# Patient Record
Sex: FEMALE | Race: WHITE
[De-identification: names, ages, dates, MRNs, and addresses within clinical notes are randomized per-mention and may not be internally consistent; named-entity substitution may affect disease eponyms.]

---

## 2021-03-23 ENCOUNTER — HOSPITAL ENCOUNTER (OUTPATIENT)
Dept: HOSPITAL 47 - CATHCVL | Age: 66
End: 2021-03-23
Attending: INTERNAL MEDICINE
Payer: MEDICARE

## 2021-03-23 VITALS — TEMPERATURE: 98 F

## 2021-03-23 VITALS — RESPIRATION RATE: 16 BRPM

## 2021-03-23 VITALS — HEART RATE: 80 BPM | DIASTOLIC BLOOD PRESSURE: 74 MMHG | SYSTOLIC BLOOD PRESSURE: 168 MMHG

## 2021-03-23 DIAGNOSIS — E78.5: ICD-10-CM

## 2021-03-23 DIAGNOSIS — Z95.828: ICD-10-CM

## 2021-03-23 DIAGNOSIS — Z88.8: ICD-10-CM

## 2021-03-23 DIAGNOSIS — Z88.5: ICD-10-CM

## 2021-03-23 DIAGNOSIS — Z95.5: ICD-10-CM

## 2021-03-23 DIAGNOSIS — I34.0: ICD-10-CM

## 2021-03-23 DIAGNOSIS — Z88.1: ICD-10-CM

## 2021-03-23 DIAGNOSIS — Z79.899: ICD-10-CM

## 2021-03-23 DIAGNOSIS — Z88.4: ICD-10-CM

## 2021-03-23 DIAGNOSIS — I10: ICD-10-CM

## 2021-03-23 DIAGNOSIS — I27.20: ICD-10-CM

## 2021-03-23 DIAGNOSIS — Z91.041: ICD-10-CM

## 2021-03-23 DIAGNOSIS — Z72.0: ICD-10-CM

## 2021-03-23 DIAGNOSIS — I73.9: Primary | ICD-10-CM

## 2021-03-23 DIAGNOSIS — R06.02: ICD-10-CM

## 2021-03-23 DIAGNOSIS — Z91.048: ICD-10-CM

## 2021-03-23 DIAGNOSIS — E78.00: ICD-10-CM

## 2021-03-23 DIAGNOSIS — Z79.02: ICD-10-CM

## 2021-03-23 DIAGNOSIS — Z88.6: ICD-10-CM

## 2021-03-23 DIAGNOSIS — I77.89: ICD-10-CM

## 2021-03-23 DIAGNOSIS — I25.10: ICD-10-CM

## 2021-03-23 LAB
ANION GAP SERPL CALC-SCNC: 12 MMOL/L
BASOPHILS # BLD AUTO: 0.1 K/UL (ref 0–0.2)
BASOPHILS NFR BLD AUTO: 1 %
BUN SERPL-SCNC: 21 MG/DL (ref 7–17)
CALCIUM SPEC-MCNC: 9.7 MG/DL (ref 8.4–10.2)
CHLORIDE SERPL-SCNC: 108 MMOL/L (ref 98–107)
CO2 SERPL-SCNC: 21 MMOL/L (ref 22–30)
EOSINOPHIL # BLD AUTO: 0.3 K/UL (ref 0–0.7)
EOSINOPHIL NFR BLD AUTO: 4 %
ERYTHROCYTE [DISTWIDTH] IN BLOOD BY AUTOMATED COUNT: 4.99 M/UL (ref 3.8–5.4)
ERYTHROCYTE [DISTWIDTH] IN BLOOD: 15.9 % (ref 11.5–15.5)
GLUCOSE SERPL-MCNC: 106 MG/DL (ref 74–99)
HCT VFR BLD AUTO: 39.8 % (ref 34–46)
HGB BLD-MCNC: 13.8 GM/DL (ref 11.4–16)
LYMPHOCYTES # SPEC AUTO: 1.7 K/UL (ref 1–4.8)
LYMPHOCYTES NFR SPEC AUTO: 26 %
MCH RBC QN AUTO: 27.7 PG (ref 25–35)
MCHC RBC AUTO-ENTMCNC: 34.8 G/DL (ref 31–37)
MCV RBC AUTO: 79.7 FL (ref 80–100)
MONOCYTES # BLD AUTO: 0.4 K/UL (ref 0–1)
MONOCYTES NFR BLD AUTO: 6 %
NEUTROPHILS # BLD AUTO: 4.1 K/UL (ref 1.3–7.7)
NEUTROPHILS NFR BLD AUTO: 62 %
PLATELET # BLD AUTO: 170 K/UL (ref 150–450)
POTASSIUM SERPL-SCNC: 4 MMOL/L (ref 3.5–5.1)
SODIUM SERPL-SCNC: 141 MMOL/L (ref 137–145)
WBC # BLD AUTO: 6.6 K/UL (ref 3.8–10.6)

## 2021-03-23 PROCEDURE — 75625 CONTRAST EXAM ABDOMINL AORTA: CPT

## 2021-03-23 PROCEDURE — 85025 COMPLETE CBC W/AUTO DIFF WBC: CPT

## 2021-03-23 PROCEDURE — 36200 PLACE CATHETER IN AORTA: CPT

## 2021-03-23 PROCEDURE — 80048 BASIC METABOLIC PNL TOTAL CA: CPT

## 2021-03-23 PROCEDURE — 75716 ARTERY X-RAYS ARMS/LEGS: CPT

## 2021-03-23 NOTE — AN
ANGIOGRAPHY REPORT



DATE OF SERVICE:

March 23, 2021



PERFORMING PHYSICIAN:

Chris Palacios MD



PROCEDURE PERFORMED:

1. An abdominal aortogram.

2. Bilateral lower extremities runoff.



INDICATION:

Left leg intermittent claudication with evidence of abnormal arterial duplex study in

this 65-year-old female patient who is known to have peripheral arterial disease and

prior aortobifemoral bypass.



APPROACH:

Right brachial artery.



COMPLICATION:

None.



LEVEL OF SEDATION:

Moderate with sedation length of 30 minutes.



PROCEDURE DESCRIPTION:

After obtaining an informed consent, the patient was brought to the cardiac cath lab.

Initially, we attempted to access the right common femoral artery, but that was

unsuccessful and because of that I accessed the right brachial artery.  I placed a 5-

Vietnamese sheath at the right brachial artery.



After that I did an abdominal aortogram and bilateral lower extremities runoff using 5-

Vietnamese pigtail catheter which was initially placed at the level of the renal arteries

and it was advanced down in the aortobifemoral bypass.



The procedure was completed without any complication.



SELECTIVE PERIPHERAL ANGIOGRAM:

1. The aortobifemoral bypass appeared to be patent.

2. Common Femoral Arteries: The right common femoral artery has mild disease only and

    the left common femoral artery appeared to have mild disease only as well.

3. Profunda: Both profunda are patent.

4. SFA: The right SFA appeared to be angiographically normal and the ostial of the

    left SFA has a lesion appeared to be in the range of 99.9%.

5. Popliteal: Both popliteals appeared to be patent.

6. Below The Knee: The arteries below the knee were not well visualized because of the

    patient motion.



CONCLUSION:

1. Patent aortobifemoral bypass.

2. Critical disease involving the ostial left SFA.



POSTPROCEDURE MANAGEMENT:

PTA of the left SFA to be performed either from an arm approach or from a foot

approach.





MMODL / IJN: [id number] / Job#: [id number]

## 2021-04-14 ENCOUNTER — HOSPITAL ENCOUNTER (OUTPATIENT)
Dept: HOSPITAL 47 - CATHCVL | Age: 66
Discharge: HOME | End: 2021-04-14
Attending: INTERNAL MEDICINE
Payer: OTHER GOVERNMENT

## 2021-04-14 VITALS — BODY MASS INDEX: 39 KG/M2

## 2021-04-14 VITALS — TEMPERATURE: 97 F | RESPIRATION RATE: 16 BRPM

## 2021-04-14 VITALS — HEART RATE: 67 BPM | DIASTOLIC BLOOD PRESSURE: 74 MMHG | SYSTOLIC BLOOD PRESSURE: 119 MMHG

## 2021-04-14 DIAGNOSIS — Z88.8: ICD-10-CM

## 2021-04-14 DIAGNOSIS — I70.212: Primary | ICD-10-CM

## 2021-04-14 DIAGNOSIS — I25.10: ICD-10-CM

## 2021-04-14 DIAGNOSIS — Z88.6: ICD-10-CM

## 2021-04-14 DIAGNOSIS — Z88.1: ICD-10-CM

## 2021-04-14 DIAGNOSIS — Z79.899: ICD-10-CM

## 2021-04-14 DIAGNOSIS — Z88.4: ICD-10-CM

## 2021-04-14 DIAGNOSIS — I10: ICD-10-CM

## 2021-04-14 DIAGNOSIS — E78.5: ICD-10-CM

## 2021-04-14 DIAGNOSIS — Z95.5: ICD-10-CM

## 2021-04-14 DIAGNOSIS — Z79.02: ICD-10-CM

## 2021-04-14 DIAGNOSIS — Z72.0: ICD-10-CM

## 2021-04-14 DIAGNOSIS — Z88.5: ICD-10-CM

## 2021-04-14 DIAGNOSIS — Z91.048: ICD-10-CM

## 2021-04-14 PROCEDURE — 37225: CPT

## 2021-04-14 PROCEDURE — 37252 INTRVASC US NONCORONARY 1ST: CPT

## 2021-04-14 PROCEDURE — 87635 SARS-COV-2 COVID-19 AMP PRB: CPT

## 2021-04-14 NOTE — PCN
PROCEDURE NOTE



DATE OF SERVICE:

04/14/2021



PERFORMING PHYSICIAN:

Chris Palacios M.D.



PROCEDURES PERFORMED:

1. Atherectomy of the left SFA using the TurboHawk device.

2. Intravascular ultrasound (IVUS) of the left SFA.

3. Successful balloon angioplasty of the left SFA using a 5 x 40 mm Inpact drug-coated

    balloon with an excellent angiographic result and reduction of stenosis from 99% to

    0%.

4. Selective angiogram of the left SFA and left posterior tibial artery.

5. Ultrasound-guided access of the left posterior tibial artery.



INDICATION:

This is a very pleasant 65-year-old female patient with peripheral arterial disease and

prior aortobifemoral bypass who was experiencing left leg intermittent claudication and

underwent an angiogram that revealed critical disease involving the left SFA.



She was brought today for intervention.



APPROACH:

Left posterior tibial artery.



COMPLICATIONS:

None.



LEVEL OF SEDATION:

Moderate, with sedation length of 42 minutes.



PROCEDURE DESCRIPTION:

After obtaining informed consent, the patient was brought to the cardiac cath lab.  The

left posterior tibial artery was accessed using micropuncture technique under

ultrasound guidance. Then I placed a slender 11 cm with a 5 mm outer diameter and 6 mm

inner diameter over a 0.018 wire in the left posterior tibial artery.



At that point, a cocktail drip of heparin as well as nitroglycerin as well as verapamil

was initiated.



I did selective left posterior tibial artery angiogram to prove that I was in the true

lumen.



Subsequently I did cross the lesion in the left SFA using an 0.014 wire.  I did

intravascular ultrasound which revealed a diameter of 5 mm.  Atherectomy was performed

using the TurboHawk device before balloon angioplasty initially was performed using a 4

mm balloon and subsequently a 5 mm balloon.  The following angiogram showed an

excellent angiographic result and the procedure was completed without any complication.



POST-PROCEDURE MANAGEMENT:

1. Dual anti-platelet therapy.

2. Risk factor modifications.

3. Follow up with the patient.





MMODL / IJN: [id number] / Job#: [id number]

## 2021-04-14 NOTE — IR
Fluoroscopy

 

HISTORY: Pain in leg, peripheral vascular occlusive disease

 

6.6 minutes fluoroscopy time supplied to the referring clinician.  177 intraoperative C-arm images do
cument the procedure. See dictated report from cardiology.

## 2021-04-16 ENCOUNTER — HOSPITAL ENCOUNTER (EMERGENCY)
Dept: HOSPITAL 47 - EC | Age: 66
Discharge: HOME | End: 2021-04-16
Payer: OTHER GOVERNMENT

## 2021-04-16 VITALS — RESPIRATION RATE: 16 BRPM | SYSTOLIC BLOOD PRESSURE: 152 MMHG | DIASTOLIC BLOOD PRESSURE: 100 MMHG | HEART RATE: 60 BPM

## 2021-04-16 VITALS — TEMPERATURE: 97.7 F

## 2021-04-16 DIAGNOSIS — Z88.6: ICD-10-CM

## 2021-04-16 DIAGNOSIS — Z88.4: ICD-10-CM

## 2021-04-16 DIAGNOSIS — I25.10: ICD-10-CM

## 2021-04-16 DIAGNOSIS — M79.605: Primary | ICD-10-CM

## 2021-04-16 DIAGNOSIS — G47.33: ICD-10-CM

## 2021-04-16 DIAGNOSIS — E78.5: ICD-10-CM

## 2021-04-16 DIAGNOSIS — Z99.89: ICD-10-CM

## 2021-04-16 DIAGNOSIS — F41.9: ICD-10-CM

## 2021-04-16 DIAGNOSIS — I10: ICD-10-CM

## 2021-04-16 DIAGNOSIS — F32.9: ICD-10-CM

## 2021-04-16 DIAGNOSIS — Z85.038: ICD-10-CM

## 2021-04-16 DIAGNOSIS — Z88.1: ICD-10-CM

## 2021-04-16 DIAGNOSIS — Z88.8: ICD-10-CM

## 2021-04-16 DIAGNOSIS — Z91.048: ICD-10-CM

## 2021-04-16 DIAGNOSIS — Z79.899: ICD-10-CM

## 2021-04-16 DIAGNOSIS — Z91.041: ICD-10-CM

## 2021-04-16 DIAGNOSIS — Z88.5: ICD-10-CM

## 2021-04-16 DIAGNOSIS — K21.9: ICD-10-CM

## 2021-04-16 DIAGNOSIS — Z87.891: ICD-10-CM

## 2021-04-16 PROCEDURE — 99283 EMERGENCY DEPT VISIT LOW MDM: CPT

## 2021-04-16 NOTE — US
EXAMINATION TYPE: US venous doppler duplex LE LT

 

DATE OF EXAM: 4/16/2021 7:48 PM

 

COMPARISON: NONE

 

CLINICAL HISTORY: recent procedure, pain in calf. Recent procedure 4/14/2021, pain in calf. Limited h
istory from patient. No hx of DVT. Patient on blood thinners.

 

SIDE PERFORMED: Left  

 

TECHNIQUE:  The lower extremity deep venous system is examined utilizing real time linear array sonog
radha with graded compression, doppler sonography and color-flow sonography.

 

VESSELS IMAGED:

Common Femoral Vein

Deep Femoral Vein

Greater Saphenous Vein *

Femoral Vein

Popliteal Vein

Small Saphenous Vein *

Proximal Calf Veins

(* superficial vessels)

 

Limited due to patient body habitus and swelling.

 

Left Leg:  There appears to be a color defect in the mid and distal femoral vein.  All veins imaged a
ppear to compress and to show color flow.  Possible chronic thrombus within the mid and distal femora
l vein?

 

At the patient's area of pain within the calf, there appears to be a heterogeneous, hypoechoic area n
ear calf vessels.  This area measures: 1.6 x 1.0 x 0.7 cm.

 

 

IMPRESSION:  Irregular wall of the femoral vein suggestive of chronic deep vein thrombosis. No eviden
ce of acute deep vein thrombosis.

 

Small hypoechoic complex area in the calf could be a small hematoma.

## 2021-04-16 NOTE — CDI
Outpatient Documentation Clarification From



Date:  4/16/21



CDS/ Name:  Diane Dee

Phone:  If any question, call Marla Elliott  at [phone]



Patient Name:  Paty Rubin

Account Number:  [id number]

Admit Date:  4/14/21

Discharged Date:  4/14/21



ATTENTION:  The [facility] coding Staff appreciate your assistance in clarifying 
documentation.  Please respond to

the clarification below the line at the bottom and electronically sign.  The [facility]
coding staff will review the response

and follow-up if needed.  Please Note:  Queries are made part of the Legal 
Health Record.  If you have any

question, please contact the .



Dear Dr. Palacios.



Please provide clarification as to the cause of the occlusive PAD.  PAD?PVD is 
considered unspecified.

Greatest specificity is required for medical necessity support. 



Is underlying cause of the Occlusive PAD one of the following?



Arteriosclerotic disease of the arteries

Arteritis

Necrotic

Due to embolism/thrombosis

Other - please specify below



Thank you for your kind consideration,

 
________________________________________________________________________________

_______________________________

MTDD

## 2021-04-16 NOTE — ED
Lower Extremity Injury HPI





- General


Chief Complaint: Extremity Injury, Lower


Stated Complaint: post op issue


Time Seen by Provider: 21 18:20


Source: patient, RN notes reviewed


Mode of arrival: ambulatory


Limitations: no limitations





- History of Present Illness


Initial Comments: 





This is a 65-year-old female history of multiple medical issues who had a left 

femoral vessel procedure done 2 days ago and complained of the onset of pain to 

the localized site as well as some into her medial left calf.  No fevers chills 

nausea vomiting sweats or other symptoms no bleeding.  No bruising reported.  No

loss of function to her lower extremities.





- Related Data


                                Home Medications











 Medication  Instructions  Recorded  Confirmed


 


Cetirizine HCl [Zyrtec] 10 mg PO DAILY 19


 


Pantoprazole Sodium [Protonix] 40 mg PO DAILY 19


 


Sertraline [Zoloft] 100 mg PO DAILY 19


 


Valsartan [Diovan] 160 mg PO BID 20


 


Metoprolol Succinate [Toprol XL] 25 mg PO DAILY 21


 


Rosuvastatin Calcium [Crestor] 40 mg PO DAILY 21


 


buPROPion HCL [Wellbutrin SR] 100 mg PO DAILY 21








                                  Previous Rx's











 Medication  Instructions  Recorded


 


Clopidogrel [Plavix] 75 mg PO DAILY #30 tab 19


 


Nitroglycerin Sl Tabs [Nitrostat] 0.4 mg SUBLINGUAL Q5M PRN #25 tab 19











                                    Allergies











Allergy/AdvReac Type Severity Reaction Status Date / Time


 


albuterol Allergy  Rash/Hives Verified 21 17:01


 


cortisone Allergy  Swelling/Pain Verified 21 17:01





   at inj site  


 


erythromycin base Allergy  Rash/Hives Verified 21 17:01


 


hydralazine Allergy  Rash/Hives Verified 21 17:01


 


Iodinated Contrast Media Allergy  Anaphylaxis Verified 21 17:01


 


iodine Allergy  Anaphylaxis Verified 21 17:01


 


lisinopril Allergy  Swelling Verified 21 17:01


 


modafinil Allergy  Unknown Verified 21 17:01


 


nystatin Allergy  Rash/Hives Verified 21 17:01


 


procaine [From Novocain] Allergy  Swelling Verified 21 17:01


 


vancomycin Allergy  Rash/Hives Verified 21 17:01


 


amlodipine AdvReac  Nausea & Verified 21 17:01





   Vomiting &  





   Diarrhea  


 


aspirin AdvReac  stomach Verified 21 17:01





   and  





   esophageal  





   ulcers  


 


atorvastatin [From Lipitor] AdvReac  MUSCLE PAIN Verified 21 17:01


 


cimetidine [From Tagamet] AdvReac  dizziness Verified 21 17:01


 


isosorbide AdvReac  Nausea & Verified 21 17:01





   Vomiting &  





   Diarrhea  


 


labetalol AdvReac  Nausea & Verified 21 17:01





   Vomiting &  





   Diarrhea  


 


metoprolol AdvReac  Diarrhea Verified 21 17:01


 


morphine AdvReac  "generic Verified 21 17:01





   morphine"  


 


oxcarbazepine AdvReac  hair loss Verified 21 17:01





[From Trileptal]     


 


paroxetine [From Paxil] AdvReac  tremors Verified 21 17:01


 


simvastatin [From Zocor] AdvReac  MUSCLE PAIN Verified 21 17:01














Review of Systems


ROS Statement: 


Those systems with pertinent positive or pertinent negative responses have been 

documented in the HPI.





ROS Other: All systems not noted in ROS Statement are negative.





Past Medical History


Past Medical History: Coronary Artery Disease (CAD), Cancer, GERD/Reflux, 

Hyperlipidemia, Hypertension, Liver Disease, Osteoarthritis (OA), Sleep 

Apnea/CPAP/BIPAP, Vascular Disorder


Additional Past Medical History / Comment(s): 2007 Aortic 

thrombectomy/aortofemoral bypass/pt in coma/trach x 6 weeks, R hip to R toes 

nerve damage after 6 weeks comatose, past chronic pain to R hip/leg but has pain

stimulator which has greatly helped, hx gastric ulcers, Nielsen's esophagus, 

hemorrhoids, 5 cancerous colon polyps removed, MIRELA with Cpap/O2 use, arthritis 

in feet.


History of Any Multi-Drug Resistant Organisms: None Reported


Past Surgical History: Appendectomy, Cholecystectomy, Heart Catheterization, 

Heart Catheterization With Stent, Hysterectomy, Orthopedic Surgery, 

Tonsillectomy


Additional Past Surgical History / Comment(s): abdominal aortogram 21, 

Bilateral heel bone spurs/bilateral feet fusions to lower arches, bone spurs 

removed from inside bilateral feet,  stent in R leg with bypass to left 

leg,(now disconnected)  aortic thrombectomy/aortofemoral bypass, R knee 

arthroscopic surgery,  neurostimulator implant,  neruostimulator paddle 

moved, 2019 stimulator replaced and relocation stimulator battery, 2017 L common

femoral endartectomy, EGDs, colonoscopies/cancerous polypectomies., left femoral

surgery,


Past Anesthesia/Blood Transfusion Reactions: No Reported Reaction


Date of Last Stent Placement:: 2019


Past Psychological History: Anxiety, Depression


Smoking Status: Former smoker


Past Alcohol Use History: None Reported


Past Drug Use History: None Reported





- Past Family History


  ** Mother


Family Medical History: No Reported History


Additional Family Medical History / Comment(s): Mother was an alcoholic and 

at the age of 36 yrs.





  ** Father


Family Medical History: Cancer


Additional Family Medical History / Comment(s): Father had jaw/throat cancer.  

He was a smoker.





General Exam





- General Exam Comments


Initial Comments: 





This is a well-developed well-nourished awake alert oriented 3 female


Limitations: no limitations


General appearance: alert, in no apparent distress


Head exam: Present: atraumatic, normocephalic, normal inspection


Eye exam: Present: normal appearance, PERRL, EOMI.  Absent: scleral icterus, 

conjunctival injection, periorbital swelling


Neck exam: Present: normal inspection


Respiratory exam: Present: normal lung sounds bilaterally.  Absent: respiratory 

distress, wheezes, rales, rhonchi, stridor


Cardiovascular Exam: Present: regular rate, normal rhythm, normal heart sounds. 

Absent: systolic murmur, diastolic murmur, rubs, gallop, clicks


Extremities exam: Present: full ROM, tenderness, normal capillary refill, other 

( alert 77 straight a healing catheterization site over the distal medial left

leg no evidence of any bleeding ecchymosis erythema there is some tenderness 

palpation over the medial inferior aspect of the left calf no palpable cords 

proximal is no tenderness.).  Absent: pedal edema, joint swelling


Back exam: Present: full ROM


Neurological exam: Present: alert, oriented X3, CN II-XII intact


Psychiatric exam: Present: normal affect, normal mood


Skin exam: Present: warm, dry, intact, normal color.  Absent: rash





Course


                                   Vital Signs











  21





  16:56


 


Temperature 97.7 F


 


Pulse Rate 85


 


Respiratory 18





Rate 


 


Blood Pressure 155/79


 


O2 Sat by Pulse 96





Oximetry 














Medical Decision Making





- Medical Decision Making





I did review the imaging and the report I did discuss findings with patient 

she'll be discharged home with follow-up with Dr. Aranda as directed.  Return 

parameters discussed.  No evidence of acute DVT no evidence of acute vascular 

occlusions





- Radiology Data


Radiology results: report reviewed (Imaging and report reviewed question 

possible old clot no acute findings seen at this time.), image reviewed





Disposition


Clinical Impression: 


 Left leg pain





Disposition: HOME SELF-CARE


Condition: Good


Instructions (If sedation given, give patient instructions):  Leg Pain (ED)


Additional Instructions: 


Keep follow-ups with Dr. Palacios as directed


Is patient prescribed a controlled substance at d/c from ED?: No


Referrals: 


[facility] [Primary Care Provider] - 1-2 days

## 2021-05-14 ENCOUNTER — HOSPITAL ENCOUNTER (INPATIENT)
Dept: HOSPITAL 47 - EC | Age: 66
LOS: 7 days | Discharge: HOME | DRG: 196 | End: 2021-05-21
Attending: HOSPITALIST | Admitting: HOSPITALIST
Payer: OTHER GOVERNMENT

## 2021-05-14 VITALS — BODY MASS INDEX: 39 KG/M2

## 2021-05-14 DIAGNOSIS — I10: ICD-10-CM

## 2021-05-14 DIAGNOSIS — Z88.1: ICD-10-CM

## 2021-05-14 DIAGNOSIS — Z91.041: ICD-10-CM

## 2021-05-14 DIAGNOSIS — Z90.49: ICD-10-CM

## 2021-05-14 DIAGNOSIS — Z88.6: ICD-10-CM

## 2021-05-14 DIAGNOSIS — Z79.02: ICD-10-CM

## 2021-05-14 DIAGNOSIS — Z87.891: ICD-10-CM

## 2021-05-14 DIAGNOSIS — K76.0: ICD-10-CM

## 2021-05-14 DIAGNOSIS — Z95.5: ICD-10-CM

## 2021-05-14 DIAGNOSIS — Z88.5: ICD-10-CM

## 2021-05-14 DIAGNOSIS — Z90.710: ICD-10-CM

## 2021-05-14 DIAGNOSIS — G47.33: ICD-10-CM

## 2021-05-14 DIAGNOSIS — Z81.1: ICD-10-CM

## 2021-05-14 DIAGNOSIS — J84.9: Primary | ICD-10-CM

## 2021-05-14 DIAGNOSIS — I25.110: ICD-10-CM

## 2021-05-14 DIAGNOSIS — N39.0: ICD-10-CM

## 2021-05-14 DIAGNOSIS — E66.01: ICD-10-CM

## 2021-05-14 DIAGNOSIS — F41.9: ICD-10-CM

## 2021-05-14 DIAGNOSIS — F32.9: ICD-10-CM

## 2021-05-14 DIAGNOSIS — Z88.8: ICD-10-CM

## 2021-05-14 DIAGNOSIS — J43.9: ICD-10-CM

## 2021-05-14 DIAGNOSIS — J96.21: ICD-10-CM

## 2021-05-14 DIAGNOSIS — K21.9: ICD-10-CM

## 2021-05-14 DIAGNOSIS — E78.5: ICD-10-CM

## 2021-05-14 DIAGNOSIS — I48.91: ICD-10-CM

## 2021-05-14 DIAGNOSIS — I49.1: ICD-10-CM

## 2021-05-14 DIAGNOSIS — Z85.038: ICD-10-CM

## 2021-05-14 DIAGNOSIS — Z79.899: ICD-10-CM

## 2021-05-14 DIAGNOSIS — I73.9: ICD-10-CM

## 2021-05-14 DIAGNOSIS — Z20.822: ICD-10-CM

## 2021-05-14 DIAGNOSIS — Z79.52: ICD-10-CM

## 2021-05-14 DIAGNOSIS — Z91.19: ICD-10-CM

## 2021-05-14 LAB
ALBUMIN SERPL-MCNC: 4.4 G/DL (ref 3.5–5)
ALP SERPL-CCNC: 114 U/L (ref 38–126)
ALT SERPL-CCNC: 21 U/L (ref 4–34)
ANION GAP SERPL CALC-SCNC: 9 MMOL/L
APTT BLD: 23.8 SEC (ref 22–30)
AST SERPL-CCNC: 28 U/L (ref 14–36)
BASOPHILS # BLD AUTO: 0.1 K/UL (ref 0–0.2)
BASOPHILS NFR BLD AUTO: 1 %
BUN SERPL-SCNC: 15 MG/DL (ref 7–17)
CALCIUM SPEC-MCNC: 9.9 MG/DL (ref 8.4–10.2)
CHLORIDE SERPL-SCNC: 108 MMOL/L (ref 98–107)
CK SERPL-CCNC: 69 U/L (ref 30–135)
CO2 SERPL-SCNC: 22 MMOL/L (ref 22–30)
EOSINOPHIL # BLD AUTO: 0.3 K/UL (ref 0–0.7)
EOSINOPHIL NFR BLD AUTO: 5 %
ERYTHROCYTE [DISTWIDTH] IN BLOOD BY AUTOMATED COUNT: 5.3 M/UL (ref 3.8–5.4)
ERYTHROCYTE [DISTWIDTH] IN BLOOD: 15.7 % (ref 11.5–15.5)
GLUCOSE SERPL-MCNC: 90 MG/DL (ref 74–99)
HCT VFR BLD AUTO: 42.9 % (ref 34–46)
HGB BLD-MCNC: 14.7 GM/DL (ref 11.4–16)
INR PPP: 1 (ref ?–1.2)
LYMPHOCYTES # SPEC AUTO: 1.5 K/UL (ref 1–4.8)
LYMPHOCYTES NFR SPEC AUTO: 25 %
MAGNESIUM SPEC-SCNC: 1.6 MG/DL (ref 1.6–2.3)
MCH RBC QN AUTO: 27.6 PG (ref 25–35)
MCHC RBC AUTO-ENTMCNC: 34.2 G/DL (ref 31–37)
MCV RBC AUTO: 80.8 FL (ref 80–100)
MONOCYTES # BLD AUTO: 0.3 K/UL (ref 0–1)
MONOCYTES NFR BLD AUTO: 6 %
NEUTROPHILS # BLD AUTO: 3.7 K/UL (ref 1.3–7.7)
NEUTROPHILS NFR BLD AUTO: 62 %
PLATELET # BLD AUTO: 160 K/UL (ref 150–450)
POTASSIUM SERPL-SCNC: 4.6 MMOL/L (ref 3.5–5.1)
PROT SERPL-MCNC: 6.8 G/DL (ref 6.3–8.2)
PT BLD: 11 SEC (ref 9–12)
SODIUM SERPL-SCNC: 139 MMOL/L (ref 137–145)
WBC # BLD AUTO: 6 K/UL (ref 3.8–10.6)

## 2021-05-14 PROCEDURE — 85610 PROTHROMBIN TIME: CPT

## 2021-05-14 PROCEDURE — 82550 ASSAY OF CK (CPK): CPT

## 2021-05-14 PROCEDURE — 71046 X-RAY EXAM CHEST 2 VIEWS: CPT

## 2021-05-14 PROCEDURE — 85730 THROMBOPLASTIN TIME PARTIAL: CPT

## 2021-05-14 PROCEDURE — 99285 EMERGENCY DEPT VISIT HI MDM: CPT

## 2021-05-14 PROCEDURE — 85025 COMPLETE CBC W/AUTO DIFF WBC: CPT

## 2021-05-14 PROCEDURE — 93306 TTE W/DOPPLER COMPLETE: CPT

## 2021-05-14 PROCEDURE — 83880 ASSAY OF NATRIURETIC PEPTIDE: CPT

## 2021-05-14 PROCEDURE — 87635 SARS-COV-2 COVID-19 AMP PRB: CPT

## 2021-05-14 PROCEDURE — 94760 N-INVAS EAR/PLS OXIMETRY 1: CPT

## 2021-05-14 PROCEDURE — 80053 COMPREHEN METABOLIC PANEL: CPT

## 2021-05-14 PROCEDURE — 93005 ELECTROCARDIOGRAM TRACING: CPT

## 2021-05-14 PROCEDURE — 81001 URINALYSIS AUTO W/SCOPE: CPT

## 2021-05-14 PROCEDURE — 36415 COLL VENOUS BLD VENIPUNCTURE: CPT

## 2021-05-14 PROCEDURE — 71045 X-RAY EXAM CHEST 1 VIEW: CPT

## 2021-05-14 PROCEDURE — 83735 ASSAY OF MAGNESIUM: CPT

## 2021-05-14 PROCEDURE — 84484 ASSAY OF TROPONIN QUANT: CPT

## 2021-05-14 PROCEDURE — 87086 URINE CULTURE/COLONY COUNT: CPT

## 2021-05-14 PROCEDURE — 80048 BASIC METABOLIC PNL TOTAL CA: CPT

## 2021-05-14 PROCEDURE — 71275 CT ANGIOGRAPHY CHEST: CPT

## 2021-05-14 PROCEDURE — 83605 ASSAY OF LACTIC ACID: CPT

## 2021-05-14 PROCEDURE — 80061 LIPID PANEL: CPT

## 2021-05-14 PROCEDURE — 85379 FIBRIN DEGRADATION QUANT: CPT

## 2021-05-14 RX ADMIN — VALSARTAN SCH MG: 160 TABLET, FILM COATED ORAL at 21:05

## 2021-05-14 NOTE — XR
EXAMINATION TYPE: XR chest 2V

 

DATE OF EXAM: 5/14/2021

 

COMPARISON: 1/22/2020

 

HISTORY: Shortness of breath

 

TECHNIQUE:  Frontal and lateral views of the chest are obtained.

 

FINDINGS:

 

Scattered senescent parenchymal changes noted. Hyperinflation compatible with COPD. 

 

No evidence for infiltrate. No evidence for atelectasis. Nonspecific prominence of the pulmonary inte
rstitium. Overall the appearance is unchanged.

 

Heart size is stable.

 

Mediastinal structures are stable and grossly unremarkable.

 

No evidence for hilar prominence.

 

Degenerative changes dorsal spine. 

 

IMPRESSION:

1. No evidence for acute pulmonary disease.

## 2021-05-14 NOTE — ED
SOB HPI





- General


Chief Complaint: Shortness of Breath


Stated Complaint: sent from [facility] for hypoxia


Time Seen by Provider: 21 14:07


Source: patient, RN notes reviewed


Mode of arrival: ambulatory


Limitations: no limitations





- History of Present Illness


Initial Comments: 





This is a 65-year-old female presents complaints of going breathing exertional 

dyspnea and some chest tightness when she tries to breathe.  She denies any 

overt fevers chills nausea vomiting sweats she was seen at [facility] and sent 

here for further evaluation.  She did have a Covid test there that was negative.

 She apparently was found have a low pulse oximetry upon arrival here was 94%.  

Patient has had shortness of breath and cough for 3 days also chest discomfort 

with breathing.


MD Complaint: shortness of breath





- Related Data


                                Home Medications











 Medication  Instructions  Recorded  Confirmed


 


Cetirizine HCl [Zyrtec] 10 mg PO DAILY 19


 


Pantoprazole Sodium [Protonix] 40 mg PO DAILY 19


 


Sertraline [Zoloft] 100 mg PO DAILY 19


 


Metoprolol Succinate [Toprol XL] 25 mg PO DAILY 21


 


Rosuvastatin Calcium [Crestor] 40 mg PO DAILY 21


 


Turmeric Root Extract [Turmeric] 500 mg PO DAILY 21


 


Valsartan 160 mg PO BID 21


 


buPROPion [Wellbutrin] 100 mg PO DAILY 21








                                  Previous Rx's











 Medication  Instructions  Recorded


 


Clopidogrel [Plavix] 75 mg PO DAILY #30 tab 19


 


Nitroglycerin Sl Tabs [Nitrostat] 0.4 mg SUBLINGUAL Q5M PRN #25 tab 19











                                    Allergies











Allergy/AdvReac Type Severity Reaction Status Date / Time


 


albuterol Allergy  Rash/Hives Verified 21 15:49


 


cortisone Allergy  Swelling/Pain Verified 21 15:49





   at inj site  


 


erythromycin base Allergy  Rash/Hives Verified 21 15:49


 


hydralazine Allergy  Rash/Hives Verified 21 15:49


 


Iodinated Contrast Media Allergy  Anaphylaxis Verified 21 15:49


 


iodine Allergy  Anaphylaxis Verified 21 15:49


 


lisinopril Allergy  Swelling Verified 21 15:49


 


modafinil Allergy  Unknown Verified 21 15:49


 


nystatin Allergy  Rash/Hives Verified 21 15:49


 


procaine [From Novocain] Allergy  Swelling Verified 21 15:49


 


vancomycin Allergy  Rash/Hives Verified 21 15:49


 


amlodipine AdvReac  Nausea & Verified 21 15:49





   Vomiting &  





   Diarrhea  


 


aspirin AdvReac  stomach Verified 21 15:49





   and  





   esophageal  





   ulcers  


 


atorvastatin [From Lipitor] AdvReac  MUSCLE PAIN Verified 21 15:49


 


cimetidine [From Tagamet] AdvReac  dizziness Verified 21 15:49


 


isosorbide AdvReac  Nausea & Verified 21 15:49





   Vomiting &  





   Diarrhea  


 


labetalol AdvReac  Nausea & Verified 21 15:49





   Vomiting &  





   Diarrhea  


 


metoprolol AdvReac  Diarrhea Verified 21 15:49


 


morphine AdvReac  "generic Verified 21 15:49





   morphine"  


 


oxcarbazepine AdvReac  hair loss Verified 21 15:49





[From Trileptal]     


 


paroxetine [From Paxil] AdvReac  tremors Verified 21 15:49


 


simvastatin [From Zocor] AdvReac  MUSCLE PAIN Verified 21 15:49














Review of Systems


ROS Statement: 


Those systems with pertinent positive or pertinent negative responses have been 

documented in the HPI.





ROS Other: All systems not noted in ROS Statement are negative.





Past Medical History


Past Medical History: Coronary Artery Disease (CAD), Cancer, GERD/Reflux, 

Hyperlipidemia, Hypertension, Liver Disease, Osteoarthritis (OA), Sleep 

Apnea/CPAP/BIPAP, Vascular Disorder


Additional Past Medical History / Comment(s): 2007 Aortic thr

ombectomy/aortofemoral bypass/pt in coma/trach x 6 weeks, R hip to R toes nerve 

damage after 6 weeks comatose, past chronic pain to R hip/leg but has pain 

stimulator which has greatly helped, hx gastric ulcers, Nielsen's esophagus, 

hemorrhoids, 5 cancerous colon polyps removed, MIRELA with Cpap/O2 use, arthritis 

in feet.


History of Any Multi-Drug Resistant Organisms: None Reported


Past Surgical History: Appendectomy, Cholecystectomy, Heart Catheterization, 

Heart Catheterization With Stent, Hysterectomy, Orthopedic Surgery, 

Tonsillectomy


Additional Past Surgical History / Comment(s): abdominal aortogram 21, 

Bilateral heel bone spurs/bilateral feet fusions to lower arches, bone spurs 

removed from inside bilateral feet,  stent in R leg with bypass to left 

leg,(now disconnected)  aortic thrombectomy/aortofemoral bypass, R knee 

arthroscopic surgery,  neurostimulator implant,  neruostimulator paddle 

moved,  stimulator replaced and relocation stimulator battery, 2017 L common

femoral endartectomy, EGDs, colonoscopies/cancerous polypectomies., left femoral

surgery,


Past Anesthesia/Blood Transfusion Reactions: No Reported Reaction


Date of Last Stent Placement:: 2019


Past Psychological History: Anxiety, Depression


Smoking Status: Former smoker


Past Alcohol Use History: None Reported


Past Drug Use History: None Reported





- Past Family History


  ** Mother


Family Medical History: No Reported History


Additional Family Medical History / Comment(s): Mother was an alcoholic and 

at the age of 36 yrs.





  ** Father


Family Medical History: Cancer


Additional Family Medical History / Comment(s): Father had jaw/throat cancer.  

He was a smoker.





General Exam





- General Exam Comments


Initial Comments: 





Is a well developed well-nourished awake alert oriented 3 female


Limitations: no limitations


General appearance: alert, in no apparent distress


Head exam: Present: atraumatic, normocephalic, normal inspection


Eye exam: Present: normal appearance, PERRL, EOMI.  Absent: scleral icterus, 

conjunctival injection, periorbital swelling


ENT exam: Present: normal exam, mucous membranes moist


Neck exam: Present: normal inspection.  Absent: tenderness, meningismus, 

lymphadenopathy


Respiratory exam: Present: normal lung sounds bilaterally.  Absent: respiratory 

distress, wheezes, rales, rhonchi, stridor


Cardiovascular Exam: Present: regular rate, normal rhythm, normal heart sounds. 

Absent: systolic murmur, diastolic murmur, rubs, gallop, clicks


GI/Abdominal exam: Present: soft, normal bowel sounds.  Absent: distended, t

enderness, guarding, rebound, rigid


Extremities exam: Present: normal inspection, full ROM, normal capillary refill.

 Absent: tenderness, pedal edema, joint swelling, calf tenderness


Back exam: Present: normal inspection


Neurological exam: Present: alert, oriented X3, CN II-XII intact


Psychiatric exam: Present: normal affect, normal mood


Skin exam: Present: warm, dry, intact, normal color.  Absent: rash





Course


                                   Vital Signs











  21





  14:02 14:19 14:40


 


Temperature 98.1 F  


 


Pulse Rate 58 L  74


 


Respiratory 20 18 18





Rate   


 


Blood Pressure 165/87  148/77


 


O2 Sat by Pulse 91 L  93 L





Oximetry   














Medical Decision Making





- Medical Decision Making





I did discuss [employer] the patient family as well as Dr. Dixon the patient be 

admitted with cardiology consultation concern for the exertional dyspnea 

secondary to anginal in:





- Lab Data


Result diagrams: 


                                 21 14:35





                                 21 14:35


                                   Lab Results











  21 Range/Units





  14:35 14:35 14:35 


 


WBC  6.0    (3.8-10.6)  k/uL


 


RBC  5.30    (3.80-5.40)  m/uL


 


Hgb  14.7    (11.4-16.0)  gm/dL


 


Hct  42.9    (34.0-46.0)  %


 


MCV  80.8    (80.0-100.0)  fL


 


MCH  27.6    (25.0-35.0)  pg


 


MCHC  34.2    (31.0-37.0)  g/dL


 


RDW  15.7 H    (11.5-15.5)  %


 


Plt Count  160    (150-450)  k/uL


 


MPV  7.8    


 


Neutrophils %  62    %


 


Lymphocytes %  25    %


 


Monocytes %  6    %


 


Eosinophils %  5    %


 


Basophils %  1    %


 


Neutrophils #  3.7    (1.3-7.7)  k/uL


 


Lymphocytes #  1.5    (1.0-4.8)  k/uL


 


Monocytes #  0.3    (0-1.0)  k/uL


 


Eosinophils #  0.3    (0-0.7)  k/uL


 


Basophils #  0.1    (0-0.2)  k/uL


 


PT   11.0   (9.0-12.0)  sec


 


INR   1.0   (<1.2)  


 


APTT   23.8   (22.0-30.0)  sec


 


D-Dimer   0.52   (<0.60)  mg/L FEU


 


Sodium    139  (137-145)  mmol/L


 


Potassium    4.6  (3.5-5.1)  mmol/L


 


Chloride    108 H  ()  mmol/L


 


Carbon Dioxide    22  (22-30)  mmol/L


 


Anion Gap    9  mmol/L


 


BUN    15  (7-17)  mg/dL


 


Creatinine    0.88  (0.52-1.04)  mg/dL


 


Est GFR (CKD-EPI)AfAm    80  (>60 ml/min/1.73 sqM)  


 


Est GFR (CKD-EPI)NonAf    69  (>60 ml/min/1.73 sqM)  


 


Glucose    90  (74-99)  mg/dL


 


Plasma Lactic Acid John     (0.7-2.0)  mmol/L


 


Calcium    9.9  (8.4-10.2)  mg/dL


 


Magnesium    1.6  (1.6-2.3)  mg/dL


 


Total Bilirubin    0.7  (0.2-1.3)  mg/dL


 


AST    28  (14-36)  U/L


 


ALT    21  (4-34)  U/L


 


Alkaline Phosphatase    114  ()  U/L


 


Creatine Kinase    69  ()  U/L


 


Troponin I     (0.000-0.034)  ng/mL


 


NT-Pro-B Natriuret Pep     pg/mL


 


Total Protein    6.8  (6.3-8.2)  g/dL


 


Albumin    4.4  (3.5-5.0)  g/dL














  21 Range/Units





  14:35 14:35 14:35 


 


WBC     (3.8-10.6)  k/uL


 


RBC     (3.80-5.40)  m/uL


 


Hgb     (11.4-16.0)  gm/dL


 


Hct     (34.0-46.0)  %


 


MCV     (80.0-100.0)  fL


 


MCH     (25.0-35.0)  pg


 


MCHC     (31.0-37.0)  g/dL


 


RDW     (11.5-15.5)  %


 


Plt Count     (150-450)  k/uL


 


MPV     


 


Neutrophils %     %


 


Lymphocytes %     %


 


Monocytes %     %


 


Eosinophils %     %


 


Basophils %     %


 


Neutrophils #     (1.3-7.7)  k/uL


 


Lymphocytes #     (1.0-4.8)  k/uL


 


Monocytes #     (0-1.0)  k/uL


 


Eosinophils #     (0-0.7)  k/uL


 


Basophils #     (0-0.2)  k/uL


 


PT     (9.0-12.0)  sec


 


INR     (<1.2)  


 


APTT     (22.0-30.0)  sec


 


D-Dimer     (<0.60)  mg/L FEU


 


Sodium     (137-145)  mmol/L


 


Potassium     (3.5-5.1)  mmol/L


 


Chloride     ()  mmol/L


 


Carbon Dioxide     (22-30)  mmol/L


 


Anion Gap     mmol/L


 


BUN     (7-17)  mg/dL


 


Creatinine     (0.52-1.04)  mg/dL


 


Est GFR (CKD-EPI)AfAm     (>60 ml/min/1.73 sqM)  


 


Est GFR (CKD-EPI)NonAf     (>60 ml/min/1.73 sqM)  


 


Glucose     (74-99)  mg/dL


 


Plasma Lactic Acid John  0.9    (0.7-2.0)  mmol/L


 


Calcium     (8.4-10.2)  mg/dL


 


Magnesium     (1.6-2.3)  mg/dL


 


Total Bilirubin     (0.2-1.3)  mg/dL


 


AST     (14-36)  U/L


 


ALT     (4-34)  U/L


 


Alkaline Phosphatase     ()  U/L


 


Creatine Kinase     ()  U/L


 


Troponin I   <0.012   (0.000-0.034)  ng/mL


 


NT-Pro-B Natriuret Pep    264  pg/mL


 


Total Protein     (6.3-8.2)  g/dL


 


Albumin     (3.5-5.0)  g/dL














- EKG Data


-: EKG Interpreted by Me


EKG Comments: 





Sinus rhythm with PACs rate 72.  Interval 150 to QRS 68 QT since /413 no 

acute ST-T wave changes





- Radiology Data


Radiology results: report reviewed (Imaging reviewed no acute findings.), image 

reviewed





Disposition


Clinical Impression: 


 Dyspnea, Unstable angina, PAC (premature atrial contraction)





Disposition: ADMITTED AS IP TO THIS HOSP


Condition: Fair


Referrals: 


[facility],Clinic [Primary Care Provider] - 1-2 days

## 2021-05-14 NOTE — HP
HISTORY AND PHYSICAL



DATE OF SERVICE:

05/14/2021.



CHIEF COMPLAINT:

Shortness of breath and as well as chest discomfort.



HISTORY OF PRESENT ILLNESS:

This 65-year-old woman with a past medical history of multiple medical problems

including history of CAD stent, history of GERD, hypertension, hyperlipidemia, 
history

of aortic thrombectomy, aortofemoral bypass and as well as stent, being followed
[facility] as well as Dr. Palacios in the outpatient setting, was 
admitted

with some shortness of breath and as well as chest discomfort.  The patient was 
having

progressive symptoms and the patient unable to breathe and [facility] and [facility] ox [facility] 94 percent.  Patient referred to [facility]
and

admitted to the hospital for further evaluation and treatment.  There is no 
history of

fever, rigors.  No history of headache, loss of consciousness, seizures at this 
time.

The current pulse ox is 96% on 2 L. Other labs are unremarkable.  The chest x-
ray which

was reviewed personally by me showed no acute changes.  EKG showed atrial 
fibrillation.

D-dimer is negative also.



PAST MEDICAL HISTORY:

CAD/stent, history of peripheral vascular disease, GERD, hypertension, 
hyperlipidemia,

liver disease, sleep apnea.



MEDICATIONS:

Home medications are Wellbutrin, valsartan, turmeric, Zoloft, Crestor, Protonix.

Nitrostat, Toprol-XL, Plavix, . Doses reviewed.



ALLERGIES:

MULTIPLE ALLERGIES INCLUDING ALBUTEROL, CORTISONE, ERYTHROMYCIN,HYDRALAZINE,  
IODINATED

CONTRAST DYE,  LISINOPRIL, MODAFINIL,  PROCAINE, VANCOMYCIN, ASPIRIN. LIPITOR,

ISOSORBIDE,  METOPROLOL AND MORPHINE.



FAMILY HISTORY:

History of alcohol in the family.



SOCIAL HISTORY:

Previous history of smoking.  No history of current smoking, alcohol intake.



REVIEW OF SYSTEMS:

ENT: No diminished vision. No diminished hearing.

CARDIOVASCULAR as mentioned earlier.

RESPIRATORY: As mentioned earlier.

GI: As mentioned earlier.  Patient has Nielsen's esophagus.

: No dysuria.

NERVOUS SYSTEM: No numbness, weakness.

ALLERGY/IMMUNOLOGY: No asthma or hayfever.

MUSCULOSKELETAL as mentioned earlier.

HEMATOLOGY/ONCOLOGY: No history of anemia.

ENDOCRINE: No history of diabetes or hypothyroidism.

CONSTITUTIONAL: As mentioned earlier.

RHEUMATOLOGY: Negative.

DERMATOLOGY: Negative.

PSYCHIATRIC: As mentioned earlier.



PHYSICAL EXAM:

Patient is alert, oriented x3.

Pulse 72, blood pressure 156/62, respiration 20, temperature 97.6, pulse ox 96% 
on 2 L.

HEENT: Conjunctivae normal.

NECK: No JVD.

CARDIOVASCULAR: S1, S2 muffled.

RESPIRATORY: Breath sounds diminished in the bases. Scattered rhonchi and 
crackles.

ABDOMEN:  Soft, obese, nontender.  No mass palpable.

LEGS: No edema. No swelling.

NERVOUS SYSTEM: Higher functions as mentioned. Moves all four limbs.

LYMPHATICS: No lymph nodes palpable in the neck, axillae or groin.

SKIN: No ulcer. No rash. No bleeding.

JOINTS: No active deforming arthropathy.



LABS:

CBC within normal limits.  Chloride is 108, Covid-19 is negative.



ASSESSMENT:

1. Chest discomfort, possible coronary disease.

2. Shortness of breath, rule out chronic obstructive pulmonary disease.

3. History of coronary artery disease/stent.

4. Gastroesophageal reflux disease.

5. Hypertension.

6. Hyperlipidemia.

7. History of liver disease.

8. History of degenerative joint disease.

9. Obstructive sleep apnea.

10.History of vascular disorder.

11.History of aortic thrombectomy and aortofemoral bypass.

12.History of degenerative joint disease.

13.History of gastric ulcers.

14.History of Nielsen's esophagus.

15.History of colonic polyps.

16.History of peripheral vascular disease.

17.Anxiety, depression.

18.History of nicotine dependence.

19.Obesity, body mass of 39.1.



RECOMMENDATIONS AND DISCUSSION:

This 65-year-old woman who presented with multiple complex medical issues, we 
will

monitor the patient closely, continue the current medications, management and

symptomatic treatment. Cardiology consultation.  Patient also has shortness of 
breath

and apparent hypoxia as well.  Also get a pulmonary consultation.  D-dimer is 
negative.

Resume the home medications.  Symptomatic treatment.  Prognosis guarded because 
of

multiple complex medical issues.  The patient might require a complete cardiac 
workup

also at this point.  Further recommendations to follow. A copy of this dictation
is

being forwarded to Dr. Lester who is the primary physician.





MMPRADEEPL / MICKEYN: [id number] / Job#: [id number]

MTDD

## 2021-05-15 LAB
ANION GAP SERPL CALC-SCNC: 9.1 MMOL/L (ref 4–12)
BASOPHILS # BLD AUTO: 0.05 X 10*3/UL (ref 0–0.1)
BASOPHILS NFR BLD AUTO: 1 %
BUN SERPL-SCNC: 18 MG/DL (ref 9–27)
BUN/CREAT SERPL: 16.36 RATIO (ref 12–20)
CALCIUM SPEC-MCNC: 9.3 MG/DL (ref 8.7–10.3)
CHLORIDE SERPL-SCNC: 105 MMOL/L (ref 96–109)
CHOLEST SERPL-MCNC: 125 MG/DL (ref 0–200)
CO2 SERPL-SCNC: 28.9 MMOL/L (ref 21.6–31.8)
EOSINOPHIL # BLD AUTO: 0.19 X 10*3/UL (ref 0.04–0.35)
EOSINOPHIL NFR BLD AUTO: 3.7 %
ERYTHROCYTE [DISTWIDTH] IN BLOOD BY AUTOMATED COUNT: 4.71 X 10*6/UL (ref 4.1–5.2)
ERYTHROCYTE [DISTWIDTH] IN BLOOD: 15.6 % (ref 11.5–14.5)
GLUCOSE SERPL-MCNC: 117 MG/DL (ref 70–110)
HCT VFR BLD AUTO: 40.5 % (ref 37.2–46.3)
HDLC SERPL-MCNC: 38 MG/DL (ref 40–60)
HGB BLD-MCNC: 12.7 G/DL (ref 12–15)
LDLC SERPL CALC-MCNC: 61.2 MG/DL (ref 0–131)
LYMPHOCYTES # SPEC AUTO: 1.38 X 10*3/UL (ref 0.9–5)
LYMPHOCYTES NFR SPEC AUTO: 26.7 %
MCH RBC QN AUTO: 27 PG (ref 27–32)
MCHC RBC AUTO-ENTMCNC: 31.4 G/DL (ref 32–37)
MCV RBC AUTO: 86 FL (ref 80–97)
MONOCYTES # BLD AUTO: 0.41 X 10*3/UL (ref 0.2–1)
MONOCYTES NFR BLD AUTO: 7.9 %
NEUTROPHILS # BLD AUTO: 3.11 X 10*3/UL (ref 1.8–7.7)
NEUTROPHILS NFR BLD AUTO: 60.1 %
PH UR: 5.5 [PH] (ref 5–8)
PLATELET # BLD AUTO: 151 X 10*3/UL (ref 140–440)
POTASSIUM SERPL-SCNC: 4.7 MMOL/L (ref 3.5–5.5)
RBC UR QL: 1 /HPF (ref 0–5)
SODIUM SERPL-SCNC: 143 MMOL/L (ref 135–145)
SP GR UR: 1.02 (ref 1–1.03)
SQUAMOUS UR QL AUTO: 1 /HPF (ref 0–4)
TRIGL SERPL-MCNC: 129 MG/DL (ref 0–149)
UROBILINOGEN UR QL STRIP: <2 MG/DL (ref ?–2)
VLDLC SERPL CALC-MCNC: 25.8 MG/DL (ref 5–40)
WBC # BLD AUTO: 5.17 X 10*3/UL (ref 4.5–10)
WBC #/AREA URNS HPF: 29 /HPF (ref 0–5)

## 2021-05-15 RX ADMIN — VALSARTAN SCH MG: 160 TABLET, FILM COATED ORAL at 19:59

## 2021-05-15 RX ADMIN — SERTRALINE HYDROCHLORIDE SCH MG: 100 TABLET ORAL at 09:26

## 2021-05-15 RX ADMIN — PANTOPRAZOLE SODIUM SCH MG: 40 TABLET, DELAYED RELEASE ORAL at 09:26

## 2021-05-15 RX ADMIN — VALSARTAN SCH MG: 160 TABLET, FILM COATED ORAL at 09:26

## 2021-05-15 RX ADMIN — METOPROLOL SUCCINATE SCH MG: 25 TABLET, EXTENDED RELEASE ORAL at 10:14

## 2021-05-15 RX ADMIN — BUDESONIDE SCH: 1 SUSPENSION RESPIRATORY (INHALATION) at 19:40

## 2021-05-15 RX ADMIN — CLOPIDOGREL BISULFATE SCH MG: 75 TABLET ORAL at 09:26

## 2021-05-15 RX ADMIN — LORATADINE SCH MG: 10 TABLET ORAL at 09:26

## 2021-05-15 RX ADMIN — ASPIRIN SCH: 325 TABLET ORAL at 09:28

## 2021-05-15 NOTE — P.CRDCN
History of Present Illness


History of present illness: 





HISTORY OF PRESENTING ILLNESS


This is a pleasant 65-year-old with past medical history significant for 

coronary artery disease status post stent to RCA, prior tobacco abuse, COPD, 

history of aortic repair, hypertension. She follows in the office with Dr. Palacios.

 Patient admits that since 2019 she has been having shortness breath.  She 

states normally the shortness breath is mainly with exertion.  She also states 

she wakes up feeling short of breath at times.  She denies any chest pain or 

pressure.  She does have occasional tightness.  She states she was previously 

worked up with pulmonary with prior diagnosis of COPD however this improved 

after her nerve stimulator and then she has been off of any inhalers.  She quit 

smoking a proximally 17 years ago.  She did have repeat heart catheterization 

early  which showed mild disease, patent stent.  Her troponins were normal 

3, BNP normal, d-dimer normal, chest x-ray no acute process.  She states this 

has been fairly constant over last year however has been somewhat worse over 

last 3 days as she has been having a mild cough and congestion.  She had recent 

echocardiogram performed approximately 1 month ago in the office per patient.





DIAGNOSTICS


EKG reveals sinus rhythm with PACs.


Chest xray no acute cardiopulmonary process








REVIEW OF SYSTEMS


At the time of my exam:


CONSTITUTIONAL: Denies fever or chills.


CARDIOVASCULAR: Denies chest pain, +shortness of breath, no orthopnea, PND or 

palpitations.


RESPIRATORY: Denies cough. 


GASTROINTESTINAL: Denies abdominal pain, diarrhea, constipation, nausea or 

vomiting.


MUSCULOSKELETAL: Denies myalgias.


NEUROLOGIC: Denies numbness, tingling or weakness.


ENDOCRINE: Denies fatigue, weight change,  polydipsia or polyurina.


GENITOURINARY: Denies burning, hematuria or urgency with micturation.


HEMATOLOGIC: Denies history of anemia or bleeding. 





PHYSICAL EXAMINATION


Vital signs reviewed.


CONSTITUTIONAL: No apparent distress, obese


HEENT: Head is normocephalic. Pupils are equal, round. Sclerae anicteric. Mucous

membranes of the mouth are moist.  No JVD. No carotid bruit.


CHEST EXAMINATION: Lungs are clear to auscultation. No chest wall tenderness is 

noted on palpation or with deep breathing. 


HEART EXAMINATION: Regular rate and rhythm. S1, S2 heard. No murmurs, gallops or

rub.


ABDOMEN: Soft, nontender. Positive bowel sounds.


EXTREMITIES: 2+ peripheral pulses, no lower extremity edema and no calf 

tenderness.


NEUROLOGIC EXAMINATION: Patient is awake, alert and oriented x3. 





ASSESSMENT


1.  Atypical chest tightness which has been going on for approximately a year 

associated shortness breath.  Appears more related to pulmonary process.  Do not

suspect acute coronary syndrome.  Troponins normal 3, proBNP normal.


2.  Acute on chronic respiratory failure, suspect mainly pulmonary source


3.  History of COPD not on any inhalers


4.  Prior tobacco abuse


5.  Coronary artery disease status post PCI of RCA with patent stent on most 

recent heart catheterization from 


6.  Hypertension


7.  Obesity


8.  Hyperlipidemia





PLAN


Patient with chronic shortness breath with prior workup fairly unrevealing.  

This has been going on for approximately a year and a half.  Workup in the 

hospital with normal troponins, unrevealing EKG, proBNP normal, chest x-ray 

showed no acute process.  May be a component of COPD and patient states she has 

been having some mild increase in congestion recently, cough.  Patient stable 

from cardiology standpoint for discharge with outpatient follow-up with Dr. Palacios

in 1 week.











Past Medical History


Past Medical History: Coronary Artery Disease (CAD), Cancer, GERD/Reflux, 

Hyperlipidemia, Hypertension, Liver Disease, Osteoarthritis (OA), Sleep 

Apnea/CPAP/BIPAP, Vascular Disorder


Additional Past Medical History / Comment(s): 2007 Aortic 

thrombectomy/aortofemoral bypass/pt in coma/trach x 6 weeks, R hip to R toes 

nerve damage after 6 weeks comatose, past chronic pain to R hip/leg but has pain

stimulator which has greatly helped, hx gastric ulcers, Nielsen's esophagus, 

hemorrhoids, 5 cancerous colon polyps removed, MIRELA with Cpap/O2 use, arthritis 

in feet.


History of Any Multi-Drug Resistant Organisms: None Reported


Past Surgical History: Appendectomy, Cholecystectomy, Heart Catheterization, 

Heart Catheterization With Stent, Hysterectomy, Orthopedic Surgery, 

Tonsillectomy


Additional Past Surgical History / Comment(s): abdominal aortogram 21, 

Bilateral heel bone spurs/bilateral feet fusions to lower arches, bone spurs 

removed from inside bilateral feet,  stent in R leg with bypass to left 

leg,(now disconnected) 2007 aortic thrombectomy/aortofemoral bypass, R knee 

arthroscopic surgery,  neurostimulator implant,  neruostimulator paddle 

moved, 2019 stimulator replaced and relocation stimulator battery, 2017 L common

femoral endartectomy, EGDs, colonoscopies/cancerous polypectomies., left femoral

surgery,


Past Anesthesia/Blood Transfusion Reactions: No Reported Reaction


Date of Last Stent Placement:: 2019


Past Psychological History: Anxiety, Depression


Additional Psychological History / Comment(s): .


Smoking Status: Former smoker


Past Alcohol Use History: None Reported


Additional Past Alcohol Use History / Comment(s): Pt started smoking in  and

quit in  SMOKED 1PPD


Past Drug Use History: None Reported





- Past Family History


  ** Mother


Family Medical History: No Reported History


Additional Family Medical History / Comment(s): Mother was an alcoholic and 

at the age of 36 yrs.





  ** Father


Family Medical History: Cancer


Additional Family Medical History / Comment(s): Father had jaw/throat cancer.  

He was a smoker.





Medications and Allergies


                                Home Medications











 Medication  Instructions  Recorded  Confirmed  Type


 


Cetirizine HCl [Zyrtec] 10 mg PO DAILY 19 History


 


Pantoprazole Sodium [Protonix] 40 mg PO DAILY 19 History


 


Sertraline [Zoloft] 100 mg PO DAILY 19 History


 


Clopidogrel [Plavix] 75 mg PO DAILY #30 tab 19 Rx


 


Nitroglycerin Sl Tabs [Nitrostat] 0.4 mg SUBLINGUAL Q5M PRN #25 tab 19 Rx


 


Metoprolol Succinate [Toprol XL] 25 mg PO DAILY 21 History


 


Rosuvastatin Calcium [Crestor] 40 mg PO DAILY 21 History


 


Turmeric Root Extract [Turmeric] 500 mg PO DAILY 21 History


 


Valsartan 160 mg PO BID 21 History


 


buPROPion [Wellbutrin] 100 mg PO DAILY 21 History








                                    Allergies











Allergy/AdvReac Type Severity Reaction Status Date / Time


 


albuterol Allergy  Rash/Hives Verified 21 15:49


 


cortisone Allergy  Swelling/Pain Verified 21 15:49





   at inj site  


 


erythromycin base Allergy  Rash/Hives Verified 21 15:49


 


hydralazine Allergy  Rash/Hives Verified 21 15:49


 


Iodinated Contrast Media Allergy  Anaphylaxis Verified 21 15:49


 


iodine Allergy  Anaphylaxis Verified 21 15:49


 


lisinopril Allergy  Swelling Verified 21 15:49


 


modafinil Allergy  Unknown Verified 21 15:49


 


nystatin Allergy  Rash/Hives Verified 21 15:49


 


procaine [From Novocain] Allergy  Swelling Verified 21 15:49


 


vancomycin Allergy  Rash/Hives Verified 21 15:49


 


amlodipine AdvReac  Nausea & Verified 21 15:49





   Vomiting &  





   Diarrhea  


 


aspirin AdvReac  stomach Verified 21 15:49





   and  





   esophageal  





   ulcers  


 


atorvastatin [From Lipitor] AdvReac  MUSCLE PAIN Verified 21 15:49


 


cimetidine [From Tagamet] AdvReac  dizziness Verified 21 15:49


 


isosorbide AdvReac  Nausea & Verified 21 15:49





   Vomiting &  





   Diarrhea  


 


labetalol AdvReac  Nausea & Verified 21 15:49





   Vomiting &  





   Diarrhea  


 


metoprolol AdvReac  Diarrhea Verified 21 15:49


 


morphine AdvReac  "generic Verified 21 15:49





   morphine"  


 


oxcarbazepine AdvReac  hair loss Verified 21 15:49





[From Trileptal]     


 


paroxetine [From Paxil] AdvReac  tremors Verified 21 15:49


 


simvastatin [From Zocor] AdvReac  MUSCLE PAIN Verified 21 15:49














Physical Exam


Vitals: 


                                   Vital Signs











  Temp Pulse Pulse Pulse Resp BP BP


 


 05/15/21 07:00  97.6 F   71   16   148/82


 


 05/15/21 02:35    72  76  14  


 


 05/15/21 00:43  97.7 F   72   14   110/65


 


 21 21:05     76  20  


 


 21 19:55  98.0 F   70   14   127/71


 


 21 17:34  98.1 F  73    20  156/62 


 


 21 16:50   73    20  156/62 


 


 21 16:48  97.6 F    76  18   142/81


 


 21 14:40   74    18  148/77 


 


 21 14:19      18  


 


 21 14:02  98.1 F  58 L    20  165/87 














  Pulse Ox


 


 05/15/21 07:00  99


 


 05/15/21 02:35 


 


 05/15/21 00:43  95


 


 21 21:05 


 


 21 19:55  96


 


 21 17:34  96


 


 21 16:50  96


 


 21 16:48  97


 


 21 14:40  93 L


 


 21 14:19 


 


 21 14:02  91 L








                                Intake and Output











 05/14/21 05/15/21 05/15/21





 22:59 06:59 14:59


 


Other:   


 


  Voiding Method Toilet Toilet Toilet


 


  # Voids 1 1 


 


  Weight 90.718 kg  














Results





                                 05/15/21 05:23





                                 05/15/21 05:23


                                 Cardiac Enzymes











  21 Range/Units





  14:35 14:35 16:30 


 


AST  28    (14-36)  U/L


 


Troponin I   <0.012  <0.012  (0.000-0.034)  ng/mL














  21 Range/Units





  19:54 


 


AST   (14-36)  U/L


 


Troponin I  <0.012  (0.000-0.034)  ng/mL








                                   Coagulation











  21 Range/Units





  14:35 


 


PT  11.0  (9.0-12.0)  sec


 


APTT  23.8  (22.0-30.0)  sec








                                     Lipids











  05/15/21 Range/Units





  05:23 


 


Triglycerides  129.0  (0.0-149.0)  mg/dL


 


Cholesterol  125  (0-200)  mg/dL


 


HDL Cholesterol  38.0 L  (40.0-60.0)  mg/dL


 


Cholesterol/HDL Ratio  3.29  








                                       CBC











  05/14/21 05/15/21 Range/Units





  14:35 05:23 


 


WBC  6.0  5.17  (3.8-10.6)  k/uL


 


RBC  5.30  4.71  (3.80-5.40)  m/uL


 


Hgb  14.7  12.7  (11.4-16.0)  gm/dL


 


Hct  42.9  40.5  (34.0-46.0)  %


 


Plt Count  160  151  (150-450)  k/uL








                          Comprehensive Metabolic Panel











  05/14/21 05/15/21 Range/Units





  14:35 05:23 


 


Sodium  139  143  (137-145)  mmol/L


 


Potassium  4.6  4.7  (3.5-5.1)  mmol/L


 


Chloride  108 H  105  ()  mmol/L


 


Carbon Dioxide  22  28.9  (22-30)  mmol/L


 


BUN  15  18.0  (7-17)  mg/dL


 


Creatinine  0.88  1.1  (0.52-1.04)  mg/dL


 


Glucose  90  117 H  (74-99)  mg/dL


 


Calcium  9.9  9.3  (8.4-10.2)  mg/dL


 


AST  28   (14-36)  U/L


 


ALT  21   (4-34)  U/L


 


Alkaline Phosphatase  114   ()  U/L


 


Total Protein  6.8   (6.3-8.2)  g/dL


 


Albumin  4.4   (3.5-5.0)  g/dL








                               Current Medications











Generic Name Dose Route Start Last Admin





  Trade Name Freq  PRN Reason Stop Dose Admin


 


Alprazolam  0.25 mg  21 20:08 





  Alprazolam 0.25 Mg Tab  PO  





  TID PRN  





  Anxiety  


 


Bupropion HCl  100 mg  05/15/21 09:00  05/15/21 09:26





  Bupropion 100 Mg Tab  PO   100 mg





  DAILY TERESO   Administration


 


Clopidogrel Bisulfate  75 mg  05/15/21 09:00  05/15/21 09:26





  Clopidogrel 75 Mg Tab  PO   75 mg





  DAILY TERESO   Administration


 


Loratadine  10 mg  05/15/21 09:00  05/15/21 09:26





  Loratadine 10 Mg Tab  PO   10 mg





  DAILY TERESO   Administration


 


Metoprolol Succinate  25 mg  05/15/21 09:00 





  Metoprolol Succinate (Er) 25 Mg Tab.Er.24h  PO  





  DAILY [facility]  


 


Nitroglycerin  0.4 mg  21 20:07 





  Nitroglycerin Sl Tabs 0.4 Mg Tab  SUBLINGUAL  





  Q5M PRN  





  Chest Pain  


 


Non-Formulary Medication  500 mg  05/15/21 09:00  05/15/21 09:28





  Turmeric Root Extract [Turmeric]  PO   Not Given





  DAILY [facility]  


 


Non-Formulary Medication  40 mg  05/15/21 09:00  05/15/21 09:28





  Rosuvastatin Calcium [Crestor]  PO   Not Given





  DAILY [facility]  


 


Pantoprazole Sodium  40 mg  05/15/21 09:00  05/15/21 09:26





  Pantoprazole 40 Mg Tablet  PO   40 mg





  DAILY TERESO   Administration


 


Sertraline HCl  100 mg  05/15/21 09:00  05/15/21 09:26





  Sertraline 100 Mg Tab  PO   100 mg





  DAILY TERESO   Administration


 


Temazepam  15 mg  21 20:08 





  Temazepam 15 Mg Cap  PO  





  HS PRN  





  Insomnia  


 


Valsartan  160 mg  21 21:00  05/15/21 09:26





  Valsartan 160 Mg Tab  PO   160 mg





  BID TERESO   Administration








                                Intake and Output











 05/14/21 05/15/21 05/15/21





 22:59 06:59 14:59


 


Other:   


 


  Voiding Method Toilet Toilet Toilet


 


  # Voids 1 1 


 


  Weight 90.718 kg  








                                        





                                 05/15/21 05:23 





                                 05/15/21 05:23

## 2021-05-15 NOTE — PN
PROGRESS NOTE



DATE OF SERVICE:

05/15/2021



This 59-year-old woman is admitted with shortness of breath, also had some chest

discomfort.  Cardiology is following the patient.  No fever.  No cough.



PHYSICAL EXAMINATION:

Alert and oriented times three. Pulse 68, blood pressure 140/88, respirations 16,

temperature 98.2, pulse ox 96% on room air.

HEENT: Conjunctivae normal.

NECK: No JVD.

CARDIOVASCULAR: S1, S2 muffled. RESPIRATORY: Breath sounds diminished in the bases. A

few scattered rhonchi.

ABDOMEN:  Soft.

NERVOUS SYSTEM: No focal deficits.



LABORATORY DATA:

CBC within normal limits.  Glucose 117.  UA shows some UTI.



ASSESSMENT:

1. Chest discomfort, possibly coronary artery disease, myocardial infarction ruled

    out.

2. Shortness of breath possibly chronic obstructive pulmonary disease.

3. Acute urinary tract infection, present on admission.

4. History of coronary artery disease, stent.

5. Gastroesophageal reflux disease.

6. Hypertension.

7. Hyperlipidemia.

8. History of liver disease.

9. History of degenerative joint disease.

10.History of obstructive sleep apnea.

11.History of vascular disorder.

12.History of aortic thrombectomy and aortobifemoral bypass.

13.History of degenerative joint disease.

14.History of gastric ulcer.

15.History of Nielsen's esophagus.

16.History of colonic polyp.

17.History of peripheral vascular disease.

18.Anxiety, depression.

19.History of nicotine dependence.

20.Obesity with body mass index 39.1.



RECOMMENDATIONS AND DISCUSSION:

Recommend to continue current medications, management and symptomatic treatment.

Pulmonary consultation with Dr. Penny to rule out the possible chronic obstructive

pulmonary disease.  Otherwise, I would also add a course of antibiotics and obtain the

urine cultures also.  Repeat labs will be ordered.  Guarded prognosis.  Further

recommendations to follow.





MMODL / IJN: [id number] / Job#: [id number]

## 2021-05-16 LAB
ANION GAP SERPL CALC-SCNC: 7.6 MMOL/L (ref 4–12)
BASOPHILS # BLD AUTO: 0.03 X 10*3/UL (ref 0–0.1)
BASOPHILS NFR BLD AUTO: 0.5 %
BUN SERPL-SCNC: 19 MG/DL (ref 9–27)
BUN/CREAT SERPL: 19 RATIO (ref 12–20)
CALCIUM SPEC-MCNC: 9.4 MG/DL (ref 8.7–10.3)
CHLORIDE SERPL-SCNC: 104 MMOL/L (ref 96–109)
CO2 SERPL-SCNC: 30.4 MMOL/L (ref 21.6–31.8)
EOSINOPHIL # BLD AUTO: 0.16 X 10*3/UL (ref 0.04–0.35)
EOSINOPHIL NFR BLD AUTO: 2.9 %
ERYTHROCYTE [DISTWIDTH] IN BLOOD BY AUTOMATED COUNT: 4.74 X 10*6/UL (ref 4.1–5.2)
ERYTHROCYTE [DISTWIDTH] IN BLOOD: 15.5 % (ref 11.5–14.5)
GLUCOSE SERPL-MCNC: 113 MG/DL (ref 70–110)
HCT VFR BLD AUTO: 40.8 % (ref 37.2–46.3)
HGB BLD-MCNC: 12.7 G/DL (ref 12–15)
LYMPHOCYTES # SPEC AUTO: 1.47 X 10*3/UL (ref 0.9–5)
LYMPHOCYTES NFR SPEC AUTO: 26.3 %
MCH RBC QN AUTO: 26.8 PG (ref 27–32)
MCHC RBC AUTO-ENTMCNC: 31.1 G/DL (ref 32–37)
MCV RBC AUTO: 86.1 FL (ref 80–97)
MONOCYTES # BLD AUTO: 0.47 X 10*3/UL (ref 0.2–1)
MONOCYTES NFR BLD AUTO: 8.4 %
NEUTROPHILS # BLD AUTO: 3.41 X 10*3/UL (ref 1.8–7.7)
NEUTROPHILS NFR BLD AUTO: 61.2 %
PLATELET # BLD AUTO: 159 X 10*3/UL (ref 140–440)
POTASSIUM SERPL-SCNC: 4.5 MMOL/L (ref 3.5–5.5)
SODIUM SERPL-SCNC: 142 MMOL/L (ref 135–145)
WBC # BLD AUTO: 5.58 X 10*3/UL (ref 4.5–10)

## 2021-05-16 RX ADMIN — BUDESONIDE SCH: 1 SUSPENSION RESPIRATORY (INHALATION) at 21:03

## 2021-05-16 RX ADMIN — PANTOPRAZOLE SODIUM SCH MG: 40 TABLET, DELAYED RELEASE ORAL at 08:14

## 2021-05-16 RX ADMIN — BUDESONIDE SCH: 1 SUSPENSION RESPIRATORY (INHALATION) at 09:18

## 2021-05-16 RX ADMIN — ASPIRIN SCH: 325 TABLET ORAL at 08:15

## 2021-05-16 RX ADMIN — VALSARTAN SCH MG: 160 TABLET, FILM COATED ORAL at 19:52

## 2021-05-16 RX ADMIN — METOPROLOL SUCCINATE SCH MG: 25 TABLET, EXTENDED RELEASE ORAL at 08:14

## 2021-05-16 RX ADMIN — CLOPIDOGREL BISULFATE SCH MG: 75 TABLET ORAL at 08:14

## 2021-05-16 RX ADMIN — LORATADINE SCH MG: 10 TABLET ORAL at 08:14

## 2021-05-16 RX ADMIN — VALSARTAN SCH MG: 160 TABLET, FILM COATED ORAL at 08:14

## 2021-05-16 RX ADMIN — SERTRALINE HYDROCHLORIDE SCH MG: 100 TABLET ORAL at 08:14

## 2021-05-16 NOTE — CT
EXAMINATION TYPE: CT angio chest

 

DATE OF EXAM: 5/16/2021

 

COMPARISON: 11/22/2019

 

HISTORY: Shortness of breath and elevated labs. None

 

CT DLP: 526 mGycm

Automated exposure control for dose reduction was used.

 

CONTRAST: 

Performed with IV Contrast, patient injected with 65 mL of Isovue 370.

 

 

There is pulmonary emphysema. There is coarse interstitial infiltrate throughout the lungs. There is 
groundglass peripheral pulmonary interstitial density. There is no pleural effusion. Heart size is fa
irly normal. There is no pericardial effusion. There is normal contrast opacification of the pulmonar
y arteries. There are no filling defects. There are a few paratracheal and bronchial lymph nodes that
 measure up to 1 cm. Thoracic aorta is intact. There is no aneurysm or dissection.

 

The thoracic spine is intact. There is no compression fracture. There is neural stimulator in the tho
racic spine.

 

IMPRESSION:

No evidence of pulmonary embolism. Pulmonary emphysema. Interstitial pneumonia which is increased com
pared to old exam. No pleural fluid seen to suggest heart failure.

 

Fatty infiltration of the liver noted unchanged.

## 2021-05-16 NOTE — PN
PROGRESS NOTE



DATE OF SERVICE:

05/16/2021



This 65-year-old woman was admitted with chest pain, also had shortness of breath.  The

patient had possible chronic obstructive pulmonary disease.  Pulmonary is following the

patient closely at this time.  The patient has allergy to multiple bronchodilator

medication.  I recommended Pulmicort though the patient refused.  No chest pain.  No

palpitations.  No fever.  CT angio of the chest has been ordered per Pulmonary.



PHYSICAL EXAMINATION:

Alert and oriented x3.  Pulse 60, blood pressure 120/70, respiration 16, temperature

97.4, pulse ox 98% on 2 L.

HEENT:  Conjunctivae normal. Oral mucosa moist.

NECK:  No jugular venous distention.  No lymph node enlargement.

CARDIOVASCULAR:  S1, S2, muffled.  No S3, no S4,

RESPIRATORY: Diminished breath sounds at the bases. Scattered rhonchi.

ABDOMEN: Soft, nontender.

LEGS: No edema, no swelling.

NERVOUS SYSTEM: No focal deficits.



LABS:

WBC 5.5, hemoglobin 12.7.  The labs and UA noted.



ASSESSMENT:

1. Chest discomfort, possible coronary artery disease, myocardial infarction ruled

    out.

2. Shortness of breath, possible chronic obstructive pulmonary disease.

3. Acute urinary tract infection, present on admission, on IV antibiotics.

4. History of coronary artery disease, stent.

5. Gastroesophageal reflux disease.

6. Hypertension.

7. Hyperlipidemia.

8. Allergy to bronchodilators.

9. History of liver disease.

10.History of degenerative joint disease.

11.History of obstructive sleep apnea.

12.History of vascular disorder.

13.History of aortic thrombectomy and as well as aortobifemoral bypass.

14.History of degenerative joint disease.

15.History of gastric ulcer.

16.History of Nielsen's esophagus.

17.History of colon polyp.

18.History of peripheral vascular disease.

19.Anxiety, depression.

20.History of nicotine dependence.

21.Obesity with body mass index of 39.1.



RECOMMENDATIONS AND DISCUSSION:

Recommend to continue current medications, continue symptomatic treatment.  Otherwise,

a CT angio of the chest and one dose of Solu-Medrol has been given.  Guarded prognosis.

Further recommendations to follow.





MMODL / IJN: [id number] / Job#: [id number]

## 2021-05-16 NOTE — P.CNPUL
History of Present Illness


Consult date: 21


Requesting physician: Emma Dixon


Reason for consult: dyspnea


Chief complaint: Shortness of breath


History of present illness: 





This is a very pleasant 65-year-old female patient who follows at the [facility] 

clinic for her primary care needs.  She has a history of hypertension, 

hyperlipidemia, gastroesophageal reflux disease, depression, anxiety, aortic 

thrombectomy in  elevated by ventilator-dependent respiratory failure and 

had been seen by Dr. Fishman at that time.  Former smoker with a history of 

COPD and obstructive sleep apnea utilizing CPAP with O2.  She was last seen in 

our office in 2017, her FEV1 value was 85% of predicted.  She presented to the 

emergency room on 2021 with ongoing issues with shortness of breath, 

dyspnea on exertion.  She had been evaluated by cardiology in the outpatient 

setting who felt it was not cardiac related.  Felt to be due to obesity and d

econditioning.  She does admit to gaining 60 pounds over the past year.  Chest 

x-ray reveals no evidence of acute pulmonary disease.  EKG reveals sinus rhythm 

with occasional PACs.  Urine culture pending.  White count 5.5.  Hemoglobin 

12.7.  Sodium 142.  Potassium 4.5.  Creatinine 1.0.  Corona virus not detected. 

She is sitting up in bed.  Awake and alert in no acute distress.  No cough or 

congestion.  No fever chills or night sweats.  18 O2 saturations at 98% on 2 L/m

per nasal cannula.  She's afebrile.  Hemodynamically stable.





Review of Systems





REVIEW OF SYSTEMS:


CONSTITUTIONAL: Denies any recent significant weight loss or weight gain.


EYES: Denies change in vision.


EARS, NOSE, MOUTH, THROAT: Denies headaches, denies sore throat.


CARDIOVASCULAR: Denies chest pain, palpitations or syncopal episodes.


RESPIRATORY: Positive for shortness of breath, cough, congestion no hemoptysis.


GASTROINTESTINAL: Denies change in appetite, denies abdominal pain


GENITOURINARY: Denies hematuria, denies infections.


MUSKULOSKELETAL: Denies pain, denies swelling.


INTEGUMENTARY: Denies rash, denies eczema.


NEUROLOGICAL: Denies recent memory loss, no recent seizure activity. 


PSYCHIATRIC: Denies anxiety, denies depression.


HEMATOLOGIC/LYMPHATIC: Denies anemia, denies enlarged lymph nodes.








Past Medical History


Past Medical History: Coronary Artery Disease (CAD), Cancer, GERD/Reflux, 

Hyperlipidemia, Hypertension, Liver Disease, Osteoarthritis (OA), Sleep 

Apnea/CPAP/BIPAP, Vascular Disorder


Additional Past Medical History / Comment(s): 2007 Aortic 

thrombectomy/aortofemoral bypass/pt in coma/trach x 6 weeks, R hip to R toes 

nerve damage after 6 weeks comatose, past chronic pain to R hip/leg but has pain

stimulator which has greatly helped, hx gastric ulcers, Nielsen's esophagus, 

hemorrhoids, 5 cancerous colon polyps removed, MIRELA with Cpap/O2 use, arthritis 

in feet.


History of Any Multi-Drug Resistant Organisms: None Reported


Past Surgical History: Appendectomy, Cholecystectomy, Heart Catheterization, 

Heart Catheterization With Stent, Hysterectomy, Orthopedic Surgery, 

Tonsillectomy


Additional Past Surgical History / Comment(s): abdominal aortogram 21, 

Bilateral heel bone spurs/bilateral feet fusions to lower arches, bone spurs 

removed from inside bilateral feet,  stent in R leg with bypass to left 

leg,(now disconnected)  aortic thrombectomy/aortofemoral bypass, R knee 

arthroscopic surgery,  neurostimulator implant,  neruostimulator paddle 

moved, 2019 stimulator replaced and relocation stimulator battery, 2017 L common

femoral endartectomy, EGDs, colonoscopies/cancerous polypectomies., left femoral

surgery,


Past Anesthesia/Blood Transfusion Reactions: No Reported Reaction


Date of Last Stent Placement:: 2019


Past Psychological History: Anxiety, Depression


Additional Psychological History / Comment(s): .


Smoking Status: Former smoker


Past Alcohol Use History: None Reported


Additional Past Alcohol Use History / Comment(s): Pt started smoking in  and

quit in  SMOKED 1PPD


Past Drug Use History: None Reported





- Past Family History


  ** Mother


Family Medical History: No Reported History


Additional Family Medical History / Comment(s): Mother was an alcoholic and 

at the age of 36 yrs.





  ** Father


Family Medical History: Cancer


Additional Family Medical History / Comment(s): Father had jaw/throat cancer.  

He was a smoker.





Medications and Allergies


                                Home Medications











 Medication  Instructions  Recorded  Confirmed  Type


 


Cetirizine HCl [Zyrtec] 10 mg PO DAILY 19 History


 


Pantoprazole Sodium [Protonix] 40 mg PO DAILY 19 History


 


Sertraline [Zoloft] 100 mg PO DAILY 19 History


 


Clopidogrel [Plavix] 75 mg PO DAILY #30 tab 19 Rx


 


Nitroglycerin Sl Tabs [Nitrostat] 0.4 mg SUBLINGUAL Q5M PRN #25 tab 19 Rx


 


Metoprolol Succinate [Toprol XL] 25 mg PO DAILY 21 History


 


Rosuvastatin Calcium [Crestor] 40 mg PO DAILY 21 History


 


Turmeric Root Extract [Turmeric] 500 mg PO DAILY 21 History


 


Valsartan 160 mg PO BID 21 History


 


buPROPion [Wellbutrin] 100 mg PO DAILY 21 History








                                    Allergies











Allergy/AdvReac Type Severity Reaction Status Date / Time


 


albuterol Allergy  Rash/Hives Verified 21 15:49


 


cortisone Allergy  Swelling/Pain Verified 21 15:49





   at inj site  


 


erythromycin base Allergy  Rash/Hives Verified 21 15:49


 


hydralazine Allergy  Rash/Hives Verified 21 15:49


 


Iodinated Contrast Media Allergy  Anaphylaxis Verified 21 15:49


 


iodine Allergy  Anaphylaxis Verified 21 15:49


 


lisinopril Allergy  Swelling Verified 21 15:49


 


modafinil Allergy  Unknown Verified 21 15:49


 


nystatin Allergy  Rash/Hives Verified 21 15:49


 


procaine [From Novocain] Allergy  Swelling Verified 21 15:49


 


vancomycin Allergy  Rash/Hives Verified 21 15:49


 


amlodipine AdvReac  Nausea & Verified 21 15:49





   Vomiting &  





   Diarrhea  


 


aspirin AdvReac  stomach Verified 21 15:49





   and  





   esophageal  





   ulcers  


 


atorvastatin [From Lipitor] AdvReac  MUSCLE PAIN Verified 21 15:49


 


cimetidine [From Tagamet] AdvReac  dizziness Verified 21 15:49


 


isosorbide AdvReac  Nausea & Verified 21 15:49





   Vomiting &  





   Diarrhea  


 


labetalol AdvReac  Nausea & Verified 21 15:49





   Vomiting &  





   Diarrhea  


 


metoprolol AdvReac  Diarrhea Verified 21 15:49


 


morphine AdvReac  "generic Verified 21 15:49





   morphine"  


 


oxcarbazepine AdvReac  hair loss Verified 21 15:49





[From Trileptal]     


 


paroxetine [From Paxil] AdvReac  tremors Verified 21 15:49


 


simvastatin [From Zocor] AdvReac  MUSCLE PAIN Verified 21 15:49














Physical Exam


Vitals: 


                                   Vital Signs











  Temp Pulse Resp BP Pulse Ox


 


 21 07:40  97.6 F  60  16  120/75  98


 


 21 02:35   64  16  


 


 21 01:10  97.8 F  64  16  131/78  95


 


 05/15/21 19:59   64  18  


 


 05/15/21 19:20  98.1 F  64  18  131/71  96


 


 05/15/21 14:30  98.2 F  68  16  148/80  96








                                Intake and Output











 05/15/21 05/16/21 05/16/21





 22:59 06:59 14:59


 


Other:   


 


  Voiding Method Toilet Toilet 


 


  # Voids 2 1 














GENERAL EXAM: Alert, pleasant 65-year-old female patient, on 2 L nasal cannula, 

comfortable in no apparent distress.


HEAD: Normocephalic.


EYES: Normal reaction of pupils, equal size.


NOSE: Clear with pink turbinates.


THROAT: No erythema or exudates.


NECK: No masses, no JVD.


CHEST: No chest wall deformity.


LUNGS: Equal air entry with no crackles, wheeze, rhonchi or dullness.


CVS: S1 and S2 normal with no audible murmur, regular rhythm.


ABDOMEN: No hepatosplenomegaly, normal bowel sounds, no guarding or rigidity.


SPINE: No scoliosis or deformity


SKIN: No rashes


CENTRAL NERVOUS SYSTEM: No focal deficits, tone is normal in all 4 extremities.


EXTREMITIES: There is no peripheral edema.  No clubbing, no cyanosis.  

Peripheral pulses are intact.





Results





- Laboratory Findings


CBC and BMP: 


                                 21 04:31





                                 21 04:31


PT/INR, D-dimer











PT  11.0 sec (9.0-12.0)   21  14:35    


 


INR  1.0  (<1.2)   21  14:35    


 


D-Dimer  0.52 mg/L FEU (<0.60)   21  14:35    








Abnormal lab findings: 


                                  Abnormal Labs











  05/14/21 05/14/21 05/15/21





  14:35 14:35 05:23


 


MCH   


 


MCHC   


 


RDW  15.7 H  


 


Chloride   108 H 


 


Est GFR (CKD-EPI)NonAf    52.3 L


 


Glucose    117 H


 


HDL Cholesterol    38.0 L


 


Ur Leukocyte Esterase   


 


Urine WBC   


 


Urine Mucus   














  05/15/21 05/15/21 05/16/21





  05:23 06:07 04:31


 


MCH    26.8 L


 


MCHC  31.4 L   31.1 L


 


RDW  15.6 H   15.5 H


 


Chloride   


 


Est GFR (CKD-EPI)NonAf   


 


Glucose   


 


HDL Cholesterol   


 


Ur Leukocyte Esterase   Large H 


 


Urine WBC   29 H 


 


Urine Mucus   Rare H 














  21





  04:31


 


MCH 


 


MCHC 


 


RDW 


 


Chloride 


 


Est GFR (CKD-EPI)NonAf  58.7 L


 


Glucose  113 H


 


HDL Cholesterol 


 


Ur Leukocyte Esterase 


 


Urine WBC 


 


Urine Mucus 














- Diagnostic Findings


Chest x-ray: image reviewed





Assessment and Plan


Assessment: 





1 Acute hypoxic respiratory failure of unclear etiology, suspect secondary to 

obesity/deconditioning, COPD exacerbation, less likely pulmonary emboli.  COVID-

19 screen negative





2 Morbid obesity with BMI of 39, weight gain of 60 pounds over the past several 

months





3 Former smoker, FEV1 value 85% of predicted in 2012





4 Obstructive sleep apnea utilizing CPAP





5 Hypertension





6 Hyperlipidemia





7 Anxiety/depression





8 Coronary artery disease with previous stent placement





9 History of aortic thrombectomy/aortofemoral bypass in  with ventilatory 

dependent respiratory failure





10 Multiple medication ALLERGIES





Plan:





The patient was seen and evaluated by Dr. Penny


Chest x-ray, labs reviewed


Obtain CT angiogram to rule out pulmonary emboli


Continue Pulmicort inhalations


Titrate down the FiO2 as tolerated


Bedside spirometry in the a.m.


We will continue to follow and make further recommendations based on her 

clinical status





I, the cosigning physician, performed a history & physical examination of the 

patient. Lungs sounds are clear.  Maintaining good O2 saturations in the 90s on 

2 L/m per nasal cannula.  I discussed the assessment and plan of care with my 

nurse practitioner, Velia Short. I attest to the above note as dictated by her.


Time with Patient: Greater than 30

## 2021-05-17 RX ADMIN — ASPIRIN SCH: 325 TABLET ORAL at 08:47

## 2021-05-17 RX ADMIN — PANTOPRAZOLE SODIUM SCH MG: 40 TABLET, DELAYED RELEASE ORAL at 08:48

## 2021-05-17 RX ADMIN — VALSARTAN SCH MG: 160 TABLET, FILM COATED ORAL at 08:48

## 2021-05-17 RX ADMIN — VALSARTAN SCH MG: 160 TABLET, FILM COATED ORAL at 21:08

## 2021-05-17 RX ADMIN — CLOPIDOGREL BISULFATE SCH MG: 75 TABLET ORAL at 08:48

## 2021-05-17 RX ADMIN — BUDESONIDE SCH: 1 SUSPENSION RESPIRATORY (INHALATION) at 19:57

## 2021-05-17 RX ADMIN — TEMAZEPAM PRN MG: 15 CAPSULE ORAL at 21:08

## 2021-05-17 RX ADMIN — LORATADINE SCH MG: 10 TABLET ORAL at 08:48

## 2021-05-17 RX ADMIN — BUDESONIDE SCH: 1 SUSPENSION RESPIRATORY (INHALATION) at 08:05

## 2021-05-17 RX ADMIN — METOPROLOL SUCCINATE SCH MG: 25 TABLET, EXTENDED RELEASE ORAL at 08:48

## 2021-05-17 RX ADMIN — SERTRALINE HYDROCHLORIDE SCH MG: 100 TABLET ORAL at 08:48

## 2021-05-17 NOTE — P.PN
Subjective


Progress Note Date: 05/17/21


Principal diagnosis: 


 Dyspnea, shortness of breath





 This is a very pleasant 65-year-old female patient who follows at the [facility] 

clinic for her primary care needs.  She has a history of hypertension, 

hyperlipidemia, gastroesophageal reflux disease, depression, anxiety, aortic th

rombectomy in 2007 elevated by ventilator-dependent respiratory failure and had 

been seen by Dr. Fishman at that time.  Former smoker with a history of COPD 

and obstructive sleep apnea utilizing CPAP with O2.  She was last seen in our 

office in 2017, her FEV1 value was 85% of predicted.  She presented to the 

emergency room on 05/14/2021 with ongoing issues with shortness of breath, 

dyspnea on exertion.  She had been evaluated by cardiology in the outpatient 

setting who felt it was not cardiac related.  Felt to be due to obesity and 

deconditioning.  She does admit to gaining 60 pounds over the past year.  Chest 

x-ray reveals no evidence of acute pulmonary disease.  EKG reveals sinus rhythm 

with occasional PACs.  Urine culture pending.  White count 5.5.  Hemoglobin 

12.7.  Sodium 142.  Potassium 4.5.  Creatinine 1.0.  Corona virus not detected. 

She is sitting up in bed.  Awake and alert in no acute distress.  No cough or 

congestion.  No fever chills or night sweats.  18 O2 saturations at 98% on 2 L/m

per nasal cannula.  She's afebrile.  Hemodynamically stable.





On 05/17/2021 patient seen in follow-up on medical surgical floor, she is 

resting in bed, appears to be in no acute distress, she is currently intubated 

of oxygen pulse ox of 97%, she had a CT chest completed which showed no evidence

of pulmonary embolism, pulmonary emphysema, and possibility of interstitial 

pneumonia.  There was no pleural fluid seen to suggest heart failure.  There was

fatty infiltration of the liver noted.  Patient is currently on Rocephin, she is

receiving nebulized Pulmicort, however she has been refusing any inhalers, and 

she has refused IV Solu-Medrol Dr. Joseph had ordered for her.  She stated that 

she is not taking any inhalers or medications before we know exactly what is 

wrong with her.  Follow-up chest x-ray shows no acute process, and underlying 

emphysema.  The rest of her blood work has been reviewed, his d-dimer was 

negative at 0.59, white blood cell count is normal at 5.58, hemoglobin is 12.7, 

electrolytes and renal profile were within normal limits, urinalysis showed 

large leuks, and mildly increased white blood cells suggesting possibility of 

urinary tract infection, the patient has been denying any urinary symptoms.  

Patient tested negative for COVID-19





Objective





- Vital Signs


Vital signs: 


                                   Vital Signs











Temp  97.8 F   05/17/21 07:00


 


Pulse  67   05/17/21 07:00


 


Resp  16   05/17/21 07:00


 


BP  154/83   05/17/21 07:00


 


Pulse Ox  97   05/17/21 08:06








                                 Intake & Output











 05/16/21 05/17/21 05/17/21





 18:59 06:59 18:59


 


Intake Total   550


 


Balance   550


 


Intake:   


 


  Intake, IV Titration   50





  Amount   


 


    cefTRIAXone 1 gm In   50





    Sodium Chloride 0.9% 50   





    ml @ 100 mls/hr IVPB   





    Q24HR [facility] Rx#:[id number]   


 


  Oral   500


 


Other:   


 


  Voiding Method  Toilet 


 


  # Voids 1 2 














- Exam


 GENERAL EXAM: Alert, very pleasant, 65-year-old white female, on 2 L of oxygen 

and the pulse ox of 97% comfortable in no apparent distress.


HEAD: Normocephalic/atraumatic.


EYES: Normal reaction of pupils, equal size.  Conjunctiva pink, sclera white.


NOSE: Clear with pink turbinates.


THROAT: No erythema or exudates.


NECK: No masses, no JVD, no thyroid enlargement, no adenopathy.


CHEST: No chest wall deformity.  Symmetrical expansion. 


LUNGS: Equal air entry with diminished breath sounds at the bases, no wheezing, 

mild crackles


CVS: Regular rate and rhythm, normal S1 and S2, no gallops, no murmurs, no rubs


ABDOMEN: Soft, nontender.  No hepatosplenomegaly, normal bowel sounds, no 

guarding or rigidity.


EXTREMITIES: No clubbing, no edema, no cyanosis, 2+ pulses and upper and lower 

extremities.


MUSCULOSKELETAL: Muscle strength and tone normal.


SPINE: No scoliosis or deformity


SKIN: No rashes


CENTRAL NERVOUS SYSTEM: Alert and oriented -3.  No focal deficits, tone is 

normal in all 4 extremities.


PSYCHIATRIC: Alert and oriented -3.  Appropriate affect.  Intact judgment and 

insight.











- Labs


CBC & Chem 7: 


                                 05/16/21 04:31





                                 05/16/21 04:31


Labs: 


                      Microbiology - Last 24 Hours (Table)











 05/15/21 15:53 Urine Culture - Final





 Urine,Voided 














Assessment and Plan


Plan: 


 Assessment:





1 Acute hypoxic respiratory failure of unclear etiology, suspect secondary to 

obesity/deconditioning, COPD exacerbation, less likely pulmonary emboli.  COVID-

19 screen negative





2 Morbid obesity with BMI of 39, weight gain of 60 pounds over the past several 

months





3 Former smoker, FEV1 value 85% of predicted in 2012





4 Obstructive sleep apnea utilizing CPAP





5 Hypertension





6 Hyperlipidemia





7 Anxiety/depression





8 Coronary artery disease with previous stent placement





9 History of aortic thrombectomy/aortofemoral bypass in 2007 with ventilatory 

dependent respiratory failure





10 Multiple medication ALLERGIES





Plan:





CTA chest showed no evidence of pulmonary embolism


It did not possibility of interstitial pneumonitis possibility of alveolitis, 

recommend IV Solu-Medrol which the patient has been refusing


Continue antibiotics


Continue nebulized treatments, patient is refusing those as well


Titrate down FiO2


Pulmonary service will sign off and follow on an as-needed basis





I performed a history & physical examination of the patient and discussed their 

management with my nurse practitioner, Jeannine Belcher.  I reviewed the nurse 

practitioner's note and agree with the documented findings and plan of care.  

Lung sounds are positive for diminished breath sounds at the bases with mild 

crackles  The findings and the impression was discussed with the patient.  I 

attest to the documentation by the nurse practitioner. 





Time with Patient: Less than 30

## 2021-05-17 NOTE — PN
PROGRESS NOTE



DATE OF SERVICE:

05/17/2021



This 65-year-old woman who was admitted with shortness of breath had a CT which showed

bilateral multiple lesions, possibly interstitial.  The patient has a history of

possibly interstitial pneumonia or COPD.  The patient is being closely monitored. There

is no evidence of pulmonary embolism at this time.



PHYSICAL EXAMINATION:

Alert and oriented x3. Pulse 75, blood pressure 170/75, respiration 18, temperature

98.4, pulse ox 94% on 2 L.

HEENT: Conjunctivae normal.

NECK: No jugular venous distention.

CARDIOVASCULAR SYSTEM:  S1, S2 muffled.

RESPIRATORY SYSTEM: Breath sounds diminished at the bases.  A few scattered rhonchi.

ABDOMEN: Soft, non-tender.

NERVOUS SYSTEM: No focal deficit.



LABS:

Glucose is 113.  UA shows 29 WBCs.



ASSESSMENT:

1. Chest discomfort; possible coronary artery disease. Myocardial infarction ruled

    out.

2. Shortness of breath; possibly chronic obstructive pulmonary disease, acute

    exacerbation.

3. Rule out interstitial lung disease or usual interstitial pneumonia.

4. Acute urinary tract infection, present on admission, on IV antibiotics.

5. History of coronary artery disease, stent.

6. Gastroesophageal reflux disease.

7. Hypertension.

8. Hyperlipidemia.

9. ALLERGY TO BRONCHODILATORS.

10.History of liver disease.

11.History of degenerative joint disease.

12.History of obstructive sleep apnea.

13.History of vascular disorder.

14.History of aortic thrombectomy as well as aortofemoral bypass.

15.History of gastric ulcer.

16.History of Nielsen's esophagus.

17.History of colonic polyp.

18.History of peripheral vascular disease.

19.Anxiety, depression.

20.History of nicotine dependence.

21.Obesity with body mass index of 13.1.



RECOMMENDATIONS AND DISCUSSION:

I recommend to continue current medications, continue with the monitoring, symptomatic

treatment. Otherwise at this time I recommend continue with the bronchodilator,

continue the current medications. Pulmonary consultation, possible bronchoscopy and

biopsy.  Guarded prognosis.  Further recommendations to follow.



I would also recommend a repeat 2D echo because of the findings on the CT scan.

Continue the antibiotics for UTI. The cultures are negative so far. Further

recommendations to follow.





MMODL / IJN: [id number] / Job#: [id number]

## 2021-05-17 NOTE — XR
EXAMINATION TYPE: XR chest 1V portable

 

DATE OF EXAM: 5/17/2021

 

COMPARISON: Chest x-ray 5/14/2021, chest CT 5/16/2021

 

HISTORY: Congestive heart failure

 

TECHNIQUE: Single frontal view of the chest is obtained.

 

FINDINGS: Technique is somewhat apical lordotic. There is no focal air space opacity, pleural effusio
n, or pneumothorax seen.  The cardiac silhouette size is at the upper limit of normal for size.  Inte
rstitium is not well appreciated possibly due to technique. The osseous structures are intact.

 

IMPRESSION:  No acute process. There is underlying emphysema.

## 2021-05-18 RX ADMIN — VALSARTAN SCH MG: 160 TABLET, FILM COATED ORAL at 09:46

## 2021-05-18 RX ADMIN — BUDESONIDE SCH: 1 SUSPENSION RESPIRATORY (INHALATION) at 07:51

## 2021-05-18 RX ADMIN — METOPROLOL SUCCINATE SCH MG: 25 TABLET, EXTENDED RELEASE ORAL at 09:47

## 2021-05-18 RX ADMIN — VALSARTAN SCH MG: 160 TABLET, FILM COATED ORAL at 21:38

## 2021-05-18 RX ADMIN — GUAIFENESIN AND DEXTROMETHORPHAN SCH ML: 100; 10 SYRUP ORAL at 15:24

## 2021-05-18 RX ADMIN — SERTRALINE HYDROCHLORIDE SCH MG: 100 TABLET ORAL at 09:47

## 2021-05-18 RX ADMIN — GUAIFENESIN AND DEXTROMETHORPHAN SCH ML: 100; 10 SYRUP ORAL at 21:38

## 2021-05-18 RX ADMIN — TEMAZEPAM PRN MG: 15 CAPSULE ORAL at 21:57

## 2021-05-18 RX ADMIN — LORATADINE SCH MG: 10 TABLET ORAL at 09:48

## 2021-05-18 RX ADMIN — PANTOPRAZOLE SODIUM SCH MG: 40 TABLET, DELAYED RELEASE ORAL at 09:47

## 2021-05-18 RX ADMIN — CLOPIDOGREL BISULFATE SCH MG: 75 TABLET ORAL at 09:47

## 2021-05-18 RX ADMIN — ASPIRIN SCH: 325 TABLET ORAL at 09:49

## 2021-05-18 NOTE — ECHOF
Referral Reason:chf



MEASUREMENTS

--------

HEIGHT: 152.4 cm

WEIGHT: 90.7 kg

BP: 154/83

RVIDd:   2.9 cm     (< 3.3)

IVSd:   1.4 cm     (0.6 - 1.1)

LVIDd:   3.4 cm     (3.9 - 5.3)

LVPWd:   1.3 cm     (0.6 - 1.1)

IVSs:   1.8 cm

LVIDs:   2.3 cm

LVPWs:   1.7 cm

LA Diam:   2.7 cm     (2.7 - 3.8)

LAESV Index (A-L):   16.01 ml/m

Ao Diam:   3.0 cm     (2.0 - 3.7)

AV Cusp:   1.9 cm     (1.5 - 2.6)

MV EXCURSION:   15.892 mm     (> 18.000)

MV EF SLOPE:   59 mm/s     (70 - 150)

EPSS:   0.6 cm

MV E Graeme:   1.11 m/s

MV DecT:   206 ms

MV A Graeme:   0.84 m/s

MV E/A Ratio:   1.31 

RAP:   5.00 mmHg

RVSP:   50.57 mmHg







FINDINGS

--------

Sinus rhythm.

This was a technically adequate study.

The left ventricular size is normal.   There is moderate concentric left ventricular hypertrophy.   O
verall left ventricular systolic function is normal with, an EF between 55 - 60 %.

The right ventricle is normal in size.

Normal LA  size by volume 22+/-6 ml/m2.

The right atrium is normal in size.

Lipomatous Hypertrophy of the atrial septum is present

There is mild aortic valve sclerosis.

Mild mitral regurgitation is present.

Mild tricuspid regurgitation present.   There is moderate pulmonary hypertension.   The right ventric
ular systolic pressure, as measured by Doppler, is 50.57mmHg.

Trace/mild (physiologic)  pulmonic regurgitation.

The aortic root size is normal.

Normal inferior vena cava with normal inspiratory collapse consistent with estimated right atrial pre
ssure of  5 mmHg.

There is no pericardial effusion.



CONCLUSIONS

--------

1. The left ventricular size is normal.

2. There is moderate concentric left ventricular hypertrophy.

3. Lipomatous Hypertrophy of the atrial septum is present

4. There is mild aortic valve sclerosis.

5. Mild mitral regurgitation is present.

6. Mild tricuspid regurgitation present.

7. There is moderate pulmonary hypertension.

8. The right ventricular systolic pressure, as measured by Doppler, is 50.57mmHg.

9. Trace/mild (physiologic)  pulmonic regurgitation.

10. There is no pericardial effusion.





SONOGRAPHER: KESHA Stubbs

## 2021-05-18 NOTE — P.PN
Subjective





This is a pleasant 65 years old female with multiple medical problems presents 

because of shortness of breath and hypoxia, also she has been complaining of 

from chest pain.  Patient is morbidly obese and she's been evaluated by 

cardiologist who cleared her for outpatient follow-up.  Also has been evaluated 

by the pulmonologist and found hypoxia of undetermined etiology, could be due to

COPD and obesity or interstitial pneumonia as CTA of the chest is negative for 

PE however it shown emphysema and possible interstitial pneumonia.


Clinically patient complained from significant exertional dyspnea and dry 

coughing.  And chest pain on coughing.  She is morbidly obese with no 

significant respiratory distress when addressed and she is saturating 90s on 2 L

oxygen via nasal cannula.


I explained to the patient her illness and the recommendation for medication, 

she verbalized understanding and accepting to take steroids, patient states she 

was refusing steroids because she did not understand why she needed to take it 

but now after explaining to her she agrees to take it.  However we will start 

with prednisone 20 mg time 1 and patient tolerated that dose without ALLERGIC 

reaction, we'll give another small dose of IV Solu-Medrol 10 mg with as 

tolerated well by the patient.  We can start her on Solu-Medrol 40 mg twice 

daily.  This discussed with the staff.


Also patient is currently on ceftriaxone.


Cardiologist recommended follow-up with Dr. Palacios in one week after discharge.








Objective





- Vital Signs


Vital signs: 


                                   Vital Signs











Temp  97.3 F L  05/18/21 07:00


 


Pulse  66   05/18/21 07:00


 


Resp  18   05/18/21 07:00


 


BP  135/81   05/18/21 07:00


 


Pulse Ox  95   05/18/21 07:48








                                 Intake & Output











 05/17/21 05/18/21 05/18/21





 18:59 06:59 18:59


 


Intake Total 550 100 


 


Balance 550 100 


 


Intake:   


 


  Intake, IV Titration 50 100 





  Amount   


 


    cefTRIAXone 1 gm In 50 100 





    Sodium Chloride 0.9% 50   





    ml @ 100 mls/hr IVPB   





    Q24HR [facility] Rx#:[id number]   


 


  Oral 500  


 


Other:   


 


  Voiding Method  Toilet Toilet


 


  # Voids  1 














- Exam





-GENERAL: The patient is alert and oriented x3, not in any acute distress.  Mo

rbidly obese


HEENT: Pupils are round and equally reacting to light. EOMI. No scleral icterus.

No conjunctival pallor. Normocephalic, atraumatic. No pharyngeal erythema. No 

thyromegaly. 


CARDIOVASCULAR: S1 and S2 present. No murmurs, rubs, or gallops. 


-PULMONARY: Chest is clear to auscultation,  bilateral expiratory wheezing


ABDOMEN: Soft, nontender, nondistended, normoactive bowel sounds. No palpable 

organomegaly. 


MUSCULOSKELETAL: No joint swelling or deformity. 


EXTREMITIES: No cyanosis, clubbing, or pedal edema. 


NEUROLOGICAL: Gross neurological examination did not reveal any focal deficits. 


SKIN: No rashes. no petechiae.





- Labs


CBC & Chem 7: 


                                 05/16/21 04:31





                                 05/16/21 04:31





Assessment and Plan


Assessment: 





Acute hypoxic respiratory failure, most likely multifactorial secondary to COPD 

exacerbation, obesity and possible interstitial pneumonia


Acute COPD exacerbation


Possible interstitial pneumonia


Morbid obesity


Noncompliance with therapy


Possible acute urinary tract infection











Plan: 





This is a pleasant 65 years old female who presents with hypoxia, COPD and 

possible pneumonia.  Continue with ceftriaxone. add Doxycycline.


Start steroids if no ALLERGIC reaction.


Cardiology and pulmonary evaluated the patient and they signed off the case.


Labs and medication were reviewed..  Continue same treatment.  Continue with 

symptomatic treatment.  Resume home medication.  Monitor lytes and vitals.  DVT 

and GI prophylaxis.  Further recommendationsas per clinical course of the 

patient


DVT prophylaxis: Subcutaneous heparin


GI Prophylaxis: Ppi


Prognosis is guarded

## 2021-05-19 RX ADMIN — SERTRALINE HYDROCHLORIDE SCH MG: 100 TABLET ORAL at 08:53

## 2021-05-19 RX ADMIN — GUAIFENESIN AND DEXTROMETHORPHAN SCH ML: 100; 10 SYRUP ORAL at 07:55

## 2021-05-19 RX ADMIN — DIPHENHYDRAMINE HYDROCHLORIDE PRN MG: 50 INJECTION, SOLUTION INTRAMUSCULAR; INTRAVENOUS at 00:20

## 2021-05-19 RX ADMIN — CLOPIDOGREL BISULFATE SCH MG: 75 TABLET ORAL at 08:53

## 2021-05-19 RX ADMIN — BUDESONIDE SCH: 1 SUSPENSION RESPIRATORY (INHALATION) at 08:27

## 2021-05-19 RX ADMIN — VALSARTAN SCH MG: 160 TABLET, FILM COATED ORAL at 20:50

## 2021-05-19 RX ADMIN — METOPROLOL SUCCINATE SCH MG: 25 TABLET, EXTENDED RELEASE ORAL at 08:53

## 2021-05-19 RX ADMIN — BUDESONIDE SCH: 1 SUSPENSION RESPIRATORY (INHALATION) at 03:25

## 2021-05-19 RX ADMIN — TEMAZEPAM PRN MG: 15 CAPSULE ORAL at 20:50

## 2021-05-19 RX ADMIN — GUAIFENESIN AND DEXTROMETHORPHAN SCH ML: 100; 10 SYRUP ORAL at 22:53

## 2021-05-19 RX ADMIN — GUAIFENESIN AND DEXTROMETHORPHAN SCH ML: 100; 10 SYRUP ORAL at 16:03

## 2021-05-19 RX ADMIN — PANTOPRAZOLE SODIUM SCH MG: 40 TABLET, DELAYED RELEASE ORAL at 08:53

## 2021-05-19 RX ADMIN — ASPIRIN SCH: 325 TABLET ORAL at 08:56

## 2021-05-19 RX ADMIN — LORATADINE SCH MG: 10 TABLET ORAL at 08:53

## 2021-05-19 RX ADMIN — VALSARTAN SCH MG: 160 TABLET, FILM COATED ORAL at 08:55

## 2021-05-19 RX ADMIN — DIPHENHYDRAMINE HYDROCHLORIDE PRN MG: 50 INJECTION, SOLUTION INTRAMUSCULAR; INTRAVENOUS at 17:41

## 2021-05-19 RX ADMIN — BUDESONIDE SCH: 1 SUSPENSION RESPIRATORY (INHALATION) at 19:38

## 2021-05-19 NOTE — P.PN
Subjective





This is a pleasant 65 years old female with multiple medical problems presents 

because of shortness of breath and hypoxia, also she has been complaining of 

from chest pain.  Patient is morbidly obese and she's been evaluated by 

cardiologist who cleared her for outpatient follow-up.  Also has been evaluated 

by the pulmonologist and found hypoxia of undetermined etiology, could be due to

COPD and obesity or interstitial pneumonia as CTA of the chest is negative for 

PE however it shown emphysema and possible interstitial pneumonia.


Clinically patient complained from significant exertional dyspnea and dry 

coughing.  And chest pain on coughing.  She is morbidly obese with no 

significant respiratory distress when addressed and she is saturating 90s on 2 L

oxygen via nasal cannula.


I explained to the patient her illness and the recommendation for medication, 

she verbalized understanding and accepting to take steroids, patient states she 

was refusing steroids because she did not understand why she needed to take it 

but now after explaining to her she agrees to take it.  However we will start 

with prednisone 20 mg time 1 and patient tolerated that dose without ALLERGIC 

reaction, we'll give another small dose of IV Solu-Medrol 10 mg with as 

tolerated well by the patient.  We can start her on Solu-Medrol 40 mg twice 

daily.  This discussed with the staff.


Also patient is currently on ceftriaxone.


Cardiologist recommended follow-up with Dr. Palacios in one week after discharge.








05/19/2021


Patient is with no dyspnea at rest however she states she got really short of 

breath on walking.  No significant cough or chest pain.  Her symptoms of hypoxia

could be due to her obesity, COPD exacerbation and possible interstitial 

pneumonia.  Pulmonary service recommended IV Solu-Medrol, yesterday she received

1 small dose of IV Solu-Medrol at 10 mg 1, after that she developed burning in 

her artery with lacrimation, rhinorrhea and swelling in his throat, therefore IV

steroids/salmeterol was a stopped.  However patient tolerated the prednisone 20 

mg yesterday, I give her another dose today and she tolerated that well so 

another dose of 20 mg as provided today with a total of 40 mg.


We will add oral doxycycline for 5 days


Pulmonary and cardiology services signed off already.





Objective





- Vital Signs


Vital signs: 


                                   Vital Signs











Temp  97.2 F L  05/19/21 07:00


 


Pulse  64   05/19/21 07:00


 


Resp  18   05/19/21 07:00


 


BP  190/82   05/19/21 13:32


 


Pulse Ox  94 L  05/19/21 07:00








                                 Intake & Output











 05/18/21 05/19/21 05/19/21





 18:59 06:59 18:59


 


Intake Total  350 500


 


Output Total  50 


 


Balance  300 500


 


Intake:   


 


  Oral  350 500


 


Output:   


 


  Emesis  50 


 


Other:   


 


  Voiding Method Toilet Toilet Toilet


 


  # Voids 3 1 1


 


  # Bowel Movements 1 2 














- Exam





-GENERAL: The patient is alert and oriented x3, not in any acute distress.  

Morbidly obese


HEENT: Pupils are round and equally reacting to light. EOMI. No scleral icterus.

No conjunctival pallor. Normocephalic, atraumatic. No pharyngeal erythema. No 

thyromegaly. 


CARDIOVASCULAR: S1 and S2 present. No murmurs, rubs, or gallops. 


-PULMONARY: Chest is clear to auscultation,  bilateral expiratory wheezing


ABDOMEN: Soft, nontender, nondistended, normoactive bowel sounds. No palpable 

organomegaly. 


MUSCULOSKELETAL: No joint swelling or deformity. 


EXTREMITIES: No cyanosis, clubbing, or pedal edema. 


NEUROLOGICAL: Gross neurological examination did not reveal any focal deficits. 


SKIN: No rashes. no petechiae.





- Labs


CBC & Chem 7: 


                                 05/16/21 04:31





                                 05/16/21 04:31





Assessment and Plan


Assessment: 





Acute hypoxic respiratory failure, most likely multifactorial secondary to COPD 

exacerbation, obesity and possible interstitial pneumonia


Acute COPD exacerbation


Possible interstitial pneumonia


Morbid obesity


Noncompliance with therapy


Possible acute urinary tract infection











Plan: 





This is a pleasant 65 years old female who presents with hypoxia, COPD and 

possible pneumonia.  Continue with ceftriaxone. add Doxycycline.


Continue with prednisone.  Her blood pressure and sugar


Cardiology and pulmonary evaluated the patient and they signed off the case.


Labs and medication were reviewed..  Continue same treatment.  Continue with 

symptomatic treatment.  Resume home medication.  Monitor lytes and vitals.  DVT 

and GI prophylaxis.  Further recommendationsas per clinical course of the 

patient


DVT prophylaxis: Subcutaneous heparin


GI Prophylaxis: Ppi


Prognosis is guarded

## 2021-05-20 VITALS — RESPIRATION RATE: 17 BRPM

## 2021-05-20 RX ADMIN — LORATADINE SCH MG: 10 TABLET ORAL at 08:27

## 2021-05-20 RX ADMIN — METOPROLOL SUCCINATE SCH MG: 25 TABLET, EXTENDED RELEASE ORAL at 08:27

## 2021-05-20 RX ADMIN — PANTOPRAZOLE SODIUM SCH MG: 40 TABLET, DELAYED RELEASE ORAL at 08:27

## 2021-05-20 RX ADMIN — GUAIFENESIN AND DEXTROMETHORPHAN SCH ML: 100; 10 SYRUP ORAL at 16:09

## 2021-05-20 RX ADMIN — SERTRALINE HYDROCHLORIDE SCH MG: 100 TABLET ORAL at 08:27

## 2021-05-20 RX ADMIN — DIPHENHYDRAMINE HYDROCHLORIDE PRN MG: 50 INJECTION, SOLUTION INTRAMUSCULAR; INTRAVENOUS at 00:56

## 2021-05-20 RX ADMIN — CLOPIDOGREL BISULFATE SCH MG: 75 TABLET ORAL at 08:27

## 2021-05-20 RX ADMIN — VALSARTAN SCH MG: 160 TABLET, FILM COATED ORAL at 23:20

## 2021-05-20 RX ADMIN — BUDESONIDE SCH: 1 SUSPENSION RESPIRATORY (INHALATION) at 21:38

## 2021-05-20 RX ADMIN — GUAIFENESIN AND DEXTROMETHORPHAN SCH ML: 100; 10 SYRUP ORAL at 06:51

## 2021-05-20 RX ADMIN — ASPIRIN SCH: 325 TABLET ORAL at 08:28

## 2021-05-20 RX ADMIN — GUAIFENESIN AND DEXTROMETHORPHAN SCH ML: 100; 10 SYRUP ORAL at 23:20

## 2021-05-20 RX ADMIN — BUDESONIDE SCH: 1 SUSPENSION RESPIRATORY (INHALATION) at 08:15

## 2021-05-20 RX ADMIN — VALSARTAN SCH MG: 160 TABLET, FILM COATED ORAL at 08:27

## 2021-05-20 NOTE — P.PN
Subjective





This is a pleasant 65 years old female with multiple medical problems presents 

because of shortness of breath and hypoxia, also she has been complaining of 

from chest pain.  Patient is morbidly obese and she's been evaluated by 

cardiologist who cleared her for outpatient follow-up.  Also has been evaluated 

by the pulmonologist and found hypoxia of undetermined etiology, could be due to

COPD and obesity or interstitial pneumonia as CTA of the chest is negative for 

PE however it shown emphysema and possible interstitial pneumonia.


Clinically patient complained from significant exertional dyspnea and dry 

coughing.  And chest pain on coughing.  She is morbidly obese with no 

significant respiratory distress when addressed and she is saturating 90s on 2 L

oxygen via nasal cannula.


I explained to the patient her illness and the recommendation for medication, 

she verbalized understanding and accepting to take steroids, patient states she 

was refusing steroids because she did not understand why she needed to take it 

but now after explaining to her she agrees to take it.  However we will start 

with prednisone 20 mg time 1 and patient tolerated that dose without ALLERGIC 

reaction, we'll give another small dose of IV Solu-Medrol 10 mg with as 

tolerated well by the patient.  We can start her on Solu-Medrol 40 mg twice 

daily.  This discussed with the staff.


Also patient is currently on ceftriaxone.


Cardiologist recommended follow-up with Dr. Palacios in one week after discharge.








05/19/2021


Patient is with no dyspnea at rest however she states she got really short of 

breath on walking.  No significant cough or chest pain.  Her symptoms of hypoxia

could be due to her obesity, COPD exacerbation and possible interstitial 

pneumonia.  Pulmonary service recommended IV Solu-Medrol, yesterday she received

1 small dose of IV Solu-Medrol at 10 mg 1, after that she developed burning in 

her artery with lacrimation, rhinorrhea and swelling in his throat, therefore IV

steroids/salmeterol was a stopped.  However patient tolerated the prednisone 20 

mg yesterday, I give her another dose today and she tolerated that well so 

another dose of 20 mg as provided today with a total of 40 mg.


We will add oral doxycycline for 5 days


Pulmonary and cardiology services signed off already.





05/20/2021


Patient dyspnea is improving and today I worked with the patient to the bathroom

back and forth where usually she complains from dyspnea and shortness distance 

however today she does not walk normal with no worsening shortness of breath.  

Also she has minimal cough and she is on Robitussin.  No chest pain.  She 

remains on 2 L oxygen via nasal cannula.


She tolerated the prednisone 40 mg daily.  Does not she has an ALLERGIC reaction

to doxycycline and it was stopped and she remains on ceftriaxone.


Possible discharge in 24 hours to 48 hours if she keeps improvement





Objective





- Vital Signs


Vital signs: 


                                   Vital Signs











Temp  97.8 F   05/20/21 15:00


 


Pulse  73   05/20/21 15:00


 


Resp  16   05/20/21 15:00


 


BP  134/64   05/20/21 15:00


 


Pulse Ox  96   05/20/21 15:00








                                 Intake & Output











 05/20/21 05/20/21 05/21/21





 06:59 18:59 06:59


 


Intake Total  542 


 


Balance  542 


 


Weight  90.718 kg 


 


Intake:   


 


  Oral  542 


 


Other:   


 


  Voiding Method Toilet Toilet 


 


  # Voids 1 1 


 


  # Bowel Movements 1  














- Exam





-GENERAL: The patient is alert and oriented x3, not in any acute distress.  

Morbidly obese


HEENT: Pupils are round and equally reacting to light. EOMI. No scleral icterus.

No conjunctival pallor. Normocephalic, atraumatic. No pharyngeal erythema. No 

thyromegaly. 


CARDIOVASCULAR: S1 and S2 present. No murmurs, rubs, or gallops. 


-PULMONARY: Chest is clear to auscultation,  bilateral expiratory wheezing


ABDOMEN: Soft, nontender, nondistended, normoactive bowel sounds. No palpable 

organomegaly. 


MUSCULOSKELETAL: No joint swelling or deformity. 


EXTREMITIES: No cyanosis, clubbing, or pedal edema. 


NEUROLOGICAL: Gross neurological examination did not reveal any focal deficits. 


SKIN: No rashes. no petechiae.





- Labs


CBC & Chem 7: 


                                 05/16/21 04:31





                                 05/16/21 04:31





Assessment and Plan


Assessment: 





Acute hypoxic respiratory failure, most likely multifactorial secondary to COPD 

exacerbation, obesity and possible interstitial pneumonia


Acute COPD exacerbation


Possible interstitial pneumonia


Morbid obesity


Noncompliance with therapy


Possible acute urinary tract infection











Plan: 





This is a pleasant 65 years old female who presents with hypoxia, COPD and 

possible pneumonia.  Continue with ceftriaxone. add Doxycycline.


Continue with prednisone.  Her blood pressure and sugar


Cardiology and pulmonary evaluated the patient and they signed off the case.


Labs and medication were reviewed..  Continue same treatment.  Continue with 

symptomatic treatment.  Resume home medication.  Monitor lytes and vitals.  DVT 

and GI prophylaxis.  Further recommendationsas per clinical course of the 

patient


DVT prophylaxis: Subcutaneous heparin


GI Prophylaxis: Ppi


Prognosis is guarded

## 2021-05-21 VITALS — TEMPERATURE: 97.7 F

## 2021-05-21 VITALS — HEART RATE: 76 BPM

## 2021-05-21 VITALS — DIASTOLIC BLOOD PRESSURE: 84 MMHG | SYSTOLIC BLOOD PRESSURE: 148 MMHG

## 2021-05-21 RX ADMIN — BUDESONIDE SCH: 1 SUSPENSION RESPIRATORY (INHALATION) at 08:19

## 2021-05-21 RX ADMIN — VALSARTAN SCH MG: 160 TABLET, FILM COATED ORAL at 08:19

## 2021-05-21 RX ADMIN — LORATADINE SCH MG: 10 TABLET ORAL at 08:20

## 2021-05-21 RX ADMIN — GUAIFENESIN AND DEXTROMETHORPHAN SCH: 100; 10 SYRUP ORAL at 08:19

## 2021-05-21 RX ADMIN — METOPROLOL SUCCINATE SCH MG: 25 TABLET, EXTENDED RELEASE ORAL at 08:20

## 2021-05-21 RX ADMIN — CLOPIDOGREL BISULFATE SCH MG: 75 TABLET ORAL at 08:20

## 2021-05-21 RX ADMIN — SERTRALINE HYDROCHLORIDE SCH MG: 100 TABLET ORAL at 08:20

## 2021-05-21 RX ADMIN — TEMAZEPAM PRN MG: 15 CAPSULE ORAL at 00:56

## 2021-05-21 RX ADMIN — ASPIRIN SCH: 325 TABLET ORAL at 08:14

## 2021-05-21 RX ADMIN — PANTOPRAZOLE SODIUM SCH MG: 40 TABLET, DELAYED RELEASE ORAL at 08:20

## 2021-05-21 NOTE — P.EN
Patient dropped her oxygen on room air with exertion down to 80s%.  Patient he 

has oxygen at home but use it with BiPAP or nasal cannula at 2 L/m at night 

only, now she needed all the day and portable

## 2021-05-22 NOTE — P.DS
Providers


Date of admission: 


05/16/21 11:12





Attending physician: 


Emam Dixon





Consults: 





                                        





05/15/21 11:48


Consult Physician Routine 


   Consulting Provider: Genaro Penny


   Consult Reason/Comments: sob


   Do you want consulting provider notified?: Yes, Notify in am











Primary care physician: 


[facility]





Hospital Course: 





Diagnoses:


Acute hypoxic respiratory failure, most likely multifactorial secondary to COPD 

exacerbation, obesity and possible interstitial pneumonia


Acute COPD exacerbation


Possible interstitial pneumonia


Morbid obesity


Noncompliance with therapy


Possible acute urinary tract infection





Hospital course:


This is a pleasant 65 years old female with multiple medical problems presents 

because of shortness of breath and hypoxia, also she has been complaining of 

from chest pain.  Patient is morbidly obese and she's been evaluated by 

cardiologist who cleared her for outpatient follow-up.  Also has been evaluated 

by the pulmonologist and found hypoxia of undetermined etiology, could be due to

COPD and obesity or interstitial pneumonia as CTA of the chest is negative for 

PE however it shown emphysema and possible interstitial pneumonia.


Clinically patient complained from significant exertional dyspnea and dry 

coughing.  And chest pain on coughing.  She is morbidly obese with no 

significant respiratory distress when addressed and she is saturating 90s on 2 L

oxygen via nasal cannula.


Patient states that she uses 2 liters per minute of oxygen at home but at night 

only.


Patient has been evaluated by cardiologist and cleared her for discharge from 

the beginning, pulmonologist recommended IV Solu-Medrol however patient declined

because of her ALLERGIES, however trial of small dose of Solu-Medrol is provided

for the patient and patient started having lacrimation and burning sensation in 

her throat so it was a stopped however patient tolerated the prednisone very 

well.  Patient will be discharged on tapered dose of prednisone


Patient her symptoms improved significantly, and over the last 2 days patient 

can walk to the restroom back-and-forth with no significant dyspnea.  Her 

symptoms are significantly improved and patient agreeable to be discharged home 

today that she's readily got dressed up however on checking her oxygen with 

exertion dropped to 80s, and it looks like patient was maintained concerns that 

she will need oxygen through the day and not only through the night, I agree 

that patient will need oxygen continuously day and night to go home with , she 

informed and she agrees.  Discussed the case with case management team to 

provided oxygen for the patient on discharge.


Patient finished her course of therapy of ceftriaxone and that can be safely 

discontinued.  Patient's with no significant respiratory symptoms, no urinary 

symptoms, no fever


No GI or urinary symptoms.


Problems and management plan were discussed with the patient and he verbalized 

understanding and acceptance


Patient was found stable and can be discharged home however he needs follow-up 

as an outpatient. Patient was instructed to follow up with PCP [facility] 

within one week and patient agrees with appointments made for her on 5/27


Patient was instructed to follow up with Dr. Palacios in one week and Dr. Fishman 

pulmonologist in 1 week and she agrees to call and make appointment








Physical exam


Gen: patient is a AAOx3, no distress.  Obese


CVS: S1-S2, RRR, no murmur


Lungs: B/L CTA, no wheezing


Abdomen: soft, no distention, no tenderness, positive bowel sounds


Extremity: no leg edema or induration





Time spent more than 35 minutes


Patient Condition at Discharge: Fair





Plan - Discharge Summary


Discharge Rx Participant: No


New Discharge Prescriptions: 


New


   predniSONE 10 mg PO AS DIRECTED #40 tab





Continue


   Sertraline [Zoloft] 100 mg PO DAILY


   Pantoprazole Sodium [Protonix] 40 mg PO DAILY


   Cetirizine HCl [Zyrtec] 10 mg PO DAILY


   Nitroglycerin Sl Tabs [Nitrostat] 0.4 mg SUBLINGUAL Q5M PRN #25 tab


     PRN Reason: Chest Pain


   Clopidogrel [Plavix] 75 mg PO DAILY #30 tab


   Rosuvastatin Calcium [Crestor] 40 mg PO DAILY


   Metoprolol Succinate [Toprol XL] 25 mg PO DAILY


   buPROPion [Wellbutrin] 100 mg PO DAILY


   Valsartan 160 mg PO BID





Discontinued


   Turmeric Root Extract [Turmeric] 500 mg PO DAILY


Discharge Medication List





Cetirizine HCl [Zyrtec] 10 mg PO DAILY 11/17/19 [History]


Pantoprazole Sodium [Protonix] 40 mg PO DAILY 11/17/19 [History]


Sertraline [Zoloft] 100 mg PO DAILY 11/17/19 [History]


Clopidogrel [Plavix] 75 mg PO DAILY #30 tab 11/20/19 [Rx]


Nitroglycerin Sl Tabs [Nitrostat] 0.4 mg SUBLINGUAL Q5M PRN #25 tab 11/20/19 

[Rx]


Metoprolol Succinate [Toprol XL] 25 mg PO DAILY 03/19/21 [History]


Rosuvastatin Calcium [Crestor] 40 mg PO DAILY 03/19/21 [History]


Valsartan 160 mg PO BID 05/14/21 [History]


buPROPion [Wellbutrin] 100 mg PO DAILY 05/14/21 [History]


predniSONE 10 mg PO AS DIRECTED #40 tab 05/21/21 [Rx]








Follow up Appointment(s)/Referral(s): 


Chris Palacios MD [Family Provider] - 1 Week


Cody Fishman MD [STAFF PHYSICIAN] - 1 Week


[facility] [Primary Care Provider] - 05/27/21 3:00 pm


Patient Instructions/Handouts:  Dyspnea (DC), Shortness of Breath (DC)


Activity/Diet/Wound Care/Special Instructions: 


Low carbohydrate diet








Activity is restricted till you see your doctor


Discharge Disposition: HOME SELF-CARE

## 2021-07-23 ENCOUNTER — HOSPITAL ENCOUNTER (OUTPATIENT)
Dept: HOSPITAL 47 - ORWHC2ENDO | Age: 66
Discharge: HOME | End: 2021-07-23
Attending: INTERNAL MEDICINE
Payer: OTHER GOVERNMENT

## 2021-07-23 VITALS — RESPIRATION RATE: 18 BRPM | DIASTOLIC BLOOD PRESSURE: 76 MMHG | SYSTOLIC BLOOD PRESSURE: 152 MMHG

## 2021-07-23 VITALS — BODY MASS INDEX: 39 KG/M2

## 2021-07-23 VITALS — HEART RATE: 85 BPM | TEMPERATURE: 97.9 F

## 2021-07-23 DIAGNOSIS — Z98.890: ICD-10-CM

## 2021-07-23 DIAGNOSIS — Z87.891: ICD-10-CM

## 2021-07-23 DIAGNOSIS — M19.90: ICD-10-CM

## 2021-07-23 DIAGNOSIS — I27.21: ICD-10-CM

## 2021-07-23 DIAGNOSIS — Z90.49: ICD-10-CM

## 2021-07-23 DIAGNOSIS — J39.8: ICD-10-CM

## 2021-07-23 DIAGNOSIS — I73.9: ICD-10-CM

## 2021-07-23 DIAGNOSIS — Z90.710: ICD-10-CM

## 2021-07-23 DIAGNOSIS — Z90.89: ICD-10-CM

## 2021-07-23 DIAGNOSIS — J44.9: Primary | ICD-10-CM

## 2021-07-23 DIAGNOSIS — E66.9: ICD-10-CM

## 2021-07-23 DIAGNOSIS — R06.00: ICD-10-CM

## 2021-07-23 DIAGNOSIS — Z96.82: ICD-10-CM

## 2021-07-23 DIAGNOSIS — Z53.9: ICD-10-CM

## 2021-07-23 DIAGNOSIS — I10: ICD-10-CM

## 2021-07-23 DIAGNOSIS — E78.5: ICD-10-CM

## 2021-07-23 DIAGNOSIS — G47.33: ICD-10-CM

## 2021-07-26 ENCOUNTER — HOSPITAL ENCOUNTER (OUTPATIENT)
Dept: HOSPITAL 47 - ORWHC2ENDO | Age: 66
Discharge: HOME | End: 2021-07-26
Attending: INTERNAL MEDICINE
Payer: OTHER GOVERNMENT

## 2021-07-26 VITALS — SYSTOLIC BLOOD PRESSURE: 156 MMHG | HEART RATE: 76 BPM | DIASTOLIC BLOOD PRESSURE: 80 MMHG

## 2021-07-26 VITALS — BODY MASS INDEX: 40.2 KG/M2

## 2021-07-26 VITALS — RESPIRATION RATE: 20 BRPM

## 2021-07-26 VITALS — TEMPERATURE: 97.9 F

## 2021-07-26 DIAGNOSIS — J39.8: ICD-10-CM

## 2021-07-26 DIAGNOSIS — I10: ICD-10-CM

## 2021-07-26 DIAGNOSIS — E78.5: ICD-10-CM

## 2021-07-26 DIAGNOSIS — Z79.02: ICD-10-CM

## 2021-07-26 DIAGNOSIS — K21.9: ICD-10-CM

## 2021-07-26 DIAGNOSIS — I25.10: ICD-10-CM

## 2021-07-26 DIAGNOSIS — I27.20: ICD-10-CM

## 2021-07-26 DIAGNOSIS — J44.1: Primary | ICD-10-CM

## 2021-07-26 DIAGNOSIS — Z79.82: ICD-10-CM

## 2021-07-26 DIAGNOSIS — G47.33: ICD-10-CM

## 2021-07-26 LAB — GLUCOSE BLD-MCNC: 94 MG/DL (ref 75–99)

## 2021-07-26 PROCEDURE — 87502 INFLUENZA DNA AMP PROBE: CPT

## 2021-07-26 PROCEDURE — 87206 SMEAR FLUORESCENT/ACID STAI: CPT

## 2021-07-26 PROCEDURE — 87102 FUNGUS ISOLATION CULTURE: CPT

## 2021-07-26 PROCEDURE — 87529 HSV DNA AMP PROBE: CPT

## 2021-07-26 PROCEDURE — 87798 DETECT AGENT NOS DNA AMP: CPT

## 2021-07-26 PROCEDURE — 87252 VIRUS INOCULATION TISSUE: CPT

## 2021-07-26 PROCEDURE — 87634 RSV DNA/RNA AMP PROBE: CPT

## 2021-07-26 PROCEDURE — 31624 DX BRONCHOSCOPE/LAVAGE: CPT

## 2021-07-26 PROCEDURE — 87498 ENTEROVIRUS PROBE&REVRS TRNS: CPT

## 2021-07-26 PROCEDURE — 87070 CULTURE OTHR SPECIMN AEROBIC: CPT

## 2021-07-26 PROCEDURE — 87205 SMEAR GRAM STAIN: CPT

## 2021-07-26 PROCEDURE — 87116 MYCOBACTERIA CULTURE: CPT

## 2021-07-26 PROCEDURE — 87496 CYTOMEG DNA AMP PROBE: CPT

## 2021-07-26 NOTE — P.PCN
Date of Procedure: 07/26/21


Preoperative Diagnosis: 


Shortness of breath, mucous production, tracheal bronchitis, COPD exacerbation


Postoperative Diagnosis: 





1 COPD exacerbation/tracheal bronchitis


2 no evidence of any retained rest or secretions 


3 mild tracheobronchomalacia


4 bronchiol  lavage of the lungs was done.


Procedure(s) Performed: 


Bronchoscopy, bronchial lavage


Anesthesia: MAC


Surgeon: Cody Fishman


Estimated Blood Loss (ml): 0


Pathology: none sent


Disposition: same day


Operative Findings: 


 


This procedure was done under conscious sedation with anesthetic agents being 

administered by anesthesia the bedside.  A timeout was performed.  The patient 

is ALLERGIC to lidocaine and for that reason no lidocaine was utilized 

throughout the procedure.





After achieving adequate sedation, the flexible bronchoscope was introduced 

through the right nostril.  Examination of the posterior pharynx and larynx was 

done.  The upper airway structures were easily collapsible and this was dynamic 

obstruction consistent with obstructive sleep apnea.  There was adipose tissue 

growing circumferentially along the laryngeal wall.  The epiglottis was easily 

identified.  Was within normal limits.  Following that, the upper airway 

structures were inspected including the vallecula, arytenoids, true and false 

vocal cords.  Airway structures were within normal limits.  Bronchoscope was 

gently passed through the vocal cords into the upper trachea and examinations 

tracheal bronchial tree was done.  Based on my inspection, there was another 

month rest or secretions retained within the airway.  There was no significant 

mucosal abnormalities other than some mild inflammatory changes scattered in the

trachea and previous bronchi throughout the lung fields bilaterally.  The 

visualized airways into the trachea, bilateral mainstem bronchi, right upper 

lobe bronchus, bronchus intermedius, right middle lobe bronchus right lower lobe

bronchus and the various 10 segments on the right.  Examination of the left side

including the left main stem bronchus, left upper lobe and left lower lobe 

bronchus along with it was associated 8 segments.  All of the airways were 

patent and a mild component of tracheal bronchomalacia .The airway was irrigated

with saline and the bronchioloalveolar lavage was done.  A total of 30 mL was 

infused in the airway and 10 mL was suctioned back.  No endobronchial tumors.  

No lesions.  No foreign bodies.  No other abnormalities.  The bronchoscope was 

removed and the patient was transferred recovery in stable condition.

## 2021-11-26 ENCOUNTER — HOSPITAL ENCOUNTER (EMERGENCY)
Dept: HOSPITAL 47 - EC | Age: 66
Discharge: HOME | End: 2021-11-26
Payer: OTHER GOVERNMENT

## 2021-11-26 VITALS — TEMPERATURE: 98.7 F | RESPIRATION RATE: 16 BRPM | DIASTOLIC BLOOD PRESSURE: 82 MMHG | SYSTOLIC BLOOD PRESSURE: 167 MMHG

## 2021-11-26 VITALS — HEART RATE: 78 BPM

## 2021-11-26 DIAGNOSIS — K21.9: ICD-10-CM

## 2021-11-26 DIAGNOSIS — J06.9: Primary | ICD-10-CM

## 2021-11-26 DIAGNOSIS — F41.9: ICD-10-CM

## 2021-11-26 DIAGNOSIS — F32.A: ICD-10-CM

## 2021-11-26 DIAGNOSIS — Z79.899: ICD-10-CM

## 2021-11-26 DIAGNOSIS — I10: ICD-10-CM

## 2021-11-26 DIAGNOSIS — E78.5: ICD-10-CM

## 2021-11-26 DIAGNOSIS — Z87.891: ICD-10-CM

## 2021-11-26 DIAGNOSIS — I25.10: ICD-10-CM

## 2021-11-26 DIAGNOSIS — Z20.822: ICD-10-CM

## 2021-11-26 DIAGNOSIS — M19.90: ICD-10-CM

## 2021-11-26 LAB
ALBUMIN SERPL-MCNC: 4.6 G/DL (ref 3.5–5)
ALP SERPL-CCNC: 99 U/L (ref 38–126)
ALT SERPL-CCNC: 37 U/L (ref 4–34)
ANION GAP SERPL CALC-SCNC: 11 MMOL/L
AST SERPL-CCNC: 45 U/L (ref 14–36)
BASOPHILS # BLD AUTO: 0 K/UL (ref 0–0.2)
BASOPHILS NFR BLD AUTO: 1 %
BUN SERPL-SCNC: 11 MG/DL (ref 7–17)
CALCIUM SPEC-MCNC: 10.2 MG/DL (ref 8.4–10.2)
CHLORIDE SERPL-SCNC: 106 MMOL/L (ref 98–107)
CO2 SERPL-SCNC: 22 MMOL/L (ref 22–30)
EOSINOPHIL # BLD AUTO: 0.1 K/UL (ref 0–0.7)
EOSINOPHIL NFR BLD AUTO: 2 %
ERYTHROCYTE [DISTWIDTH] IN BLOOD BY AUTOMATED COUNT: 5.15 M/UL (ref 3.8–5.4)
ERYTHROCYTE [DISTWIDTH] IN BLOOD: 14.8 % (ref 11.5–15.5)
GLUCOSE SERPL-MCNC: 127 MG/DL (ref 74–99)
HCT VFR BLD AUTO: 42.8 % (ref 34–46)
HGB BLD-MCNC: 14.3 GM/DL (ref 11.4–16)
LYMPHOCYTES # SPEC AUTO: 0.8 K/UL (ref 1–4.8)
LYMPHOCYTES NFR SPEC AUTO: 15 %
MCH RBC QN AUTO: 27.7 PG (ref 25–35)
MCHC RBC AUTO-ENTMCNC: 33.4 G/DL (ref 31–37)
MCV RBC AUTO: 83.1 FL (ref 80–100)
MONOCYTES # BLD AUTO: 0.3 K/UL (ref 0–1)
MONOCYTES NFR BLD AUTO: 6 %
NEUTROPHILS # BLD AUTO: 3.8 K/UL (ref 1.3–7.7)
NEUTROPHILS NFR BLD AUTO: 74 %
PLATELET # BLD AUTO: 133 K/UL (ref 150–450)
POTASSIUM SERPL-SCNC: 4.5 MMOL/L (ref 3.5–5.1)
PROT SERPL-MCNC: 7.4 G/DL (ref 6.3–8.2)
SODIUM SERPL-SCNC: 139 MMOL/L (ref 137–145)
WBC # BLD AUTO: 5.1 K/UL (ref 3.8–10.6)

## 2021-11-26 PROCEDURE — 36415 COLL VENOUS BLD VENIPUNCTURE: CPT

## 2021-11-26 PROCEDURE — 99285 EMERGENCY DEPT VISIT HI MDM: CPT

## 2021-11-26 PROCEDURE — 71045 X-RAY EXAM CHEST 1 VIEW: CPT

## 2021-11-26 PROCEDURE — 80053 COMPREHEN METABOLIC PANEL: CPT

## 2021-11-26 PROCEDURE — 85025 COMPLETE CBC W/AUTO DIFF WBC: CPT

## 2021-11-26 PROCEDURE — 87635 SARS-COV-2 COVID-19 AMP PRB: CPT

## 2021-11-26 NOTE — XR
EXAMINATION TYPE: XR chest 1V portable

 

DATE OF EXAM: 11/26/2021

 

Comparison: 5/17/2021

 

Clinical History: 66-year-old female cough

 

Findings:

Spinal stimulator recent or along the mid thoracic spinal canal. Heart is borderline in size. Diffuse
 interstitial densities. Relative upper lung lucencies. No lalita consolidation or pleural effusion.

 

 

Impression:

Interstitial changes. Correlate for possible etiologies such as chronic asthma, bronchitis, or atypic
al pneumonias.

## 2021-11-26 NOTE — ED
URI HPI





- General


Chief Complaint: Upper Respiratory Infection


Stated Complaint: fever, bodyaches


Time Seen by Provider: 21 09:57


Source: patient, RN notes reviewed


Mode of arrival: ambulatory


Limitations: no limitations





- History of Present Illness


Initial Comments: 





Patient is a 66-year-old female with history of GERD, hypertension presenting to

the emergency Department with complaints of fever, body aches and headache that 

started this morning.  Patient did not take anything for her body aches today.  

She denies any chest pain, she has some mild shortness of breath.  Denies 

abdominal pain, no nausea or vomiting, no diarrhea.  She denies any fevers or 

chills.  Patient has no further complaints.  Upon arrival to the ER, she is 

hypertensive at 225/118, she doesn't history hypertension, she didn't take her 

medication this morning.  Patient states she is very anxious but possibility of 

having covid.  Rest of vitals are within normal limits.





- Related Data


                                Home Medications











 Medication  Instructions  Recorded  Confirmed


 


Cetirizine HCl [Zyrtec] 10 mg PO DAILY 19


 


Pantoprazole Sodium [Protonix] 40 mg PO DAILY 19


 


Sertraline [Zoloft] 100 mg PO DAILY 19


 


Metoprolol Succinate [Toprol XL] 25 mg PO DAILY 21


 


Rosuvastatin Calcium [Crestor] 40 mg PO DAILY 21


 


Valsartan 160 mg PO BID 21


 


buPROPion [Wellbutrin] 100 mg PO DAILY 21


 


Levalbuterol Hfa Inhaler [Xopenex 2 puff INHALATION RT-Q6H PRN 21





Hfa Inhaler]   


 


Levalbuterol Nebulized [Xopenex 1.25 mg INHALATION RT-Q6H PRN 21





Nebulized]   


 


Tiotropium 2.5 Mcg/Puff [Spiriva 2 puff INHALATION RT-DAILY 21





Respimat 2.5 Mcg]   








                                  Previous Rx's











 Medication  Instructions  Recorded


 


Clopidogrel [Plavix] 75 mg PO DAILY #30 tab 19


 


Nitroglycerin Sl Tabs [Nitrostat] 0.4 mg SUBLINGUAL Q5M PRN #25 tab 19


 


Azithromycin [Zithromax Z-pack (6 0 mg PO AS DIRECTED #6 tab 21





tabs)]  











                                    Allergies











Allergy/AdvReac Type Severity Reaction Status Date / Time


 


albuterol Allergy  Rash/Hives Verified 21 11:29


 


cortisone Allergy  Swelling/Pain Verified 21 11:29





   at inj site  


 


doxycycline Allergy  Swelling Verified 21 11:29


 


erythromycin base Allergy  Rash/Hives Verified 21 11:29


 


hydralazine Allergy  Rash/Hives Verified 21 11:29


 


Iodinated Contrast Media Allergy  Anaphylaxis Verified 21 11:29


 


iodine Allergy  Anaphylaxis Verified 21 11:29


 


lidocaine Allergy  Swelling Verified 21 11:29


 


lisinopril Allergy  Swelling Verified 21 11:29


 


modafinil Allergy  Unknown Verified 21 11:29


 


nystatin Allergy  Rash/Hives Verified 21 11:29


 


procaine [From Novocain] Allergy  Swelling Verified 21 11:29


 


vancomycin Allergy  Rash/Hives Verified 21 11:29


 


amlodipine AdvReac  Nausea & Verified 21 11:29





   Vomiting &  





   Diarrhea  


 


aspirin AdvReac  stomach Verified 21 11:29





   and  





   esophageal  





   ulcers  


 


atorvastatin [From Lipitor] AdvReac  MUSCLE PAIN Verified 21 11:29


 


cimetidine [From Tagamet] AdvReac  dizziness Verified 21 11:29


 


isosorbide AdvReac  Nausea & Verified 21 11:29





   Vomiting &  





   Diarrhea  


 


labetalol AdvReac  Nausea & Verified 21 11:29





   Vomiting &  





   Diarrhea  


 


metoprolol AdvReac  Diarrhea Verified 21 11:29


 


morphine AdvReac  "generic Verified 21 11:29





   morphine"  


 


oxcarbazepine AdvReac  hair loss Verified 21 11:29





[From Trileptal]     


 


paroxetine [From Paxil] AdvReac  tremors Verified 21 11:29


 


simvastatin [From Zocor] AdvReac  MUSCLE PAIN Verified 21 11:29














Review of Systems


ROS Statement: 


Those systems with pertinent positive or pertinent negative responses have been 

documented in the HPI.





ROS Other: All systems not noted in ROS Statement are negative.





Past Medical History


Past Medical History: Coronary Artery Disease (CAD), Cancer, GERD/Reflux, 

Hyperlipidemia, Hypertension, Liver Disease, Osteoarthritis (OA), Sleep 

Apnea/CPAP/BIPAP, Vascular Disorder


Additional Past Medical History / Comment(s): 2007 Aortic 

thrombectomy/aortofemoral bypass/pt in coma/trach x 6 weeks, R hip to R toes 

nerve damage after 6 weeks comatose, past chronic pain to R hip/leg but has pain

stimulator which has greatly helped, hx gastric ulcers, Nielsen's esophagus, 

hemorrhoids, 5 cancerous colon polyps removed, MIRELA with Cpap/O2 use, arthritis 

in feet. HAVING DIFFICULTY BREATHING-O2 SATS LOW WITH ACTIVITY


History of Any Multi-Drug Resistant Organisms: None Reported


Past Surgical History: Appendectomy, Cholecystectomy, Heart Catheterization, 

Heart Catheterization With Stent, Hysterectomy, Orthopedic Surgery, 

Tonsillectomy


Additional Past Surgical History / Comment(s): abdominal aortogram 21, 

Bilateral heel bone spurs/bilateral feet fusions to lower arches, bone spurs 

removed from inside bilateral feet,  stent in R leg with bypass to left 

leg,(now disconnected)  aortic thrombectomy/aortofemoral bypass, R knee 

arthroscopic surgery,  neurostimulator implant,  neruostimulator paddle 

moved, 2019 stimulator replaced and relocation stimulator battery, 2017 L common

femoral endartectomy, EGDs, colonoscopies/cancerous polypectomies., left femoral

surgery,


Past Anesthesia/Blood Transfusion Reactions: No Reported Reaction


Date of Last Stent Placement:: 2019


Past Psychological History: Anxiety, Depression


Smoking Status: Former smoker


Past Alcohol Use History: None Reported


Past Drug Use History: None Reported





- Past Family History


  ** Mother


Family Medical History: No Reported History


Additional Family Medical History / Comment(s): Mother was an alcoholic and 

at the age of 36 yrs.





  ** Father


Family Medical History: Cancer


Additional Family Medical History / Comment(s): Father had jaw/throat cancer.  

He was a smoker.





General Exam





- General Exam Comments


Initial Comments: 





GENERAL: 


Patient is well-developed and well-nourished.  Patient is nontoxic and in no 

acute distress, seems anxious.





HEAD: 


Atraumatic, normocephalic.





EYES:


Pupils equal round and reactive to light, extraocular movements intact, sclera 

anicteric, conjunctiva are normal.  Eyelids were unremarkable.





ENT: 


Nares patent, oropharynx clear without exudates.  Moist mucous membranes.





NECK: 


Normal range of motion, supple without lymphadenopathy or JVD.





LUNGS:


Unlabored respirations.  Breath sounds clear to auscultation bilaterally and 

equal.  No wheezes rales or rhonchi.





HEART:


Regular rate and rhythm without murmurs, rubs or gallops.





ABDOMEN: 


Soft, nontender, normoactive bowel sounds.  No guarding, no rebound.  No masses 

appreciated.





MUSCULOSKELETAL: 


Normal extremities with adequate strength and normal range of motion, no pitting

or edema.  No clubbing or cyanosis.





NEUROLOGICAL: 


Patient is alert and oriented x 3.  Motor and sensory are also intact.  Cranial 

nerves II through XII grossly intact.  Symmetrical smile.  Normal speech, normal

gait.   





PSYCH:


Normal mood, normal affect.





SKIN:


 Warm, Dry, normal turgor, no rashes or lesions noted.


Limitations: no limitations





Course





                                   Vital Signs











  21





  09:43 11:53


 


Temperature 99.5 F 98.7 F


 


Pulse Rate 80 82


 


Respiratory 18 16





Rate  


 


Blood Pressure 225/118 167/82


 


O2 Sat by Pulse 92 L 90 L





Oximetry  














Medical Decision Making





- Medical Decision Making





Patient is a 66-year-old female here with viral type symptoms that started 

suddenly this morning.  She has some mild shortness of breath present.  She did 

arrive hypertensive, she is very anxious, worried about the possibility of 

covid.  She has no fevers.  Labs are within normal limits except for some mild 

transaminitis.  Rapid Covid is negative.  Chest x-ray shows interstitial 

changes, possible chronic asthma, bronchitis or atypical pneumonia.  Patient has

been resting comfortably after some Tylenol.  Her blood pressure has improved 

after some rest.  Has strong inclination that this could be Covid, did retest 

her and again it was negative.  Patient will be treated for possible pneumonia, 

she does have inhalers at home.  Patient will follow up with her primary care.  

She is agreeable to this plan of care and she is stable for discharge.  Case 

discussed with Dr. Joiner.





- Lab Data


Result diagrams: 


                                 21 10:14





                                 21 10:18





                                   Lab Results











  21 Range/Units





  10:14 10:18 10:18 


 


WBC  5.1    (3.8-10.6)  k/uL


 


RBC  5.15    (3.80-5.40)  m/uL


 


Hgb  14.3    (11.4-16.0)  gm/dL


 


Hct  42.8    (34.0-46.0)  %


 


MCV  83.1    (80.0-100.0)  fL


 


MCH  27.7    (25.0-35.0)  pg


 


MCHC  33.4    (31.0-37.0)  g/dL


 


RDW  14.8    (11.5-15.5)  %


 


Plt Count  133 L    (150-450)  k/uL


 


MPV  8.5    


 


Neutrophils %  74    %


 


Lymphocytes %  15    %


 


Monocytes %  6    %


 


Eosinophils %  2    %


 


Basophils %  1    %


 


Neutrophils #  3.8    (1.3-7.7)  k/uL


 


Lymphocytes #  0.8 L    (1.0-4.8)  k/uL


 


Monocytes #  0.3    (0-1.0)  k/uL


 


Eosinophils #  0.1    (0-0.7)  k/uL


 


Basophils #  0.0    (0-0.2)  k/uL


 


Sodium   139   (137-145)  mmol/L


 


Potassium   4.5   (3.5-5.1)  mmol/L


 


Chloride   106   ()  mmol/L


 


Carbon Dioxide   22   (22-30)  mmol/L


 


Anion Gap   11   mmol/L


 


BUN   11   (7-17)  mg/dL


 


Creatinine   0.92   (0.52-1.04)  mg/dL


 


Est GFR (CKD-EPI)AfAm   75   (>60 ml/min/1.73 sqM)  


 


Est GFR (CKD-EPI)NonAf   65   (>60 ml/min/1.73 sqM)  


 


Glucose   127 H   (74-99)  mg/dL


 


Calcium   10.2   (8.4-10.2)  mg/dL


 


Total Bilirubin   1.0   (0.2-1.3)  mg/dL


 


AST   45 H   (14-36)  U/L


 


ALT   37 H   (4-34)  U/L


 


Alkaline Phosphatase   99   ()  U/L


 


Total Protein   7.4   (6.3-8.2)  g/dL


 


Albumin   4.6   (3.5-5.0)  g/dL


 


Coronavirus (PCR)    Not Detected  (Not Detectd)  














  11/26/21 Range/Units





  12:06 


 


WBC   (3.8-10.6)  k/uL


 


RBC   (3.80-5.40)  m/uL


 


Hgb   (11.4-16.0)  gm/dL


 


Hct   (34.0-46.0)  %


 


MCV   (80.0-100.0)  fL


 


MCH   (25.0-35.0)  pg


 


MCHC   (31.0-37.0)  g/dL


 


RDW   (11.5-15.5)  %


 


Plt Count   (150-450)  k/uL


 


MPV   


 


Neutrophils %   %


 


Lymphocytes %   %


 


Monocytes %   %


 


Eosinophils %   %


 


Basophils %   %


 


Neutrophils #   (1.3-7.7)  k/uL


 


Lymphocytes #   (1.0-4.8)  k/uL


 


Monocytes #   (0-1.0)  k/uL


 


Eosinophils #   (0-0.7)  k/uL


 


Basophils #   (0-0.2)  k/uL


 


Sodium   (137-145)  mmol/L


 


Potassium   (3.5-5.1)  mmol/L


 


Chloride   ()  mmol/L


 


Carbon Dioxide   (22-30)  mmol/L


 


Anion Gap   mmol/L


 


BUN   (7-17)  mg/dL


 


Creatinine   (0.52-1.04)  mg/dL


 


Est GFR (CKD-EPI)AfAm   (>60 ml/min/1.73 sqM)  


 


Est GFR (CKD-EPI)NonAf   (>60 ml/min/1.73 sqM)  


 


Glucose   (74-99)  mg/dL


 


Calcium   (8.4-10.2)  mg/dL


 


Total Bilirubin   (0.2-1.3)  mg/dL


 


AST   (14-36)  U/L


 


ALT   (4-34)  U/L


 


Alkaline Phosphatase   ()  U/L


 


Total Protein   (6.3-8.2)  g/dL


 


Albumin   (3.5-5.0)  g/dL


 


Coronavirus (PCR)  Not Detected  (Not Detectd)  














Disposition


Clinical Impression: 


 Acute upper respiratory infection





Disposition: HOME SELF-CARE


Condition: Stable


Instructions (If sedation given, give patient instructions):  Upper Respiratory 

Infection (ED)


Additional Instructions: 


Please return to the Emergency Department if symptoms worsen or any other 

concerns.


Continue with your already prescribed inhalers.  


Take tylenol for your body aches. 


Take antibiotic as prescribed.  


Please follow-up with your primary care.


Prescriptions: 


Azithromycin [Zithromax Z-pack (6 tabs)] 0 mg PO AS DIRECTED #6 tab


Is patient prescribed a controlled substance at d/c from ED?: No


Referrals: 


[facility],Clinic [Primary Care Provider] - 1-2 days


Time of Disposition: 12:51

## 2021-11-29 ENCOUNTER — HOSPITAL ENCOUNTER (EMERGENCY)
Dept: HOSPITAL 47 - EC | Age: 66
LOS: 1 days | Discharge: HOME | End: 2021-11-30
Payer: OTHER GOVERNMENT

## 2021-11-29 DIAGNOSIS — M19.90: ICD-10-CM

## 2021-11-29 DIAGNOSIS — I10: ICD-10-CM

## 2021-11-29 DIAGNOSIS — Z88.1: ICD-10-CM

## 2021-11-29 DIAGNOSIS — Z87.891: ICD-10-CM

## 2021-11-29 DIAGNOSIS — Z88.8: ICD-10-CM

## 2021-11-29 DIAGNOSIS — E78.5: ICD-10-CM

## 2021-11-29 DIAGNOSIS — K21.9: ICD-10-CM

## 2021-11-29 DIAGNOSIS — Z91.041: ICD-10-CM

## 2021-11-29 DIAGNOSIS — Z79.899: ICD-10-CM

## 2021-11-29 DIAGNOSIS — Z88.6: ICD-10-CM

## 2021-11-29 DIAGNOSIS — U07.1: Primary | ICD-10-CM

## 2021-11-29 PROCEDURE — 71046 X-RAY EXAM CHEST 2 VIEWS: CPT

## 2021-11-29 PROCEDURE — 96365 THER/PROPH/DIAG IV INF INIT: CPT

## 2021-11-29 PROCEDURE — 87636 SARSCOV2 & INF A&B AMP PRB: CPT

## 2021-11-29 PROCEDURE — 99283 EMERGENCY DEPT VISIT LOW MDM: CPT

## 2021-11-30 VITALS — TEMPERATURE: 98.9 F | DIASTOLIC BLOOD PRESSURE: 60 MMHG | SYSTOLIC BLOOD PRESSURE: 141 MMHG | HEART RATE: 77 BPM

## 2021-11-30 VITALS — RESPIRATION RATE: 20 BRPM

## 2021-11-30 NOTE — XR
EXAMINATION TYPE: XR chest 2V

 

DATE OF EXAM: 11/30/2021

 

COMPARISON: 11/26/2021

 

HISTORY: Cough and fever

 

TECHNIQUE: 2 views

 

FINDINGS: Heart and mediastinum are normal. Lungs are clear of infiltrate. There is no heart failure.
 There is neural stimulator in the mid thoracic spine. There is no pleural effusion.

 

IMPRESSION: No active cardiopulmonary disease. No change.

## 2021-11-30 NOTE — ED
General Adult HPI





- General


Chief complaint: Shortness of Breath


Stated complaint: Fever,Pain, called Dr Fishman


Time Seen by Provider: 21 04:16


Source: patient


Mode of arrival: ambulatory





- History of Present Illness


Initial comments: 


66 year old female patient presents to the emergency department today for 

evaluation of fever, body aches, cough.  States she's been sick since Friday 

with symptoms.  She was seen and evaluated on Friday but tested negative for 

COVID-19.  States that over the weekend her symptoms worsened so she came back 

in for evaluation.  She denies any shortness of breath.  She does wear 2 L of 

oxygen at home.  Denies chest pain.  Denies any vomiting or diarrhea.  States 

she has had decreased appetite though has been able to eat.  Denies any rash.








- Related Data


                                Home Medications











 Medication  Instructions  Recorded  Confirmed


 


Cetirizine HCl [Zyrtec] 10 mg PO DAILY 19


 


Pantoprazole Sodium [Protonix] 40 mg PO DAILY 19


 


Sertraline [Zoloft] 100 mg PO DAILY 19


 


Metoprolol Succinate [Toprol XL] 25 mg PO DAILY 21


 


Rosuvastatin Calcium [Crestor] 40 mg PO DAILY 21


 


Valsartan 160 mg PO BID 21


 


buPROPion [Wellbutrin] 100 mg PO DAILY 21


 


Levalbuterol Hfa Inhaler [Xopenex 2 puff INHALATION RT-Q6H PRN 21





Hfa Inhaler]   


 


Levalbuterol Nebulized [Xopenex 1.25 mg INHALATION RT-Q6H PRN 21





Nebulized]   


 


Tiotropium 2.5 Mcg/Puff [Spiriva 2 puff INHALATION RT-DAILY 21





Respimat 2.5 Mcg]   








                                  Previous Rx's











 Medication  Instructions  Recorded


 


Clopidogrel [Plavix] 75 mg PO DAILY #30 tab 19


 


Nitroglycerin Sl Tabs [Nitrostat] 0.4 mg SUBLINGUAL Q5M PRN #25 tab 19


 


Azithromycin [Zithromax Z-pack (6 0 mg PO AS DIRECTED #6 tab 21





tabs)]  











                                    Allergies











Allergy/AdvReac Type Severity Reaction Status Date / Time


 


albuterol Allergy  Rash/Hives Verified 21 00:30


 


cortisone Allergy  Swelling/Pain Verified 21 00:30





   at inj site  


 


doxycycline Allergy  Swelling Verified 21 00:30


 


erythromycin base Allergy  Rash/Hives Verified 21 00:30


 


hydralazine Allergy  Rash/Hives Verified 21 00:30


 


Iodinated Contrast Media Allergy  Anaphylaxis Verified 21 00:30


 


iodine Allergy  Anaphylaxis Verified 21 00:30


 


lidocaine Allergy  Swelling Verified 21 00:30


 


lisinopril Allergy  Swelling Verified 21 00:30


 


modafinil Allergy  Unknown Verified 21 00:30


 


nystatin Allergy  Rash/Hives Verified 21 00:30


 


procaine [From Novocain] Allergy  Swelling Verified 21 00:30


 


vancomycin Allergy  Rash/Hives Verified 21 00:30


 


amlodipine AdvReac  Nausea & Verified 21 00:30





   Vomiting &  





   Diarrhea  


 


aspirin AdvReac  stomach Verified 21 00:30





   and  





   esophageal  





   ulcers  


 


atorvastatin [From Lipitor] AdvReac  MUSCLE PAIN Verified 21 00:30


 


cimetidine [From Tagamet] AdvReac  dizziness Verified 21 00:30


 


isosorbide AdvReac  Nausea & Verified 21 00:30





   Vomiting &  





   Diarrhea  


 


labetalol AdvReac  Nausea & Verified 21 00:30





   Vomiting &  





   Diarrhea  


 


metoprolol AdvReac  Diarrhea Verified 21 00:30


 


morphine AdvReac  "generic Verified 21 00:30





   morphine"  


 


oxcarbazepine AdvReac  hair loss Verified 21 00:30





[From Trileptal]     


 


paroxetine [From Paxil] AdvReac  tremors Verified 21 00:30


 


simvastatin [From Zocor] AdvReac  MUSCLE PAIN Verified 21 00:30














Review of Systems


ROS Statement: 


Those systems with pertinent positive or pertinent negative responses have been 

documented in the HPI.





ROS Other: All systems not noted in ROS Statement are negative.





Past Medical History


Past Medical History: Coronary Artery Disease (CAD), Cancer, GERD/Reflux, 

Hyperlipidemia, Hypertension, Liver Disease, Osteoarthritis (OA), Sleep 

Apnea/CPAP/BIPAP, Vascular Disorder


Additional Past Medical History / Comment(s): 2007 Aortic 

thrombectomy/aortofemoral bypass/pt in coma/trach x 6 weeks, R hip to R toes 

nerve damage after 6 weeks comatose, past chronic pain to R hip/leg but has pain

stimulator which has greatly helped, hx gastric ulcers, Nielsen's esophagus, 

hemorrhoids, 5 cancerous colon polyps removed, MIRELA with Cpap/O2 use, arthritis 

in feet. HAVING DIFFICULTY BREATHING-O2 SATS LOW WITH ACTIVITY


History of Any Multi-Drug Resistant Organisms: None Reported


Past Surgical History: Appendectomy, Cholecystectomy, Heart Catheterization, 

Heart Catheterization With Stent, Hysterectomy, Orthopedic Surgery, 

Tonsillectomy


Additional Past Surgical History / Comment(s): abdominal aortogram 21, 

Bilateral heel bone spurs/bilateral feet fusions to lower arches, bone spurs 

removed from inside bilateral feet,  stent in R leg with bypass to left 

leg,(now disconnected)  aortic thrombectomy/aortofemoral bypass, R knee 

arthroscopic surgery,  neurostimulator implant,  neruostimulator paddle 

moved, 2019 stimulator replaced and relocation stimulator battery, 2017 L common

femoral endartectomy, EGDs, colonoscopies/cancerous polypectomies., left femoral

surgery,


Past Anesthesia/Blood Transfusion Reactions: No Reported Reaction


Date of Last Stent Placement:: 2019


Past Psychological History: Anxiety, Depression


Smoking Status: Former smoker


Past Alcohol Use History: None Reported


Past Drug Use History: None Reported





- Past Family History


  ** Mother


Family Medical History: No Reported History


Additional Family Medical History / Comment(s): Mother was an alcoholic and 

at the age of 36 yrs.





  ** Father


Family Medical History: Cancer


Additional Family Medical History / Comment(s): Father had jaw/throat cancer.  

He was a smoker.





General Exam


General appearance: alert, in no apparent distress, other (This is a well-

developed, well-nourished adult female in no acute distress.)


ENT exam: Present: normal exam, normal oropharynx, mucous membranes moist


Respiratory exam: Present: normal lung sounds bilaterally, other (Tachypnea, no 

accessory muscle use.).  Absent: respiratory distress, wheezes, rales, rhonchi, 

stridor


Cardiovascular Exam: Present: regular rate, normal rhythm, normal heart sounds. 

Absent: systolic murmur, diastolic murmur, rubs, gallop, clicks


GI/Abdominal exam: Present: soft, normal bowel sounds.  Absent: distended, 

tenderness, guarding, rebound, rigid


Neurological exam: Present: alert, oriented X3, CN II-XII intact


Psychiatric exam: Present: normal affect, normal mood


Skin exam: Present: warm, dry, intact, normal color.  Absent: rash





Course





                                   Vital Signs











  21





  00:30 00:37


 


Temperature 97.9 F 


 


Pulse Rate 85 


 


Respiratory 20 





Rate  


 


Blood Pressure 107/67 


 


O2 Sat by Pulse 85 L 93 L





Oximetry  














Medical Decision Making





- Medical Decision Making


66-year-old female patient presented to the emergency department today for 

evaluation of fever, cough, body aches.  Physical examination was unremarkable. 

Lungs are clear to auscultation with good air movement.  Initially oxygen 

saturation in triage was 85%, patient was not wearing her home O2.  After 

application of 2 L via nasal cannula she did improve to 94-95% which is usual 

for her she states.  Chest x-ray negative.  She did test positive for COVID-19. 

She does meet criteria to receive monoclonal antibody infusion.  I did discuss 

this infusion with her including risks versus benefits.  She does agree to 

receive this medication.  She is taking azithromycin at home she'll be 

instructed to continue taking this.  She is instructed to follow-up with her 

primary care physician for recheck in 1-2 days.  Return parameters were 

discussed in detail.  She verbalizes understanding and agrees with this plan.








- Lab Data





                                   Lab Results











  21 Range/Units





  00:38 


 


Influenza Type A (PCR)  Not Detected  (Not Detectd)  


 


Influenza Type B (PCR)  Not Detected  (Not Detectd)  


 


RSV (PCR)  Not Detected  (Not Detectd)  


 


SARS-CoV-2 (PCR)  Detected A  (Not Detectd)  














- Radiology Data


Radiology results: report reviewed, image reviewed





Views of the chest are obtained.  Report was reviewed in its entirety.  

Impression by Dr. Friend shows no active cardiopulmonary disease.  No change.





Disposition


Clinical Impression: 


 COVID-19





Disposition: HOME SELF-CARE


Condition: Good


Instructions (If sedation given, give patient instructions):  Coronavirus 

Disease 2019 (COVID-19)


Additional Instructions: 


Tips to help you feel better:





-Maintain adequate fluid intake - especially water.


-Rest, you are healing your body will require extra sleep.


-Eat even if you do not feel like it - broth, jello, toast are fine if you canno

t eat full meals.


-Take tylenol and motrin alternating (if you have no allergies or have not been 

instructed to avoid these medications) to help with body aches and fevers. 


-Obtain over the counter vitamin C, zinc, and vitamin D3.


-Take medications as prescribed. 





Follow-up with your primary care physician for recheck in 1-2 days.  Return for 

any new, worsening, or concerning symptoms.


Is patient prescribed a controlled substance at d/c from ED?: No


Referrals: 


[facility],Clinic [Primary Care Provider] - 1-2 days

## 2021-12-06 ENCOUNTER — HOSPITAL ENCOUNTER (INPATIENT)
Dept: HOSPITAL 47 - EC | Age: 66
LOS: 25 days | DRG: 177 | End: 2021-12-31
Attending: HOSPITALIST | Admitting: HOSPITALIST
Payer: OTHER GOVERNMENT

## 2021-12-06 VITALS — BODY MASS INDEX: 39 KG/M2

## 2021-12-06 DIAGNOSIS — N17.9: ICD-10-CM

## 2021-12-06 DIAGNOSIS — M16.0: ICD-10-CM

## 2021-12-06 DIAGNOSIS — Z90.49: ICD-10-CM

## 2021-12-06 DIAGNOSIS — Z86.010: ICD-10-CM

## 2021-12-06 DIAGNOSIS — Z87.891: ICD-10-CM

## 2021-12-06 DIAGNOSIS — J12.82: ICD-10-CM

## 2021-12-06 DIAGNOSIS — F32.A: ICD-10-CM

## 2021-12-06 DIAGNOSIS — I73.9: ICD-10-CM

## 2021-12-06 DIAGNOSIS — E78.5: ICD-10-CM

## 2021-12-06 DIAGNOSIS — M19.09: ICD-10-CM

## 2021-12-06 DIAGNOSIS — I25.10: ICD-10-CM

## 2021-12-06 DIAGNOSIS — Z88.1: ICD-10-CM

## 2021-12-06 DIAGNOSIS — J43.9: ICD-10-CM

## 2021-12-06 DIAGNOSIS — E83.42: ICD-10-CM

## 2021-12-06 DIAGNOSIS — I95.9: ICD-10-CM

## 2021-12-06 DIAGNOSIS — F41.9: ICD-10-CM

## 2021-12-06 DIAGNOSIS — Z79.51: ICD-10-CM

## 2021-12-06 DIAGNOSIS — M19.072: ICD-10-CM

## 2021-12-06 DIAGNOSIS — Z66: ICD-10-CM

## 2021-12-06 DIAGNOSIS — Z81.1: ICD-10-CM

## 2021-12-06 DIAGNOSIS — E87.1: ICD-10-CM

## 2021-12-06 DIAGNOSIS — Z80.8: ICD-10-CM

## 2021-12-06 DIAGNOSIS — Z96.82: ICD-10-CM

## 2021-12-06 DIAGNOSIS — Z90.89: ICD-10-CM

## 2021-12-06 DIAGNOSIS — Z87.11: ICD-10-CM

## 2021-12-06 DIAGNOSIS — J96.22: ICD-10-CM

## 2021-12-06 DIAGNOSIS — M19.071: ICD-10-CM

## 2021-12-06 DIAGNOSIS — Z91.041: ICD-10-CM

## 2021-12-06 DIAGNOSIS — R73.9: ICD-10-CM

## 2021-12-06 DIAGNOSIS — Z95.5: ICD-10-CM

## 2021-12-06 DIAGNOSIS — Z88.4: ICD-10-CM

## 2021-12-06 DIAGNOSIS — Z90.710: ICD-10-CM

## 2021-12-06 DIAGNOSIS — Z88.5: ICD-10-CM

## 2021-12-06 DIAGNOSIS — Z79.899: ICD-10-CM

## 2021-12-06 DIAGNOSIS — Z98.890: ICD-10-CM

## 2021-12-06 DIAGNOSIS — Z79.02: ICD-10-CM

## 2021-12-06 DIAGNOSIS — I10: ICD-10-CM

## 2021-12-06 DIAGNOSIS — J96.21: ICD-10-CM

## 2021-12-06 DIAGNOSIS — T38.0X5A: ICD-10-CM

## 2021-12-06 DIAGNOSIS — Z85.038: ICD-10-CM

## 2021-12-06 DIAGNOSIS — B37.0: ICD-10-CM

## 2021-12-06 DIAGNOSIS — J84.10: ICD-10-CM

## 2021-12-06 DIAGNOSIS — E87.2: ICD-10-CM

## 2021-12-06 DIAGNOSIS — Z98.1: ICD-10-CM

## 2021-12-06 DIAGNOSIS — E66.01: ICD-10-CM

## 2021-12-06 DIAGNOSIS — G47.33: ICD-10-CM

## 2021-12-06 DIAGNOSIS — K21.9: ICD-10-CM

## 2021-12-06 DIAGNOSIS — I27.20: ICD-10-CM

## 2021-12-06 DIAGNOSIS — Z95.828: ICD-10-CM

## 2021-12-06 DIAGNOSIS — E87.0: ICD-10-CM

## 2021-12-06 DIAGNOSIS — Z88.8: ICD-10-CM

## 2021-12-06 DIAGNOSIS — Z99.81: ICD-10-CM

## 2021-12-06 DIAGNOSIS — Z88.6: ICD-10-CM

## 2021-12-06 DIAGNOSIS — Z51.5: ICD-10-CM

## 2021-12-06 DIAGNOSIS — G89.29: ICD-10-CM

## 2021-12-06 DIAGNOSIS — K22.70: ICD-10-CM

## 2021-12-06 DIAGNOSIS — U07.1: Primary | ICD-10-CM

## 2021-12-06 LAB
ALBUMIN SERPL-MCNC: 3.8 G/DL (ref 3.5–5)
ALP SERPL-CCNC: 156 U/L (ref 38–126)
ALT SERPL-CCNC: 26 U/L (ref 4–34)
ANION GAP SERPL CALC-SCNC: 14 MMOL/L
APTT BLD: 22.4 SEC (ref 22–30)
AST SERPL-CCNC: 39 U/L (ref 14–36)
BASOPHILS # BLD AUTO: 0 K/UL (ref 0–0.2)
BASOPHILS NFR BLD AUTO: 0 %
BUN SERPL-SCNC: 37 MG/DL (ref 7–17)
CALCIUM SPEC-MCNC: 9.2 MG/DL (ref 8.4–10.2)
CHLORIDE SERPL-SCNC: 106 MMOL/L (ref 98–107)
CO2 SERPL-SCNC: 19 MMOL/L (ref 22–30)
EOSINOPHIL # BLD AUTO: 0 K/UL (ref 0–0.7)
EOSINOPHIL NFR BLD AUTO: 1 %
ERYTHROCYTE [DISTWIDTH] IN BLOOD BY AUTOMATED COUNT: 4.75 M/UL (ref 3.8–5.4)
ERYTHROCYTE [DISTWIDTH] IN BLOOD: 15.6 % (ref 11.5–15.5)
GLUCOSE SERPL-MCNC: 107 MG/DL (ref 74–99)
HCT VFR BLD AUTO: 39 % (ref 34–46)
HGB BLD-MCNC: 13.4 GM/DL (ref 11.4–16)
LDH SPEC-CCNC: 1283 U/L (ref 313–618)
LYMPHOCYTES # SPEC AUTO: 1.1 K/UL (ref 1–4.8)
LYMPHOCYTES NFR SPEC AUTO: 15 %
MCH RBC QN AUTO: 28.3 PG (ref 25–35)
MCHC RBC AUTO-ENTMCNC: 34.4 G/DL (ref 31–37)
MCV RBC AUTO: 82.1 FL (ref 80–100)
MONOCYTES # BLD AUTO: 0.5 K/UL (ref 0–1)
MONOCYTES NFR BLD AUTO: 7 %
NEUTROPHILS # BLD AUTO: 5.7 K/UL (ref 1.3–7.7)
NEUTROPHILS NFR BLD AUTO: 76 %
PLATELET # BLD AUTO: 396 K/UL (ref 150–450)
POTASSIUM SERPL-SCNC: 5.1 MMOL/L (ref 3.5–5.1)
PROT SERPL-MCNC: 6.7 G/DL (ref 6.3–8.2)
SODIUM SERPL-SCNC: 139 MMOL/L (ref 137–145)
WBC # BLD AUTO: 7.5 K/UL (ref 3.8–10.6)

## 2021-12-06 PROCEDURE — 76937 US GUIDE VASCULAR ACCESS: CPT

## 2021-12-06 PROCEDURE — 83615 LACTATE (LD) (LDH) ENZYME: CPT

## 2021-12-06 PROCEDURE — 94640 AIRWAY INHALATION TREATMENT: CPT

## 2021-12-06 PROCEDURE — 83605 ASSAY OF LACTIC ACID: CPT

## 2021-12-06 PROCEDURE — 80053 COMPREHEN METABOLIC PANEL: CPT

## 2021-12-06 PROCEDURE — 93005 ELECTROCARDIOGRAM TRACING: CPT

## 2021-12-06 PROCEDURE — 80048 BASIC METABOLIC PNL TOTAL CA: CPT

## 2021-12-06 PROCEDURE — 84484 ASSAY OF TROPONIN QUANT: CPT

## 2021-12-06 PROCEDURE — 71275 CT ANGIOGRAPHY CHEST: CPT

## 2021-12-06 PROCEDURE — 87324 CLOSTRIDIUM AG IA: CPT

## 2021-12-06 PROCEDURE — 36410 VNPNXR 3YR/> PHY/QHP DX/THER: CPT

## 2021-12-06 PROCEDURE — 93970 EXTREMITY STUDY: CPT

## 2021-12-06 PROCEDURE — 83735 ASSAY OF MAGNESIUM: CPT

## 2021-12-06 PROCEDURE — 94760 N-INVAS EAR/PLS OXIMETRY 1: CPT

## 2021-12-06 PROCEDURE — 36415 COLL VENOUS BLD VENIPUNCTURE: CPT

## 2021-12-06 PROCEDURE — 86140 C-REACTIVE PROTEIN: CPT

## 2021-12-06 PROCEDURE — 85379 FIBRIN DEGRADATION QUANT: CPT

## 2021-12-06 PROCEDURE — 85730 THROMBOPLASTIN TIME PARTIAL: CPT

## 2021-12-06 PROCEDURE — 87040 BLOOD CULTURE FOR BACTERIA: CPT

## 2021-12-06 PROCEDURE — 82728 ASSAY OF FERRITIN: CPT

## 2021-12-06 PROCEDURE — 99285 EMERGENCY DEPT VISIT HI MDM: CPT

## 2021-12-06 PROCEDURE — 94660 CPAP INITIATION&MGMT: CPT

## 2021-12-06 PROCEDURE — 71045 X-RAY EXAM CHEST 1 VIEW: CPT

## 2021-12-06 PROCEDURE — 85025 COMPLETE CBC W/AUTO DIFF WBC: CPT

## 2021-12-06 PROCEDURE — 84145 PROCALCITONIN (PCT): CPT

## 2021-12-06 RX ADMIN — MAGNESIUM SULFATE IN DEXTROSE SCH MLS/HR: 10 INJECTION, SOLUTION INTRAVENOUS at 19:43

## 2021-12-06 RX ADMIN — DEXTROSE SCH MG: 50 INJECTION, SOLUTION INTRAVENOUS at 17:02

## 2021-12-06 RX ADMIN — MAGNESIUM SULFATE IN DEXTROSE SCH MLS/HR: 10 INJECTION, SOLUTION INTRAVENOUS at 21:02

## 2021-12-06 RX ADMIN — MAGNESIUM SULFATE IN DEXTROSE SCH MLS/HR: 10 INJECTION, SOLUTION INTRAVENOUS at 21:47

## 2021-12-06 RX ADMIN — ACETAMINOPHEN PRN MG: 325 TABLET, FILM COATED ORAL at 17:03

## 2021-12-06 NOTE — ED
General Adult HPI





- General


Chief complaint: Shortness of Breath


Stated complaint: revisit - SOB


Time Seen by Provider: 21 16:41


Source: patient


Mode of arrival: wheelchair


Limitations: no limitations





- History of Present Illness


Initial comments: 





I evaluated the patient when she was placed in a room.  Patient is a 66-year-old

female with past medical history remarkable for chronic COPD on home 3 L nasal 

cannula, hypertension, CAD, known COVID-19 infection and received monoclonal 

antibodies last week presents emergency Department with worsening shortness of 

breath.  Pulse ox at home ranged from 70-90% per family.  She states she is 

having worsening dyspnea with a mild nonproductive cough.  Endorses nausea, 

vomiting, as well as mild diarrhea.  Emesis and diarrhea are nonbloody.  Emesis 

is nonbilious.  Patient is also generalized weakness and fatigue.  She is been 

on her normal 3 L nasal cannula home oxygen.  Patient was not vaccinated for 

COVID-19 secondary to prior history of multiple ALLERGIC reactions to many 

different medications.  This includes albuterol.  She denies any history of 

blood clots or leg swelling.  Denies any chest pain.  She has no other acute 

complaints at this time.  Patient presents over concern for worsening COVID-19.





- Related Data


                                Home Medications











 Medication  Instructions  Recorded  Confirmed


 


Cetirizine HCl [Zyrtec] 10 mg PO DAILY 19


 


Pantoprazole Sodium [Protonix] 40 mg PO DAILY 19


 


Sertraline [Zoloft] 100 mg PO DAILY 19


 


Metoprolol Succinate [Toprol XL] 25 mg PO DAILY 21


 


Rosuvastatin Calcium [Crestor] 40 mg PO DAILY 21


 


Valsartan 160 mg PO BID 21


 


buPROPion [Wellbutrin] 100 mg PO DAILY 21


 


Levalbuterol Hfa Inhaler [Xopenex 2 puff INHALATION RT-Q6H PRN 21





Hfa Inhaler]   


 


Levalbuterol Nebulized [Xopenex 1.25 mg INHALATION RT-Q6H PRN 21





Nebulized]   


 


Tiotropium 2.5 Mcg/Puff [Spiriva 2 puff INHALATION RT-DAILY 21





Respimat 2.5 Mcg]   








                                  Previous Rx's











 Medication  Instructions  Recorded


 


Clopidogrel [Plavix] 75 mg PO DAILY #30 tab 19


 


Nitroglycerin Sl Tabs [Nitrostat] 0.4 mg SUBLINGUAL Q5M PRN #25 tab 19











                                    Allergies











Allergy/AdvReac Type Severity Reaction Status Date / Time


 


albuterol Allergy  Rash/Hives Verified 21 18:32


 


cortisone Allergy  Swelling/Pain Verified 21 18:32





   at inj site  


 


doxycycline Allergy  Swelling Verified 21 18:32


 


erythromycin base Allergy  Rash/Hives Verified 21 18:32


 


hydralazine Allergy  Rash/Hives Verified 21 18:32


 


Iodinated Contrast Media Allergy  Anaphylaxis Verified 21 18:32


 


iodine Allergy  Anaphylaxis Verified 21 18:32


 


lidocaine Allergy  Swelling Verified 21 18:32


 


lisinopril Allergy  Swelling Verified 21 18:32


 


modafinil Allergy  Unknown Verified 21 18:32


 


nystatin Allergy  Rash/Hives Verified 21 18:32


 


procaine [From Novocain] Allergy  Swelling Verified 21 18:32


 


vancomycin Allergy  Rash/Hives Verified 21 18:32


 


amlodipine AdvReac  Nausea & Verified 21 18:32





   Vomiting &  





   Diarrhea  


 


aspirin AdvReac  stomach Verified 21 18:32





   and  





   esophageal  





   ulcers  


 


atorvastatin [From Lipitor] AdvReac  MUSCLE PAIN Verified 21 18:32


 


cimetidine [From Tagamet] AdvReac  dizziness Verified 21 18:32


 


isosorbide AdvReac  Nausea & Verified 21 18:32





   Vomiting &  





   Diarrhea  


 


labetalol AdvReac  Nausea & Verified 21 18:32





   Vomiting &  





   Diarrhea  


 


metoprolol AdvReac  Diarrhea Verified 21 18:32


 


morphine AdvReac  "generic Verified 21 18:32





   morphine"  


 


oxcarbazepine AdvReac  hair loss Verified 21 18:32





[From Trileptal]     


 


paroxetine [From Paxil] AdvReac  tremors Verified 12/06/21 18:32


 


simvastatin [From Zocor] AdvReac  MUSCLE PAIN Verified 21 18:32














Review of Systems


ROS Statement: 


Those systems with pertinent positive or pertinent negative responses have been 

documented in the HPI.


Review of Systems:


CONST: Denies fever 


EYES: Denies blurry vision 


ENT: Denies nasal congestion  


C/V:  Denies Chest pain


RESP: Endorses shortness of breath


GI: Denies abdominal pain 


: Denies dysuria  


SKIN: Denies rash.


MSK: Endorses generalized Joint pain


NEURO: Denies headache 


ROS Other: All systems not noted in ROS Statement are negative.





Past Medical History


Past Medical History: Coronary Artery Disease (CAD), Cancer, GERD/Reflux, 

Hyperlipidemia, Hypertension, Liver Disease, Osteoarthritis (OA), Sleep 

Apnea/CPAP/BIPAP, Vascular Disorder


Additional Past Medical History / Comment(s): 2007 Aortic 

thrombectomy/aortofemoral bypass/pt in coma/trach x 6 weeks, R hip to R toes 

nerve damage after 6 weeks comatose, past chronic pain to R hip/leg but has pain

stimulator which has greatly helped, hx gastric ulcers, Nielsen's esophagus, 

hemorrhoids, 5 cancerous colon polyps removed, MIRELA with Cpap/O2 use, arthritis 

in feet. HAVING DIFFICULTY BREATHING-O2 SATS LOW WITH ACTIVITY


History of Any Multi-Drug Resistant Organisms: None Reported


Past Surgical History: Appendectomy, Cholecystectomy, Heart Catheterization, 

Heart Catheterization With Stent, Hysterectomy, Orthopedic Surgery, 

Tonsillectomy


Additional Past Surgical History / Comment(s): abdominal aortogram 21, 

Bilateral heel bone spurs/bilateral feet fusions to lower arches, bone spurs 

removed from inside bilateral feet,  stent in R leg with bypass to left 

leg,(now disconnected)  aortic thrombectomy/aortofemoral bypass, R knee 

arthroscopic surgery,  neurostimulator implant,  neruostimulator paddle 

moved, 2019 stimulator replaced and relocation stimulator battery,  L common

femoral endartectomy, EGDs, colonoscopies/cancerous polypectomies., left femoral

surgery,


Past Anesthesia/Blood Transfusion Reactions: No Reported Reaction


Date of Last Stent Placement:: 2019


Past Psychological History: Anxiety, Depression


Smoking Status: Former smoker


Past Alcohol Use History: None Reported


Past Drug Use History: None Reported





- Past Family History


  ** Mother


Family Medical History: No Reported History


Additional Family Medical History / Comment(s): Mother was an alcoholic and 

at the age of 36 yrs.





  ** Father


Family Medical History: Cancer


Additional Family Medical History / Comment(s): Father had jaw/throat cancer.  

He was a smoker.





General Exam





- General Exam Comments


Initial Comments: 





General: Patient is mildly tachypneic In mild respiratory distress.


HEAD:  Normal with no signs of head trauma.


EYES:  PERRLA, EOMI, conjunctiva normal, no discharge.


ENT:  Hearing grossly intact, normal oropharynx.


RESPIRATORY: Relatively clear breath sounds bilaterally without any obvious 

wheezing.  Mildly increased work of breathing.  Patient is hypoxic on her home 

oxygen, ranging from 80-85% on 3L NC with minimal improvement on 6 L NC.


C/V:  Regular rate and rhythm. S1 and S2 auscultated, no edema, peripheral 

pulses 2+ and intact throughout


ABD:  Abd is soft, nontender, nondistended


EXT: Normal range of motion, no obvious deformity


SKIN:  No rashes or lesions observed on exposed skin.


NEURO: Alert and oriented 4.  No focal deficits.


Limitations: no limitations





Course


                                   Vital Signs











  21





  16:37 17:00 19:40


 


Temperature 98.5 F  


 


Pulse Rate 85 81 73


 


Respiratory 20 20 19





Rate   


 


Blood Pressure 122/73 116/95 136/74


 


O2 Sat by Pulse 86 L 90 L 97





Oximetry   














  21





  21:02


 


Temperature 97.7 F


 


Pulse Rate 


 


Respiratory 





Rate 


 


Blood Pressure 


 


O2 Sat by Pulse 





Oximetry 














Medical Decision Making





- Medical Decision Making





Based on the patient's presentation and physical exam, she is likely expressing 

worsening COVID-19 infection.  Patient is a confirmed positive test last week 

and is chronically on home oxygen which is not sufficient for her hypoxia at 

this time.  We will obtain COVID-19 laboratory studies include cardiac workup.  

EKG and chest x-ray will also be obtained.  Patient was started on high flow 

nasal cannula and oxygenation improved to 90-92%.  She will be started on 

Decadron twice a day.  Tylenol was ordered for the patient.  Patient has an 

ALLERGY to albuterol, and we do not have levalbuterol to provide to the patient.

 Patient does require admission to the hospital due to acute hypoxic respiratory

failure on chronic hypoxic respiratory failure likely secondary to COVID-19 

pneumonia.  Patient will be connected to continuous cardiac monitoring with 

pulse oximetry was she is here in the department.





Patient's EKG shows no signs of acute ischemia.Chest x-ray shows worsening 

bilateral pulmonary infiltrates consistent COVID-19 pneumonia.  Laboratory 

studies are remarkable for an elevated d-dimer of 1.89.  Magnesium is less than 

0.4 and is replenished.  Patient is a mild KOREY with a creatinine of 1.22.  LDH 

is elevated to 1200.  CRP is elevated to 21.  Troponin is negative.





I discussed with the patient that due to her elevated d-dimer, I would like to 

obtain a CT angiogram over concern for possible cardiopulmonary embolism.  She 

was in agreement this plan.  She is a contrast ALLERGY was given pretreatment.  

CT angiogram revealed no signs of acute pulmonary embolism.  There is pulmonary 

emphysema as well as pulmonary interstitial fibrosis with some increased 

interstitial infiltrate compared to the prior exam.





After the patient results of her imaging.  Patient is no longer in respiratory 

distress, and is resting comfortably on hyponasal cannula.  Oxygen saturation 

saturations in the 90%'s.  I splinted I would like to admitted to the hospital 

for further evaluation.  Patient was in agreement with this plan.  She requested

that I speak with the pulmonologist on-call, and therefore I contacted Dr. Joesph and informed him of his consult.  He was in agreement this plan.  Patient

was able to obtain her home levalbuterol inhaler for breathing treatments, she 

does have an albuterol ALLERGY.  Patient is on BID decadronI consult infectious 

disease Dr. Dueñas for evaluation of the patient tomorrow.  I spoke with the 

admitting team PETER Santizo who accepted the patient.  Patient was therefore 

admitted to [facility] in serious condition.





- Lab Data


Result diagrams: 


                                 21 17:39





                                 21 17:39


                                   Lab Results











  21 Range/Units





  17:39 17:39 17:39 


 


WBC  7.5    (3.8-10.6)  k/uL


 


RBC  4.75    (3.80-5.40)  m/uL


 


Hgb  13.4    (11.4-16.0)  gm/dL


 


Hct  39.0    (34.0-46.0)  %


 


MCV  82.1    (80.0-100.0)  fL


 


MCH  28.3    (25.0-35.0)  pg


 


MCHC  34.4    (31.0-37.0)  g/dL


 


RDW  15.6 H    (11.5-15.5)  %


 


Plt Count  396  D    (150-450)  k/uL


 


MPV  8.0    


 


Neutrophils %  76    %


 


Lymphocytes %  15    %


 


Monocytes %  7    %


 


Eosinophils %  1    %


 


Basophils %  0    %


 


Neutrophils #  5.7    (1.3-7.7)  k/uL


 


Lymphocytes #  1.1    (1.0-4.8)  k/uL


 


Monocytes #  0.5    (0-1.0)  k/uL


 


Eosinophils #  0.0    (0-0.7)  k/uL


 


Basophils #  0.0    (0-0.2)  k/uL


 


APTT   22.4   (22.0-30.0)  sec


 


D-Dimer   1.89 H   (<0.60)  mg/L FEU


 


Sodium     (137-145)  mmol/L


 


Potassium     (3.5-5.1)  mmol/L


 


Chloride     ()  mmol/L


 


Carbon Dioxide     (22-30)  mmol/L


 


Anion Gap     mmol/L


 


BUN     (7-17)  mg/dL


 


Creatinine     (0.52-1.04)  mg/dL


 


Est GFR (CKD-EPI)AfAm     (>60 ml/min/1.73 sqM)  


 


Est GFR (CKD-EPI)NonAf     (>60 ml/min/1.73 sqM)  


 


Glucose     (74-99)  mg/dL


 


Plasma Lactic Acid John     (0.7-2.0)  mmol/L


 


Calcium     (8.4-10.2)  mg/dL


 


Magnesium    <0.4 L*  (1.6-2.3)  mg/dL


 


Total Bilirubin     (0.2-1.3)  mg/dL


 


AST     (14-36)  U/L


 


ALT     (4-34)  U/L


 


Alkaline Phosphatase     ()  U/L


 


Lactate Dehydrogenase     (313-618)  U/L


 


Troponin I     (0.000-0.034)  ng/mL


 


C-Reactive Protein     (<1.0)  mg/dL


 


Total Protein     (6.3-8.2)  g/dL


 


Albumin     (3.5-5.0)  g/dL














  21 Range/Units





  17:39 17:39 17:39 


 


WBC     (3.8-10.6)  k/uL


 


RBC     (3.80-5.40)  m/uL


 


Hgb     (11.4-16.0)  gm/dL


 


Hct     (34.0-46.0)  %


 


MCV     (80.0-100.0)  fL


 


MCH     (25.0-35.0)  pg


 


MCHC     (31.0-37.0)  g/dL


 


RDW     (11.5-15.5)  %


 


Plt Count     (150-450)  k/uL


 


MPV     


 


Neutrophils %     %


 


Lymphocytes %     %


 


Monocytes %     %


 


Eosinophils %     %


 


Basophils %     %


 


Neutrophils #     (1.3-7.7)  k/uL


 


Lymphocytes #     (1.0-4.8)  k/uL


 


Monocytes #     (0-1.0)  k/uL


 


Eosinophils #     (0-0.7)  k/uL


 


Basophils #     (0-0.2)  k/uL


 


APTT     (22.0-30.0)  sec


 


D-Dimer     (<0.60)  mg/L FEU


 


Sodium    139  (137-145)  mmol/L


 


Potassium    5.1  (3.5-5.1)  mmol/L


 


Chloride    106  ()  mmol/L


 


Carbon Dioxide    19 L  (22-30)  mmol/L


 


Anion Gap    14  mmol/L


 


BUN    37 H  (7-17)  mg/dL


 


Creatinine    1.22 H  (0.52-1.04)  mg/dL


 


Est GFR (CKD-EPI)AfAm    53  (>60 ml/min/1.73 sqM)  


 


Est GFR (CKD-EPI)NonAf    46  (>60 ml/min/1.73 sqM)  


 


Glucose    107 H  (74-99)  mg/dL


 


Plasma Lactic Acid John  1.1    (0.7-2.0)  mmol/L


 


Calcium    9.2  (8.4-10.2)  mg/dL


 


Magnesium     (1.6-2.3)  mg/dL


 


Total Bilirubin    1.0  (0.2-1.3)  mg/dL


 


AST    39 H  (14-36)  U/L


 


ALT    26  (4-34)  U/L


 


Alkaline Phosphatase    156 H  ()  U/L


 


Lactate Dehydrogenase    1283 H  (313-618)  U/L


 


Troponin I   <0.012   (0.000-0.034)  ng/mL


 


C-Reactive Protein    21.7 H  (<1.0)  mg/dL


 


Total Protein    6.7  (6.3-8.2)  g/dL


 


Albumin    3.8  (3.5-5.0)  g/dL














- EKG Data


-: EKG Interpreted by Me


EKG Comments: 





12-lead Electrocardiogram Interpretation Note





EKG was reviewed and interpreted by myself. 12-lead ECG performed at 1651 is 

interpreted by me as revealing normal sinus rhythm at a rate of 82 beats per 

minute.  Axis is normal. VA Intervals 152 ms, QRS duration is 76 ms, QTc is 432 

ms..  There were no ST or T wave abnormalities to suggest myocardial ischemia or

injury. R wave progression across the precordium was satisfactory. By my 

interpretation this EKG is non-diagnostic for acute ischemia.





Disposition


Clinical Impression: 


 Pneumonia due to COVID-19 virus, Acute on chronic respiratory failure with 

hypoxia, Hypomagnesemia





Disposition: ADMITTED AS IP TO THIS HOSP


Condition: Serious

## 2021-12-06 NOTE — CT
EXAMINATION TYPE: CT chest angio for PE

 

DATE OF EXAM: 12/6/2021

 

COMPARISON: 5/16/2021

 

HISTORY: Shortness of breath and cough.

 

CT DLP: 400.6 mGycm

Automated exposure control for dose reduction was used.

 

CONTRAST: 

Performed with IV Contrast, patient injected with 100 mL of Isovue 370.

 

There are 3-D post processed images.

 

There is extensive pulmonary emphysema. There is honeycomb interstitial infiltrate throughout both javan
ngs. There is fatty infiltration of the liver. There is no pleural effusion. Heart size is normal. Th
ere is no pericardial effusion.

 

There is no mediastinal adenopathy. There are no hilar masses. Thoracic aorta is intact. There are a 
few bronchial lymph nodes up to 1 cm.

 

There is normal contrast opacification of the pulmonary arteries. There are no filling defects. There
 is some spurring in the thoracic spine.

 

IMPRESSION:

No evidence of pulmonary embolism. Pulmonary emphysema and pulmonary interstitial fibrosis with some 
increased interstitial infiltrate compared to old exam. No suspicious pulmonary mass.

## 2021-12-06 NOTE — XR
EXAMINATION TYPE: XR chest 1V portable

 

DATE OF EXAM: 12/6/2021

 

COMPARISON: 11/30/2021

 

HISTORY: Short of breath

 

TECHNIQUE: Single view

 

FINDINGS: There is some coarse interstitial infiltrate throughout both lungs. Heart is top normal in 
size. There are chest leads. There is no pleural effusion.

 

IMPRESSION: Interstitial pneumonia appears increased compared to old exam. No definite pleural fluid 
seen to suggest heart failure.

## 2021-12-07 LAB
ANION GAP SERPL CALC-SCNC: 16.7 MMOL/L (ref 10–18)
BASOPHILS # BLD MANUAL: 0 X 10*3/UL (ref 0–0.1)
BUN SERPL-SCNC: 39.1 MG/DL (ref 9–27)
BUN/CREAT SERPL: 32.58 RATIO (ref 12–20)
CALCIUM SPEC-MCNC: 9.1 MG/DL (ref 8.7–10.3)
CHLORIDE SERPL-SCNC: 105 MMOL/L (ref 96–109)
CO2 SERPL-SCNC: 18.3 MMOL/L (ref 20–27.5)
EOSINOPHIL # BLD MANUAL: 0 X 10*3/UL (ref 0.04–0.35)
ERYTHROCYTE [DISTWIDTH] IN BLOOD BY AUTOMATED COUNT: 4.71 X 10*6/UL (ref 4.1–5.2)
ERYTHROCYTE [DISTWIDTH] IN BLOOD: 15.7 % (ref 11.5–14.5)
FERRITIN SERPL-MCNC: 995 NG/ML (ref 10–291)
GLUCOSE SERPL-MCNC: 193 MG/DL (ref 70–110)
HCT VFR BLD AUTO: 39.4 % (ref 37.2–46.3)
HGB BLD-MCNC: 12.5 G/DL (ref 12–15)
LYMPHOCYTES # BLD MANUAL: 0.59 X 10*3/UL (ref 0.9–5)
MAGNESIUM SPEC-SCNC: 3.4 MG/DL (ref 1.5–2.4)
MCH RBC QN AUTO: 26.5 PG (ref 27–32)
MCHC RBC AUTO-ENTMCNC: 31.7 G/DL (ref 32–37)
MCV RBC AUTO: 83.7 FL (ref 80–97)
MONOCYTES # BLD MANUAL: 0.18 X 10*3/UL (ref 0.2–1)
NEUTROPHILS NFR BLD MANUAL: 85 %
NEUTS SEG # BLD MANUAL: 5.05 X 10*3/UL (ref 2–8.9)
PLATELET # BLD AUTO: 398 X 10*3/UL (ref 140–440)
POTASSIUM SERPL-SCNC: 4.9 MMOL/L (ref 3.5–5.5)
SODIUM SERPL-SCNC: 140 MMOL/L (ref 135–145)
WBC # BLD AUTO: 5.94 X 10*3/UL (ref 4.5–10)

## 2021-12-07 RX ADMIN — CEFAZOLIN SCH MLS/HR: 330 INJECTION, POWDER, FOR SOLUTION INTRAMUSCULAR; INTRAVENOUS at 16:40

## 2021-12-07 RX ADMIN — MAGNESIUM SULFATE IN DEXTROSE SCH MLS/HR: 10 INJECTION, SOLUTION INTRAVENOUS at 17:11

## 2021-12-07 RX ADMIN — METOPROLOL SUCCINATE SCH MG: 25 TABLET, EXTENDED RELEASE ORAL at 09:25

## 2021-12-07 RX ADMIN — VALSARTAN SCH: 160 TABLET, FILM COATED ORAL at 10:39

## 2021-12-07 RX ADMIN — DEXTROSE SCH MG: 50 INJECTION, SOLUTION INTRAVENOUS at 22:34

## 2021-12-07 RX ADMIN — ACETAMINOPHEN PRN MG: 325 TABLET, FILM COATED ORAL at 15:43

## 2021-12-07 RX ADMIN — VALSARTAN SCH MG: 160 TABLET, FILM COATED ORAL at 10:38

## 2021-12-07 RX ADMIN — PANTOPRAZOLE SODIUM SCH MG: 40 TABLET, DELAYED RELEASE ORAL at 09:26

## 2021-12-07 RX ADMIN — CEFAZOLIN SCH MLS/HR: 330 INJECTION, POWDER, FOR SOLUTION INTRAMUSCULAR; INTRAVENOUS at 10:44

## 2021-12-07 RX ADMIN — MAGNESIUM SULFATE IN DEXTROSE SCH MLS/HR: 10 INJECTION, SOLUTION INTRAVENOUS at 13:31

## 2021-12-07 RX ADMIN — CLOPIDOGREL BISULFATE SCH MG: 75 TABLET ORAL at 09:26

## 2021-12-07 RX ADMIN — MAGNESIUM SULFATE IN DEXTROSE SCH MLS/HR: 10 INJECTION, SOLUTION INTRAVENOUS at 11:59

## 2021-12-07 RX ADMIN — SERTRALINE HYDROCHLORIDE SCH MG: 100 TABLET ORAL at 09:26

## 2021-12-07 RX ADMIN — ATORVASTATIN CALCIUM SCH MG: 80 TABLET, FILM COATED ORAL at 09:26

## 2021-12-07 RX ADMIN — DEXTROSE SCH MG: 50 INJECTION, SOLUTION INTRAVENOUS at 03:20

## 2021-12-07 RX ADMIN — TIOTROPIUM BROMIDE INHALATION SPRAY SCH PUFF: 3.12 SPRAY, METERED RESPIRATORY (INHALATION) at 11:03

## 2021-12-07 RX ADMIN — DEXTROSE SCH MG: 50 INJECTION, SOLUTION INTRAVENOUS at 09:26

## 2021-12-07 RX ADMIN — LORATADINE SCH MG: 10 TABLET ORAL at 09:26

## 2021-12-07 RX ADMIN — MAGNESIUM SULFATE IN DEXTROSE SCH MLS/HR: 10 INJECTION, SOLUTION INTRAVENOUS at 15:43

## 2021-12-07 RX ADMIN — ENOXAPARIN SODIUM SCH MG: 40 INJECTION SUBCUTANEOUS at 09:26

## 2021-12-07 NOTE — P.CNPUL
History of Present Illness


Consult date: 21


Requesting physician: Emma Dixon


Reason for consult: dyspnea, hypoxemia, abnormal CXR/CT


Chief complaint: Shortness of breath, cough, poor appetite


History of present illness: 





This is a pleasant 66-year-old female patient who follows at the [facility] for her

primary care needs.  She has history of hypertension, hyperlipidemia, 

gastroesophageal reflux disease, anxiety/depression, aortic thrombectomy in 

, former smoker, COPD, obstructive sleep apnea utilizing CPAP.  He is here 

yesterday to the emergency room with a four-week history of shortness of breath,

cough, congestion, body aches and fevers.  She was tested negative for CoVID 2.

 Her  is positive for CoVID and she is now positive for CoVID.  She is 

not vaccinated.  She does have home oxygen use at 2-3 L.  She is currently 

requiring 10 L high flow nasal cannula to maintain O2 saturations at 90%.  Chest

x-ray reveals bilateral patchy infiltrates. White count 5.9.  Hemoglobin 12.5.  

Platelets 398.  Lymphocytes 0.5.  D-dimer 1.89.  Sodium 140.  Potassium 4.9.  

Creatinine 1.2.  AST 39.  ALT 26.  Alk phos 156.  LDH 1283.  C-reactive protein 

21.7.  Pro-calcitonin 0.32.  She's been initiated on Decadron, Lovenox, vitamin 

supplements.  0.9% normal saline at 75 ML's per hour.





Review of Systems





REVIEW OF SYSTEMS:


CONSTITUTIONAL: Denies any recent significant weight loss or weight gain.


EYES: Denies change in vision.


EARS, NOSE, MOUTH, THROAT: Denies headaches, denies sore throat.


CARDIOVASCULAR: Denies chest pain, palpitations or syncopal episodes.


RESPIRATORY: Positive for shortness of breath, cough, congestion no hemoptysis.


GASTROINTESTINAL: Denies change in appetite, denies abdominal pain


GENITOURINARY: Denies hematuria, denies infections.


MUSKULOSKELETAL: Denies pain, denies swelling.


INTEGUMENTARY: Denies rash, denies eczema.


NEUROLOGICAL: Denies recent memory loss, no recent seizure activity. 


PSYCHIATRIC: Denies anxiety, denies depression.


HEMATOLOGIC/LYMPHATIC: Denies anemia, denies enlarged lymph nodes.








Past Medical History


Past Medical History: Coronary Artery Disease (CAD), Cancer, GERD/Reflux, 

Hyperlipidemia, Hypertension, Liver Disease, Osteoarthritis (OA), Sleep 

Apnea/CPAP/BIPAP, Vascular Disorder


Additional Past Medical History / Comment(s): 2007 Aortic 

thrombectomy/aortofemoral bypass/pt in coma/trach x 6 weeks, R hip to R toes 

nerve damage after 6 weeks comatose, past chronic pain to R hip/leg but has pain

stimulator which has greatly helped, hx gastric ulcers, Nielsen's esophagus, hem

orrhoids, 5 cancerous colon polyps removed, MIRELA with Cpap/O2 use, arthritis in 

feet. HAVING DIFFICULTY BREATHING-O2 SATS LOW WITH ACTIVITY


History of Any Multi-Drug Resistant Organisms: None Reported


Past Surgical History: Appendectomy, Cholecystectomy, Heart Catheterization, 

Heart Catheterization With Stent, Hysterectomy, Orthopedic Surgery, Tonsi

llectomy


Additional Past Surgical History / Comment(s): abdominal aortogram 21, 

Bilateral heel bone spurs/bilateral feet fusions to lower arches, bone spurs 

removed from inside bilateral feet,  stent in R leg with bypass to left 

leg,(now disconnected)  aortic thrombectomy/aortofemoral bypass, R knee 

arthroscopic surgery,  neurostimulator implant,  neruostimulator paddle 

moved, 2019 stimulator replaced and relocation stimulator battery, 2017 L common

femoral endartectomy, EGDs, colonoscopies/cancerous polypectomies., left femoral

surgery,


Past Anesthesia/Blood Transfusion Reactions: No Reported Reaction


Date of Last Stent Placement:: 2019


Past Psychological History: Anxiety, Depression


Smoking Status: Former smoker


Past Alcohol Use History: None Reported


Past Drug Use History: None Reported





- Past Family History


  ** Mother


Family Medical History: No Reported History


Additional Family Medical History / Comment(s): Mother was an alcoholic and 

at the age of 36 yrs.





  ** Father


Family Medical History: Cancer


Additional Family Medical History / Comment(s): Father had jaw/throat cancer.  

He was a smoker.





Medications and Allergies


                                Home Medications











 Medication  Instructions  Recorded  Confirmed  Type


 


Cetirizine HCl [Zyrtec] 10 mg PO DAILY 19 History


 


Pantoprazole Sodium [Protonix] 40 mg PO DAILY 19 History


 


Sertraline [Zoloft] 100 mg PO DAILY 19 History


 


Clopidogrel [Plavix] 75 mg PO DAILY #30 tab 19 Rx


 


Nitroglycerin Sl Tabs [Nitrostat] 0.4 mg SUBLINGUAL Q5M PRN #25 tab 19 Rx


 


Metoprolol Succinate [Toprol XL] 25 mg PO DAILY 21 History


 


Rosuvastatin Calcium [Crestor] 40 mg PO DAILY 21 History


 


Valsartan 160 mg PO BID 21 History


 


buPROPion [Wellbutrin] 100 mg PO DAILY 21 History


 


Levalbuterol Hfa Inhaler [Xopenex 2 puff INHALATION RT-Q6H PRN 21

 History





Hfa Inhaler]    


 


Levalbuterol Nebulized [Xopenex 1.25 mg INHALATION RT-Q6H PRN 21 

History





Nebulized]    


 


Tiotropium 2.5 Mcg/Puff [Spiriva 2 puff INHALATION RT-DAILY 21 

History





Respimat 2.5 Mcg]    








                                    Allergies











Allergy/AdvReac Type Severity Reaction Status Date / Time


 


albuterol Allergy  Rash/Hives Verified 21 18:32


 


cortisone Allergy  Swelling/Pain Verified 21 18:32





   at inj site  


 


doxycycline Allergy  Swelling Verified 21 18:32


 


erythromycin base Allergy  Rash/Hives Verified 21 18:32


 


hydralazine Allergy  Rash/Hives Verified 21 18:32


 


Iodinated Contrast Media Allergy  Anaphylaxis Verified 21 18:32


 


iodine Allergy  Anaphylaxis Verified 21 18:32


 


lidocaine Allergy  Swelling Verified 21 18:32


 


lisinopril Allergy  Swelling Verified 21 18:32


 


modafinil Allergy  Unknown Verified 21 18:32


 


nystatin Allergy  Rash/Hives Verified 21 18:32


 


procaine [From Novocain] Allergy  Swelling Verified 21 18:32


 


vancomycin Allergy  Rash/Hives Verified 21 18:32


 


amlodipine AdvReac  Nausea & Verified 21 18:32





   Vomiting &  





   Diarrhea  


 


aspirin AdvReac  stomach Verified 21 18:32





   and  





   esophageal  





   ulcers  


 


atorvastatin [From Lipitor] AdvReac  MUSCLE PAIN Verified 21 18:32


 


cimetidine [From Tagamet] AdvReac  dizziness Verified 21 18:32


 


isosorbide AdvReac  Nausea & Verified 21 18:32





   Vomiting &  





   Diarrhea  


 


labetalol AdvReac  Nausea & Verified 21 18:32





   Vomiting &  





   Diarrhea  


 


metoprolol AdvReac  Diarrhea Verified 21 18:32


 


morphine AdvReac  "generic Verified 21 18:32





   morphine"  


 


oxcarbazepine AdvReac  hair loss Verified 21 18:32





[From Trileptal]     


 


paroxetine [From Paxil] AdvReac  tremors Verified 21 18:32


 


simvastatin [From Zocor] AdvReac  MUSCLE PAIN Verified 21 18:32














Physical Exam


Vitals: 


                                   Vital Signs











  Temp Pulse Resp BP Pulse Ox


 


 21 09:00  97.9 F  68  15  119/61  89 L


 


 21 07:34      96


 


 21 01:25   59 L  18  112/83  97


 


 21 21:02  97.7 F    


 


 21 19:40   73  19  136/74  97


 


 21 17:00   81  20  116/95  90 L


 


 21 16:37  98.5 F  85  20  122/73  86 L








                                Intake and Output











 21





 22:59 06:59 14:59


 


Other:   


 


  Weight 90.718 kg  














GENERAL EXAM: Alert, pleasant 66-year-old female patient, on 10 L high flow 

nasal cannula, fairly comfortable in no apparent distress.


HEAD: Normocephalic.


EYES: Normal reaction of pupils, equal size.


NOSE: Clear with pink turbinates.


THROAT: No erythema or exudates.


NECK: No masses, no JVD.


CHEST: No chest wall deformity.


LUNGS: Equal air entry with crackles in the bilateral bases, diminished.


CVS: S1 and S2 normal with no audible murmur, regular rhythm.


ABDOMEN: No hepatosplenomegaly, normal bowel sounds, no guarding or rigidity.


SPINE: No scoliosis or deformity


SKIN: No rashes


CENTRAL NERVOUS SYSTEM: No focal deficits, tone is normal in all 4 extremities.


EXTREMITIES: There is no peripheral edema.  Changes of chronic venous stasis.  

No clubbing, no cyanosis.  Peripheral pulses are intact.





Results





- Laboratory Findings


CBC and BMP: 


                                 21 05:54





                                 21 05:54


PT/INR, D-dimer











D-Dimer  1.89 mg/L FEU (<0.60)  H  21  17:39    








Abnormal lab findings: 


                                  Abnormal Labs











  21





  17:39 17:39 17:39


 


MCH   


 


MCHC   


 


RDW  15.6 H  


 


Metamyelocytes %   


 


Myelocytes %   


 


Lymphocytes # (Manual)   


 


Monocytes # (Manual)   


 


Eosinophils # (Manual)   


 


D-Dimer   1.89 H 


 


Carbon Dioxide   


 


BUN   


 


Creatinine   


 


Est GFR (CKD-EPI)AfAm   


 


Est GFR (CKD-EPI)NonAf   


 


BUN/Creatinine Ratio   


 


Glucose   


 


Magnesium    <0.4 L*


 


AST   


 


Alkaline Phosphatase   


 


Lactate Dehydrogenase   


 


C-Reactive Protein   


 


Procalcitonin   














  21





  17:39 17:39 05:54


 


MCH    26.5 L


 


MCHC    31.7 L


 


RDW    15.7 H


 


Metamyelocytes %    1 H


 


Myelocytes %    1 H


 


Lymphocytes # (Manual)    0.59 L


 


Monocytes # (Manual)    0.18 L


 


Eosinophils # (Manual)    0 L


 


D-Dimer   


 


Carbon Dioxide   19 L 


 


BUN   37 H 


 


Creatinine   1.22 H 


 


Est GFR (CKD-EPI)AfAm   


 


Est GFR (CKD-EPI)NonAf   


 


BUN/Creatinine Ratio   


 


Glucose   107 H 


 


Magnesium   


 


AST   39 H 


 


Alkaline Phosphatase   156 H 


 


Lactate Dehydrogenase   1283 H 


 


C-Reactive Protein   21.7 H 


 


Procalcitonin  0.32 H  














  21





  05:54


 


MCH 


 


MCHC 


 


RDW 


 


Metamyelocytes % 


 


Myelocytes % 


 


Lymphocytes # (Manual) 


 


Monocytes # (Manual) 


 


Eosinophils # (Manual) 


 


D-Dimer 


 


Carbon Dioxide  18.3 L


 


BUN  39.1 H


 


Creatinine 


 


Est GFR (CKD-EPI)AfAm  54.2 L


 


Est GFR (CKD-EPI)NonAf  46.7 L


 


BUN/Creatinine Ratio  32.58 H


 


Glucose  193 H


 


Magnesium  3.4 H


 


AST 


 


Alkaline Phosphatase 


 


Lactate Dehydrogenase 


 


C-Reactive Protein 


 


Procalcitonin 














- Diagnostic Findings


Chest x-ray: image reviewed





Assessment and Plan


Assessment: 





1 Acute on chronic hypoxic respiratory failure secondary to COVID-19 pneumonia. 

Not vaccinated.





2 Elevated inflammatory markers secondary to above





3 Elevated pro calcitonin, cannot rule out underlying infection, initiated on 

ceftriaxone 





4 Obesity





5 Obstructive sleep apnea, on CPAP





6 Hypertension





7 Hyperlipidemia





8 Anxiety/depression





9 History of coronary disease with previous stent placement





10 History of aortic thrombectomy/aortobifem bypass in 





11 Multiple medication ALLERGIES





12 Former smoker.  Plan:





The patient was seen and evaluated


Chest x-ray and labs reviewed


Add ceftriaxone


Continue Decadron, Lovenox, vitamin supplements


Continue bronchodilators


Titrate the FiO2 as tolerated


Follow-up chest x-ray and labs in the a.m.


We'll continue to follow and make further recommendations based on her clinical 

status





I, the cosigning physician, performed a history & physical examination of the 

patient. Lungs sounds with basilar crackles, diminished.  Maintaining good O2 

saturations in the 90s on 10 L high flow nasal cannula.  I discussed the 

assessment and plan of care with my nurse practitioner, Velia Short. I attest to 

the above consultation as dictated by her.

## 2021-12-07 NOTE — P.HPIM
History of Present Illness


Patient is a 66-year-old the female with past medical history of COPD and 3 L 

oxygen dependent at home has cold symptoms for about a month comes in with 

shortness of breath presently on telemetry dysfunction CT of the chest did not s

how any pulmonary embolism but did show pulmonary fibrosis as well as 

infiltrates.  Below.  Patient doesn't have any infiltrate consistent with 

pneumonia patient denied any dysuria.  Patient is presently on Rocephin.  

Patient has mild acute renal failure because of which I'm holding ACE inhibitor.

 Severely hypomagnesemic magnesium is being replaced.  Patient last smoked about

17 years ago.











REVIEW OF SYSTEMS: 


CONSTITUTIONAL: No fever, no malaise, no fatigue. 


HEENT: No recent visual problems or hearing problems. Denied any sore throat. 


CARDIOVASCULAR: No chest pain, orthopnea, PND, no palpitations, no syncope. 


PULMONARY:no hemoptysis. 


GASTROINTESTINAL: No diarrhea, no nausea, no vomiting, no abdominal pain. 


NEUROLOGICAL: No headaches, no weakness, no numbness. 


HEMATOLOGICAL: Denies any bleeding or petechiae. 


GENITOURINARY: Denies any burning micturition, frequency, or urgency. 


MUSCULOSKELETAL/RHEUMATOLOGICAL: Denies any joint pain, swelling, or any muscle 

pain. 


ENDOCRINE: Denies any polyuria or polydipsia. 





The rest of the 14-point review of systems is negative.











PHYSICAL EXAMINATION: 





GENERAL: The patient is alert and oriented x3, not in any acute distress. Well 

developed, well nourished. 


HEENT: Pupils are round and equally reacting to light. EOMI. No scleral icterus.

No conjunctival pallor. Normocephalic, atraumatic. No pharyngeal erythema. No 

thyromegaly. 


CARDIOVASCULAR: S1 and S2 present. No murmurs, rubs, or gallops. 


PULMONARY: Chest is clear to auscultation, no wheezing or crackles. 


ABDOMEN: Soft, nontender, nondistended, normoactive bowel sounds. No palpable 

organomegaly. 


MUSCULOSKELETAL: No joint swelling or deformity.


EXTREMITIES: No cyanosis, clubbing, or pedal edema. 


NEUROLOGICAL: Gross neurological examination did not reveal any focal deficits. 


SKIN: No rashes. 





Assessment and plan


1 acute hypoxic respiratory failure: Possibly secondary to COVID-19 pneumonia 

but patient does have a very fibrosis and does have COPD and chronic respiratory

failure.  Patient will continued on Decadron patient may need higher amounts of 

oxygen upon discharge on 5 L upon discharge.  Patient is on Rocephin which will 

be continued although no clear evidence of bacterial pneumonia at this time.  

Pro-calcitonin is only 0.3


Some chronic hypercapnic respiratory failure secondary to COPD uses 3 L of 

oxygen at home


-Severe hypomagnesemia magnesium will be replaced


-Elevated d-dimer seconded to cold and patient is on Lovenox which will be 

continued


-Acute renal failure: Patient will be started on IV fluids hold off on ACE 

inhibitor as mentioned above


-Hypertension patient is presently mildly hypotensive


-Mild anion gap and [place] acidosis probability of lactic acidosis although 

lactic acid is not available I do not believe we will need to do lactic acid 

will just continue with IV fluids for now.


-Chest esophageal reflux disease


-Hyperlipidemia


-Hypertension


-Sleep apnea uses CPAP machine at home next and-for vascular disease


#Coronary artery disease





DVT prophylaxis: On Lovenox which will be continued








Past Medical History


Past Medical History: Coronary Artery Disease (CAD), Cancer, GERD/Reflux, H

yperlipidemia, Hypertension, Liver Disease, Osteoarthritis (OA), Sleep 

Apnea/CPAP/BIPAP, Vascular Disorder


Additional Past Medical History / Comment(s): 2007 Aortic thrombecto

my/aortofemoral bypass/pt in coma/trach x 6 weeks, R hip to R toes nerve damage 

after 6 weeks comatose, past chronic pain to R hip/leg but has pain stimulator 

which has greatly helped, hx gastric ulcers, Nielsen's esophagus, hemorrhoids, 5

cancerous colon polyps removed, MIRELA with Cpap/O2 use, arthritis in feet. HAVING 

DIFFICULTY BREATHING-O2 SATS LOW WITH ACTIVITY


History of Any Multi-Drug Resistant Organisms: None Reported


Past Surgical History: Appendectomy, Cholecystectomy, Heart Catheterization, 

Heart Catheterization With Stent, Hysterectomy, Orthopedic Surgery, 

Tonsillectomy


Additional Past Surgical History / Comment(s): abdominal aortogram 21, 

Bilateral heel bone spurs/bilateral feet fusions to lower arches, bone spurs 

removed from inside bilateral feet,  stent in R leg with bypass to left 

leg,(now disconnected)  aortic thrombectomy/aortofemoral bypass, R knee 

arthroscopic surgery,  neurostimulator implant,  neruostimulator paddle 

moved, 2019 stimulator replaced and relocation stimulator battery, 2017 L common

femoral endartectomy, EGDs, colonoscopies/cancerous polypectomies., left femoral

surgery,


Past Anesthesia/Blood Transfusion Reactions: No Reported Reaction


Date of Last Stent Placement:: 2019


Past Psychological History: Anxiety, Depression


Smoking Status: Former smoker


Past Alcohol Use History: None Reported


Past Drug Use History: None Reported





- Past Family History


  ** Mother


Family Medical History: No Reported History


Additional Family Medical History / Comment(s): Mother was an alcoholic and 

at the age of 36 yrs.





  ** Father


Family Medical History: Cancer


Additional Family Medical History / Comment(s): Father had jaw/throat cancer.  

He was a smoker.





Medications and Allergies


                                Home Medications











 Medication  Instructions  Recorded  Confirmed  Type


 


Cetirizine HCl [Zyrtec] 10 mg PO DAILY 19 History


 


Pantoprazole Sodium [Protonix] 40 mg PO DAILY 19 History


 


Sertraline [Zoloft] 100 mg PO DAILY 19 History


 


Clopidogrel [Plavix] 75 mg PO DAILY #30 tab 19 Rx


 


Nitroglycerin Sl Tabs [Nitrostat] 0.4 mg SUBLINGUAL Q5M PRN #25 tab 19 Rx


 


Metoprolol Succinate [Toprol XL] 25 mg PO DAILY 21 History


 


Rosuvastatin Calcium [Crestor] 40 mg PO DAILY 21 History


 


Valsartan 160 mg PO BID 21 History


 


buPROPion [Wellbutrin] 100 mg PO DAILY 21 History


 


Levalbuterol Hfa Inhaler [Xopenex 2 puff INHALATION RT-Q6H PRN 21

 History





Hfa Inhaler]    


 


Levalbuterol Nebulized [Xopenex 1.25 mg INHALATION RT-Q6H PRN 21 

History





Nebulized]    


 


Tiotropium 2.5 Mcg/Puff [Spiriva 2 puff INHALATION RT-DAILY 21 

History





Respimat 2.5 Mcg]    








                                    Allergies











Allergy/AdvReac Type Severity Reaction Status Date / Time


 


albuterol Allergy  Rash/Hives Verified 21 18:32


 


cortisone Allergy  Swelling/Pain Verified 21 18:32





   at inj site  


 


doxycycline Allergy  Swelling Verified 21 18:32


 


erythromycin base Allergy  Rash/Hives Verified 21 18:32


 


hydralazine Allergy  Rash/Hives Verified 21 18:32


 


Iodinated Contrast Media Allergy  Anaphylaxis Verified 21 18:32


 


iodine Allergy  Anaphylaxis Verified 21 18:32


 


lidocaine Allergy  Swelling Verified 21 18:32


 


lisinopril Allergy  Swelling Verified 21 18:32


 


modafinil Allergy  Unknown Verified 21 18:32


 


nystatin Allergy  Rash/Hives Verified 21 18:32


 


procaine [From Novocain] Allergy  Swelling Verified 21 18:32


 


vancomycin Allergy  Rash/Hives Verified 21 18:32


 


amlodipine AdvReac  Nausea & Verified 21 18:32





   Vomiting &  





   Diarrhea  


 


aspirin AdvReac  stomach Verified 21 18:32





   and  





   esophageal  





   ulcers  


 


atorvastatin [From Lipitor] AdvReac  MUSCLE PAIN Verified 21 18:32


 


cimetidine [From Tagamet] AdvReac  dizziness Verified 21 18:32


 


isosorbide AdvReac  Nausea & Verified 21 18:32





   Vomiting &  





   Diarrhea  


 


labetalol AdvReac  Nausea & Verified 21 18:32





   Vomiting &  





   Diarrhea  


 


metoprolol AdvReac  Diarrhea Verified 21 18:32


 


morphine AdvReac  "generic Verified 21 18:32





   morphine"  


 


oxcarbazepine AdvReac  hair loss Verified 21 18:32





[From Trileptal]     


 


paroxetine [From Paxil] AdvReac  tremors Verified 21 18:32


 


simvastatin [From Zocor] AdvReac  MUSCLE PAIN Verified 21 18:32














Physical Exam


Vitals: 


                                   Vital Signs











  Temp Pulse Resp BP Pulse Ox


 


 21 09:00  97.9 F  68  15  119/61  89 L


 


 21 07:34      96


 


 21 01:25   59 L  18  112/83  97


 


 21 21:02  97.7 F    


 


 21 19:40   73  19  136/74  97


 


 21 17:00   81  20  116/95  90 L


 


 21 16:37  98.5 F  85  20  122/73  86 L








                                Intake and Output











 21





 22:59 06:59 14:59


 


Other:   


 


  Weight 90.718 kg  














Results


CBC & Chem 7: 


                                 21 05:54





                                 21 05:54


Labs: 


                  Abnormal Lab Results - Last 24 Hours (Table)











  21 Range/Units





  17:39 17:39 17:39 


 


MCH     (27.0-32.0)  pg


 


MCHC     (32.0-37.0)  g/dL


 


RDW  15.6 H    (11.5-15.5)  %


 


D-Dimer   1.89 H   (<0.60)  mg/L FEU


 


Carbon Dioxide     (22-30)  mmol/L


 


BUN     (7-17)  mg/dL


 


Creatinine     (0.52-1.04)  mg/dL


 


Est GFR (CKD-EPI)AfAm     (60.0-200.0)   


 


Est GFR (CKD-EPI)NonAf     (60.0-200.0)   


 


BUN/Creatinine Ratio     (12.00-20.00)  Ratio


 


Glucose     (74-99)  mg/dL


 


Magnesium    <0.4 L*  (1.6-2.3)  mg/dL


 


AST     (14-36)  U/L


 


Alkaline Phosphatase     ()  U/L


 


Lactate Dehydrogenase     (313-618)  U/L


 


C-Reactive Protein     (<1.0)  mg/dL


 


Procalcitonin     (0.02-0.09)  ng/mL














  21 Range/Units





  17:39 17:39 05:54 


 


MCH    26.5 L  (27.0-32.0)  pg


 


MCHC    31.7 L  (32.0-37.0)  g/dL


 


RDW    15.7 H  (11.5-15.5)  %


 


D-Dimer     (<0.60)  mg/L FEU


 


Carbon Dioxide   19 L   (22-30)  mmol/L


 


BUN   37 H   (7-17)  mg/dL


 


Creatinine   1.22 H   (0.52-1.04)  mg/dL


 


Est GFR (CKD-EPI)AfAm     (60.0-200.0)   


 


Est GFR (CKD-EPI)NonAf     (60.0-200.0)   


 


BUN/Creatinine Ratio     (12.00-20.00)  Ratio


 


Glucose   107 H   (74-99)  mg/dL


 


Magnesium     (1.6-2.3)  mg/dL


 


AST   39 H   (14-36)  U/L


 


Alkaline Phosphatase   156 H   ()  U/L


 


Lactate Dehydrogenase   1283 H   (313-618)  U/L


 


C-Reactive Protein   21.7 H   (<1.0)  mg/dL


 


Procalcitonin  0.32 H    (0.02-0.09)  ng/mL














  21 Range/Units





  05:54 


 


MCH   (27.0-32.0)  pg


 


MCHC   (32.0-37.0)  g/dL


 


RDW   (11.5-15.5)  %


 


D-Dimer   (<0.60)  mg/L FEU


 


Carbon Dioxide  18.3 L  (22-30)  mmol/L


 


BUN  39.1 H  (7-17)  mg/dL


 


Creatinine   (0.52-1.04)  mg/dL


 


Est GFR (CKD-EPI)AfAm  54.2 L  (60.0-200.0)   


 


Est GFR (CKD-EPI)NonAf  46.7 L  (60.0-200.0)   


 


BUN/Creatinine Ratio  32.58 H  (12.00-20.00)  Ratio


 


Glucose  193 H  (74-99)  mg/dL


 


Magnesium  3.4 H  (1.6-2.3)  mg/dL


 


AST   (14-36)  U/L


 


Alkaline Phosphatase   ()  U/L


 


Lactate Dehydrogenase   (313-618)  U/L


 


C-Reactive Protein   (<1.0)  mg/dL


 


Procalcitonin   (0.02-0.09)  ng/mL

## 2021-12-08 LAB
LDH SPEC-CCNC: 338 U/L (ref 120–246)
MAGNESIUM SPEC-SCNC: 2.9 MG/DL (ref 1.5–2.4)

## 2021-12-08 RX ADMIN — ENOXAPARIN SODIUM SCH MG: 40 INJECTION SUBCUTANEOUS at 09:52

## 2021-12-08 RX ADMIN — SERTRALINE HYDROCHLORIDE SCH MG: 100 TABLET ORAL at 09:53

## 2021-12-08 RX ADMIN — CEFAZOLIN SCH MLS/HR: 330 INJECTION, POWDER, FOR SOLUTION INTRAMUSCULAR; INTRAVENOUS at 13:17

## 2021-12-08 RX ADMIN — METOPROLOL SUCCINATE SCH MG: 25 TABLET, EXTENDED RELEASE ORAL at 09:53

## 2021-12-08 RX ADMIN — OXYCODONE HYDROCHLORIDE AND ACETAMINOPHEN SCH MG: 500 TABLET ORAL at 13:17

## 2021-12-08 RX ADMIN — Medication SCH MCG: at 13:17

## 2021-12-08 RX ADMIN — LORATADINE SCH MG: 10 TABLET ORAL at 09:53

## 2021-12-08 RX ADMIN — CLOPIDOGREL BISULFATE SCH MG: 75 TABLET ORAL at 09:53

## 2021-12-08 RX ADMIN — DEXTROSE SCH MG: 50 INJECTION, SOLUTION INTRAVENOUS at 20:03

## 2021-12-08 RX ADMIN — CHOLESTYRAMINE SCH: 4 POWDER, FOR SUSPENSION ORAL at 18:18

## 2021-12-08 RX ADMIN — OXYCODONE HYDROCHLORIDE AND ACETAMINOPHEN SCH MG: 500 TABLET ORAL at 20:02

## 2021-12-08 RX ADMIN — LOPERAMIDE HYDROCHLORIDE PRN MG: 2 CAPSULE ORAL at 21:35

## 2021-12-08 RX ADMIN — DEXTROSE SCH MG: 50 INJECTION, SOLUTION INTRAVENOUS at 09:52

## 2021-12-08 RX ADMIN — PANTOPRAZOLE SODIUM SCH MG: 40 TABLET, DELAYED RELEASE ORAL at 09:53

## 2021-12-08 RX ADMIN — TIOTROPIUM BROMIDE INHALATION SPRAY SCH PUFF: 3.12 SPRAY, METERED RESPIRATORY (INHALATION) at 08:00

## 2021-12-08 RX ADMIN — Medication SCH MG: at 13:17

## 2021-12-08 RX ADMIN — ATORVASTATIN CALCIUM SCH MG: 80 TABLET, FILM COATED ORAL at 09:53

## 2021-12-08 NOTE — P.PN
Subjective


Progress Note Date: 12/08/21


Principal diagnosis: 


 Dyspnea, hypoxia, COVID-19





This is a pleasant 66-year-old female patient who follows at the [facility] for her

primary care needs.  She has history of hypertension, hyperlipidemia, 

gastroesophageal reflux disease, anxiety/depression, aortic thrombectomy in 2007

, former smoker, COPD, obstructive sleep apnea utilizing CPAP.  He is here 

yesterday to the emergency room with a four-week history of shortness of breath,

cough, congestion, body aches and fevers.  She was tested negative for CoVID 2.

 Her  is positive for CoVID and she is now positive for CoVID.  She is 

not vaccinated.  She does have home oxygen use at 2-3 L.  She is currently 

requiring 10 L high flow nasal cannula to maintain O2 saturations at 90%.  Chest

x-ray reveals bilateral patchy infiltrates. White count 5.9.  Hemoglobin 12.5.  

Platelets 398.  Lymphocytes 0.5.  D-dimer 1.89.  Sodium 140.  Potassium 4.9.  

Creatinine 1.2.  AST 39.  ALT 26.  Alk phos 156.  LDH 1283.  C-reactive protein 

21.7.  Pro-calcitonin 0.32.  She's been initiated on Decadron, Lovenox, vitamin 

supplements.  0.9% normal saline at 75 ML's per hour.





On 12/08/2021 patient is in follow-up on medical surgical floor, she is resting 

comfortably in bed, she states she is feeling better, although she is requiring 

more oxygen compared to when she first presented to the emergency department, 

note that patient does wear home oxygen at 2 L for history of COPD.  She is 

currently on 10 L of oxygen, her pulse ox is 91-92%, breathing comfortably, lung

sounds reveal coarse crackles at bilateral bases, but no wheezing, no rhonchi.  

CT angiogram of the chest showed no evidence of pulmonary embolism, it did show 

pulmonary emphysema and pulmonary interstitial fibrosis with increased 

interstitial infiltrates compared to her old exam.  There was no evidence of a 

suspicious pulmonary mass.  Patient was outside the window for Remdesivir, she 

continues on Decadron 6 mg twice daily, her pro-calcitonin level on admission 

was 0.32, and patient was covered with Rocephin for possibility of underlying 

bacterial infection.  She is on prophylactic Lovenox 40 mg daily.  CTA [place] 

Ye of the chest showed no evidence of pulmonary embolism. 








Objective





- Vital Signs


Vital signs: 


                                   Vital Signs











Temp  97.7 F   12/08/21 10:22


 


Pulse  69   12/08/21 10:22


 


Resp  17   12/08/21 10:22


 


BP  157/75   12/08/21 10:22


 


Pulse Ox  92 L  12/08/21 10:44








                                 Intake & Output











 12/07/21 12/08/21 12/08/21





 18:59 06:59 18:59


 


Intake Total 675  


 


Balance 675  


 


Weight 90.718 kg  


 


Intake:   


 


  Intake, IV Titration 475  





  Amount   


 


    Magnesium Sulfate-D5w Pmx 400  





    1 gm In Dextrose/Water 1   





    100ml.bag @ 100 mls/hr   





    IVPB Q1H [facility] Rx#:   





    [id number]   


 


    Sodium Chloride 0.9% 1, 75  





    000 ml @ 75 mls/hr IV .   





    [id number] [facility] Rx#:[id number]   


 


  Oral 200  


 


Other:   


 


  Voiding Method  Bedside Commode 


 


  # Voids  1 














- Exam


 GENERAL EXAM: Alert, very pleasant, 66-year-old white female, on 10 L of 

oxygen, with a pulse ox of 91-92% comfortable in no apparent distress.


HEAD: Normocephalic/atraumatic.


EYES: Normal reaction of pupils, equal size.  Conjunctiva pink, sclera white.


NOSE: Clear with pink turbinates.


THROAT: No erythema or exudates.


NECK: No masses, no JVD, no thyroid enlargement, no adenopathy.


CHEST: No chest wall deformity.  Symmetrical expansion. 


LUNGS: Equal air entry with with coarse bilateral crackles


CVS: Regular rate and rhythm, normal S1 and S2, no gallops, no murmurs, no rubs


ABDOMEN: Soft, nontender.  No hepatosplenomegaly, normal bowel sounds, no gu

arding or rigidity.


EXTREMITIES: No clubbing, no edema, no cyanosis, 2+ pulses and upper and lower e

xtremities.


MUSCULOSKELETAL: Muscle strength and tone normal.


SPINE: No scoliosis or deformity


SKIN: No rashes


CENTRAL NERVOUS SYSTEM: Alert and oriented -3.  No focal deficits, tone is 

normal in all 4 extremities.


PSYCHIATRIC: Alert and oriented -3.  Appropriate affect.  Intact judgment and 

insight.











- Labs


CBC & Chem 7: 


                                 12/07/21 05:54





                                 12/07/21 05:54


Labs: 


                  Abnormal Lab Results - Last 24 Hours (Table)











  12/06/21 12/08/21 12/08/21 Range/Units





  17:39 06:50 06:50 


 


D-Dimer    2.42 H  (<0.60)  mg/L FEU


 


Magnesium   2.9 H   (1.5-2.4)  mg/dL


 


Ferritin  995.0 H    (10.0-291.0)  ng/mL


 


Lactate Dehydrogenase   338 H   (120-246)  U/L


 


C-Reactive Protein   7.00 H   (0.00-0.80)  mg/dL








                      Microbiology - Last 24 Hours (Table)











 12/06/21 17:39 Blood Culture - Preliminary





 Blood    No Growth after 24 hours


 


 12/06/21 17:39 Blood Culture - Preliminary





 Blood    No Growth after 24 hours














Assessment and Plan


Plan: 


 Assessment:





#1.  Acute on chronic hypoxic respiratory failure secondary to acute COVID-19 

pneumonia, patient is not vaccinated related to multiple ALLERGIES, patient 

presented with a 4 week history of symptoms, she was outside the window for 

Remdesivir, she is being treated supportively with Decadron, prophylactic 

Lovenox, and empiric antibiotics





#2.  Mildly elevated pro calcitonin, patient is on empiric antibiotics in the 

form of Rocephin





#3.  Elevated inflammatory markers secondary to the viral pneumonia





#4.  Morbid obesity with BMI of 39.1 kg/m





#5.  Obstructive sleep apnea on CPAP therapy





#6.  History of COPD with chronic hypoxic respiratory failure usually wears 2 L 

of oxygen on the outpatient basis, and CT angiogram of the chest also revealed 

evidence of interstitial pulmonary fibrosis





#7.  Hyperlipidemia





#8.  Anxiety/depression





#9.  History of coronary artery disease with previous stent placement





#10.  History of aortic thrombectomy/aortobifem bypass in 2007





#11.  Former smoker





Plan:





Patient is outside the window for Remdesivir


Continue Decadron


Continue prophylactic Lovenox


CT angiogram was negative for any evidence of pulmonary embolism, it did show 

emphysematous changes with interstitial pulmonary fibrosis


We will obtain lower extremity Dopplers to rule out possibility of DVT


Continue IV hydration


Continue inhaled bronchodilators


Overall she denies worsening dyspnea although she is requiring more oxygen


We'll continue to follow her clinical course and make recommendations 

accordingly





I performed a history & physical examination of the patient and discussed their 

management with my nurse practitioner, Jeannine Belcher.  I reviewed the nurse 

practitioner's note and agree with the documented findings and plan of care.  

Lung sounds are positive for diffuse crackles throughout the lung fields.  The 

findings and the impression was discussed with the patient.  I attest to the 

documentation by the nurse practitioner. 











Time with Patient: Less than 30

## 2021-12-08 NOTE — PN
PROGRESS NOTE



DATE OF SERVICE:

12/08/2021



REASON FOR FOLLOWUP:

COVID-19 pneumonia.



INTERVAL HISTORY:

The patient is afebrile. She mentioned breathing slightly comfortably today.  The

patient is still requiring high-flow oxygen _____. The patient denies having any chest

pain. Did have a cough, not bringing up any sputum.  No abdominal pain or diarrhea.



PHYSICAL EXAMINATION:

Blood pressure 157/76, pulse of 74, temperature 98.1. She is 92% on 10 L high-flow

oxygen. General description is an elderly female up in the chair in no distress.

Respiratory system: Unlabored breathing. Coarse breath sounds bilaterally. Heart S1,

S2.  Regular rate and rhythm.  Abdomen soft, no tenderness.



LABS:

D-dimer is 2.42. _____ is 2.9.  CRP is down to 7.



DIAGNOSTIC IMPRESSION AND PLAN:

Patient with acute respiratory failure secondary to severe COVID-19 pneumonia. Patient

is covered with dexamethasone and Lovenox, zinc and ascorbic acid; to continue along

with respiratory support, and monitor clinical course closely.  Continue with

supportive care.





MMODL / IJN: [id number] / Job#: [id number]

## 2021-12-08 NOTE — P.PN
Subjective


Progress Note Date: 12/08/21











Patient is a 66-year-old the female with past medical history of COPD and 3 L 

oxygen dependent at home has cold symptoms for about a month comes in with 

shortness of breath presently on telemetry dysfunction CT of the chest did not 

show any pulmonary embolism but did show pulmonary fibrosis as well as i

nfiltrates.  Below.  Patient doesn't have any infiltrate consistent with 

pneumonia patient denied any dysuria.  Patient is presently on Rocephin.  

Patient has mild acute renal failure because of which I'm holding ACE inhibitor.

 Severely hypomagnesemic magnesium is being replaced.  Patient last smoked about

17 years ago.





12/08/2021





Patient is seen and evaluated in follow-up and currently maintained on 10 L high

flow and weaning as tolerated.  Patient was on 15 L high flow nasal cannula this

morning and tolerating and nursing staff continuing to wean.  Patient normally 

wears 3 L of oxygen continuously in the outpatient setting.  Patient denies any 

worsening shortness of breath just generalized fatigue and weakness and 

continued diarrhea.  Patient states she is going approximately 2-3 times per day

will add C. diff testing and if negative will add Imodium and Questran.  

Pulmonary is following and venous Doppler was ordered bilateral lower 

extremities which is negative for DVT as d-dimer is elevated today at 2.42.  

Inflammatory markers significantly improved his LDH is 338 and CRP is 7.0, 

repeat magnesium after replacement is 2.9. 





Review of systems:


Constitutional: reports of fatigue, no reports of fever, or chills


Cardiovascular: No reports of chest pain or palpitations


Respiratory: No reports of worsening shortness of breath, reports dyspnea with 

exertion


GI: No reports of nausea, vomiting, reports continued loose stools


: No reports of dysuria or retention


Neurovascular: rePorts generalized weakness





All medications have been reviewed








Active Medications





Acetaminophen (Acetaminophen Tab 325 Mg Tab)  650 mg PO Q4HR PRN


   PRN Reason: Fever>101


   Last Admin: 12/07/21 15:43 Dose:  650 mg


   Documented by: 


Ascorbic Acid (Ascorbic Acid 500 Mg Tab)  500 mg PO BID [facility]


   Last Admin: 12/08/21 13:17 Dose:  500 mg


   Documented by: 


Atorvastatin Calcium (Atorvastatin 80 Mg Tab)  80 mg PO DAILY [facility]


   Last Admin: 12/08/21 09:53 Dose:  80 mg


   Documented by: 


Bupropion HCl (Bupropion 100 Mg Tab)  100 mg PO DAILY [facility]


   Last Admin: 12/08/21 12:06 Dose:  100 mg


   Documented by: 


Cholecalciferol (Cholecalciferol 25 Mcg (1000 Iu) Tablet)  25 mcg PO DAILY [facility]


   Last Admin: 12/08/21 13:17 Dose:  25 mcg


   Documented by: 


Clopidogrel Bisulfate (Clopidogrel 75 Mg Tab)  75 mg PO DAILY [facility]


   Last Admin: 12/08/21 09:53 Dose:  75 mg


   Documented by: 


Dexamethasone Sodium Phosphate (Dexamethasone Sod Phosphate 10 Mg/Ml 1 Ml Vial) 

6 mg IVP BID [facility]


   Last Admin: 12/08/21 09:52 Dose:  6 mg


   Documented by: 


Enoxaparin Sodium (Enoxaparin 40 Mg/0.4 Ml Syringe)  40 mg SQ DAILY [facility]


   Last Admin: 12/08/21 09:52 Dose:  40 mg


   Documented by: 


Ceftriaxone Sodium 1 gm/ (Sodium Chloride)  50 mls @ 100 mls/hr IVPB Q24HR [facility]


   Last Admin: 12/08/21 09:52 Dose:  100 mls/hr


   Documented by: 


Sodium Chloride (Saline 0.9%)  1,000 mls @ 75 mls/hr IV .[id number] [facility]


   Last Admin: 12/08/21 13:17 Dose:  75 mls/hr


   Documented by: 


Loratadine (Loratadine 10 Mg Tab)  10 mg PO DAILY [facility]


   Last Admin: 12/08/21 09:53 Dose:  10 mg


   Documented by: 


Metoprolol Succinate (Metoprolol Succinate (Er) 25 Mg Tab.Er.24h)  25 mg PO 

DAILY [facility]


   Last Admin: 12/08/21 09:53 Dose:  25 mg


   Documented by: 


Non-Formulary Medication (Levalbuterol Hfa Inhaler)  2 puff INHALATION RT-Q6H 

PRN


   PRN Reason: Shortness Of Breath


Non-Formulary Medication (Levalbuterol Nebulized)  1.25 mg INHALATION RT-Q6H PRN


   PRN Reason: Shortness Of Breath


Pantoprazole Sodium (Pantoprazole 40 Mg Tablet)  40 mg PO DAILY [facility]


   Last Admin: 12/08/21 09:53 Dose:  40 mg


   Documented by: 


Sertraline HCl (Sertraline 100 Mg Tab)  100 mg PO DAILY [facility]


   Last Admin: 12/08/21 09:53 Dose:  100 mg


   Documented by: 


Tiotropium Bromide (Tiotropium 2.5 Mcg Inhaler)  2 puff INHALATION RT-DAILY [facility]


   Last Admin: 12/08/21 08:00 Dose:  2 puff


   Documented by: 


Zinc Sulfate (Zinc Sulfate 220 Mg Cap)  220 mg PO DAILY [facility]


   Last Admin: 12/08/21 13:17 Dose:  220 mg


   Documented by: 











PHYSICAL EXAMINATION: 





GENERAL: The patient is alert and oriented x3, not in any acute distress. Well 

developed, well nourished. 


HEENT: Pupils are round and equally reacting to light. EOMI. No scleral icterus.

No conjunctival pallor. Normocephalic, atraumatic. No pharyngeal erythema. No 

thyromegaly. 


CARDIOVASCULAR: S1 and S2 present. No murmurs, rubs, or gallops. 


PULMONARY: Diminished breath sounds bilaterally with no wheezing or rhonchi 

noted


ABDOMEN: Soft, nontender, nondistended, normoactive bowel sounds. No palpable 

organomegaly. 


MUSCULOSKELETAL: No joint swelling or deformity.


EXTREMITIES: No cyanosis, clubbing, or pedal edema. 


NEUROLOGICAL: Gross neurological examination did not reveal any focal deficits. 


SKIN: No rashes. 





Assessment:





-acute hypoxic respiratory failure: Possibly secondary to COVID-19 pneumonia but

patient does have a pulmonary fibrosis and does have COPD and chronic 

respiratory failure.  Patient will continued on Decadron patient may need higher

amounts of oxygen upon discharge on 5 L upon discharge.  Patient is on Rocephin 

which will be continued although no clear evidence of bacterial pneumonia at 

this time.  Pro-calcitonin is only 0.3


-chronic hypercapnic respiratory failure secondary to COPD uses 3 L of oxygen at

home


-Severe hypomagnesemia magnesium will be replaced, improved and is 2.9


-Elevated d-dimer secondary to Covid and patient is on Lovenox which will be 

continued


-Acute renal failure: Patient will be continued on IV fluids hold off on ACE 

inhibitor as mentioned above and will repeat labs


-Hypertension patient is presently mildly hypotensive


-Mild anion gap and metabolic acidosis probability of lactic acidosis, most 

likely secondary to increased amounts of diarrhea and dehydration, lactic acid 

is 1.1


-gastroesophageal reflux disease


-Hyperlipidemia


-Hypertension


-Sleep apnea uses CPAP machine at home


-Peripheral vascular disease


-Coronary artery disease


-DVT prophylaxis: On Lovenox which will be continued


-full code








Plan:





Recommend to continue with current medications area C. diff testing ordered as 

patient continues to have multiple episodes of diarrhea and if negative may use 

Questran and Imodium.  Encouraged oral intake and increase activity as 

tolerated.  Continue to wean FiO2 as tolerated currently maintained on 10 L high

flow.  Pulmonary is following and recommended to continue with vitamin and zinc 

supplements along with IV dexamethasone, Lovenox, and patient is also continued 

on IV ceftriaxone.  Cultures remain negative.  Patient is afebrile.  Will repeat

a.m. labs along with chest x-ray and continue to monitor closely.  Further 

recommendations to follow based on the clinical course of the patient.  

Prognosis is guarded.








Objective





- Vital Signs


Vital signs: 


                                   Vital Signs











Temp  97.7 F   12/08/21 10:22


 


Pulse  69   12/08/21 10:22


 


Resp  17   12/08/21 10:22


 


BP  157/75   12/08/21 10:22


 


Pulse Ox  91 L  12/08/21 10:22








                                 Intake & Output











 12/07/21 12/08/21 12/08/21





 18:59 06:59 18:59


 


Intake Total 675  


 


Balance 675  


 


Weight 90.718 kg  


 


Intake:   


 


  Intake, IV Titration 475  





  Amount   


 


    Magnesium Sulfate-D5w Pmx 400  





    1 gm In Dextrose/Water 1   





    100ml.bag @ 100 mls/hr   





    IVPB Q1H [facility] Rx#:   





    [id number]   


 


    Sodium Chloride 0.9% 1, 75  





    000 ml @ 75 mls/hr IV .   





    [id number] [facility] Rx#:[id number]   


 


  Oral 200  


 


Other:   


 


  Voiding Method  Bedside Commode 


 


  # Voids  1 














- Labs


CBC & Chem 7: 


                                 12/07/21 05:54





                                 12/07/21 05:54


Labs: 


                  Abnormal Lab Results - Last 24 Hours (Table)











  12/06/21 12/07/21 12/08/21 Range/Units





  17:39 05:54 06:50 


 


Metamyelocytes %   1 H   (0-0)  %


 


Myelocytes %   1 H   (0-0)  %


 


Lymphocytes # (Manual)   0.59 L   (0.90-5.00)  X 10*3/uL


 


Monocytes # (Manual)   0.18 L   (0.20-1.00)  X 10*3/uL


 


Eosinophils # (Manual)   0 L   (0.04-0.35)  X 10*3/uL


 


D-Dimer    2.42 H  (<0.60)  mg/L FEU


 


Ferritin  995.0 H    (10.0-291.0)  ng/mL








                      Microbiology - Last 24 Hours (Table)











 12/06/21 17:39 Blood Culture - Preliminary





 Blood    No Growth after 24 hours


 


 12/06/21 17:39 Blood Culture - Preliminary





 Blood    No Growth after 24 hours

## 2021-12-08 NOTE — XR
EXAMINATION TYPE: XR chest 1V portable

 

DATE OF EXAM: 12/8/2021

 

COMPARISON: 12/6/2021

 

HISTORY: Shortness of breath

 

TECHNIQUE: Single frontal view of the chest is obtained.

 

FINDINGS:  Diffuse interstitial pattern with areas of subsegmental consolidation are stable. A stimul
ator catheter suspect overlying the thoracic spine. Heart size stable. Underlying COPD and chronic in
terstitial lung disease suspected. No pneumothorax. Cannot exclude tiny pleural effusions.

 

IMPRESSION:  COPD and suspected interstitial infiltrate  superimposed on a background of interstitial
 chronic lung disease.

## 2021-12-08 NOTE — US
EXAMINATION TYPE: US venous doppler duplex LE 

 

DATE OF EXAM: 12/8/2021 1:41 PM

 

COMPARISON: NONE

 

CLINICAL HISTORY: elevated d-dimer. Covid+, elevated d-dimer

 

SIDE PERFORMED: Bilateral  

 

TECHNIQUE:  The lower extremity deep venous system is examined utilizing real time linear array sonog
radha with graded compression, doppler sonography and color-flow sonography.

 

VESSELS IMAGED:

Common Femoral Vein

Deep Femoral Vein

Greater Saphenous Vein *

Femoral Vein

Popliteal Vein

Small Saphenous Vein *

Proximal Calf Veins

(* superficial vessels)

 

 

 

Right Leg:  Negative for DVT

 

Left Leg:  Negative for DVT

 

 

 

IMPRESSION:

1. Bilateral lower extremity ultrasound negative for deep venous thrombosis.

## 2021-12-09 LAB
ALBUMIN SERPL-MCNC: 3.6 G/DL (ref 3.8–4.9)
ALBUMIN/GLOB SERPL: 1.68 G/DL (ref 1.6–3.17)
ALP SERPL-CCNC: 172 U/L (ref 41–126)
ALT SERPL-CCNC: 23 U/L (ref 8–44)
ANION GAP SERPL CALC-SCNC: 13.7 MMOL/L (ref 10–18)
AST SERPL-CCNC: 20 U/L (ref 13–35)
BASOPHILS # BLD AUTO: 0.01 X 10*3/UL (ref 0–0.1)
BASOPHILS NFR BLD AUTO: 0.1 %
BUN SERPL-SCNC: 20.9 MG/DL (ref 9–27)
BUN/CREAT SERPL: 30.07 RATIO (ref 12–20)
CALCIUM SPEC-MCNC: 8.8 MG/DL (ref 8.7–10.3)
CHLORIDE SERPL-SCNC: 112 MMOL/L (ref 96–109)
CO2 SERPL-SCNC: 20.9 MMOL/L (ref 20–27.5)
EOSINOPHIL # BLD AUTO: 0 X 10*3/UL (ref 0.04–0.35)
EOSINOPHIL NFR BLD AUTO: 0 %
ERYTHROCYTE [DISTWIDTH] IN BLOOD BY AUTOMATED COUNT: 4.6 X 10*6/UL (ref 4.1–5.2)
ERYTHROCYTE [DISTWIDTH] IN BLOOD: 15.9 % (ref 11.5–14.5)
GLOBULIN SER CALC-MCNC: 2.1 G/DL (ref 1.6–3.3)
GLUCOSE SERPL-MCNC: 104 MG/DL (ref 70–110)
HCT VFR BLD AUTO: 39.8 % (ref 37.2–46.3)
HGB BLD-MCNC: 12 G/DL (ref 12–15)
LDH SPEC-CCNC: 348 U/L (ref 120–246)
LYMPHOCYTES # SPEC AUTO: 0.87 X 10*3/UL (ref 0.9–5)
LYMPHOCYTES NFR SPEC AUTO: 10.1 %
MCH RBC QN AUTO: 26.1 PG (ref 27–32)
MCHC RBC AUTO-ENTMCNC: 30.2 G/DL (ref 32–37)
MCV RBC AUTO: 86.5 FL (ref 80–97)
MONOCYTES # BLD AUTO: 0.57 X 10*3/UL (ref 0.2–1)
MONOCYTES NFR BLD AUTO: 6.6 %
NEUTROPHILS # BLD AUTO: 6.91 X 10*3/UL (ref 1.8–7.7)
NEUTROPHILS NFR BLD AUTO: 80.2 %
PLATELET # BLD AUTO: 506 X 10*3/UL (ref 140–440)
POTASSIUM SERPL-SCNC: 4.8 MMOL/L (ref 3.5–5.5)
PROT SERPL-MCNC: 5.7 G/DL (ref 6.2–8.2)
SODIUM SERPL-SCNC: 147 MMOL/L (ref 135–145)
WBC # BLD AUTO: 8.62 X 10*3/UL (ref 4.5–10)

## 2021-12-09 RX ADMIN — CEFAZOLIN SCH MLS/HR: 330 INJECTION, POWDER, FOR SOLUTION INTRAMUSCULAR; INTRAVENOUS at 04:00

## 2021-12-09 RX ADMIN — TIOTROPIUM BROMIDE INHALATION SPRAY SCH PUFF: 3.12 SPRAY, METERED RESPIRATORY (INHALATION) at 09:30

## 2021-12-09 RX ADMIN — Medication SCH MCG: at 09:53

## 2021-12-09 RX ADMIN — LOPERAMIDE HYDROCHLORIDE PRN MG: 2 CAPSULE ORAL at 17:30

## 2021-12-09 RX ADMIN — OXYCODONE HYDROCHLORIDE AND ACETAMINOPHEN SCH MG: 500 TABLET ORAL at 20:35

## 2021-12-09 RX ADMIN — CHOLESTYRAMINE SCH: 4 POWDER, FOR SUSPENSION ORAL at 16:32

## 2021-12-09 RX ADMIN — PANTOPRAZOLE SODIUM SCH MG: 40 TABLET, DELAYED RELEASE ORAL at 09:53

## 2021-12-09 RX ADMIN — ENOXAPARIN SODIUM SCH MG: 40 INJECTION SUBCUTANEOUS at 09:54

## 2021-12-09 RX ADMIN — LORATADINE SCH MG: 10 TABLET ORAL at 09:53

## 2021-12-09 RX ADMIN — CLOPIDOGREL BISULFATE SCH MG: 75 TABLET ORAL at 09:53

## 2021-12-09 RX ADMIN — DEXTROSE SCH MG: 50 INJECTION, SOLUTION INTRAVENOUS at 20:35

## 2021-12-09 RX ADMIN — ATORVASTATIN CALCIUM SCH MG: 80 TABLET, FILM COATED ORAL at 09:53

## 2021-12-09 RX ADMIN — VALSARTAN SCH MG: 80 TABLET ORAL at 17:28

## 2021-12-09 RX ADMIN — POTASSIUM CHLORIDE SCH MLS/HR: 14.9 INJECTION, SOLUTION INTRAVENOUS at 13:46

## 2021-12-09 RX ADMIN — DEXTROSE SCH MG: 50 INJECTION, SOLUTION INTRAVENOUS at 09:52

## 2021-12-09 RX ADMIN — OXYCODONE HYDROCHLORIDE AND ACETAMINOPHEN SCH MG: 500 TABLET ORAL at 09:53

## 2021-12-09 RX ADMIN — Medication SCH MG: at 09:53

## 2021-12-09 RX ADMIN — CEFAZOLIN SCH: 330 INJECTION, POWDER, FOR SOLUTION INTRAMUSCULAR; INTRAVENOUS at 13:43

## 2021-12-09 RX ADMIN — LOPERAMIDE HYDROCHLORIDE PRN MG: 2 CAPSULE ORAL at 10:07

## 2021-12-09 RX ADMIN — METOPROLOL SUCCINATE SCH MG: 25 TABLET, EXTENDED RELEASE ORAL at 09:53

## 2021-12-09 RX ADMIN — CHOLESTYRAMINE SCH GM: 4 POWDER, FOR SUSPENSION ORAL at 09:55

## 2021-12-09 RX ADMIN — SERTRALINE HYDROCHLORIDE SCH MG: 100 TABLET ORAL at 09:53

## 2021-12-09 RX ADMIN — CHOLESTYRAMINE SCH: 4 POWDER, FOR SUSPENSION ORAL at 16:33

## 2021-12-09 RX ADMIN — ENOXAPARIN SODIUM SCH MG: 40 INJECTION SUBCUTANEOUS at 20:35

## 2021-12-09 NOTE — XR
EXAMINATION TYPE: XR chest 1V portable

 

DATE OF EXAM: 12/9/2021

 

COMPARISON: 12/8/2021

 

HISTORY: Shortness of breath

 

TECHNIQUE: Single frontal view of the chest is obtained.

 

FINDINGS:  Diffuse interstitial pattern with areas of subsegmental consolidation are stable. A stimul
ator catheter suspect overlying the thoracic spine. Heart size stable. Underlying COPD and chronic in
terstitial lung disease suspected. No pneumothorax. Cannot exclude tiny pleural effusions. 

 

 

IMPRESSION:  

1. Stable patchy bilateral areas of infiltrate. Suspect underlying COPD and superimposed chronic inte
rstitial pulmonary lung disease.

## 2021-12-09 NOTE — P.PN
Subjective


Progress Note Date: 12/09/21











Patient is a 66-year-old the female with past medical history of COPD and 3 L 

oxygen dependent at home has cold symptoms for about a month comes in with 

shortness of breath presently on telemetry dysfunction CT of the chest did not 

show any pulmonary embolism but did show pulmonary fibrosis as well as i

nfiltrates.  Below.  Patient doesn't have any infiltrate consistent with 

pneumonia patient denied any dysuria.  Patient is presently on Rocephin.  

Patient has mild acute renal failure because of which I'm holding ACE inhibitor.

 Severely hypomagnesemic magnesium is being replaced.  Patient last smoked about

17 years ago.





12/08/2021





Patient is seen and evaluated in follow-up and currently maintained on 10 L high

flow and weaning as tolerated.  Patient was on 15 L high flow nasal cannula this

morning and tolerating and nursing staff continuing to wean.  Patient normally 

wears 3 L of oxygen continuously in the outpatient setting.  Patient denies any 

worsening shortness of breath just generalized fatigue and weakness and 

continued diarrhea.  Patient states she is going approximately 2-3 times per day

will add C. diff testing and if negative will add Imodium and Questran.  

Pulmonary is following and venous Doppler was ordered bilateral lower 

extremities which is negative for DVT as d-dimer is elevated today at 2.42.  

Inflammatory markers significantly improved his LDH is 338 and CRP is 7.0, 

repeat magnesium after replacement is 2.9. 





12/09/2021





Patient is seen and evaluated in follow-up this morning currently sitting up in 

the chair and was on 10 L high flow although oxygen saturations were below 90% 

and patient bumped back up to 15 L.  Patient is complaining of the oxygen being 

too high and making her feel more anxious and discussed with nursing staff about

continuing to wean as tolerated.  She continues with diarrhea although is 

improving and C. diff testing was negative and patient was started on Questran 

along with Imodium.  Patient continues to state she has no real appetite 

although encourage the patient to continue with small frequent meals and will 

add Magic cups as well.  Encourage the patient to increase oral intake as she is

weak and needs the energy.  She normally maintained on 3 L via nasal cannula and

will continue to titrate as tolerated.  Pulmonary is following patient is 

maintained on IV dexamethasone twice daily along with vitamin and zinc supplem

ents, Lovenox twice daily for elevated d-dimer and will continue.  Patient was 

on normal saline and considered discontinuing as patient sodium is now 147 and 

patient is being started on D5 in water and will repeat labs.





Labs:





White blood count is 8.62, hemoglobin is 12.0, platelets are 506, d-dimer is 

4.01, sodium is 147, potassium is 4.8, creatinine is 0.7, alk phos is 172, LDH 

is 348





Review of systems:


Constitutional: reports of fatigue, no reports of fever, or chills


Cardiovascular: No reports of chest pain or palpitations


Respiratory: No reports of worsening shortness of breath, reports dyspnea with 

exertion


GI: No reports of nausea, vomiting, reports continued loose stools although i

mproving


: No reports of dysuria or retention


Neurovascular: rePorts generalized weakness





All medications have been reviewed








Active Medications





Acetaminophen (Acetaminophen Tab 325 Mg Tab)  650 mg PO Q4HR PRN


   PRN Reason: Fever>101


   Last Admin: 12/07/21 15:43 Dose:  650 mg


   Documented by: 


Ascorbic Acid (Ascorbic Acid 500 Mg Tab)  500 mg PO BID [facility]


   Last Admin: 12/09/21 09:53 Dose:  500 mg


   Documented by: 


Atorvastatin Calcium (Atorvastatin 80 Mg Tab)  80 mg PO DAILY [facility]


   Last Admin: 12/09/21 09:53 Dose:  80 mg


   Documented by: 


Bupropion HCl (Bupropion 100 Mg Tab)  100 mg PO DAILY [facility]


   Last Admin: 12/09/21 09:55 Dose:  100 mg


   Documented by: 


Cholecalciferol (Cholecalciferol 25 Mcg (1000 Iu) Tablet)  25 mcg PO DAILY [facility]


   Last Admin: 12/09/21 09:53 Dose:  25 mcg


   Documented by: 


Cholestyramine Resin (Cholestyramine (With Sugar) 4 Gm Packet)  4 gm PO BID@1000

,1800 [facility]


   Last Admin: 12/09/21 09:55 Dose:  4 gm


   Documented by: 


Clopidogrel Bisulfate (Clopidogrel 75 Mg Tab)  75 mg PO DAILY [facility]


   Last Admin: 12/09/21 09:53 Dose:  75 mg


   Documented by: 


Dexamethasone Sodium Phosphate (Dexamethasone Sod Phosphate 10 Mg/Ml 1 Ml Vial) 

6 mg IVP BID [facility]


   Last Admin: 12/09/21 09:52 Dose:  6 mg


   Documented by: 


Enoxaparin Sodium (Enoxaparin 40 Mg/0.4 Ml Syringe)  40 mg SQ BID [facility]


   Last Admin: 12/09/21 09:54 Dose:  40 mg


   Documented by: 


Sodium Chloride (Saline 0.9%)  1,000 mls @ 75 mls/hr IV .[id number] [facility]


   Last Admin: 12/09/21 13:43 Dose:  Not Given


   Documented by: 


Dextrose/Water (Dextrose 5%-Water Iv Soln)  1,000 mls @ 100 mls/hr IV .Q10H [facility]


   Last Admin: 12/09/21 13:46 Dose:  100 mls/hr


   Documented by: 


Loperamide HCl (Loperamide 2 Mg Cap)  2 mg PO QID PRN


   PRN Reason: Diarrhea


   Last Admin: 12/09/21 10:07 Dose:  2 mg


   Documented by: 


Loratadine (Loratadine 10 Mg Tab)  10 mg PO DAILY [facility]


   Last Admin: 12/09/21 09:53 Dose:  10 mg


   Documented by: 


Metoprolol Succinate (Metoprolol Succinate (Er) 25 Mg Tab.Er.24h)  25 mg PO 

DAILY [facility]


   Last Admin: 12/09/21 09:53 Dose:  25 mg


   Documented by: 


Non-Formulary Medication (Levalbuterol Hfa Inhaler)  2 puff INHALATION RT-Q6H 

PRN


   PRN Reason: Shortness Of Breath


Non-Formulary Medication (Levalbuterol Nebulized)  1.25 mg INHALATION RT-Q6H PRN


   PRN Reason: Shortness Of Breath


Pantoprazole Sodium (Pantoprazole 40 Mg Tablet)  40 mg PO DAILY [facility]


   Last Admin: 12/09/21 09:53 Dose:  40 mg


   Documented by: 


Sertraline HCl (Sertraline 100 Mg Tab)  100 mg PO DAILY [facility]


   Last Admin: 12/09/21 09:53 Dose:  100 mg


   Documented by: 


Tiotropium Bromide (Tiotropium 2.5 Mcg Inhaler)  2 puff INHALATION RT-DAILY [facility]


   Last Admin: 12/09/21 09:30 Dose:  2 puff


   Documented by: 


Zinc Sulfate (Zinc Sulfate 220 Mg Cap)  220 mg PO DAILY [facility]


   Last Admin: 12/09/21 09:53 Dose:  220 mg


   Documented by: 














PHYSICAL EXAMINATION: 





GENERAL: The patient is alert and oriented x3, not in any acute distress. Well 

developed, well nourished.  Currently on 15 L high flow


HEENT: Pupils are round and equally reacting to light. EOMI. No scleral icterus.

No conjunctival pallor. Normocephalic, atraumatic. No pharyngeal erythema. No 

thyromegaly. 


CARDIOVASCULAR: S1 and S2 present. No murmurs, rubs, or gallops. 


PULMONARY: Diminished breath sounds bilaterally with no wheezing or rhonchi 

noted


ABDOMEN: Soft, nontender, nondistended, normoactive bowel sounds. No palpable 

organomegaly. 


MUSCULOSKELETAL: No joint swelling or deformity.


EXTREMITIES: No cyanosis, clubbing, or pedal edema. 


NEUROLOGICAL: Gross neurological examination did not reveal any focal deficits. 


SKIN: No rashes. 





Assessment:





-acute hypoxic respiratory failure: Possibly secondary to COVID-19 pneumonia but

patient does have a pulmonary fibrosis and does have COPD and chronic 

respiratory failure.  Patient will continued on Decadron patient may need higher

amounts of oxygen upon discharge on 5 L upon discharge.  


-chronic hypercapnic respiratory failure secondary to COPD uses 3 L of oxygen at

home


-Diarrhea most likely secondary to Covid


-Hypernatremia, sodium is 147 and maintained on normal saline which is 

discontinued and will start patient on D5 in water and repeat labs


-Severe hypomagnesemia, improved


-Elevated d-dimer secondary to Covid and patient is on Lovenox which will be 

continued, Lovenox is currently twice daily


-Acute renal failure, prerenal, improved with IV fluids


-Hypertension


-Mild anion gap and metabolic acidosis probability of lactic acidosis, most 

likely secondary to increased amounts of diarrhea and dehydration, lactic acid 

is 1.1


-gastroesophageal reflux disease


-Hyperlipidemia


-Hypertension


-Sleep apnea uses CPAP machine at home


-Peripheral vascular disease


-Coronary artery disease


-DVT prophylaxis: On Lovenox which will be continued


-full code








Plan:





Recommend to continue with current medications.  Patient was maintained on IV 

hydration and has been discontinued as sodium is 147 today and being started on 

D5 in water and will repeat labs.  Encouraged oral intake especially protein and

added Magic cups as well as patient states she has not been eating at all and 

has no appetite.  Patient continues with diarrhea although states has improved 

since starting Questran and Imodium.  C. diff testing was negative.  Patient 

requiring 15 L of oxygen currently although states she feels this is too much 

and it is feeling like it is making her more short of breath.  Patient was 

maintained on 10 L this morning although oxygen saturations dipped down to 87%. 

Discussed with nursing staff about weaning FiO2 as tolerated.  Will add 

incentive spirometer and encourage the patient to use at least 10 times every 

hour while awake.  D-dimer was also elevated and have increased Lovenox to twice

daily.  Pulmonary is following and recommended to continue with vitamin and zinc

supplements along with IV dexamethasone, Lovenox, and patient is also continued 

on IV ceftriaxone.  Cultures remain negative.  Patient is afebrile.  Chest x-ray

today shows stable patchy bilateral areas of infiltrate with underlying COPD and

superimposed chronic interstitial pulmonary lung disease.  Will repeat a.m. labs

and continue to monitor closely.  Further recommendations to follow based on the

clinical course of the patient.  Prognosis is guarded.








Objective





- Vital Signs


Vital signs: 


                                   Vital Signs











Temp  97.7 F   12/09/21 02:00


 


Pulse  55 L  12/09/21 06:00


 


Resp  19   12/09/21 02:00


 


BP  154/61   12/09/21 06:00


 


Pulse Ox  89 L  12/09/21 06:01








                                 Intake & Output











 12/08/21 12/09/21 12/09/21





 18:59 06:59 18:59


 


Weight 90.718 kg  


 


Other:   


 


  # Voids 3 4 


 


  # Bowel Movements 2 4 














- Labs


CBC & Chem 7: 


                                 12/09/21 06:39





                                 12/09/21 06:39


Labs: 


                  Abnormal Lab Results - Last 24 Hours (Table)











  12/08/21 12/08/21 12/09/21 Range/Units





  06:50 06:50 06:39 


 


D-Dimer    4.01 H  (<0.60)  mg/L FEU


 


Magnesium  2.9 H    (1.5-2.4)  mg/dL


 


Lactate Dehydrogenase  338 H    (120-246)  U/L


 


C-Reactive Protein  7.00 H    (0.00-0.80)  mg/dL


 


Procalcitonin   0.13 H   (0.02-0.09)  ng/mL








                      Microbiology - Last 24 Hours (Table)











 12/06/21 17:39 Blood Culture - Preliminary





 Blood    No Growth after 48 hours


 


 12/06/21 17:39 Blood Culture - Preliminary





 Blood    No Growth after 48 hours

## 2021-12-09 NOTE — P.PN
Subjective


Progress Note Date: 12/09/21


Principal diagnosis: 


 Dyspnea, hypoxia, COVID-19





This is a pleasant 66-year-old female patient who follows at the [facility] for her

primary care needs.  She has history of hypertension, hyperlipidemia, 

gastroesophageal reflux disease, anxiety/depression, aortic thrombectomy in 2007

, former smoker, COPD, obstructive sleep apnea utilizing CPAP.  He is here 

yesterday to the emergency room with a four-week history of shortness of breath,

cough, congestion, body aches and fevers.  She was tested negative for CoVID 2.

 Her  is positive for CoVID and she is now positive for CoVID.  She is 

not vaccinated.  She does have home oxygen use at 2-3 L.  She is currently 

requiring 10 L high flow nasal cannula to maintain O2 saturations at 90%.  Chest

x-ray reveals bilateral patchy infiltrates. White count 5.9.  Hemoglobin 12.5.  

Platelets 398.  Lymphocytes 0.5.  D-dimer 1.89.  Sodium 140.  Potassium 4.9.  

Creatinine 1.2.  AST 39.  ALT 26.  Alk phos 156.  LDH 1283.  C-reactive protein 

21.7.  Pro-calcitonin 0.32.  She's been initiated on Decadron, Lovenox, vitamin 

supplements.  0.9% normal saline at 75 ML's per hour.





On 12/08/2021 patient is in follow-up on medical surgical floor, she is resting 

comfortably in bed, she states she is feeling better, although she is requiring 

more oxygen compared to when she first presented to the emergency department, 

note that patient does wear home oxygen at 2 L for history of COPD.  She is 

currently on 10 L of oxygen, her pulse ox is 91-92%, breathing comfortably, lung

sounds reveal coarse crackles at bilateral bases, but no wheezing, no rhonchi.  

CT angiogram of the chest showed no evidence of pulmonary embolism, it did show 

pulmonary emphysema and pulmonary interstitial fibrosis with increased 

interstitial infiltrates compared to her old exam.  There was no evidence of a 

suspicious pulmonary mass.  Patient was outside the window for Remdesivir, she 

continues on Decadron 6 mg twice daily, her pro-calcitonin level on admission 

was 0.32, and patient was covered with Rocephin for possibility of underlying 

bacterial infection.  She is on prophylactic Lovenox 40 mg daily.  [facility] of the chest showed no evidence of pulmonary embolism. 0





On 12/09/2001 patient seen in follow-up on medical surgical floor, she is c

urrently on 10 L of oxygen the pulse ox of 86%, FiO2 has since been increased to

15 L, she has a mild cough, but overall seems to be in no acute distress, she is

sitting up in the chair, denies any chest discomfort, looks quite comfortable.  

Lung sounds reveal coarse crackles bilateral bases, she does get short of breath

with exertion.  She states if she sits still she is breathing comfortably, and 

her O2 saturations are at or above 90%.  She's had no fever or chills overnight,

she remains on Decadron 40 mg twice daily.  She is on empiric antibiotics in the

form of Rocephin and IV fluids at 75 ML per hour.  Today's labs have been 

reviewed, white blood cell count is 8.6, hemoglobin is 12, platelet count is 

506, d-dimer is 4.01, sodium is 147, potassium is 4.8, chloride is 112, BUN of 

20 creatinine 0.7, renal profile has significantly improved since admission.  

LDH has significantly improved since admission and is down to 348 on today's 

labs, CRP is still pending, her last pro-calcitonin is negative at 0.13.  Blood 

culture has shown no growth thus far.








Objective





- Vital Signs


Vital signs: 


                                   Vital Signs











Temp  97.7 F   12/09/21 02:00


 


Pulse  55 L  12/09/21 06:00


 


Resp  19   12/09/21 02:00


 


BP  154/61   12/09/21 06:00


 


Pulse Ox  89 L  12/09/21 06:01








                                 Intake & Output











 12/08/21 12/09/21 12/09/21





 18:59 06:59 18:59


 


Weight 90.718 kg  


 


Other:   


 


  Voiding Method   Bedside Commode


 


  # Voids 3 4 


 


  # Bowel Movements 2 4 














- Exam


 GENERAL EXAM: Alert, very pleasant, 66-year-old white female, on 10 L of 

oxygen, with a pulse ox of 91-92% comfortable in no apparent distress.


HEAD: Normocephalic/atraumatic.


EYES: Normal reaction of pupils, equal size.  Conjunctiva pink, sclera white.


NOSE: Clear with pink turbinates.


THROAT: No erythema or exudates.


NECK: No masses, no JVD, no thyroid enlargement, no adenopathy.


CHEST: No chest wall deformity.  Symmetrical expansion. 


LUNGS: Equal air entry with with coarse bilateral crackles


CVS: Regular rate and rhythm, normal S1 and S2, no gallops, no murmurs, no rubs


ABDOMEN: Soft, nontender.  No hepatosplenomegaly, normal bowel sounds, no 

guarding or rigidity.


EXTREMITIES: No clubbing, no edema, no cyanosis, 2+ pulses and upper and lower 

extremities.


MUSCULOSKELETAL: Muscle strength and tone normal.


SPINE: No scoliosis or deformity


SKIN: No rashes


CENTRAL NERVOUS SYSTEM: Alert and oriented -3.  No focal deficits, tone is 

normal in all 4 extremities.


PSYCHIATRIC: Alert and oriented -3.  Appropriate affect.  Intact judgment and 

insight.











- Labs


CBC & Chem 7: 


                                 12/09/21 06:39





                                 12/09/21 06:39


Labs: 


                  Abnormal Lab Results - Last 24 Hours (Table)











  12/08/21 12/09/21 12/09/21 Range/Units





  06:50 06:39 06:39 


 


MCH   26.1 L   (27.0-32.0)  pg


 


MCHC   30.2 L   (32.0-37.0)  g/dL


 


RDW   15.9 H   (11.5-14.5)  %


 


Plt Count   506 H   (140-440)  X 10*3/uL


 


Immature Gran #   0.26 H   (0.00-0.04)  X 10*3/uL


 


Lymphocytes #   0.87 L   (0.90-5.00)  X 10*3/uL


 


Eosinophils #   0 L   (0.04-0.35)  X 10*3/uL


 


D-Dimer    4.01 H  (<0.60)  mg/L FEU


 


Sodium     (135-145)  mmol/L


 


Chloride     ()  mmol/L


 


BUN/Creatinine Ratio     (12.00-20.00)  Ratio


 


Alkaline Phosphatase     ()  U/L


 


Lactate Dehydrogenase     (120-246)  U/L


 


Total Protein     (6.2-8.2)  g/dL


 


Albumin     (3.8-4.9)  g/dL


 


Procalcitonin  0.13 H    (0.02-0.09)  ng/mL














  12/09/21 Range/Units





  06:39 


 


MCH   (27.0-32.0)  pg


 


MCHC   (32.0-37.0)  g/dL


 


RDW   (11.5-14.5)  %


 


Plt Count   (140-440)  X 10*3/uL


 


Immature Gran #   (0.00-0.04)  X 10*3/uL


 


Lymphocytes #   (0.90-5.00)  X 10*3/uL


 


Eosinophils #   (0.04-0.35)  X 10*3/uL


 


D-Dimer   (<0.60)  mg/L FEU


 


Sodium  147 H  (135-145)  mmol/L


 


Chloride  112 H  ()  mmol/L


 


BUN/Creatinine Ratio  30.07 H  (12.00-20.00)  Ratio


 


Alkaline Phosphatase  172 H  ()  U/L


 


Lactate Dehydrogenase  348 H  (120-246)  U/L


 


Total Protein  5.7 L  (6.2-8.2)  g/dL


 


Albumin  3.6 L  (3.8-4.9)  g/dL


 


Procalcitonin   (0.02-0.09)  ng/mL








                      Microbiology - Last 24 Hours (Table)











 12/06/21 17:39 Blood Culture - Preliminary





 Blood    No Growth after 48 hours


 


 12/06/21 17:39 Blood Culture - Preliminary





 Blood    No Growth after 48 hours














Assessment and Plan


Plan: 


 Assessment:





#1.  Acute on chronic hypoxic respiratory failure secondary to acute COVID-19 

pneumonia, patient is not vaccinated related to multiple ALLERGIES, patient pre

sented with a 4 week history of symptoms, she was outside the window for 

Remdesivir, she is being treated supportively with Decadron, prophylactic 

Lovenox, and empiric antibiotics





#2.  Mildly elevated pro calcitonin, patient is on empiric antibiotics in the 

form of Rocephin





#3.  Elevated inflammatory markers secondary to the viral pneumonia





#4.  Morbid obesity with BMI of 39.1 kg/m





#5.  Obstructive sleep apnea on CPAP therapy





#6.  History of COPD with chronic hypoxic respiratory failure usually wears 2 L 

of oxygen on the outpatient basis, and CT angiogram of the chest also revealed 

evidence of interstitial pulmonary fibrosis





#7.  Hyperlipidemia





#8.  Anxiety/depression





#9.  History of coronary artery disease with previous stent placement





#10.  History of aortic thrombectomy/aortobifem bypass in 2007





#11.  Former smoker





Plan:





Continue current medical treatment


Continue Decadron,


Continue Lovenox


No evidence of DVT on the lower extremity Dopplers, no PE on CT angiogram of the

chest


Stop IV 0.9 fluids, encourage oral water intake


Follow-up labs tomorrow


Clinically relatively stable, 


We'll continue to follow her clinical course





I performed a history & physical examination of the patient and discussed their 

management with my nurse practitioner, Jeannine Belcher.  I reviewed the nurse 

practitioner's note and agree with the documented findings and plan of care.  

Lung sounds are positive for diffuse crackles throughout the lung fields.  The 

findings and the impression was discussed with the patient.  I attest to the 

documentation by the nurse practitioner. 











Time with Patient: Less than 30

## 2021-12-10 LAB
ALBUMIN SERPL-MCNC: 3.5 G/DL (ref 3.8–4.9)
ALBUMIN/GLOB SERPL: 1.59 G/DL (ref 1.6–3.17)
ALP SERPL-CCNC: 149 U/L (ref 41–126)
ALT SERPL-CCNC: 17 U/L (ref 8–44)
ANION GAP SERPL CALC-SCNC: 13.3 MMOL/L (ref 10–18)
AST SERPL-CCNC: 18 U/L (ref 13–35)
BASOPHILS # BLD AUTO: 0.02 X 10*3/UL (ref 0–0.1)
BASOPHILS NFR BLD AUTO: 0.2 %
BUN SERPL-SCNC: 10.5 MG/DL (ref 9–27)
BUN/CREAT SERPL: 14.91 RATIO (ref 12–20)
CALCIUM SPEC-MCNC: 8.7 MG/DL (ref 8.7–10.3)
CHLORIDE SERPL-SCNC: 105 MMOL/L (ref 96–109)
CO2 SERPL-SCNC: 21.1 MMOL/L (ref 20–27.5)
EOSINOPHIL # BLD AUTO: 0 X 10*3/UL (ref 0.04–0.35)
EOSINOPHIL NFR BLD AUTO: 0 %
ERYTHROCYTE [DISTWIDTH] IN BLOOD BY AUTOMATED COUNT: 4.46 X 10*6/UL (ref 4.1–5.2)
ERYTHROCYTE [DISTWIDTH] IN BLOOD: 15.8 % (ref 11.5–14.5)
GLOBULIN SER CALC-MCNC: 2.2 G/DL (ref 1.6–3.3)
GLUCOSE SERPL-MCNC: 159 MG/DL (ref 70–110)
HCT VFR BLD AUTO: 38.5 % (ref 37.2–46.3)
HGB BLD-MCNC: 11.9 G/DL (ref 12–15)
LYMPHOCYTES # SPEC AUTO: 0.88 X 10*3/UL (ref 0.9–5)
LYMPHOCYTES NFR SPEC AUTO: 7.7 %
MCH RBC QN AUTO: 26.7 PG (ref 27–32)
MCHC RBC AUTO-ENTMCNC: 30.9 G/DL (ref 32–37)
MCV RBC AUTO: 86.3 FL (ref 80–97)
MONOCYTES # BLD AUTO: 0.63 X 10*3/UL (ref 0.2–1)
MONOCYTES NFR BLD AUTO: 5.5 %
NEUTROPHILS # BLD AUTO: 9.8 X 10*3/UL (ref 1.8–7.7)
NEUTROPHILS NFR BLD AUTO: 85.4 %
PLATELET # BLD AUTO: 466 X 10*3/UL (ref 140–440)
POTASSIUM SERPL-SCNC: 4.5 MMOL/L (ref 3.5–5.5)
PROT SERPL-MCNC: 5.7 G/DL (ref 6.2–8.2)
SODIUM SERPL-SCNC: 140 MMOL/L (ref 135–145)
WBC # BLD AUTO: 11.47 X 10*3/UL (ref 4.5–10)

## 2021-12-10 PROCEDURE — 5A09457 ASSISTANCE WITH RESPIRATORY VENTILATION, 24-96 CONSECUTIVE HOURS, CONTINUOUS POSITIVE AIRWAY PRESSURE: ICD-10-PCS

## 2021-12-10 PROCEDURE — XW0DXM6 INTRODUCTION OF BARICITINIB INTO MOUTH AND PHARYNX, EXTERNAL APPROACH, NEW TECHNOLOGY GROUP 6: ICD-10-PCS

## 2021-12-10 RX ADMIN — OXYCODONE HYDROCHLORIDE AND ACETAMINOPHEN SCH MG: 500 TABLET ORAL at 09:59

## 2021-12-10 RX ADMIN — Medication SCH MCG: at 09:59

## 2021-12-10 RX ADMIN — CHOLESTYRAMINE SCH: 4 POWDER, FOR SUSPENSION ORAL at 09:50

## 2021-12-10 RX ADMIN — DEXTROSE SCH MG: 50 INJECTION, SOLUTION INTRAVENOUS at 10:38

## 2021-12-10 RX ADMIN — CLOPIDOGREL BISULFATE SCH MG: 75 TABLET ORAL at 09:59

## 2021-12-10 RX ADMIN — PANTOPRAZOLE SODIUM SCH MG: 40 TABLET, DELAYED RELEASE ORAL at 09:59

## 2021-12-10 RX ADMIN — CEFAZOLIN SCH: 330 INJECTION, POWDER, FOR SOLUTION INTRAMUSCULAR; INTRAVENOUS at 09:50

## 2021-12-10 RX ADMIN — POTASSIUM CHLORIDE SCH MLS/HR: 14.9 INJECTION, SOLUTION INTRAVENOUS at 04:40

## 2021-12-10 RX ADMIN — ENOXAPARIN SODIUM SCH MG: 40 INJECTION SUBCUTANEOUS at 10:38

## 2021-12-10 RX ADMIN — BARICITINIB SCH MG: 2 TABLET, FILM COATED ORAL at 14:13

## 2021-12-10 RX ADMIN — POTASSIUM CHLORIDE SCH MLS/HR: 14.9 INJECTION, SOLUTION INTRAVENOUS at 19:23

## 2021-12-10 RX ADMIN — ATORVASTATIN CALCIUM SCH: 80 TABLET, FILM COATED ORAL at 10:37

## 2021-12-10 RX ADMIN — Medication SCH MG: at 09:59

## 2021-12-10 RX ADMIN — OXYCODONE HYDROCHLORIDE AND ACETAMINOPHEN SCH MG: 500 TABLET ORAL at 19:23

## 2021-12-10 RX ADMIN — TIOTROPIUM BROMIDE INHALATION SPRAY SCH: 3.12 SPRAY, METERED RESPIRATORY (INHALATION) at 09:23

## 2021-12-10 RX ADMIN — METOPROLOL SUCCINATE SCH MG: 25 TABLET, EXTENDED RELEASE ORAL at 09:59

## 2021-12-10 RX ADMIN — VALSARTAN SCH MG: 80 TABLET ORAL at 09:59

## 2021-12-10 RX ADMIN — SERTRALINE HYDROCHLORIDE SCH MG: 100 TABLET ORAL at 09:59

## 2021-12-10 RX ADMIN — ENOXAPARIN SODIUM SCH MG: 40 INJECTION SUBCUTANEOUS at 19:22

## 2021-12-10 RX ADMIN — POTASSIUM CHLORIDE SCH MLS/HR: 14.9 INJECTION, SOLUTION INTRAVENOUS at 09:59

## 2021-12-10 RX ADMIN — LORATADINE SCH MG: 10 TABLET ORAL at 09:59

## 2021-12-10 RX ADMIN — DEXTROSE SCH MG: 50 INJECTION, SOLUTION INTRAVENOUS at 19:22

## 2021-12-10 NOTE — P.PN
Subjective


Progress Note Date: 12/10/21











Patient is a 66-year-old the female with past medical history of COPD and 3 L 

oxygen dependent at home has cold symptoms for about a month comes in with 

shortness of breath presently on telemetry dysfunction CT of the chest did not 

show any pulmonary embolism but did show pulmonary fibrosis as well as i

nfiltrates.  Below.  Patient doesn't have any infiltrate consistent with 

pneumonia patient denied any dysuria.  Patient is presently on Rocephin.  

Patient has mild acute renal failure because of which I'm holding ACE inhibitor.

 Severely hypomagnesemic magnesium is being replaced.  Patient last smoked about

17 years ago.





12/08/2021





Patient is seen and evaluated in follow-up and currently maintained on 10 L high

flow and weaning as tolerated.  Patient was on 15 L high flow nasal cannula this

morning and tolerating and nursing staff continuing to wean.  Patient normally 

wears 3 L of oxygen continuously in the outpatient setting.  Patient denies any 

worsening shortness of breath just generalized fatigue and weakness and 

continued diarrhea.  Patient states she is going approximately 2-3 times per day

will add C. diff testing and if negative will add Imodium and Questran.  

Pulmonary is following and venous Doppler was ordered bilateral lower 

extremities which is negative for DVT as d-dimer is elevated today at 2.42.  

Inflammatory markers significantly improved his LDH is 338 and CRP is 7.0, 

repeat magnesium after replacement is 2.9. 





12/09/2021





Patient is seen and evaluated in follow-up this morning currently sitting up in 

the chair and was on 10 L high flow although oxygen saturations were below 90% 

and patient bumped back up to 15 L.  Patient is complaining of the oxygen being 

too high and making her feel more anxious and discussed with nursing staff about

continuing to wean as tolerated.  She continues with diarrhea although is 

improving and C. diff testing was negative and patient was started on Questran 

along with Imodium.  Patient continues to state she has no real appetite 

although encourage the patient to continue with small frequent meals and will 

add Magic cups as well.  Encourage the patient to increase oral intake as she is

weak and needs the energy.  She normally maintained on 3 L via nasal cannula and

will continue to titrate as tolerated.  Pulmonary is following patient is 

maintained on IV dexamethasone twice daily along with vitamin and zinc supplem

ents, Lovenox twice daily for elevated d-dimer and will continue.  Patient was 

on normal saline and considered discontinuing as patient sodium is now 147 and 

patient is being started on D5 in water and will repeat labs.





12/10/2021


Patient is seen in follow-up this morning currently sitting up in the chair and 

oxygen requirements have increased and patient is now maintained on Airvo along 

with 15 L nonrebreather and oxygen saturations have been in the low 80s to 90 

maximum.  Discussed CODE STATUS with the patient and patient is a full code but 

does not wish to be placed on a ventilator if respiratory status continues to 

decline.  Pulmonary following closely and prognosis remains quite guarded.  

Patient continues on Lovenox along with dexamethasone and vitamin and zinc 

supplements and is being started on Baricitinib.  Patient is extremely weak and 

becomes extremely dyspneic with minimal exertion.  Patient's oral intake 

continues to be extremely poor and again encouraged and stressed the importance 

of eating regardless of not feeling hungry and having no appetite.  May consider

enteral nutrition and dietary consult.  Patient states her diarrhea has 

significantly improved and will use Imodium as needed and discontinue Questran. 

Repeat inflammatory markers ordered and pending.  Chest x-ray today shows 

bilateral airspace disease again noted with prominence and interstitial, and fin

dings consistent with pneumonia.





Labs:





WBC is 11.47, hemoglobin is 11.9, platelets are 466, sodium improved at 140, 

potassium is 4.5, BUN is 10.5, creatinine is 0.7, alk phos is 149





Review of systems:


Constitutional: reports of fatigue, no reports of fever, or chills


Cardiovascular: No reports of chest pain or palpitations


Respiratory: No reports of worsening shortness of breath, reports dyspnea with 

exertion


GI: No reports of nausea, vomiting, reports continued loose stools although 

improving


: No reports of dysuria or retention


Neurovascular: rePorts generalized weakness





All medications have been reviewed





Active Medications





Acetaminophen (Acetaminophen Tab 325 Mg Tab)  650 mg PO Q4HR PRN


   PRN Reason: Fever>101


   Last Admin: 12/07/21 15:43 Dose:  650 mg


   Documented by: 


Ascorbic Acid (Ascorbic Acid 500 Mg Tab)  500 mg PO BID [facility]


   Last Admin: 12/10/21 09:59 Dose:  500 mg


   Documented by: 


Atorvastatin Calcium (Atorvastatin 80 Mg Tab)  80 mg PO DAILY [facility]


   Last Admin: 12/10/21 10:37 Dose:  Not Given


   Documented by: 


Baricitinib (Baricitinib 2 Mg Tablet)  4 mg PO DAILY [facility]


   Stop: 12/23/21 09:01


Bupropion HCl (Bupropion 100 Mg Tab)  100 mg PO DAILY [facility]


   Last Admin: 12/10/21 09:59 Dose:  100 mg


   Documented by: 


Cholecalciferol (Cholecalciferol 25 Mcg (1000 Iu) Tablet)  25 mcg PO DAILY [facility]


   Last Admin: 12/10/21 09:59 Dose:  25 mcg


   Documented by: 


Cholestyramine Resin (Cholestyramine (With Sugar) 4 Gm Packet)  4 gm PO 

BID@1000,1800 [facility]


   Last Admin: 12/10/21 09:50 Dose:  Not Given


   Documented by: 


Clopidogrel Bisulfate (Clopidogrel 75 Mg Tab)  75 mg PO DAILY [facility]


   Last Admin: 12/10/21 09:59 Dose:  75 mg


   Documented by: 


Dexamethasone Sodium Phosphate (Dexamethasone Sod Phosphate 10 Mg/Ml 1 Ml Vial) 

6 mg IVP BID [facility]


   Last Admin: 12/10/21 10:38 Dose:  6 mg


   Documented by: 


Enoxaparin Sodium (Enoxaparin 40 Mg/0.4 Ml Syringe)  40 mg SQ BID [facility]


   Last Admin: 12/10/21 10:38 Dose:  40 mg


   Documented by: 


Dextrose/Water (Dextrose 5%-Water Iv Soln)  1,000 mls @ 100 mls/hr IV .Q10H [facility]


   Last Admin: 12/10/21 09:59 Dose:  100 mls/hr


   Documented by: 


Loperamide HCl (Loperamide 2 Mg Cap)  2 mg PO QID PRN


   PRN Reason: Diarrhea


   Last Admin: 12/09/21 17:30 Dose:  2 mg


   Documented by: 


Loratadine (Loratadine 10 Mg Tab)  10 mg PO DAILY [facility]


   Last Admin: 12/10/21 09:59 Dose:  10 mg


   Documented by: 


Metoprolol Succinate (Metoprolol Succinate (Er) 25 Mg Tab.Er.24h)  25 mg PO 

DAILY [facility]


   Last Admin: 12/10/21 09:59 Dose:  25 mg


   Documented by: 


Non-Formulary Medication (Levalbuterol Hfa Inhaler)  2 puff INHALATION RT-Q6H 

PRN


   PRN Reason: Shortness Of Breath


Non-Formulary Medication (Levalbuterol Nebulized)  1.25 mg INHALATION RT-Q6H PRN


   PRN Reason: Shortness Of Breath


Pantoprazole Sodium (Pantoprazole 40 Mg Tablet)  40 mg PO DAILY [facility]


   Last Admin: 12/10/21 09:59 Dose:  40 mg


   Documented by: 


Sertraline HCl (Sertraline 100 Mg Tab)  100 mg PO DAILY [facility]


   Last Admin: 12/10/21 09:59 Dose:  100 mg


   Documented by: 


Tiotropium Bromide (Tiotropium 2.5 Mcg Inhaler)  2 puff INHALATION RT-DAILY [facility]


   Last Admin: 12/10/21 09:23 Dose:  Not Given


   Documented by: 


Valsartan (Valsartan 80 Mg Tab)  80 mg PO DAILY [facility]


   Last Admin: 12/10/21 09:59 Dose:  80 mg


   Documented by: 


Zinc Sulfate (Zinc Sulfate 220 Mg Cap)  220 mg PO DAILY [facility]


   Last Admin: 12/10/21 09:59 Dose:  220 mg


   Documented by: 














PHYSICAL EXAMINATION: 





GENERAL: The patient is alert and oriented x3, not in any acute distress. Well 

developed, well nourished.  Currently on 15 L nonrebreather along with Airvo 

with a flow rate of 60 and an FiO2 of 90%


HEENT: Pupils are round and equally reacting to light. EOMI. No scleral icterus.

No conjunctival pallor. Normocephalic, atraumatic. No pharyngeal erythema. No 

thyromegaly. 


CARDIOVASCULAR: S1 and S2 present. No murmurs, rubs, or gallops. 


PULMONARY: Diminished breath sounds bilaterally with no wheezing or rhonchi 

noted,  scattered crackles noted at the bases


ABDOMEN: Soft, nontender, nondistended, normoactive bowel sounds. No palpable 

organomegaly. 


MUSCULOSKELETAL: No joint swelling or deformity.


EXTREMITIES: No cyanosis, clubbing, or pedal edema. 


NEUROLOGICAL: Gross neurological examination did not reveal any focal deficits. 

diffusely weak


SKIN: No rashes. 





Assessment:





-acute hypoxic respiratory failure: secondary to COVID-19 pneumonia 


-chronic hypercapnic respiratory failure secondary to COPD uses 3 L of oxygen at

home


-Diarrhea most likely secondary to Covid, resolved


-Hypernatremia, improving, sodium is 140 a.m. we'll continue D5 in water and 

repeat a.m. labs 


-Severe hypomagnesemia, improved


-Elevated d-dimer secondary to Covid and patient is on Lovenox which will be 

continued, Lovenox is currently twice daily


-Acute renal failure, prerenal, improved with IV fluids


-Hypertension


-Mild anion gap and metabolic acidosis probability of lactic acidosis, most 

likely secondary to increased amounts of diarrhea and dehydration, lactic acid 

is 1.1


-gastroesophageal reflux disease


-Hyperlipidemia


-Hypertension


-History of pulmonary fibrosis


-Sleep apnea uses CPAP machine at home


-Peripheral vascular disease


-Coronary artery disease


-DVT prophylaxis: On Lovenox which will be continued


-full code








Plan:





Recommend to continue with current medications.  Patient was maintained on  D5 

in water sodium improved at 140 today and will continue for now and will repeat 

labs.  Encouraged oral intake especially protein and added Magic cups as well as

patient states she has not been eating at all and has no appetite.  Strongly 

encouraged oral intake again and may consider enteral-nutrition for the next 24 

hours if no improvement.  Patient states her diarrhea has improved and will 

discontinue Questran and continue with Imodium as needed.  C. diff testing was 

negative.  Patient requiring 15 L non-rebreather and placed on airvo.  Pulmonary

following closely.  Discussed with nursing staff about weaning FiO2 as tolerate

d.  D-dimer was also elevated and have increased Lovenox to twice daily.  Repeat

labs pending.  Pulmonary is following and recommended to continue with vitamin 

and zinc supplements along with IV dexamethasone, Lovenox, and patient was also 

on IV ceftriaxone with no obvious signs of infection and ceftriaxone being 

discontinued.   Cultures remain negative.  Patient is afebrile.  Chest x-ray 

today showsfindings consistent with pneumonia and continued bilateral airspace 

disease with prominence in the interstitium.  Discussed CODE STATUS with the 

patient and patient would like to remain full code without mechanical 

ventilation.   Will repeat a.m. labs and continue to monitor closely.  Further 

recommendations to follow based on the clinical course of the patient.  

Prognosis is  quite guarded.








Objective





- Vital Signs


Vital signs: 


                                   Vital Signs











Temp  98.7 F   12/10/21 06:00


 


Pulse  80   12/10/21 06:00


 


Resp  21   12/10/21 01:21


 


BP  153/72   12/10/21 06:00


 


Pulse Ox  93 L  12/10/21 06:00








                                 Intake & Output











 12/09/21 12/10/21 12/10/21





 18:59 06:59 18:59


 


Intake Total  350 


 


Balance  350 


 


Intake:   


 


  Oral  350 


 


Other:   


 


  Voiding Method Bedside Commode  


 


  # Voids 3 3 


 


  # Bowel Movements 2  














- Labs


CBC & Chem 7: 


                                 12/10/21 07:06





                                 12/09/21 06:39


Labs: 


                  Abnormal Lab Results - Last 24 Hours (Table)











  12/09/21 12/09/21 Range/Units





  06:39 06:39 


 


MCH  26.1 L   (27.0-32.0)  pg


 


MCHC  30.2 L   (32.0-37.0)  g/dL


 


RDW  15.9 H   (11.5-14.5)  %


 


Plt Count  506 H   (140-440)  X 10*3/uL


 


Immature Gran #  0.26 H   (0.00-0.04)  X 10*3/uL


 


Lymphocytes #  0.87 L   (0.90-5.00)  X 10*3/uL


 


Eosinophils #  0 L   (0.04-0.35)  X 10*3/uL


 


Sodium   147 H  (135-145)  mmol/L


 


Chloride   112 H  ()  mmol/L


 


BUN/Creatinine Ratio   30.07 H  (12.00-20.00)  Ratio


 


Alkaline Phosphatase   172 H  ()  U/L


 


Lactate Dehydrogenase   348 H  (120-246)  U/L


 


Total Protein   5.7 L  (6.2-8.2)  g/dL


 


Albumin   3.6 L  (3.8-4.9)  g/dL








                      Microbiology - Last 24 Hours (Table)











 12/06/21 17:39 Blood Culture - Preliminary





 Blood    No Growth after 72 hours


 


 12/06/21 17:39 Blood Culture - Preliminary





 Blood    No Growth after 72 hours

## 2021-12-10 NOTE — P.PN
Subjective


Progress Note Date: 12/10/21


Principal diagnosis: 


 Dyspnea, hypoxia, COVID-19





This is a pleasant 66-year-old female patient who follows at the [facility] for her

primary care needs.  She has history of hypertension, hyperlipidemia, 

gastroesophageal reflux disease, anxiety/depression, aortic thrombectomy in 2007

, former smoker, COPD, obstructive sleep apnea utilizing CPAP.  He is here 

yesterday to the emergency room with a four-week history of shortness of breath,

cough, congestion, body aches and fevers.  She was tested negative for CoVID 2.

 Her  is positive for CoVID and she is now positive for CoVID.  She is 

not vaccinated.  She does have home oxygen use at 2-3 L.  She is currently 

requiring 10 L high flow nasal cannula to maintain O2 saturations at 90%.  Chest

x-ray reveals bilateral patchy infiltrates. White count 5.9.  Hemoglobin 12.5.  

Platelets 398.  Lymphocytes 0.5.  D-dimer 1.89.  Sodium 140.  Potassium 4.9.  

Creatinine 1.2.  AST 39.  ALT 26.  Alk phos 156.  LDH 1283.  C-reactive protein 

21.7.  Pro-calcitonin 0.32.  She's been initiated on Decadron, Lovenox, vitamin 

supplements.  0.9% normal saline at 75 ML's per hour.





On 12/08/2021 patient is in follow-up on medical surgical floor, she is resting 

comfortably in bed, she states she is feeling better, although she is requiring 

more oxygen compared to when she first presented to the emergency department, 

note that patient does wear home oxygen at 2 L for history of COPD.  She is 

currently on 10 L of oxygen, her pulse ox is 91-92%, breathing comfortably, lung

sounds reveal coarse crackles at bilateral bases, but no wheezing, no rhonchi.  

CT angiogram of the chest showed no evidence of pulmonary embolism, it did show 

pulmonary emphysema and pulmonary interstitial fibrosis with increased 

interstitial infiltrates compared to her old exam.  There was no evidence of a 

suspicious pulmonary mass.  Patient was outside the window for Remdesivir, she 

continues on Decadron 6 mg twice daily, her pro-calcitonin level on admission 

was 0.32, and patient was covered with Rocephin for possibility of underlying 

bacterial infection.  She is on prophylactic Lovenox 40 mg daily.  [facility] of the chest showed no evidence of pulmonary embolism. 0





On 12/09/2001 patient seen in follow-up on medical surgical floor, she is c

urrently on 10 L of oxygen the pulse ox of 86%, FiO2 has since been increased to

15 L, she has a mild cough, but overall seems to be in no acute distress, she is

sitting up in the chair, denies any chest discomfort, looks quite comfortable.  

Lung sounds reveal coarse crackles bilateral bases, she does get short of breath

with exertion.  She states if she sits still she is breathing comfortably, and 

her O2 saturations are at or above 90%.  She's had no fever or chills overnight,

she remains on Decadron 40 mg twice daily.  She is on empiric antibiotics in the

form of Rocephin and IV fluids at 75 ML per hour.  Today's labs have been 

reviewed, white blood cell count is 8.6, hemoglobin is 12, platelet count is 

506, d-dimer is 4.01, sodium is 147, potassium is 4.8, chloride is 112, BUN of 

20 creatinine 0.7, renal profile has significantly improved since admission.  

LDH has significantly improved since admission and is down to 348 on today's 

labs, CRP is still pending, her last pro-calcitonin is negative at 0.13.  Blood 

culture has shown no growth thus far.





On 12/10/2021 patient seen in follow-up on medical surgical floor, she had been 

on 15 L high flow and 100% nonrebreather mask up until this morning, this 

morning patient's FiO2 requirements had increased, patient was getting up in the

chair, she is significantly desaturated, she was placed on irritable at 60 L and

FiO2 of 90% in addition to 100% nonrebreather, and patient is taking quite a 

while to recover her O2 saturations although her work of breathing is not 

increased.  She is still awake and alert, she is oriented 3, she is able to 

make her needs known, her pulse ox is slowly recovering at rest, with deep 

breathing, pursed lip breathing, repositioning.  Overnight she has had no fever 

or chills, blood pressures have been stable.  Breathing fairly comfortably 

although she desaturates with any little exertion.  Currently she is on Decadron

6 Twice daily, she will be started on Baricitinib, patient is also on Plavix, 

and prophylactic Lovenox.  Today's chest x-ray and labs are still pending, she 

remains on D5W at a rate of 100 ML per hour for dehydration related to diarrhea 

and hypernatremia, repeat electrolytes and renal profile are still pending for 

today.  No abdominal pain, no nausea.  The diarrhea has significantly improved 

in the last 48 hours.  Patient is tolerating oral intake, no abdominal pain. 








Objective





- Vital Signs


Vital signs: 


                                   Vital Signs











Temp  98.6 F   12/10/21 10:00


 


Pulse  88   12/10/21 10:00


 


Resp  24   12/10/21 10:00


 


BP  169/78   12/10/21 10:00


 


Pulse Ox  90 L  12/10/21 10:00








                                 Intake & Output











 12/09/21 12/10/21 12/10/21





 18:59 06:59 18:59


 


Intake Total  350 


 


Balance  350 


 


Intake:   


 


  Oral  350 


 


Other:   


 


  Voiding Method Bedside Commode  


 


  # Voids 3 3 


 


  # Bowel Movements 2  














- Exam


 GENERAL EXAM: Alert, very pleasant, 66-year-old white female, on Airvo at 60 L 

and FiO2 of 90% in addition to nonrebreather mask, with a pulse ox of 84-86%, 

patient has just gotten up in the chair, she desats quite rapidly with any 

exertion, currently recovering.


HEAD: Normocephalic/atraumatic.


EYES: Normal reaction of pupils, equal size.  Conjunctiva pink, sclera white.


NOSE: Clear with pink turbinates.


THROAT: No erythema or exudates.


NECK: No masses, no JVD, no thyroid enlargement, no adenopathy.


CHEST: No chest wall deformity.  Symmetrical expansion. 


LUNGS: Equal air entry with with coarse bilateral crackles


CVS: Regular rate and rhythm, normal S1 and S2, no gallops, no murmurs, no rubs


ABDOMEN: Soft, nontender.  No hepatosplenomegaly, normal bowel sounds, no 

guarding or rigidity.


EXTREMITIES: No clubbing, no edema, no cyanosis, 2+ pulses and upper and lower 

extremities.


MUSCULOSKELETAL: Muscle strength and tone normal.


SPINE: No scoliosis or deformity


SKIN: No rashes


CENTRAL NERVOUS SYSTEM: Alert and oriented -3.  No focal deficits, tone is 

normal in all 4 extremities.


PSYCHIATRIC: Alert and oriented -3.  Appropriate affect.  Intact judgment and 

insight.











- Labs


CBC & Chem 7: 


                                 12/09/21 06:39





                                 12/09/21 06:39


Labs: 


                  Abnormal Lab Results - Last 24 Hours (Table)











  12/09/21 12/09/21 Range/Units





  06:39 06:39 


 


MCH  26.1 L   (27.0-32.0)  pg


 


MCHC  30.2 L   (32.0-37.0)  g/dL


 


RDW  15.9 H   (11.5-14.5)  %


 


Plt Count  506 H   (140-440)  X 10*3/uL


 


Immature Gran #  0.26 H   (0.00-0.04)  X 10*3/uL


 


Lymphocytes #  0.87 L   (0.90-5.00)  X 10*3/uL


 


Eosinophils #  0 L   (0.04-0.35)  X 10*3/uL


 


Sodium   147 H  (135-145)  mmol/L


 


Chloride   112 H  ()  mmol/L


 


BUN/Creatinine Ratio   30.07 H  (12.00-20.00)  Ratio


 


Alkaline Phosphatase   172 H  ()  U/L


 


Lactate Dehydrogenase   348 H  (120-246)  U/L


 


Total Protein   5.7 L  (6.2-8.2)  g/dL


 


Albumin   3.6 L  (3.8-4.9)  g/dL








                      Microbiology - Last 24 Hours (Table)











 12/06/21 17:39 Blood Culture - Preliminary





 Blood    No Growth after 72 hours


 


 12/06/21 17:39 Blood Culture - Preliminary





 Blood    No Growth after 72 hours














Assessment and Plan


Plan: 


 Assessment:





#1.  Acute on chronic hypoxic respiratory failure secondary to acute COVID-19 

pneumonia, patient is not vaccinated related to multiple ALLERGIES, patient 

presented with a 4 week history of symptoms, she was outside the window for 

Remdesivir, she is being treated supportively with Decadron, prophylactic 

Lovenox, and empiric antibiotics.  Patient was placed on irritable at 60 L and 

90% FiO2 with nonrebreather mask today on 12/10/2021, we'll start the patient on

Baricitinib





#2.  Mildly elevated pro calcitonin, patient is on empiric antibiotics in the 

form of Rocephin, pro calcitonin has improved, no definite sign of infection, 

Rocephin has been discontinued





#3.  Elevated inflammatory markers secondary to the viral pneumonia, improving





#4.  Morbid obesity with BMI of 39.1 kg/m





#5.  Obstructive sleep apnea on CPAP therapy





#6.  History of COPD with chronic hypoxic respiratory failure usually wears 2 L 

of oxygen on the outpatient basis, and CT angiogram of the chest also revealed 

evidence of interstitial pulmonary fibrosis





#7.  Hyperlipidemia





#8.  Anxiety/depression





#9.  History of coronary artery disease with previous stent placement





#10.  History of aortic thrombectomy/aortobifem bypass in 2007





#11.  Former smoker





Plan:





Patient's FiO2 requirements have significantly increased, and patient is 

currently on irritable at 60 L and 90% and nonrebreather mask


Will be started on Baricitinib


No clear evidence of infection, Rocephin has been discontinued, her pulse 

follow-up pro calcitonin was negative on yesterday's labs


May try BiPAP support if there is increased work of breathing and persistent 

hypoxia


Despite the low oxygen saturations patient is breathing fairly comfortably, does

not seem to be in any acute distress


We'll continue current dose Decadron 6 mg twice daily, Lovenox


Awaiting today's labs, d-dimer, inflammatory markers, CBC and CMP


Discussed CODE STATUS with the patient who does not wish to be intubated


She would like to continue with supportive treatment, she is a full code with 

instructions for no intubation


We called her  and notified him of his wife's wishes in regards to 

medical care going forward in case of further deterioration in her clinical 

condition


Patient's  is in agreement with his wife's wishes


We'll continue supportive medical treatment, continue closely follow her 

clinical course


Follow-up chest x-ray is pending


We'll continue prophylactic anticoagulation


Overall prognosis is extremely guarded





I performed a history & physical examination of the patient and discussed their 

management with my nurse practitioner, Jeannine Belcher.  I reviewed the nurse 

practitioner's note and agree with the documented findings and plan of care.  

Lung sounds are positive for diffuse crackles throughout the lung fields.  The 

findings and the impression was discussed with the patient.  I attest to the 

documentation by the nurse practitioner. 











Time with Patient: Less than 30

## 2021-12-10 NOTE — XR
EXAMINATION TYPE: XR chest 1V portable

 

DATE OF EXAM: 12/10/2021

 

COMPARISON: Chest x-ray 12/9/2021

 

HISTORY: Covid, pneumonia

 

TECHNIQUE: Single frontal view of the chest is obtained.

 

FINDINGS:  Bilateral airspace disease is again noted. There is prominence of interstitium. No evident
 pneumothorax or pleural effusion. There are overlying artifacts. Cardiac mediastinal silhouette is s
table. Thoracic stimulator lead is noted over the mid thoracic spine. There is multilevel spondylosis
.

 

IMPRESSION:  Findings consistent with pneumonia.

## 2021-12-10 NOTE — PN
PROGRESS NOTE



DATE OF SERVICE:

12/10/2021



REASON FOR FOLLOWUP:

COVID-19 pneumonia.



INTERVAL HISTORY:

Patient did have worsening of her respiratory status requiring BiPAP.  The patient is

hemodynamically stable.  Has been complaining of more shortness of breath and cough.

Not bringing up any sputum.  No abdominal pain or diarrhea.



PHYSICAL EXAMINATION:

Blood pressure 152/99 with a pulse of 74, temperature 98.  She is 90% on BiPAP. General

description is an elderly female up in the chair in no distress.  Respiratory system:

Unlabored breathing.  Coarse crackles bilaterally.  Heart S1, S2.  Regular rhythm.

Abdomen soft, no tenderness extremities:  No edema of the feet.



LABS:

Hemoglobin is 11.8, white count 11.17, creatinine 0.79.



IMPRESSION:

Patient with acute COVID-19 pneumonia in this patient who did have symptoms of

worsening of _____.  The patient had been started on baricitinib to continue along with

Dexamethasone, Lovenox, zinc, ascorbic acid _____ remains to be guarded.





MMODL / IJN: [id number] / Job#: [id number]

## 2021-12-10 NOTE — PN
PROGRESS NOTE



DATE OF SERVICE:

12/09/2021



REASON FOR FOLLOWUP:

COVID-19 pneumonia.



INTERVAL HISTORY:

The patient is afebrile.  The patient is complaining of more shortness of breath today

and is requiring high-flow oxygen.  Denies having any chest pain.  No worsening cough

or sputum production.  No abdominal pain, no diarrhea.



PHYSICAL EXAMINATION:

Blood pressure is 161/76, pulse of 73, temperature 96.5.  She is 91% on 15 L high-flow

oxygen. General description is an elderly female up in the bed in no distress.

Respiratory system: Unlabored breathing, coarse breath sounds bilaterally, no wheeze.

Heart S1, S2.  Regular rate and rhythm.  Abdomen soft, no tenderness.



LABS:

Hemoglobin is 12 with a white count of 8.62, creatinine 0.7.



DIAGNOSTIC IMPRESSION AND PLAN:

Patient with acute COVID-19 pneumonia, slight worsening of her clinical condition

requiring more oxygen.  Patient to continue with dexamethasone, Lovenox, zinc and

ascorbic acid.  Slightly worsening, may be candidate for baricitinib.  Continue

supportive care.





MMODL / IJN: [id number] / Job#: [id number]

## 2021-12-11 LAB — LDH SPEC-CCNC: 392 U/L (ref 120–246)

## 2021-12-11 RX ADMIN — Medication SCH MG: at 08:54

## 2021-12-11 RX ADMIN — TIOTROPIUM BROMIDE INHALATION SPRAY SCH PUFF: 3.12 SPRAY, METERED RESPIRATORY (INHALATION) at 07:39

## 2021-12-11 RX ADMIN — OXYCODONE HYDROCHLORIDE AND ACETAMINOPHEN SCH MG: 500 TABLET ORAL at 08:54

## 2021-12-11 RX ADMIN — Medication SCH MCG: at 08:55

## 2021-12-11 RX ADMIN — METOPROLOL SUCCINATE SCH MG: 25 TABLET, EXTENDED RELEASE ORAL at 08:55

## 2021-12-11 RX ADMIN — CLOPIDOGREL BISULFATE SCH MG: 75 TABLET ORAL at 08:55

## 2021-12-11 RX ADMIN — LORATADINE SCH MG: 10 TABLET ORAL at 08:55

## 2021-12-11 RX ADMIN — VALSARTAN SCH MG: 80 TABLET ORAL at 08:56

## 2021-12-11 RX ADMIN — SERTRALINE HYDROCHLORIDE SCH MG: 100 TABLET ORAL at 08:55

## 2021-12-11 RX ADMIN — ENOXAPARIN SODIUM SCH MG: 40 INJECTION SUBCUTANEOUS at 08:54

## 2021-12-11 RX ADMIN — BARICITINIB SCH MG: 2 TABLET, FILM COATED ORAL at 08:56

## 2021-12-11 RX ADMIN — ATORVASTATIN CALCIUM SCH MG: 80 TABLET, FILM COATED ORAL at 08:54

## 2021-12-11 RX ADMIN — POTASSIUM CHLORIDE SCH MLS/HR: 14.9 INJECTION, SOLUTION INTRAVENOUS at 13:56

## 2021-12-11 RX ADMIN — DEXTROSE SCH MG: 50 INJECTION, SOLUTION INTRAVENOUS at 08:55

## 2021-12-11 RX ADMIN — ENOXAPARIN SODIUM SCH MG: 40 INJECTION SUBCUTANEOUS at 21:24

## 2021-12-11 RX ADMIN — DEXTROSE SCH MG: 50 INJECTION, SOLUTION INTRAVENOUS at 21:24

## 2021-12-11 RX ADMIN — POTASSIUM CHLORIDE SCH MLS/HR: 14.9 INJECTION, SOLUTION INTRAVENOUS at 05:34

## 2021-12-11 RX ADMIN — PANTOPRAZOLE SODIUM SCH MG: 40 TABLET, DELAYED RELEASE ORAL at 08:55

## 2021-12-11 RX ADMIN — OXYCODONE HYDROCHLORIDE AND ACETAMINOPHEN SCH MG: 500 TABLET ORAL at 21:24

## 2021-12-11 NOTE — P.PN
Subjective





Patient is a 66-year-old the female with past medical history of COPD and 3 L 

oxygen dependent at home has cold symptoms for about a month comes in with 

shortness of breath presently on telemetry dysfunction CT of the chest did not 

show any pulmonary embolism but did show pulmonary fibrosis as well as 

infiltrates.  Below.  Patient doesn't have any infiltrate consistent with 

pneumonia patient denied any dysuria.  Patient is presently on Rocephin.  

Patient has mild acute renal failure because of which I'm holding ACE inhibitor.

 Severely hypomagnesemic magnesium is being replaced.  Patient last smoked about

17 years ago.





12/08/2021





Patient is seen and evaluated in follow-up and currently maintained on 10 L high

flow and weaning as tolerated.  Patient was on 15 L high flow nasal cannula this

morning and tolerating and nursing staff continuing to wean.  Patient normally 

wears 3 L of oxygen continuously in the outpatient setting.  Patient denies any 

worsening shortness of breath just generalized fatigue and weakness and 

continued diarrhea.  Patient states she is going approximately 2-3 times per day

will add C. diff testing and if negative will add Imodium and Questran.  

Pulmonary is following and venous Doppler was ordered bilateral lower 

extremities which is negative for DVT as d-dimer is elevated today at 2.42.  

Inflammatory markers significantly improved his LDH is 338 and CRP is 7.0, 

repeat magnesium after replacement is 2.9. 





12/09/2021





Patient is seen and evaluated in follow-up this morning currently sitting up in 

the chair and was on 10 L high flow although oxygen saturations were below 90% 

and patient bumped back up to 15 L.  Patient is complaining of the oxygen being 

too high and making her feel more anxious and discussed with nursing staff about

continuing to wean as tolerated.  She continues with diarrhea although is 

improving and C. diff testing was negative and patient was started on Questran 

along with Imodium.  Patient continues to state she has no real appetite 

although encourage the patient to continue with small frequent meals and will 

add Magic cups as well.  Encourage the patient to increase oral intake as she is

weak and needs the energy.  She normally maintained on 3 L via nasal cannula and

will continue to titrate as tolerated.  Pulmonary is following patient is 

maintained on IV dexamethasone twice daily along with vitamin and zinc 

supplements, Lovenox twice daily for elevated d-dimer and will continue.  

Patient was on normal saline and considered discontinuing as patient sodium is 

now 147 and patient is being started on D5 in water and will repeat labs.





12/10/2021


Patient is seen in follow-up this morning currently sitting up in the chair and 

oxygen requirements have increased and patient is now maintained on Airvo along 

with 15 L nonrebreather and oxygen saturations have been in the low 80s to 90 

maximum.  Discussed CODE STATUS with the patient and patient is a full code but 

does not wish to be placed on a ventilator if respiratory status continues to 

decline.  Pulmonary following closely and prognosis remains quite guarded.  

Patient continues on Lovenox along with dexamethasone and vitamin and zinc 

supplements and is being started on Baricitinib.  Patient is extremely weak and 

becomes extremely dyspneic with minimal exertion.  Patient's oral intake 

continues to be extremely poor and again encouraged and stressed the importance 

of eating regardless of not feeling hungry and having no appetite.  May consider

enteral nutrition and dietary consult.  Patient states her diarrhea has 

significantly improved and will use Imodium as needed and discontinue Questran. 

Repeat inflammatory markers ordered and pending.  Chest x-ray today shows 

bilateral airspace disease again noted with prominence and interstitial, and 

findings consistent with pneumonia.





12/11/2021


Patient admitted with bilateral common pneumonia , Her respiratory status today 

is a slightly worse as she is dependent on BiPAP throughout the day.  Also she 

has decreased air entry on both sides


She is saturating 88% while she is on BiPAP.


D-dimer is the same, 4.0 and 3.9.


She continued on dexamethasone, vitamin C, vitamin D, zinc,  barcitininb and 

Lovenox 40 mg twice a day.  Also she is on Protonix





Objective





- Vital Signs


Vital signs: 


                                   Vital Signs











Temp  97.4 F L  12/11/21 09:00


 


Pulse  74   12/11/21 09:00


 


Resp  18   12/11/21 09:00


 


BP  158/63   12/11/21 09:00


 


Pulse Ox  88 L  12/11/21 09:00








                                 Intake & Output











 12/10/21 12/11/21 12/11/21





 18:59 06:59 18:59


 


Other:   


 


  Voiding Method Bedside Commode  


 


  # Voids 2 2 


 


  # Bowel Movements 1 0 














- Exam





-GENERAL: The patient is alert and oriented , on BiPAP, not in any acute 

distress. Well developed, well nourished. 


HEENT: Pupils are round and equally reacting to light. EOMI. No scleral icterus.

No conjunctival pallor. Normocephalic, atraumatic. No pharyngeal erythema. No 

thyromegaly. 


CARDIOVASCULAR: S1 and S2 present. No murmurs, rubs, or gallops. 


-PULMONARY: Chest is clear to auscultation, no wheezing.  Bilateral crepitation 

and decreased air entry on both sides


ABDOMEN: Soft, nontender, nondistended, normoactive bowel sounds. No palpable 

organomegaly. 


MUSCULOSKELETAL: No joint swelling or deformity. 


EXTREMITIES: No cyanosis, clubbing, or pedal edema. 


NEUROLOGICAL: Gross neurological examination did not reveal any focal deficits. 


SKIN: No rashes. no petechiae.





- Labs


CBC & Chem 7: 


                                 12/10/21 07:06





                                 12/10/21 07:06


Labs: 


                  Abnormal Lab Results - Last 24 Hours (Table)











  12/10/21 12/10/21 12/11/21 Range/Units





  07:06 07:06 09:47 


 


D-Dimer    3.98 H  (<0.60)  mg/L FEU


 


Glucose  159 H    ()  mg/dL


 


Alkaline Phosphatase  149 H    ()  U/L


 


Lactate Dehydrogenase   392 H   (120-246)  U/L


 


C-Reactive Protein   12.00 H   (0.00-0.80)  mg/dL


 


Total Protein  5.7 L    (6.2-8.2)  g/dL


 


Albumin  3.5 L    (3.8-4.9)  g/dL


 


Albumin/Globulin Ratio  1.59 L    (1.60-3.17)  g/dL








                      Microbiology - Last 24 Hours (Table)











 12/06/21 17:39 Blood Culture - Preliminary





 Blood    No Growth after 96 hours


 


 12/06/21 17:39 Blood Culture - Preliminary





 Blood    No Growth after 96 hours














Assessment and Plan


Assessment: 





-acute hypoxic respiratory failure: secondary to COVID-19 pneumonia 


-chronic hypercapnic respiratory failure secondary to COPD uses 3 L of oxygen at

home


-Diarrhea most likely secondary to Covid, resolved


-Hypernatremia, improving, sodium is 140 a.m. we'll continue D5 in water and 

repeat a.m. labs 


-Severe hypomagnesemia, improved


-Elevated d-dimer secondary to Covid and patient is on Lovenox which will be 

continued, Lovenox is currently twice daily


-Acute renal failure, prerenal, improved with IV fluids


-Hypertension


-Mild anion gap and metabolic acidosis probability of lactic acidosis, most 

likely secondary to increased amounts of diarrhea and dehydration, lactic acid 

is 1.1


-gastroesophageal reflux disease


-Hyperlipidemia


-Hypertension


-History of pulmonary fibrosis


-Sleep apnea uses CPAP machine at home


-Peripheral vascular disease


-Coronary artery disease


-DVT prophylaxis: On Lovenox which will be continued











Plan: 





This is a pleasant 66 years old female who presents with bilateral covid pneumo

keya


Continue with dexamethasone


Continue with vitamin C, vitamin D and zinc


Pulmonary consult 


Labs and medication were reviewed..  Continue same treatment.  Continue with 

symptomatic treatment.  Resume home medication.  Monitor lytes and vitals.  DVT 

and GI prophylaxis.  Further recommendationsas per clinical course of the 

patient


DVT prophylaxis: Subcutaneous Lovenox


GI Prophylaxis: Ppi


Prognosis is guarded

## 2021-12-11 NOTE — P.PN
Subjective


Progress Note Date: 12/11/21





This is a pleasant 66-year-old female patient who follows at the [facility] for her

primary care needs.  She has history of hypertension, hyperlipidemia, 

gastroesophageal reflux disease, anxiety/depression, aortic thrombectomy in 

2007, former smoker, COPD, obstructive sleep apnea utilizing CPAP.  He is here 

yesterday to the emergency room with a four-week history of shortness of breath,

cough, congestion, body aches and fevers.  She was tested negative for CoVID 2.

 Her  is positive for CoVID and she is now positive for CoVID.  She is 

not vaccinated.  She does have home oxygen use at 2-3 L.  She is currently 

requiring 10 L high flow nasal cannula to maintain O2 saturations at 90%.  Chest

x-ray reveals bilateral patchy infiltrates. White count 5.9.  Hemoglobin 12.5.  

Platelets 398.  Lymphocytes 0.5.  D-dimer 1.89.  Sodium 140.  Potassium 4.9.  

Creatinine 1.2.  AST 39.  ALT 26.  Alk phos 156.  LDH 1283.  C-reactive protein 

21.7.  Pro-calcitonin 0.32.  She's been initiated on Decadron, Lovenox, vitamin 

supplements.  0.9% normal saline at 75 ML's per hour.





On 12/08/2021 patient is in follow-up on medical surgical floor, she is resting 

comfortably in bed, she states she is feeling better, although she is requiring 

more oxygen compared to when she first presented to the emergency department, 

note that patient does wear home oxygen at 2 L for history of COPD.  She is 

currently on 10 L of oxygen, her pulse ox is 91-92%, breathing comfortably, lung

sounds reveal coarse crackles at bilateral bases, but no wheezing, no rhonchi.  

CT angiogram of the chest showed no evidence of pulmonary embolism, it did show 

pulmonary emphysema and pulmonary interstitial fibrosis with increased inter

stitial infiltrates compared to her old exam.  There was no evidence of a 

suspicious pulmonary mass.  Patient was outside the window for Remdesivir, she 

continues on Decadron 6 mg twice daily, her pro-calcitonin level on admission 

was 0.32, and patient was covered with Rocephin for possibility of underlying 

bacterial infection.  She is on prophylactic Lovenox 40 mg daily.  CTA [place] 

Ye of the chest showed no evidence of pulmonary embolism. 0





On 12/09/2001 patient seen in follow-up on medical surgical floor, she is 

currently on 10 L of oxygen the pulse ox of 86%, FiO2 has since been increased 

to 15 L, she has a mild cough, but overall seems to be in no acute distress, she

is sitting up in the chair, denies any chest discomfort, looks quite 

comfortable.  Lung sounds reveal coarse crackles bilateral bases, she does get 

short of breath with exertion.  She states if she sits still she is breathing 

comfortably, and her O2 saturations are at or above 90%.  She's had no fever or 

chills overnight, she remains on Decadron 40 mg twice daily.  She is on empiric 

antibiotics in the form of Rocephin and IV fluids at 75 ML per hour.  Today's 

labs have been reviewed, white blood cell count is 8.6, hemoglobin is 12, 

platelet count is 506, d-dimer is 4.01, sodium is 147, potassium is 4.8, chlor

zachariah is 112, BUN of 20 creatinine 0.7, renal profile has significantly improved 

since admission.  LDH has significantly improved since admission and is down to 

348 on today's labs, CRP is still pending, her last pro-calcitonin is negative 

at 0.13.  Blood culture has shown no growth thus far.





On 12/10/2021 patient seen in follow-up on medical surgical floor, she had been 

on 15 L high flow and 100% nonrebreather mask up until this morning, this 

morning patient's FiO2 requirements had increased, patient was getting up in the

chair, she is significantly desaturated, she was placed on irritable at 60 L and

FiO2 of 90% in addition to 100% nonrebreather, and patient is taking quite a 

while to recover her O2 saturations although her work of breathing is not 

increased.  She is still awake and alert, she is oriented 3, she is able to 

make her needs known, her pulse ox is slowly recovering at rest, with deep 

breathing, pursed lip breathing, repositioning.  Overnight she has had no fever 

or chills, blood pressures have been stable.  Breathing fairly comfortably 

although she desaturates with any little exertion.  Currently she is on Decadron

6 Twice daily, she will be started on Baricitinib, patient is also on Plavix, 

and prophylactic Lovenox.  Today's chest x-ray and labs are still pending, she 

remains on D5W at a rate of 100 ML per hour for dehydration related to diarrhea 

and hypernatremia, repeat electrolytes and renal profile are still pending for 

today.  No abdominal pain, no nausea.  The diarrhea has significantly improved 

in the last 48 hours.  Patient is tolerating oral intake, no abdominal pain. 





The patient is seen today 12/11/2021 in follow-up on the regular medical floor. 

She is currently sitting up in a chair at the bedside.  She is now on BiPAP 12/6

and 100% FiO2 with O2 saturations in the mid to upper 80s.  She remains alert.  

No further diarrhea.  No abdominal pain.  The cultures revealed no growth.  D-

dimer 3.98.  She remains on Baricitinib, Decadron, vitamin supplements.  She is 

continued on bronchodilators.





Objective





- Vital Signs


Vital signs: 


                                   Vital Signs











Temp  97.7 F   12/11/21 16:50


 


Pulse  76   12/11/21 16:50


 


Resp  17   12/11/21 16:50


 


BP  123/75   12/11/21 16:50


 


Pulse Ox  90 L  12/11/21 16:50








                                 Intake & Output











 12/10/21 12/11/21 12/11/21





 18:59 06:59 18:59


 


Other:   


 


  Voiding Method Bedside Commode  


 


  # Voids 2 2 1


 


  # Bowel Movements 1 0 














- Exam





GENERAL EXAM: Alert, very pleasant, 66-year-old female, on BiPAP 12/6 and 100% 

FiO2 with O2 saturation 85-86%, up in the chair, she desats quite rapidly with 

any exertion.


HEAD: Normocephalic/atraumatic.


EYES: Normal reaction of pupils, equal size.  Conjunctiva pink, sclera white.


NOSE: Clear with pink turbinates.


THROAT: No erythema or exudates.


NECK: No masses, no JVD, no thyroid enlargement, no adenopathy.


CHEST: No chest wall deformity.  Symmetrical expansion. 


LUNGS: Equal air entry with with coarse bilateral crackles


CVS: Regular rate and rhythm, normal S1 and S2, no gallops, no murmurs, no rubs


ABDOMEN: Soft, nontender.  No hepatosplenomegaly, normal bowel sounds, no 

guarding or rigidity.


EXTREMITIES: No clubbing, no edema, no cyanosis, 2+ pulses and upper and lower 

extremities.


MUSCULOSKELETAL: Muscle strength and tone normal.


SPINE: No scoliosis or deformity


SKIN: No rashes


CENTRAL NERVOUS SYSTEM: No focal deficits, tone is normal in all 4 extremities.


PSYCHIATRIC: Alert and oriented -3.  Appropriate affect.  Intact judgment and 

insight.





- Labs


CBC & Chem 7: 


                                 12/10/21 07:06





                                 12/10/21 07:06


Labs: 


                  Abnormal Lab Results - Last 24 Hours (Table)











  12/10/21 12/11/21 Range/Units





  07:06 09:47 


 


D-Dimer   3.98 H  (<0.60)  mg/L FEU


 


Lactate Dehydrogenase  392 H   (120-246)  U/L


 


C-Reactive Protein  12.00 H   (0.00-0.80)  mg/dL








                      Microbiology - Last 24 Hours (Table)











 12/06/21 17:39 Blood Culture - Preliminary





 Blood    No Growth after 96 hours


 


 12/06/21 17:39 Blood Culture - Preliminary





 Blood    No Growth after 96 hours














Assessment and Plan


Assessment: 





1 Acute on chronic hypoxic respiratory failure secondary to COVID-19 pneumonia. 

Not vaccinated.  The patient is oxygen requirements have increased.  She's 

currently on BiPAP 12/600% FiO2.  Initiated on Baricitinib yesterday.





2 Elevated inflammatory markers secondary to above





3 No evidence of acute infection, antibiotics discontinued and started on 

Baricitinib 





4 Obesity





5 Obstructive sleep apnea, on CPAP





6 Hypertension





7 Hyperlipidemia





8 Anxiety/depression





9 History of coronary disease with previous stent placement





10 History of aortic thrombectomy/aortobifem bypass in 2007





11 Multiple medication ALLERGIES





12 Former smoker.  





Plan:





Currently up in a chair at the bedside


On BiPAP 12/6 in the 100% FiO2


Continue Baricitinib


Continue Decadron, Lovenox, vitamin supplements


Continue bronchodilators


Titrate the FiO2 as tolerated


Follow-up chest x-ray and labs in the a.m.


We'll continue to follow and make further recommendations based on her clinical 

status





I, the cosigning physician, performed a history & physical examination of the 

patient. Lungs sounds with basilar coarse crackles, diminished.  Maintaining O2 

saturations in the 80s on BiPAP 12/6 in the 100% FiO2.  I discussed the 

assessment and plan of care with my nurse practitioner, Velia Short. I attest to 

the above note as dictated by her.

## 2021-12-12 LAB
ALBUMIN SERPL-MCNC: 3.2 G/DL (ref 3.5–5)
ALBUMIN/GLOB SERPL: 1.1 {RATIO}
ALP SERPL-CCNC: 146 U/L (ref 38–126)
ALT SERPL-CCNC: 17 U/L (ref 4–34)
ANION GAP SERPL CALC-SCNC: 9 MMOL/L
AST SERPL-CCNC: 22 U/L (ref 14–36)
BASOPHILS # BLD AUTO: 0 K/UL (ref 0–0.2)
BASOPHILS NFR BLD AUTO: 0 %
BUN SERPL-SCNC: 12 MG/DL (ref 7–17)
CALCIUM SPEC-MCNC: 9.3 MG/DL (ref 8.4–10.2)
CHLORIDE SERPL-SCNC: 103 MMOL/L (ref 98–107)
CO2 SERPL-SCNC: 26 MMOL/L (ref 22–30)
EOSINOPHIL # BLD AUTO: 0 K/UL (ref 0–0.7)
EOSINOPHIL NFR BLD AUTO: 0 %
ERYTHROCYTE [DISTWIDTH] IN BLOOD BY AUTOMATED COUNT: 4.47 M/UL (ref 3.8–5.4)
ERYTHROCYTE [DISTWIDTH] IN BLOOD: 15 % (ref 11.5–15.5)
GLOBULIN SER CALC-MCNC: 2.8 G/DL
GLUCOSE SERPL-MCNC: 173 MG/DL (ref 74–99)
HCT VFR BLD AUTO: 38.6 % (ref 34–46)
HGB BLD-MCNC: 12.2 GM/DL (ref 11.4–16)
LYMPHOCYTES # SPEC AUTO: 0.6 K/UL (ref 1–4.8)
LYMPHOCYTES NFR SPEC AUTO: 5 %
MCH RBC QN AUTO: 27.3 PG (ref 25–35)
MCHC RBC AUTO-ENTMCNC: 31.6 G/DL (ref 31–37)
MCV RBC AUTO: 86.4 FL (ref 80–100)
MONOCYTES # BLD AUTO: 0.5 K/UL (ref 0–1)
MONOCYTES NFR BLD AUTO: 4 %
NEUTROPHILS # BLD AUTO: 9.5 K/UL (ref 1.3–7.7)
NEUTROPHILS NFR BLD AUTO: 89 %
PLATELET # BLD AUTO: 446 K/UL (ref 150–450)
POTASSIUM SERPL-SCNC: 4.6 MMOL/L (ref 3.5–5.1)
PROT SERPL-MCNC: 6 G/DL (ref 6.3–8.2)
SODIUM SERPL-SCNC: 138 MMOL/L (ref 137–145)
WBC # BLD AUTO: 10.6 K/UL (ref 3.8–10.6)

## 2021-12-12 RX ADMIN — DEXTROSE SCH MG: 50 INJECTION, SOLUTION INTRAVENOUS at 09:22

## 2021-12-12 RX ADMIN — TIOTROPIUM BROMIDE INHALATION SPRAY SCH PUFF: 3.12 SPRAY, METERED RESPIRATORY (INHALATION) at 07:38

## 2021-12-12 RX ADMIN — DEXTROSE SCH MG: 50 INJECTION, SOLUTION INTRAVENOUS at 20:47

## 2021-12-12 RX ADMIN — Medication SCH MG: at 09:21

## 2021-12-12 RX ADMIN — OXYCODONE HYDROCHLORIDE AND ACETAMINOPHEN SCH MG: 500 TABLET ORAL at 20:47

## 2021-12-12 RX ADMIN — VALSARTAN SCH MG: 80 TABLET ORAL at 09:22

## 2021-12-12 RX ADMIN — METOPROLOL SUCCINATE SCH MG: 25 TABLET, EXTENDED RELEASE ORAL at 09:21

## 2021-12-12 RX ADMIN — Medication SCH MCG: at 09:21

## 2021-12-12 RX ADMIN — PANTOPRAZOLE SODIUM SCH MG: 40 TABLET, DELAYED RELEASE ORAL at 09:21

## 2021-12-12 RX ADMIN — ACETAMINOPHEN PRN MG: 325 TABLET, FILM COATED ORAL at 11:49

## 2021-12-12 RX ADMIN — POTASSIUM CHLORIDE SCH MLS/HR: 14.9 INJECTION, SOLUTION INTRAVENOUS at 09:48

## 2021-12-12 RX ADMIN — SERTRALINE HYDROCHLORIDE SCH MG: 100 TABLET ORAL at 09:21

## 2021-12-12 RX ADMIN — ATORVASTATIN CALCIUM SCH MG: 80 TABLET, FILM COATED ORAL at 09:21

## 2021-12-12 RX ADMIN — OXYCODONE HYDROCHLORIDE AND ACETAMINOPHEN SCH MG: 500 TABLET ORAL at 09:21

## 2021-12-12 RX ADMIN — POTASSIUM CHLORIDE SCH MLS/HR: 14.9 INJECTION, SOLUTION INTRAVENOUS at 21:00

## 2021-12-12 RX ADMIN — BARICITINIB SCH MG: 2 TABLET, FILM COATED ORAL at 09:22

## 2021-12-12 RX ADMIN — ENOXAPARIN SODIUM SCH MG: 40 INJECTION SUBCUTANEOUS at 20:48

## 2021-12-12 RX ADMIN — LORATADINE SCH MG: 10 TABLET ORAL at 09:21

## 2021-12-12 RX ADMIN — POTASSIUM CHLORIDE SCH MLS/HR: 14.9 INJECTION, SOLUTION INTRAVENOUS at 01:38

## 2021-12-12 RX ADMIN — CLOPIDOGREL BISULFATE SCH MG: 75 TABLET ORAL at 09:21

## 2021-12-12 RX ADMIN — ENOXAPARIN SODIUM SCH MG: 40 INJECTION SUBCUTANEOUS at 09:22

## 2021-12-12 NOTE — P.PN
Subjective





Patient is a 66-year-old the female with past medical history of COPD and 3 L 

oxygen dependent at home has cold symptoms for about a month comes in with 

shortness of breath presently on telemetry dysfunction CT of the chest did not 

show any pulmonary embolism but did show pulmonary fibrosis as well as 

infiltrates.  Below.  Patient doesn't have any infiltrate consistent with 

pneumonia patient denied any dysuria.  Patient is presently on Rocephin.  

Patient has mild acute renal failure because of which I'm holding ACE inhibitor.

 Severely hypomagnesemic magnesium is being replaced.  Patient last smoked about

17 years ago.





12/08/2021





Patient is seen and evaluated in follow-up and currently maintained on 10 L high

flow and weaning as tolerated.  Patient was on 15 L high flow nasal cannula this

morning and tolerating and nursing staff continuing to wean.  Patient normally 

wears 3 L of oxygen continuously in the outpatient setting.  Patient denies any 

worsening shortness of breath just generalized fatigue and weakness and 

continued diarrhea.  Patient states she is going approximately 2-3 times per day

will add C. diff testing and if negative will add Imodium and Questran.  

Pulmonary is following and venous Doppler was ordered bilateral lower 

extremities which is negative for DVT as d-dimer is elevated today at 2.42.  

Inflammatory markers significantly improved his LDH is 338 and CRP is 7.0, 

repeat magnesium after replacement is 2.9. 





12/09/2021





Patient is seen and evaluated in follow-up this morning currently sitting up in 

the chair and was on 10 L high flow although oxygen saturations were below 90% 

and patient bumped back up to 15 L.  Patient is complaining of the oxygen being 

too high and making her feel more anxious and discussed with nursing staff about

continuing to wean as tolerated.  She continues with diarrhea although is 

improving and C. diff testing was negative and patient was started on Questran 

along with Imodium.  Patient continues to state she has no real appetite 

although encourage the patient to continue with small frequent meals and will 

add Magic cups as well.  Encourage the patient to increase oral intake as she is

weak and needs the energy.  She normally maintained on 3 L via nasal cannula and

will continue to titrate as tolerated.  Pulmonary is following patient is 

maintained on IV dexamethasone twice daily along with vitamin and zinc 

supplements, Lovenox twice daily for elevated d-dimer and will continue.  

Patient was on normal saline and considered discontinuing as patient sodium is 

now 147 and patient is being started on D5 in water and will repeat labs.





12/10/2021


Patient is seen in follow-up this morning currently sitting up in the chair and 

oxygen requirements have increased and patient is now maintained on Airvo along 

with 15 L nonrebreather and oxygen saturations have been in the low 80s to 90 

maximum.  Discussed CODE STATUS with the patient and patient is a full code but 

does not wish to be placed on a ventilator if respiratory status continues to 

decline.  Pulmonary following closely and prognosis remains quite guarded.  

Patient continues on Lovenox along with dexamethasone and vitamin and zinc 

supplements and is being started on Baricitinib.  Patient is extremely weak and 

becomes extremely dyspneic with minimal exertion.  Patient's oral intake 

continues to be extremely poor and again encouraged and stressed the importance 

of eating regardless of not feeling hungry and having no appetite.  May consider

enteral nutrition and dietary consult.  Patient states her diarrhea has 

significantly improved and will use Imodium as needed and discontinue Questran. 

Repeat inflammatory markers ordered and pending.  Chest x-ray today shows 

bilateral airspace disease again noted with prominence and interstitial, and 

findings consistent with pneumonia.





12/11/2021


Patient admitted with bilateral common pneumonia , Her respiratory status today 

is a slightly worse as she is dependent on BiPAP throughout the day.  Also she 

has decreased air entry on both sides


She is saturating 88% while she is on BiPAP.


D-dimer is the same, 4.0 and 3.9.


She continued on dexamethasone, vitamin C, vitamin D, zinc,  barcitininb and 

Lovenox 40 mg twice a day.  Also she is on Protonix





12/12/2021


Sitting in chair looks mildly tachypneic and tolerates BiPAP which she uses his 

yesterday all the time.  She has not been eating since yesterday and may switch 

her for short time to nonrebreather and air or so she can read some oral diet   

Per pulmonary team recommendation.  Other than that she is hemodynamically 

stable.  Labs are stable.Calcitonin 0.19.  C. diff is negative.


She remains on dexamethasone, vitamin C, Z and Lovenox, Protonix and  barcitinib







Objective





- Vital Signs


Vital signs: 


                                   Vital Signs











Temp  97.4 F L  12/12/21 09:12


 


Pulse  68   12/12/21 09:12


 


Resp  18   12/12/21 09:12


 


BP  175/75   12/12/21 09:12


 


Pulse Ox  87 L  12/12/21 09:12








                                 Intake & Output











 12/11/21 12/12/21 12/12/21





 18:59 06:59 18:59


 


Other:   


 


  Voiding Method  Bedside Commode 


 


  # Voids 1 1 1














- Exam





-GENERAL: The patient is alert and oriented , on BiPAP, not in any acute di

stress. Well developed, well nourished. 


HEENT: Pupils are round and equally reacting to light. EOMI. No scleral icterus.

No conjunctival pallor. Normocephalic, atraumatic. No pharyngeal erythema. No 

thyromegaly. 


CARDIOVASCULAR: S1 and S2 present. No murmurs, rubs, or gallops. 


-PULMONARY: Chest is clear to auscultation, no wheezing.  Bilateral crepitation 

and decreased air entry on both sides


ABDOMEN: Soft, nontender, nondistended, normoactive bowel sounds. No palpable 

organomegaly. 


MUSCULOSKELETAL: No joint swelling or deformity. 


EXTREMITIES: No cyanosis, clubbing, or pedal edema. 


NEUROLOGICAL: Gross neurological examination did not reveal any focal deficits. 


SKIN: No rashes. no petechiae.





- Labs


CBC & Chem 7: 


                                 12/12/21 06:00





                                 12/12/21 06:00


Labs: 


                  Abnormal Lab Results - Last 24 Hours (Table)











  12/11/21 12/12/21 12/12/21 Range/Units





  09:47 06:00 06:00 


 


Neutrophils #   9.5 H   (1.3-7.7)  k/uL


 


Lymphocytes #   0.6 L   (1.0-4.8)  k/uL


 


Glucose    173 H  (74-99)  mg/dL


 


Alkaline Phosphatase    146 H  ()  U/L


 


Total Protein    6.0 L  (6.3-8.2)  g/dL


 


Albumin    3.2 L  (3.5-5.0)  g/dL


 


Procalcitonin  0.19 H    (0.02-0.09)  ng/mL








                      Microbiology - Last 24 Hours (Table)











 12/06/21 17:39 Blood Culture - Preliminary





 Blood    No Growth after 120 hours


 


 12/06/21 17:39 Blood Culture - Preliminary





 Blood    No Growth after 120 hours














Assessment and Plan


Assessment: 





-acute hypoxic respiratory failure: secondary to COVID-19 pneumonia 


-chronic hypercapnic respiratory failure secondary to COPD uses 3 L of oxygen at

home


-Diarrhea most likely secondary to Covid, resolved


-Hypernatremia, improving, sodium is 140 a.m. we'll continue D5 in water and 

repeat a.m. labs 


-Severe hypomagnesemia, improved


-Elevated d-dimer secondary to Covid and patient is on Lovenox which will be 

continued, Lovenox is currently twice daily


-Acute renal failure, prerenal, improved with IV fluids


-Hypertension


-Mild anion gap and metabolic acidosis probability of lactic acidosis, most 

likely secondary to increased amounts of diarrhea and dehydration, lactic acid 

is 1.1


-gastroesophageal reflux disease


-Hyperlipidemia


-Hypertension


-History of pulmonary fibrosis


-Sleep apnea uses CPAP machine at home


-Peripheral vascular disease


-Coronary artery disease


-DVT prophylaxis: On Lovenox which will be continued











Plan: 





This is a pleasant 66 years old female who presents with bilateral covid 

pneumonia


Continue with dexamethasone


Continue with vitamin C, vitamin D and zinc


Pulmonary consult 


Also he is on barcitinib


Labs and medication were reviewed..  Continue same treatment.  Continue with 

symptomatic treatment.  Resume home medication.  Monitor lytes and vitals.  DVT 

and GI prophylaxis.  Further recommendationsas per clinical course of the 

patient


DVT prophylaxis: Subcutaneous Lovenox


GI Prophylaxis: Ppi


Prognosis is guarded

## 2021-12-12 NOTE — P.PN
Subjective


Progress Note Date: 12/12/21


Principal diagnosis: 


 Dyspnea, hypoxia, COVID-19





This is a pleasant 66-year-old female patient who follows at the [facility] for her

primary care needs.  She has history of hypertension, hyperlipidemia, 

gastroesophageal reflux disease, anxiety/depression, aortic thrombectomy in 2007

, former smoker, COPD, obstructive sleep apnea utilizing CPAP.  He is here 

yesterday to the emergency room with a four-week history of shortness of breath,

cough, congestion, body aches and fevers.  She was tested negative for CoVID 2.

 Her  is positive for CoVID and she is now positive for CoVID.  She is 

not vaccinated.  She does have home oxygen use at 2-3 L.  She is currently 

requiring 10 L high flow nasal cannula to maintain O2 saturations at 90%.  Chest

x-ray reveals bilateral patchy infiltrates. White count 5.9.  Hemoglobin 12.5.  

Platelets 398.  Lymphocytes 0.5.  D-dimer 1.89.  Sodium 140.  Potassium 4.9.  

Creatinine 1.2.  AST 39.  ALT 26.  Alk phos 156.  LDH 1283.  C-reactive protein 

21.7.  Pro-calcitonin 0.32.  She's been initiated on Decadron, Lovenox, vitamin 

supplements.  0.9% normal saline at 75 ML's per hour.





On 12/08/2021 patient is in follow-up on medical surgical floor, she is resting 

comfortably in bed, she states she is feeling better, although she is requiring 

more oxygen compared to when she first presented to the emergency department, 

note that patient does wear home oxygen at 2 L for history of COPD.  She is 

currently on 10 L of oxygen, her pulse ox is 91-92%, breathing comfortably, lung

sounds reveal coarse crackles at bilateral bases, but no wheezing, no rhonchi.  

CT angiogram of the chest showed no evidence of pulmonary embolism, it did show 

pulmonary emphysema and pulmonary interstitial fibrosis with increased 

interstitial infiltrates compared to her old exam.  There was no evidence of a 

suspicious pulmonary mass.  Patient was outside the window for Remdesivir, she 

continues on Decadron 6 mg twice daily, her pro-calcitonin level on admission 

was 0.32, and patient was covered with Rocephin for possibility of underlying 

bacterial infection.  She is on prophylactic Lovenox 40 mg daily.  CTA [facility] of the chest showed no evidence of pulmonary embolism. 0





On 12/09/2001 patient seen in follow-up on medical surgical floor, she is c

urrently on 10 L of oxygen the pulse ox of 86%, FiO2 has since been increased to

15 L, she has a mild cough, but overall seems to be in no acute distress, she is

sitting up in the chair, denies any chest discomfort, looks quite comfortable.  

Lung sounds reveal coarse crackles bilateral bases, she does get short of breath

with exertion.  She states if she sits still she is breathing comfortably, and 

her O2 saturations are at or above 90%.  She's had no fever or chills overnight,

she remains on Decadron 40 mg twice daily.  She is on empiric antibiotics in the

form of Rocephin and IV fluids at 75 ML per hour.  Today's labs have been 

reviewed, white blood cell count is 8.6, hemoglobin is 12, platelet count is 

506, d-dimer is 4.01, sodium is 147, potassium is 4.8, chloride is 112, BUN of 

20 creatinine 0.7, renal profile has significantly improved since admission.  

LDH has significantly improved since admission and is down to 348 on today's 

labs, CRP is still pending, her last pro-calcitonin is negative at 0.13.  Blood 

culture has shown no growth thus far.





On 12/10/2021 patient seen in follow-up on medical surgical floor, she had been 

on 15 L high flow and 100% nonrebreather mask up until this morning, this 

morning patient's FiO2 requirements had increased, patient was getting up in the

chair, she is significantly desaturated, she was placed on irritable at 60 L and

FiO2 of 90% in addition to 100% nonrebreather, and patient is taking quite a 

while to recover her O2 saturations although her work of breathing is not 

increased.  She is still awake and alert, she is oriented 3, she is able to 

make her needs known, her pulse ox is slowly recovering at rest, with deep 

breathing, pursed lip breathing, repositioning.  Overnight she has had no fever 

or chills, blood pressures have been stable.  Breathing fairly comfortably 

although she desaturates with any little exertion.  Currently she is on Decadron

6 Twice daily, she will be started on Baricitinib, patient is also on Plavix, 

and prophylactic Lovenox.  Today's chest x-ray and labs are still pending, she 

remains on D5W at a rate of 100 ML per hour for dehydration related to diarrhea 

and hypernatremia, repeat electrolytes and renal profile are still pending for 

today.  No abdominal pain, no nausea.  The diarrhea has significantly improved 

in the last 48 hours.  Patient is tolerating oral intake, no abdominal pain. 





On 12/12/2021 patient seen in follow-up on medical surgical floor.  She is alert

and alert, she is on BiPAP support with pressures of 14 and 8 and FiO2 100%.  

Earlier this morning her BiPAP pressures have been increased from 12.6 in the 

100% FiO2.  Her pulse oximeter readings are marginal at around 90%, with any 

exertion patient does desaturate and takes her a while to recover although she 

looks very comfortable, nontachypneic, she is sitting up in the recliner, she is

watching TV.  She is complaining of being hungry.  She has not been able to take

much by mouth related to being BiPAP dependent.  She has been afebrile, 

hemodynamics are stable.  Her last chest x-ray from 12/10/2021 showed bilateral 

airspace disease with prominence of interstitium with no evident pneumothorax or

pleural effusion.  Today's labs have been reviewed showing white blood cell 

count of 10.6, hemoglobin 12.2, d-dimer and yesterday's labs was down to 3.98, 

electrolytes and renal profile were within normal limits.  Per pro-calcitonin 

level was negative at 0.19.  Patient remains on Baricitinib, Decadron 6 mg twice

daily, she is receiving IV fluids with D5 W at 100 ML per hour





Objective





- Vital Signs


Vital signs: 


                                   Vital Signs











Temp  97.4 F L  12/12/21 09:12


 


Pulse  68   12/12/21 09:12


 


Resp  18   12/12/21 09:12


 


BP  175/75   12/12/21 09:12


 


Pulse Ox  87 L  12/12/21 09:12








                                 Intake & Output











 12/11/21 12/12/21 12/12/21





 18:59 06:59 18:59


 


Other:   


 


  Voiding Method  Bedside Commode 


 


  # Voids 1 1 1














- Exam


 GENERAL EXAM: Alert, very pleasant, 66-year-old white female, on BiPAP support 

with pressures of 14/8 and FiO2 of 100% patient is breathing comfortably, does 

not appear to be in any acute distress


HEAD: Normocephalic/atraumatic.


EYES: Normal reaction of pupils, equal size.  Conjunctiva pink, sclera white.


NOSE: Clear with pink turbinates.


THROAT: No erythema or exudates.


NECK: No masses, no JVD, no thyroid enlargement, no adenopathy.


CHEST: No chest wall deformity.  Symmetrical expansion. 


LUNGS: Equal air entry with with coarse bilateral crackles


CVS: Regular rate and rhythm, normal S1 and S2, no gallops, no murmurs, no rubs


ABDOMEN: Soft, nontender.  No hepatosplenomegaly, normal bowel sounds, no 

guarding or rigidity.


EXTREMITIES: No clubbing, no edema, no cyanosis, 2+ pulses and upper and lower 

extremities.


MUSCULOSKELETAL: Muscle strength and tone normal.


SPINE: No scoliosis or deformity


SKIN: No rashes


CENTRAL NERVOUS SYSTEM: Alert and oriented -3.  No focal deficits, tone is 

normal in all 4 extremities.


PSYCHIATRIC: Alert and oriented -3.  Appropriate affect.  Intact judgment and 

insight.











- Labs


CBC & Chem 7: 


                                 12/12/21 06:00





                                 12/12/21 06:00


Labs: 


                  Abnormal Lab Results - Last 24 Hours (Table)











  12/11/21 12/12/21 12/12/21 Range/Units





  09:47 06:00 06:00 


 


Neutrophils #   9.5 H   (1.3-7.7)  k/uL


 


Lymphocytes #   0.6 L   (1.0-4.8)  k/uL


 


Glucose    173 H  (74-99)  mg/dL


 


Alkaline Phosphatase    146 H  ()  U/L


 


Total Protein    6.0 L  (6.3-8.2)  g/dL


 


Albumin    3.2 L  (3.5-5.0)  g/dL


 


Procalcitonin  0.19 H    (0.02-0.09)  ng/mL








                      Microbiology - Last 24 Hours (Table)











 12/06/21 17:39 Blood Culture - Preliminary





 Blood    No Growth after 120 hours


 


 12/06/21 17:39 Blood Culture - Preliminary





 Blood    No Growth after 120 hours














Assessment and Plan


Plan: 


 Assessment:





#1.  Acute on chronic hypoxic respiratory failure secondary to acute COVID-19 

pneumonia, patient is not vaccinated related to multiple ALLERGIES, patient 

presented with a 4 week history of symptoms, she was outside the window for Rem

desivir, she is being treated supportively with Decadron, prophylactic Lovenox, 

and empiric antibiotics.  Patient was placed on irritable at 60 L and 90% FiO2 

with nonrebreather mask today on 12/10/2021, we'll start the patient on 

Baricitinib.  On 12/12/2021 patient is on BiPAP support remains BiPAP support 

dependent at pressures of 14 and 1800% FiO2 although clinically is comfortable





#2.  Mildly elevated pro calcitonin, patient is on empiric antibiotics in the 

form of Rocephin, pro calcitonin has improved, no definite sign of infection, 

Rocephin has been discontinued





#3.  Elevated inflammatory markers secondary to the viral pneumonia, improving





#4.  Morbid obesity with BMI of 39.1 kg/m





#5.  Obstructive sleep apnea on CPAP therapy





#6.  History of COPD with chronic hypoxic respiratory failure usually wears 2 L 

of oxygen on the outpatient basis, and CT angiogram of the chest also revealed 

evidence of interstitial pulmonary fibrosis





#7.  Hyperlipidemia





#8.  Anxiety/depression





#9.  History of coronary artery disease with previous stent placement





#10.  History of aortic thrombectomy/aortobifem bypass in 2007





#11.  Former smoker





Plan:





Continue Baricitinib


Patient is BiPAP dependent at pressures of 14 in the immediate 100% although 

clinically she looks very comfortable


May try on Airvo at 60 L and FiO2 of 90% and  100% nonrebreather for short 

intervals for oral intake if tolerates, accept O2 saturations at 85% and better 

as long as she is asymptomatic


Continue Decadron, and prophylactic Lovenox at 40 mg twice daily,


Patient previously expressed wish for NO intubation


She remains a full code without intubation


We'll continue to follow and provide supportive care








I performed a history & physical examination of the patient and discussed their 

management with my nurse practitioner, Jeannine Belcher.  I reviewed the nurse 

practitioner's note and agree with the documented findings and plan of care.  

Lung sounds are positive for diffuse crackles throughout the lung fields.  The 

findings and the impression was discussed with the patient.  I attest to the 

documentation by the nurse practitioner. 











Time with Patient: Less than 30

## 2021-12-12 NOTE — PN
PROGRESS NOTE



DATE OF SERVICE:

12/11/2021



REASON FOR FOLLOWUP:

COVID-19 pneumonia.



INTERVAL HISTORY:

Patient is afebrile.  The patient is currently on a BiPAP.  Slightly lethargic, unable

to  provide any history.  No vomiting.  No diarrhea or any other changes reported by

the nursing staff.



EXAMINATION:

Blood pressure is 176/68 with a pulse of 80, temperature 98.5.  She is 90% on BIPAP.

General description is an elderly female up in the chair in no distress. Respiratory

system: Unlabored breathing.  Decreased intensity in breath sounds.  No wheeze. Heart

S1, S2.  Regular rate and rhythm. Abdomen soft. No tenderness.



LABS:

Hemoglobin 11.8, white count 11.47.  D-dimer 3.98.



DIAGNOSTIC IMPRESSION AND PLAN:

Patient with acute respiratory failure,  severe Covid 19 pneumonia in this patient who

has been started on Baricitinib yesterday that will be continued along with

dexamethasone, Lovenox, zinc and ascorbic acid to continue along with respiratory

support and monitor clinical course closely.  Prognosis remains to be guarded.





MMODL / IJN: [id number] / Job#: [id number]

## 2021-12-12 NOTE — PN
PROGRESS NOTE



DATE OF SERVICE:

12/12/2021



REASON FOR FOLLOWUP:

COVID-19 pneumonia.



INTERVAL HISTORY:

The patient is afebrile.  The patient is currently BiPAP-dependent; however, she did

mention breathing slightly comfortably.  Denies any chest pain.  No worsening cough or

sputum production.  No abdominal pain or diarrhea.



PHYSICAL EXAMINATION:

Blood pressure 152/68 with a pulse of 80, temperature 98.2.  She is 90% on BiPAP.

General description is an elderly female up in the chair in no distress. Respiratory

system: Unlabored breathing, decreased intensity of breath sounds. No wheeze.  Heart

S1, S2.  Regular rate and rhythm.  Abdomen soft, no tenderness.



LABS:

Hemoglobin is 12.1, white count 10.6, creatinine 0.66.



DIAGNOSTIC IMPRESSION AND PLAN:

Patient with acute respiratory failure secondary to COVID-19 pneumonia in this patient

who is currently on baricitinib, dexamethasone, Lovenox, zinc and ascorbic acid; to

continue along with respiratory support, and monitor clinical course closely.





MMODL / IJN: [id number] / Job#: [id number]

## 2021-12-13 LAB
ALBUMIN SERPL-MCNC: 3.2 G/DL (ref 3.5–5)
ALBUMIN/GLOB SERPL: 1.1 {RATIO}
ALP SERPL-CCNC: 138 U/L (ref 38–126)
ALT SERPL-CCNC: 22 U/L (ref 4–34)
ANION GAP SERPL CALC-SCNC: 9 MMOL/L
AST SERPL-CCNC: 26 U/L (ref 14–36)
BASOPHILS # BLD AUTO: 0 K/UL (ref 0–0.2)
BASOPHILS NFR BLD AUTO: 0 %
BUN SERPL-SCNC: 15 MG/DL (ref 7–17)
CALCIUM SPEC-MCNC: 9.5 MG/DL (ref 8.4–10.2)
CHLORIDE SERPL-SCNC: 103 MMOL/L (ref 98–107)
CO2 SERPL-SCNC: 29 MMOL/L (ref 22–30)
EOSINOPHIL # BLD AUTO: 0.2 K/UL (ref 0–0.7)
EOSINOPHIL NFR BLD AUTO: 2 %
ERYTHROCYTE [DISTWIDTH] IN BLOOD BY AUTOMATED COUNT: 4.61 M/UL (ref 3.8–5.4)
ERYTHROCYTE [DISTWIDTH] IN BLOOD: 15.3 % (ref 11.5–15.5)
GLOBULIN SER CALC-MCNC: 2.8 G/DL
GLUCOSE SERPL-MCNC: 99 MG/DL (ref 74–99)
HCT VFR BLD AUTO: 40.2 % (ref 34–46)
HGB BLD-MCNC: 12.6 GM/DL (ref 11.4–16)
LYMPHOCYTES # SPEC AUTO: 1 K/UL (ref 1–4.8)
LYMPHOCYTES NFR SPEC AUTO: 10 %
MCH RBC QN AUTO: 27.3 PG (ref 25–35)
MCHC RBC AUTO-ENTMCNC: 31.3 G/DL (ref 31–37)
MCV RBC AUTO: 87.3 FL (ref 80–100)
MONOCYTES # BLD AUTO: 0.6 K/UL (ref 0–1)
MONOCYTES NFR BLD AUTO: 6 %
NEUTROPHILS # BLD AUTO: 8.5 K/UL (ref 1.3–7.7)
NEUTROPHILS NFR BLD AUTO: 82 %
PLATELET # BLD AUTO: 452 K/UL (ref 150–450)
POTASSIUM SERPL-SCNC: 3.9 MMOL/L (ref 3.5–5.1)
PROT SERPL-MCNC: 6 G/DL (ref 6.3–8.2)
SODIUM SERPL-SCNC: 141 MMOL/L (ref 137–145)
WBC # BLD AUTO: 10.4 K/UL (ref 3.8–10.6)

## 2021-12-13 PROCEDURE — 5A0955A ASSISTANCE WITH RESPIRATORY VENTILATION, GREATER THAN 96 CONSECUTIVE HOURS, HIGH NASAL FLOW/VELOCITY: ICD-10-PCS

## 2021-12-13 RX ADMIN — SERTRALINE HYDROCHLORIDE SCH MG: 100 TABLET ORAL at 08:13

## 2021-12-13 RX ADMIN — Medication SCH MCG: at 08:13

## 2021-12-13 RX ADMIN — CLOPIDOGREL BISULFATE SCH MG: 75 TABLET ORAL at 08:13

## 2021-12-13 RX ADMIN — LORATADINE SCH MG: 10 TABLET ORAL at 08:13

## 2021-12-13 RX ADMIN — ACETAMINOPHEN PRN MG: 325 TABLET, FILM COATED ORAL at 08:14

## 2021-12-13 RX ADMIN — ATORVASTATIN CALCIUM SCH MG: 80 TABLET, FILM COATED ORAL at 08:13

## 2021-12-13 RX ADMIN — DEXTROSE SCH MG: 50 INJECTION, SOLUTION INTRAVENOUS at 21:56

## 2021-12-13 RX ADMIN — BARICITINIB SCH MG: 2 TABLET, FILM COATED ORAL at 08:14

## 2021-12-13 RX ADMIN — ENOXAPARIN SODIUM SCH MG: 40 INJECTION SUBCUTANEOUS at 08:14

## 2021-12-13 RX ADMIN — Medication SCH MG: at 08:13

## 2021-12-13 RX ADMIN — TIOTROPIUM BROMIDE INHALATION SPRAY SCH: 3.12 SPRAY, METERED RESPIRATORY (INHALATION) at 08:59

## 2021-12-13 RX ADMIN — ENOXAPARIN SODIUM SCH MG: 40 INJECTION SUBCUTANEOUS at 21:56

## 2021-12-13 RX ADMIN — DEXTROSE SCH MG: 50 INJECTION, SOLUTION INTRAVENOUS at 08:14

## 2021-12-13 RX ADMIN — OXYCODONE HYDROCHLORIDE AND ACETAMINOPHEN SCH MG: 500 TABLET ORAL at 21:56

## 2021-12-13 RX ADMIN — PANTOPRAZOLE SODIUM SCH MG: 40 TABLET, DELAYED RELEASE ORAL at 08:13

## 2021-12-13 RX ADMIN — POTASSIUM CHLORIDE SCH MLS/HR: 14.9 INJECTION, SOLUTION INTRAVENOUS at 10:35

## 2021-12-13 RX ADMIN — VALSARTAN SCH MG: 80 TABLET ORAL at 08:14

## 2021-12-13 RX ADMIN — OXYCODONE HYDROCHLORIDE AND ACETAMINOPHEN SCH MG: 500 TABLET ORAL at 08:13

## 2021-12-13 RX ADMIN — METOPROLOL SUCCINATE SCH MG: 25 TABLET, EXTENDED RELEASE ORAL at 08:13

## 2021-12-13 NOTE — PN
PROGRESS NOTE



DATE OF SERVICE:

12/13/2021



REASON FOR FOLLOWUP:

COVID-19 pneumonia.



INTERVAL HISTORY:

Patient is afebrile.  The patient as mentioned breathing slightly comfortably. Still

requiring high-flow oxygen.  Denies any chest pain.  No worsening cough or sputum

production.  No abdominal pain. No diarrhea.



PHYSICAL EXAMINATION:

Blood pressure 108/64, pulse of 80, temperature 98.1.  She is 91% on room air. General

description is an elderly female up in the chair in no distress.  Respiratory system:

Unlabored breathing, decreased intensity of breath sounds. No wheeze. Heart S1, S2.

Regular rate and rhythm.  Abdomen soft, no tenderness.



LABS:

Hemoglobin is 12.6, white count 10.4, creatinine 0.72.



DIAGNOSTIC IMPRESSION AND PLAN:

Patient with acute respiratory failure secondary to Covid 19 infection.  The patient

with minimal clinical improvement.  Currently on Baricitinib and dexamethasone,

Lovenox, zinc and ascorbic acid to continue  along with respiratory support and monitor

clinical course closely.





MMODL / IJN: [id number] / Job#: [id number]

## 2021-12-13 NOTE — P.PN
Subjective





Patient is a 66-year-old the female with past medical history of COPD and 3 L 

oxygen dependent at home has cold symptoms for about a month comes in with 

shortness of breath presently on telemetry dysfunction CT of the chest did not 

show any pulmonary embolism but did show pulmonary fibrosis as well as 

infiltrates.  Below.  Patient doesn't have any infiltrate consistent with 

pneumonia patient denied any dysuria.  Patient is presently on Rocephin.  

Patient has mild acute renal failure because of which I'm holding ACE inhibitor.

 Severely hypomagnesemic magnesium is being replaced.  Patient last smoked about

17 years ago.





12/08/2021





Patient is seen and evaluated in follow-up and currently maintained on 10 L high

flow and weaning as tolerated.  Patient was on 15 L high flow nasal cannula this

morning and tolerating and nursing staff continuing to wean.  Patient normally 

wears 3 L of oxygen continuously in the outpatient setting.  Patient denies any 

worsening shortness of breath just generalized fatigue and weakness and 

continued diarrhea.  Patient states she is going approximately 2-3 times per day

will add C. diff testing and if negative will add Imodium and Questran.  

Pulmonary is following and venous Doppler was ordered bilateral lower 

extremities which is negative for DVT as d-dimer is elevated today at 2.42.  

Inflammatory markers significantly improved his LDH is 338 and CRP is 7.0, 

repeat magnesium after replacement is 2.9. 





12/09/2021





Patient is seen and evaluated in follow-up this morning currently sitting up in 

the chair and was on 10 L high flow although oxygen saturations were below 90% 

and patient bumped back up to 15 L.  Patient is complaining of the oxygen being 

too high and making her feel more anxious and discussed with nursing staff about

continuing to wean as tolerated.  She continues with diarrhea although is 

improving and C. diff testing was negative and patient was started on Questran 

along with Imodium.  Patient continues to state she has no real appetite 

although encourage the patient to continue with small frequent meals and will 

add Magic cups as well.  Encourage the patient to increase oral intake as she is

weak and needs the energy.  She normally maintained on 3 L via nasal cannula and

will continue to titrate as tolerated.  Pulmonary is following patient is 

maintained on IV dexamethasone twice daily along with vitamin and zinc 

supplements, Lovenox twice daily for elevated d-dimer and will continue.  

Patient was on normal saline and considered discontinuing as patient sodium is 

now 147 and patient is being started on D5 in water and will repeat labs.





12/10/2021


Patient is seen in follow-up this morning currently sitting up in the chair and 

oxygen requirements have increased and patient is now maintained on Airvo along 

with 15 L nonrebreather and oxygen saturations have been in the low 80s to 90 

maximum.  Discussed CODE STATUS with the patient and patient is a full code but 

does not wish to be placed on a ventilator if respiratory status continues to 

decline.  Pulmonary following closely and prognosis remains quite guarded.  

Patient continues on Lovenox along with dexamethasone and vitamin and zinc 

supplements and is being started on Baricitinib.  Patient is extremely weak and 

becomes extremely dyspneic with minimal exertion.  Patient's oral intake 

continues to be extremely poor and again encouraged and stressed the importance 

of eating regardless of not feeling hungry and having no appetite.  May consider

enteral nutrition and dietary consult.  Patient states her diarrhea has 

significantly improved and will use Imodium as needed and discontinue Questran. 

Repeat inflammatory markers ordered and pending.  Chest x-ray today shows 

bilateral airspace disease again noted with prominence and interstitial, and 

findings consistent with pneumonia.





12/11/2021


Patient admitted with bilateral common pneumonia , Her respiratory status today 

is a slightly worse as she is dependent on BiPAP throughout the day.  Also she 

has decreased air entry on both sides


She is saturating 88% while she is on BiPAP.


D-dimer is the same, 4.0 and 3.9.


She continued on dexamethasone, vitamin C, vitamin D, zinc,  barcitininb and 

Lovenox 40 mg twice a day.  Also she is on Protonix





12/12/2021


Sitting in chair looks mildly tachypneic and tolerates BiPAP which she uses his 

yesterday all the time.  She has not been eating since yesterday and may switch 

her for short time to nonrebreather and air or so she can read some oral diet   

Per pulmonary team recommendation.  Other than that she is hemodynamically 

stable.  Labs are stable.Calcitonin 0.19.  C. diff is negative.


She remains on dexamethasone, vitamin C, Z and Lovenox, Protonix and  barcitinib







12/13/2021


This morning she was still on BiPAP, and she was refusing to start TPN as she 

could not come off the BiPAP.  However later on during the day she could be 

placed on nonrebreather 15 L and Aravo 60 L with 94% so she can tolerate oral 

feeding.


Other vitals are stable, afebrile.  Labs are stable as well.


She continued on dexamethasone, vitamin C, vitamin D, zinc,  barcitininb and 

Lovenox 40 mg twice a day





Objective





- Vital Signs


Vital signs: 


                                   Vital Signs











Temp  98.6 F   12/13/21 10:00


 


Pulse  75   12/13/21 10:00


 


Resp  17   12/13/21 10:00


 


BP  124/74   12/13/21 10:00


 


Pulse Ox  88 L  12/13/21 10:00








                                 Intake & Output











 12/12/21 12/13/21 12/13/21





 18:59 06:59 18:59


 


Other:   


 


  Voiding Method  Bedside Commode 


 


  # Voids 1 1 














- Exam





-GENERAL: The patient is alert and oriented , on BiPAP, not in any acute 

distress. Well developed, well nourished. 


HEENT: Pupils are round and equally reacting to light. EOMI. No scleral icterus.

No conjunctival pallor. Normocephalic, atraumatic. No pharyngeal erythema. No 

thyromegaly. 


CARDIOVASCULAR: S1 and S2 present. No murmurs, rubs, or gallops. 


-PULMONARY: Chest is clear to auscultation, no wheezing.  Bilateral crepitation 

and decreased air entry on both sides


ABDOMEN: Soft, nontender, nondistended, normoactive bowel sounds. No palpable 

organomegaly. 


MUSCULOSKELETAL: No joint swelling or deformity. 


EXTREMITIES: No cyanosis, clubbing, or pedal edema. 


NEUROLOGICAL: Gross neurological examination did not reveal any focal deficits. 


SKIN: No rashes. no petechiae.





- Labs


CBC & Chem 7: 


                                 12/13/21 06:30





                                 12/13/21 06:30


Labs: 


                  Abnormal Lab Results - Last 24 Hours (Table)











  12/13/21 12/13/21 Range/Units





  06:30 06:30 


 


Plt Count  452 H   (150-450)  k/uL


 


Neutrophils #  8.5 H   (1.3-7.7)  k/uL


 


Alkaline Phosphatase   138 H  ()  U/L


 


Total Protein   6.0 L  (6.3-8.2)  g/dL


 


Albumin   3.2 L  (3.5-5.0)  g/dL








                      Microbiology - Last 24 Hours (Table)











 12/06/21 17:39 Blood Culture - Final





 Blood    No Growth after 144 hours


 


 12/06/21 17:39 Blood Culture - Final





 Blood    No Growth after 144 hours














Assessment and Plan


Assessment: 





-acute hypoxic respiratory failure: secondary to COVID-19 pneumonia 


-chronic hypercapnic respiratory failure secondary to COPD uses 3 L of oxygen at

home


-Diarrhea most likely secondary to Covid, resolved


-Hypernatremia, improving, sodium is 140 a.m. we'll continue D5 in water and 

repeat a.m. labs 


-Severe hypomagnesemia, improved


-Elevated d-dimer secondary to Covid and patient is on Lovenox which will be 

continued, Lovenox is currently twice daily


-Acute renal failure, prerenal, improved with IV fluids


-Hypertension


-Mild anion gap and metabolic acidosis probability of lactic acidosis, most 

likely secondary to increased amounts of diarrhea and dehydration, lactic acid 

is 1.1


-gastroesophageal reflux disease


-Hyperlipidemia


-Hypertension


-History of pulmonary fibrosis


-Sleep apnea uses CPAP machine at home


-Peripheral vascular disease


-Coronary artery disease


-DVT prophylaxis: On Lovenox which will be continued











Plan: 





This is a pleasant 66 years old female who presents with bilateral covid 

pneumonia


Continue with dexamethasone


Continue with vitamin C, vitamin D and zinc


Pulmonary consult 


Also he is on barcitinib


Labs and medication were reviewed..  Continue same treatment.  Continue with 

symptomatic treatment.  Resume home medication.  Monitor lytes and vitals.  DVT 

and GI prophylaxis.  Further recommendationsas per clinical course of the 

patient


DVT prophylaxis: Subcutaneous Lovenox


GI Prophylaxis: Ppi


Prognosis is guarded

## 2021-12-13 NOTE — P.PN
Subjective


Progress Note Date: 12/13/21


Principal diagnosis: 


 Dyspnea, hypoxia, COVID-19





This is a pleasant 66-year-old female patient who follows at the [facility] for her

primary care needs.  She has history of hypertension, hyperlipidemia, 

gastroesophageal reflux disease, anxiety/depression, aortic thrombectomy in 2007

, former smoker, COPD, obstructive sleep apnea utilizing CPAP.  He is here 

yesterday to the emergency room with a four-week history of shortness of breath,

cough, congestion, body aches and fevers.  She was tested negative for CoVID 2.

 Her  is positive for CoVID and she is now positive for CoVID.  She is 

not vaccinated.  She does have home oxygen use at 2-3 L.  She is currently 

requiring 10 L high flow nasal cannula to maintain O2 saturations at 90%.  Chest

x-ray reveals bilateral patchy infiltrates. White count 5.9.  Hemoglobin 12.5.  

Platelets 398.  Lymphocytes 0.5.  D-dimer 1.89.  Sodium 140.  Potassium 4.9.  

Creatinine 1.2.  AST 39.  ALT 26.  Alk phos 156.  LDH 1283.  C-reactive protein 

21.7.  Pro-calcitonin 0.32.  She's been initiated on Decadron, Lovenox, vitamin 

supplements.  0.9% normal saline at 75 ML's per hour.





On 12/08/2021 patient is in follow-up on medical surgical floor, she is resting 

comfortably in bed, she states she is feeling better, although she is requiring 

more oxygen compared to when she first presented to the emergency department, 

note that patient does wear home oxygen at 2 L for history of COPD.  She is 

currently on 10 L of oxygen, her pulse ox is 91-92%, breathing comfortably, lung

sounds reveal coarse crackles at bilateral bases, but no wheezing, no rhonchi.  

CT angiogram of the chest showed no evidence of pulmonary embolism, it did show 

pulmonary emphysema and pulmonary interstitial fibrosis with increased 

interstitial infiltrates compared to her old exam.  There was no evidence of a 

suspicious pulmonary mass.  Patient was outside the window for Remdesivir, she 

continues on Decadron 6 mg twice daily, her pro-calcitonin level on admission 

was 0.32, and patient was covered with Rocephin for possibility of underlying 

bacterial infection.  She is on prophylactic Lovenox 40 mg daily.  [facility] of the chest showed no evidence of pulmonary embolism. 0





On 12/09/2001 patient seen in follow-up on medical surgical floor, she is c

urrently on 10 L of oxygen the pulse ox of 86%, FiO2 has since been increased to

15 L, she has a mild cough, but overall seems to be in no acute distress, she is

sitting up in the chair, denies any chest discomfort, looks quite comfortable.  

Lung sounds reveal coarse crackles bilateral bases, she does get short of breath

with exertion.  She states if she sits still she is breathing comfortably, and 

her O2 saturations are at or above 90%.  She's had no fever or chills overnight,

she remains on Decadron 40 mg twice daily.  She is on empiric antibiotics in the

form of Rocephin and IV fluids at 75 ML per hour.  Today's labs have been 

reviewed, white blood cell count is 8.6, hemoglobin is 12, platelet count is 

506, d-dimer is 4.01, sodium is 147, potassium is 4.8, chloride is 112, BUN of 

20 creatinine 0.7, renal profile has significantly improved since admission.  

LDH has significantly improved since admission and is down to 348 on today's 

labs, CRP is still pending, her last pro-calcitonin is negative at 0.13.  Blood 

culture has shown no growth thus far.





On 12/10/2021 patient seen in follow-up on medical surgical floor, she had been 

on 15 L high flow and 100% nonrebreather mask up until this morning, this 

morning patient's FiO2 requirements had increased, patient was getting up in the

chair, she is significantly desaturated, she was placed on irritable at 60 L and

FiO2 of 90% in addition to 100% nonrebreather, and patient is taking quite a 

while to recover her O2 saturations although her work of breathing is not 

increased.  She is still awake and alert, she is oriented 3, she is able to 

make her needs known, her pulse ox is slowly recovering at rest, with deep 

breathing, pursed lip breathing, repositioning.  Overnight she has had no fever 

or chills, blood pressures have been stable.  Breathing fairly comfortably 

although she desaturates with any little exertion.  Currently she is on Decadron

6 Twice daily, she will be started on Baricitinib, patient is also on Plavix, 

and prophylactic Lovenox.  Today's chest x-ray and labs are still pending, she 

remains on D5W at a rate of 100 ML per hour for dehydration related to diarrhea 

and hypernatremia, repeat electrolytes and renal profile are still pending for 

today.  No abdominal pain, no nausea.  The diarrhea has significantly improved 

in the last 48 hours.  Patient is tolerating oral intake, no abdominal pain. 





On 12/12/2021 patient seen in follow-up on medical surgical floor.  She is alert

and alert, she is on BiPAP support with pressures of 14 and 8 and FiO2 100%.  

Earlier this morning her BiPAP pressures have been increased from 12.6 in the 

100% FiO2.  Her pulse oximeter readings are marginal at around 90%, with any 

exertion patient does desaturate and takes her a while to recover although she 

looks very comfortable, nontachypneic, she is sitting up in the recliner, she is

watching TV.  She is complaining of being hungry.  She has not been able to take

much by mouth related to being BiPAP dependent.  She has been afebrile, 

hemodynamics are stable.  Her last chest x-ray from 12/10/2021 showed bilateral 

airspace disease with prominence of interstitium with no evident pneumothorax or

pleural effusion.  Today's labs have been reviewed showing white blood cell 

count of 10.6, hemoglobin 12.2, d-dimer and yesterday's labs was down to 3.98, 

electrolytes and renal profile were within normal limits.  Per pro-calcitonin 

level was negative at 0.19.  Patient remains on Baricitinib, Decadron 6 mg twice

daily, she is receiving IV fluids with D5 W at 100 ML per hour





On 12/13/2021 patient seen in follow-up on medical surgical floor, she was 

switched to Airvo at 60 L and 90%, and nonrebreather mask, she is maintaining 

her O2 saturations between 88 and 91%, she is breathing comfortably, tolerating 

it for a few hours now, she was able to eat something, she is taking in oral 

fluids, she is awake and alert, she is sitting up in the chair, she is being her

bills.  Lung sounds are positive for scattered crackles throughout the lung 

fields, no cough, appears very comfortable.  He is on [place] neb, she is on 

Decadron, and Lovenox.  She is on D5W at a rate of 100 ML per hour related to 

her nothing by mouth status however in view of her tolerating oral intake now, 

and not been BiPAP dependent anymore we'll stop the IV fluids.  Today's labs 

have been reviewed and her white blood cell count is 10.4, hemoglobin is 12.6, 

her electrolytes have been reviewed and are within normal limits, sodium is 141,

potassium is 3.9, chloride is 103, BUN is 15, creatinine is 0.72.  Her 

procalcitonin level was negative.





Objective





- Vital Signs


Vital signs: 


                                   Vital Signs











Temp  99.1 F   12/13/21 14:00


 


Pulse  80   12/13/21 14:00


 


Resp  17   12/13/21 14:00


 


BP  137/70   12/13/21 14:00


 


Pulse Ox  91 L  12/13/21 14:00








                                 Intake & Output











 12/12/21 12/13/21 12/13/21





 18:59 06:59 18:59


 


Other:   


 


  Voiding Method  Bedside Commode 


 


  # Voids 1 1 














- Exam


 GENERAL EXAM: Alert, very pleasant, 66-year-old white female, on low at 60 L an

FiO2 of 90%, and 100% nonrebreather mask, with a pulse ox between 88-91% patient

is breathing comfortably, does not appear to be in any acute distress


HEAD: Normocephalic/atraumatic.


EYES: Normal reaction of pupils, equal size.  Conjunctiva pink, sclera white.


NOSE: Clear with pink turbinates.


THROAT: No erythema or exudates.


NECK: No masses, no JVD, no thyroid enlargement, no adenopathy.


CHEST: No chest wall deformity.  Symmetrical expansion. 


LUNGS: Equal air entry with with coarse bilateral crackles


CVS: Regular rate and rhythm, normal S1 and S2, no gallops, no murmurs, no rubs


ABDOMEN: Soft, nontender.  No hepatosplenomegaly, normal bowel sounds, no 

guarding or rigidity.


EXTREMITIES: No clubbing, no edema, no cyanosis, 2+ pulses and upper and lower 

extremities.


MUSCULOSKELETAL: Muscle strength and tone normal.


SPINE: No scoliosis or deformity


SKIN: No rashes


CENTRAL NERVOUS SYSTEM: Alert and oriented -3.  No focal deficits, tone is 

normal in all 4 extremities.


PSYCHIATRIC: Alert and oriented -3.  Appropriate affect.  Intact judgment and 

insight.











- Labs


CBC & Chem 7: 


                                 12/13/21 06:30





                                 12/13/21 06:30


Labs: 


                  Abnormal Lab Results - Last 24 Hours (Table)











  12/13/21 12/13/21 Range/Units





  06:30 06:30 


 


Plt Count  452 H   (150-450)  k/uL


 


Neutrophils #  8.5 H   (1.3-7.7)  k/uL


 


Alkaline Phosphatase   138 H  ()  U/L


 


Total Protein   6.0 L  (6.3-8.2)  g/dL


 


Albumin   3.2 L  (3.5-5.0)  g/dL








                      Microbiology - Last 24 Hours (Table)











 12/06/21 17:39 Blood Culture - Final





 Blood    No Growth after 144 hours


 


 12/06/21 17:39 Blood Culture - Final





 Blood    No Growth after 144 hours














Assessment and Plan


Plan: 


 Assessment:





#1.  Acute on chronic hypoxic respiratory failure secondary to acute COVID-19 

pneumonia, patient is not vaccinated related to multiple ALLERGIES, patient 

presented with a 4 week history of symptoms, she was outside the window for 

Remdesivir, she is being treated supportively with Decadron, prophylactic 

Lovenox, and empiric antibiotics.  Patient was placed on irritable at 60 L and 

90% FiO2 with nonrebreather mask today on 12/10/2021, we'll start the patient on

Baricitinib.  On 12/12/2021 patient is on BiPAP support remains BiPAP support 

dependent at pressures of 14 and 1800% FiO2 although clinically is comfortable. 

On 12/13/2021 patient has been switched to Airvo at 60 L and 90% and 

nonrebreather mask, tolerating it well so far





#2.  Mildly elevated pro calcitonin, patient is on empiric antibiotics in the 

form of Rocephin, pro calcitonin has improved, no definite sign of infection, 

Rocephin has been discontinued





#3.  Elevated inflammatory markers secondary to the viral pneumonia, improving





#4.  Morbid obesity with BMI of 39.1 kg/m





#5.  Obstructive sleep apnea on CPAP therapy





#6.  History of COPD with chronic hypoxic respiratory failure usually wears 2 L 

of oxygen on the outpatient basis, and CT angiogram of the chest also revealed 

evidence of interstitial pulmonary fibrosis





#7.  Hyperlipidemia





#8.  Anxiety/depression





#9.  History of coronary artery disease with previous stent placement





#10.  History of aortic thrombectomy/aortobifem bypass in 2007





#11.  Former smoker





Plan:





Continue Baricitinib


Tolerating Airvo at 60 L and FiO2 of 90% and  100% nonrebreather for short 

intervals for oral intake if tolerates, accept O2 saturations at 85% and better 

as long as she is asymptomatic


She remains comfortable,


Continue Decadron, and prophylactic Lovenox at 40 mg twice daily,


Discontinue D5W, patient is tolerating oral intake


Patient previously expressed wish for NO intubation


She remains a full code without intubation


We'll continue to follow and provide supportive care








I performed a history & physical examination of the patient and discussed their 

management with my nurse practitioner, Jeannine Belcher.  I reviewed the nurse 

practitioner's note and agree with the documented findings and plan of care.  

Lung sounds are positive for diffuse crackles throughout the lung fields.  The 

findings and the impression was discussed with the patient.  I attest to the 

documentation by the nurse practitioner. 











Time with Patient: Less than 30

## 2021-12-14 RX ADMIN — TIOTROPIUM BROMIDE INHALATION SPRAY SCH PUFF: 3.12 SPRAY, METERED RESPIRATORY (INHALATION) at 10:11

## 2021-12-14 RX ADMIN — ACETAMINOPHEN PRN MG: 325 TABLET, FILM COATED ORAL at 08:29

## 2021-12-14 RX ADMIN — ACETAMINOPHEN PRN MG: 325 TABLET, FILM COATED ORAL at 14:43

## 2021-12-14 RX ADMIN — ENOXAPARIN SODIUM SCH MG: 40 INJECTION SUBCUTANEOUS at 08:31

## 2021-12-14 RX ADMIN — OXYCODONE HYDROCHLORIDE AND ACETAMINOPHEN SCH MG: 500 TABLET ORAL at 20:02

## 2021-12-14 RX ADMIN — Medication SCH MG: at 08:28

## 2021-12-14 RX ADMIN — ATORVASTATIN CALCIUM SCH MG: 80 TABLET, FILM COATED ORAL at 08:28

## 2021-12-14 RX ADMIN — ENOXAPARIN SODIUM SCH MG: 40 INJECTION SUBCUTANEOUS at 20:02

## 2021-12-14 RX ADMIN — METOPROLOL SUCCINATE SCH MG: 25 TABLET, EXTENDED RELEASE ORAL at 08:28

## 2021-12-14 RX ADMIN — DEXTROSE SCH MG: 50 INJECTION, SOLUTION INTRAVENOUS at 08:30

## 2021-12-14 RX ADMIN — BARICITINIB SCH MG: 2 TABLET, FILM COATED ORAL at 08:30

## 2021-12-14 RX ADMIN — DEXTROSE SCH MG: 50 INJECTION, SOLUTION INTRAVENOUS at 20:02

## 2021-12-14 RX ADMIN — CLOPIDOGREL BISULFATE SCH MG: 75 TABLET ORAL at 08:29

## 2021-12-14 RX ADMIN — SERTRALINE HYDROCHLORIDE SCH MG: 100 TABLET ORAL at 08:29

## 2021-12-14 RX ADMIN — LORATADINE SCH MG: 10 TABLET ORAL at 08:28

## 2021-12-14 RX ADMIN — VALSARTAN SCH MG: 80 TABLET ORAL at 08:29

## 2021-12-14 RX ADMIN — PANTOPRAZOLE SODIUM SCH MG: 40 TABLET, DELAYED RELEASE ORAL at 08:28

## 2021-12-14 RX ADMIN — OXYCODONE HYDROCHLORIDE AND ACETAMINOPHEN SCH MG: 500 TABLET ORAL at 08:28

## 2021-12-14 RX ADMIN — Medication SCH MCG: at 08:29

## 2021-12-14 NOTE — P.PN
Subjective





Patient is a 66-year-old the female with past medical history of COPD and 3 L 

oxygen dependent at home has cold symptoms for about a month comes in with 

shortness of breath presently on telemetry dysfunction CT of the chest did not 

show any pulmonary embolism but did show pulmonary fibrosis as well as 

infiltrates.  Below.  Patient doesn't have any infiltrate consistent with 

pneumonia patient denied any dysuria.  Patient is presently on Rocephin.  

Patient has mild acute renal failure because of which I'm holding ACE inhibitor.

 Severely hypomagnesemic magnesium is being replaced.  Patient last smoked about

17 years ago.





12/08/2021





Patient is seen and evaluated in follow-up and currently maintained on 10 L high

flow and weaning as tolerated.  Patient was on 15 L high flow nasal cannula this

morning and tolerating and nursing staff continuing to wean.  Patient normally 

wears 3 L of oxygen continuously in the outpatient setting.  Patient denies any 

worsening shortness of breath just generalized fatigue and weakness and 

continued diarrhea.  Patient states she is going approximately 2-3 times per day

will add C. diff testing and if negative will add Imodium and Questran.  

Pulmonary is following and venous Doppler was ordered bilateral lower 

extremities which is negative for DVT as d-dimer is elevated today at 2.42.  

Inflammatory markers significantly improved his LDH is 338 and CRP is 7.0, 

repeat magnesium after replacement is 2.9. 





12/09/2021





Patient is seen and evaluated in follow-up this morning currently sitting up in 

the chair and was on 10 L high flow although oxygen saturations were below 90% 

and patient bumped back up to 15 L.  Patient is complaining of the oxygen being 

too high and making her feel more anxious and discussed with nursing staff about

continuing to wean as tolerated.  She continues with diarrhea although is 

improving and C. diff testing was negative and patient was started on Questran 

along with Imodium.  Patient continues to state she has no real appetite 

although encourage the patient to continue with small frequent meals and will 

add Magic cups as well.  Encourage the patient to increase oral intake as she is

weak and needs the energy.  She normally maintained on 3 L via nasal cannula and

will continue to titrate as tolerated.  Pulmonary is following patient is 

maintained on IV dexamethasone twice daily along with vitamin and zinc 

supplements, Lovenox twice daily for elevated d-dimer and will continue.  

Patient was on normal saline and considered discontinuing as patient sodium is 

now 147 and patient is being started on D5 in water and will repeat labs.





12/10/2021


Patient is seen in follow-up this morning currently sitting up in the chair and 

oxygen requirements have increased and patient is now maintained on Airvo along 

with 15 L nonrebreather and oxygen saturations have been in the low 80s to 90 

maximum.  Discussed CODE STATUS with the patient and patient is a full code but 

does not wish to be placed on a ventilator if respiratory status continues to 

decline.  Pulmonary following closely and prognosis remains quite guarded.  

Patient continues on Lovenox along with dexamethasone and vitamin and zinc 

supplements and is being started on Baricitinib.  Patient is extremely weak and 

becomes extremely dyspneic with minimal exertion.  Patient's oral intake 

continues to be extremely poor and again encouraged and stressed the importance 

of eating regardless of not feeling hungry and having no appetite.  May consider

enteral nutrition and dietary consult.  Patient states her diarrhea has 

significantly improved and will use Imodium as needed and discontinue Questran. 

Repeat inflammatory markers ordered and pending.  Chest x-ray today shows 

bilateral airspace disease again noted with prominence and interstitial, and 

findings consistent with pneumonia.





12/11/2021


Patient admitted with bilateral common pneumonia , Her respiratory status today 

is a slightly worse as she is dependent on BiPAP throughout the day.  Also she 

has decreased air entry on both sides


She is saturating 88% while she is on BiPAP.


D-dimer is the same, 4.0 and 3.9.


She continued on dexamethasone, vitamin C, vitamin D, zinc,  barcitininb and 

Lovenox 40 mg twice a day.  Also she is on Protonix





12/12/2021


Sitting in chair looks mildly tachypneic and tolerates BiPAP which she uses his 

yesterday all the time.  She has not been eating since yesterday and may switch 

her for short time to nonrebreather and air or so she can read some oral diet   

Per pulmonary team recommendation.  Other than that she is hemodynamically 

stable.  Labs are stable.Calcitonin 0.19.  C. diff is negative.


She remains on dexamethasone, vitamin C, Z and Lovenox, Protonix and  barcitinib







12/13/2021


This morning she was still on BiPAP, and she was refusing to start TPN as she 

could not come off the BiPAP.  However later on during the day she could be 

placed on nonrebreather 15 L and Aravo 60 L with 94% so she can tolerate oral 

feeding.


Other vitals are stable, afebrile.  Labs are stable as well.


She continued on dexamethasone, vitamin C, vitamin D, zinc,  barcitininb and 

Lovenox 40 mg twice a day





12/14/2021


Patient with bilateral: With pneumonia and hypoxia, she is improving gradually 

today, today she couldn't get off her BiPAP and placed on airvo 60 L with FiO2 

of 85%, with occasional nonrebreather 15 L, patient was happy that she was able 

to eat today and she tolerates diet well.  She has some diarrhea but no 

abdominal pain.  C. diff is negative.


Other than that she is hemodynamically stable


Patient continued today on  dexamethasone, vitamin C, Z and Lovenox, Protonix 

and  barcitinib 


Follow-up chest x-ray and CBC/BMP in the morning





Objective





- Vital Signs


Vital signs: 


                                   Vital Signs











Temp  98.2 F   12/14/21 10:26


 


Pulse  71   12/14/21 10:26


 


Resp  18   12/14/21 10:26


 


BP  145/73   12/14/21 10:26


 


Pulse Ox  84 L  12/14/21 10:26








                                 Intake & Output











 12/13/21 12/14/21 12/14/21





 18:59 06:59 18:59


 


Intake Total  500 


 


Output Total  400 


 


Balance  100 


 


Intake:   


 


  Oral  500 


 


Output:   


 


  Urine  400 


 


Other:   


 


  Voiding Method  Bedside Commode Bedside Commode


 


  # Voids 3 1 


 


  # Bowel Movements 1  














- Exam





-GENERAL: The patient is alert and oriented , on BiPAP, not in any acute 

distress. Well developed, well nourished. 


HEENT: Pupils are round and equally reacting to light. EOMI. No scleral icterus.

No conjunctival pallor. Normocephalic, atraumatic. No pharyngeal erythema. No 

thyromegaly. 


CARDIOVASCULAR: S1 and S2 present. No murmurs, rubs, or gallops. 


-PULMONARY: Chest is clear to auscultation, no wheezing.  Bilateral crepitation 

and decreased air entry on both sides


ABDOMEN: Soft, nontender, nondistended, normoactive bowel sounds. No palpable 

organomegaly. 


MUSCULOSKELETAL: No joint swelling or deformity. 


EXTREMITIES: No cyanosis, clubbing, or pedal edema. 


NEUROLOGICAL: Gross neurological examination did not reveal any focal deficits. 


SKIN: No rashes. no petechiae.





- Labs


CBC & Chem 7: 


                                 12/13/21 06:30





                                 12/13/21 06:30





Assessment and Plan


Assessment: 





-acute hypoxic respiratory failure: secondary to COVID-19 pneumonia 


-chronic hypercapnic respiratory failure secondary to COPD uses 3 L of oxygen at

home


-Diarrhea most likely secondary to Covid, resolved


-Hypernatremia, improving, sodium is 140 a.m. we'll continue D5 in water and 

repeat a.m. labs 


-Severe hypomagnesemia, improved


-Elevated d-dimer secondary to Covid and patient is on Lovenox which will be 

continued, Lovenox is currently twice daily


-Acute renal failure, prerenal, improved with IV fluids


-Hypertension


-Mild anion gap and metabolic acidosis probability of lactic acidosis, most like

ly secondary to increased amounts of diarrhea and dehydration, lactic acid is 

1.1


-gastroesophageal reflux disease


-Hyperlipidemia


-Hypertension


-History of pulmonary fibrosis


-Sleep apnea uses CPAP machine at home


-Peripheral vascular disease


-Coronary artery disease


-DVT prophylaxis: On Lovenox which will be continued











Plan: 





This is a pleasant 66 years old female who presents with bilateral covid 

pneumonia


Continue with dexamethasone


Continue with vitamin C, vitamin D and zinc


Pulmonary consult 


Also he is on barcitinib


Labs and medication were reviewed..  Continue same treatment.  Continue with 

symptomatic treatment.  Resume home medication.  Monitor lytes and vitals.  DVT 

and GI prophylaxis.  Further recommendations as per clinical course of the 

patient


DVT prophylaxis: Subcutaneous Lovenox


GI Prophylaxis: Ppi


Prognosis is guarded

## 2021-12-14 NOTE — P.PN
Subjective


Progress Note Date: 12/14/21





This is a pleasant 66-year-old female patient who follows at the [facility] for her

primary care needs.  She has history of hypertension, hyperlipidemia, 

gastroesophageal reflux disease, anxiety/depression, aortic thrombectomy in 

2007, former smoker, COPD, obstructive sleep apnea utilizing CPAP.  He is here 

yesterday to the emergency room with a four-week history of shortness of breath,

cough, congestion, body aches and fevers.  She was tested negative for CoVID 2.

 Her  is positive for CoVID and she is now positive for CoVID.  She is 

not vaccinated.  She does have home oxygen use at 2-3 L.  She is currently 

requiring 10 L high flow nasal cannula to maintain O2 saturations at 90%.  Chest

x-ray reveals bilateral patchy infiltrates. White count 5.9.  Hemoglobin 12.5.  

Platelets 398.  Lymphocytes 0.5.  D-dimer 1.89.  Sodium 140.  Potassium 4.9.  

Creatinine 1.2.  AST 39.  ALT 26.  Alk phos 156.  LDH 1283.  C-reactive protein 

21.7.  Pro-calcitonin 0.32.  She's been initiated on Decadron, Lovenox, vitamin 

supplements.  0.9% normal saline at 75 ML's per hour.





On 12/08/2021 patient is in follow-up on medical surgical floor, she is resting 

comfortably in bed, she states she is feeling better, although she is requiring 

more oxygen compared to when she first presented to the emergency department, 

note that patient does wear home oxygen at 2 L for history of COPD.  She is 

currently on 10 L of oxygen, her pulse ox is 91-92%, breathing comfortably, lung

sounds reveal coarse crackles at bilateral bases, but no wheezing, no rhonchi.  

CT angiogram of the chest showed no evidence of pulmonary embolism, it did show 

pulmonary emphysema and pulmonary interstitial fibrosis with increased inter

stitial infiltrates compared to her old exam.  There was no evidence of a 

suspicious pulmonary mass.  Patient was outside the window for Remdesivir, she 

continues on Decadron 6 mg twice daily, her pro-calcitonin level on admission 

was 0.32, and patient was covered with Rocephin for possibility of underlying 

bacterial infection.  She is on prophylactic Lovenox 40 mg daily.  CTA [place] 

Ye of the chest showed no evidence of pulmonary embolism. 0





On 12/09/2001 patient seen in follow-up on medical surgical floor, she is 

currently on 10 L of oxygen the pulse ox of 86%, FiO2 has since been increased 

to 15 L, she has a mild cough, but overall seems to be in no acute distress, she

is sitting up in the chair, denies any chest discomfort, looks quite 

comfortable.  Lung sounds reveal coarse crackles bilateral bases, she does get 

short of breath with exertion.  She states if she sits still she is breathing 

comfortably, and her O2 saturations are at or above 90%.  She's had no fever or 

chills overnight, she remains on Decadron 40 mg twice daily.  She is on empiric 

antibiotics in the form of Rocephin and IV fluids at 75 ML per hour.  Today's 

labs have been reviewed, white blood cell count is 8.6, hemoglobin is 12, 

platelet count is 506, d-dimer is 4.01, sodium is 147, potassium is 4.8, chlor

zachariah is 112, BUN of 20 creatinine 0.7, renal profile has significantly improved 

since admission.  LDH has significantly improved since admission and is down to 

348 on today's labs, CRP is still pending, her last pro-calcitonin is negative 

at 0.13.  Blood culture has shown no growth thus far.





On 12/10/2021 patient seen in follow-up on medical surgical floor, she had been 

on 15 L high flow and 100% nonrebreather mask up until this morning, this 

morning patient's FiO2 requirements had increased, patient was getting up in the

chair, she is significantly desaturated, she was placed on irritable at 60 L and

FiO2 of 90% in addition to 100% nonrebreather, and patient is taking quite a 

while to recover her O2 saturations although her work of breathing is not 

increased.  She is still awake and alert, she is oriented 3, she is able to 

make her needs known, her pulse ox is slowly recovering at rest, with deep 

breathing, pursed lip breathing, repositioning.  Overnight she has had no fever 

or chills, blood pressures have been stable.  Breathing fairly comfortably 

although she desaturates with any little exertion.  Currently she is on Decadron

6 Twice daily, she will be started on Baricitinib, patient is also on Plavix, 

and prophylactic Lovenox.  Today's chest x-ray and labs are still pending, she 

remains on D5W at a rate of 100 ML per hour for dehydration related to diarrhea 

and hypernatremia, repeat electrolytes and renal profile are still pending for 

today.  No abdominal pain, no nausea.  The diarrhea has significantly improved 

in the last 48 hours.  Patient is tolerating oral intake, no abdominal pain. 





The patient is seen today 12/11/2021 in follow-up on the regular medical floor. 

She is currently sitting up in a chair at the bedside.  She is now on BiPAP 12/6

and 100% FiO2 with O2 saturations in the mid to upper 80s.  She remains alert.  

No further diarrhea.  No abdominal pain.  The cultures revealed no growth.  D-

dimer 3.98.  She remains on Baricitinib, Decadron, vitamin supplements.  She is 

continued on bronchodilators.








The patient is seen today 12/14/2021 in follow-up on the regular medical floor. 

She is currently sitting up in a chair at the bedside.  She remains on AirVo 

high flow oxygen at 60 L and 85% FiO2.  O2 saturations 84-89%.  Occasionally 

she'll put on a nonrebreather mask.  She's afebrile.  Hemodynamically stable.  

Blood cultures revealed no growth.  C. difficile screen is negative.  She is 

continued on Decadron, Lovenox, vitamin supplements.  She remains on 

Baricitinib.  Continued on bronchodilators. 





Objective





- Vital Signs


Vital signs: 


                                   Vital Signs











Temp  98.2 F   12/14/21 10:26


 


Pulse  71   12/14/21 10:26


 


Resp  18   12/14/21 10:26


 


BP  145/73   12/14/21 10:26


 


Pulse Ox  89 L  12/14/21 13:05








                                 Intake & Output











 12/13/21 12/14/21 12/14/21





 18:59 06:59 18:59


 


Intake Total  500 


 


Output Total  400 


 


Balance  100 


 


Intake:   


 


  Oral  500 


 


Output:   


 


  Urine  400 


 


Other:   


 


  Voiding Method  Bedside Commode Bedside Commode


 


  # Voids 3 1 


 


  # Bowel Movements 1  














- Exam





GENERAL EXAM: Alert, very pleasant, 66-year-old female, on on AirVo high flow 

oxygen at 60 L and 85% FiO2, occasional nonrebreather mask, up in the chair, she

desats quite rapidly with any exertion.


HEAD: Normocephalic/atraumatic.


EYES: Normal reaction of pupils, equal size.  Conjunctiva pink, sclera white.


NOSE: Clear with pink turbinates.


THROAT: No erythema or exudates.


NECK: No masses, no JVD, no thyroid enlargement, no adenopathy.


CHEST: No chest wall deformity.  Symmetrical expansion. 


LUNGS: Equal air entry with with coarse bilateral crackles


CVS: Regular rate and rhythm, normal S1 and S2, no gallops, no murmurs, no rubs


ABDOMEN: Soft, nontender.  No hepatosplenomegaly, normal bowel sounds, no 

guarding or rigidity.


EXTREMITIES: No clubbing, no edema, no cyanosis, 2+ pulses and upper and lower 

extremities.


MUSCULOSKELETAL: Muscle strength and tone normal.


SPINE: No scoliosis or deformity


SKIN: No rashes


CENTRAL NERVOUS SYSTEM: No focal deficits, tone is normal in all 4 extremities.


PSYCHIATRIC: Alert and oriented -3.  Appropriate affect.  Intact judgment and 

insight.





- Labs


CBC & Chem 7: 


                                 12/13/21 06:30





                                 12/13/21 06:30





Assessment and Plan


Assessment: 





1 Acute on chronic hypoxic respiratory failure secondary to COVID-19 pneumonia. 

Not vaccinated.  The patient is oxygen requirements have increased.  She's 

currently on BiPAP 12/600% FiO2.  Initiated on Baricitinib yesterday.





2 Elevated inflammatory markers secondary to above





3 No evidence of acute infection, antibiotics discontinued and started on 

Baricitinib 





4 Obesity





5 Obstructive sleep apnea, on CPAP





6 Hypertension





7 Hyperlipidemia





8 Anxiety/depression





9 History of coronary disease with previous stent placement





10 History of aortic thrombectomy/aortobifem bypass in 2007





11 Multiple medication ALLERGIES





12 Former smoker.  





Plan:





Currently up in a chair at the bedside


On AirVo high flow oxygen at 60 L and 85% FiO2


Occasional nonrebreather mask in addition


Continue Baricitinib


Continue Decadron, Lovenox, vitamin supplements


Continue bronchodilators


Titrate the FiO2 as tolerated


Follow-up chest x-ray and labs in the a.m.


She is a DO NOT INTUBATE CODE STATUS


We'll continue to follow and make further recommendations based on her clinical 

status





I, the cosigning physician, performed a history & physical examination of the 

patient. Lungs sounds with basilar coarse crackles, diminished.  Maintaining O2 

saturations in the 80s on AirVo high flow oxygen at 60 L and 85% FiO2.  I 

discussed the assessment and plan of care with my nurse practitioner, Velia Short. I attest to the above note as dictated by her.

## 2021-12-14 NOTE — PN
PROGRESS NOTE



DATE OF SERVICE:

12/14/2021



REASON FOR FOLLOWUP:

COVID-19 pneumonia.



INTERVAL HISTORY:

The patient is afebrile.  The patient is currently down to AIRVO. She mentioned she is

breathing slightly comfortably.  The patient denies having any chest pain. No worsening

cough or sputum production.  No abdominal pain or diarrhea.



PHYSICAL EXAMINATION:

Blood pressure 128/71 with a pulse of 80, temperature 98.5. She is 91% on high-flow

AIRVO. General description is an elderly female up in a chair in no distress.

Respiratory system: Unlabored breathing, decreased intensity of breath sounds. No

wheeze.  Heart S1, S2.  Regular rate and rhythm.  Abdomen soft, no tenderness.



LABS:

Hemoglobin is 12.4, white count 10.4, creatinine 0.72.



DIAGNOSTIC IMPRESSION AND PLAN:

Patient with acute respiratory failure secondary to COVID-19 pneumonia. This patient

seems to have shown some clinical improvement.  Patient to continue with baricitinib,

dexamethasone, Lovenox, zinc and ascorbic acid. To continue along with respiratory

support and monitor clinical course closely.





MMODL / IJN: [id number] / Job#: [id number]

## 2021-12-15 LAB
ALBUMIN SERPL-MCNC: 3.4 G/DL (ref 3.8–4.9)
ALBUMIN/GLOB SERPL: 1.48 G/DL (ref 1.6–3.17)
ALP SERPL-CCNC: 130 U/L (ref 41–126)
ALT SERPL-CCNC: 21 U/L (ref 8–44)
ANION GAP SERPL CALC-SCNC: 11.6 MMOL/L (ref 10–18)
AST SERPL-CCNC: 16 U/L (ref 13–35)
BASOPHILS # BLD AUTO: 0.02 X 10*3/UL (ref 0–0.1)
BASOPHILS NFR BLD AUTO: 0.3 %
BUN SERPL-SCNC: 19.4 MG/DL (ref 9–27)
BUN/CREAT SERPL: 24.25 RATIO (ref 12–20)
CALCIUM SPEC-MCNC: 9.3 MG/DL (ref 8.7–10.3)
CHLORIDE SERPL-SCNC: 105 MMOL/L (ref 96–109)
CO2 SERPL-SCNC: 25.4 MMOL/L (ref 20–27.5)
EOSINOPHIL # BLD AUTO: 0.05 X 10*3/UL (ref 0.04–0.35)
EOSINOPHIL NFR BLD AUTO: 0.7 %
ERYTHROCYTE [DISTWIDTH] IN BLOOD BY AUTOMATED COUNT: 4.48 X 10*6/UL (ref 4.1–5.2)
ERYTHROCYTE [DISTWIDTH] IN BLOOD: 15.3 % (ref 11.5–14.5)
GLOBULIN SER CALC-MCNC: 2.3 G/DL (ref 1.6–3.3)
GLUCOSE SERPL-MCNC: 134 MG/DL (ref 70–110)
HCT VFR BLD AUTO: 38.8 % (ref 37.2–46.3)
HGB BLD-MCNC: 11.9 G/DL (ref 12–15)
LYMPHOCYTES # SPEC AUTO: 0.91 X 10*3/UL (ref 0.9–5)
LYMPHOCYTES NFR SPEC AUTO: 12.7 %
MCH RBC QN AUTO: 26.6 PG (ref 27–32)
MCHC RBC AUTO-ENTMCNC: 30.7 G/DL (ref 32–37)
MCV RBC AUTO: 86.6 FL (ref 80–97)
MONOCYTES # BLD AUTO: 0.56 X 10*3/UL (ref 0.2–1)
MONOCYTES NFR BLD AUTO: 7.8 %
NEUTROPHILS # BLD AUTO: 5.47 X 10*3/UL (ref 1.8–7.7)
NEUTROPHILS NFR BLD AUTO: 76 %
PLATELET # BLD AUTO: 383 X 10*3/UL (ref 140–440)
POTASSIUM SERPL-SCNC: 4.7 MMOL/L (ref 3.5–5.5)
PROT SERPL-MCNC: 5.7 G/DL (ref 6.2–8.2)
SODIUM SERPL-SCNC: 142 MMOL/L (ref 135–145)
WBC # BLD AUTO: 7.19 X 10*3/UL (ref 4.5–10)

## 2021-12-15 RX ADMIN — ATORVASTATIN CALCIUM SCH MG: 80 TABLET, FILM COATED ORAL at 08:03

## 2021-12-15 RX ADMIN — METOPROLOL SUCCINATE SCH MG: 25 TABLET, EXTENDED RELEASE ORAL at 08:04

## 2021-12-15 RX ADMIN — ENOXAPARIN SODIUM SCH MG: 40 INJECTION SUBCUTANEOUS at 08:05

## 2021-12-15 RX ADMIN — OXYCODONE HYDROCHLORIDE AND ACETAMINOPHEN SCH MG: 500 TABLET ORAL at 08:04

## 2021-12-15 RX ADMIN — ACETAMINOPHEN PRN MG: 325 TABLET, FILM COATED ORAL at 08:02

## 2021-12-15 RX ADMIN — Medication SCH MG: at 08:03

## 2021-12-15 RX ADMIN — OXYCODONE HYDROCHLORIDE AND ACETAMINOPHEN SCH MG: 500 TABLET ORAL at 20:26

## 2021-12-15 RX ADMIN — BARICITINIB SCH MG: 2 TABLET, FILM COATED ORAL at 08:04

## 2021-12-15 RX ADMIN — SERTRALINE HYDROCHLORIDE SCH MG: 100 TABLET ORAL at 08:03

## 2021-12-15 RX ADMIN — PANTOPRAZOLE SODIUM SCH MG: 40 TABLET, DELAYED RELEASE ORAL at 08:03

## 2021-12-15 RX ADMIN — LORATADINE SCH MG: 10 TABLET ORAL at 08:03

## 2021-12-15 RX ADMIN — CLOPIDOGREL BISULFATE SCH MG: 75 TABLET ORAL at 08:03

## 2021-12-15 RX ADMIN — ENOXAPARIN SODIUM SCH MG: 40 INJECTION SUBCUTANEOUS at 20:27

## 2021-12-15 RX ADMIN — TIOTROPIUM BROMIDE INHALATION SPRAY SCH PUFF: 3.12 SPRAY, METERED RESPIRATORY (INHALATION) at 07:59

## 2021-12-15 RX ADMIN — ACETAMINOPHEN PRN MG: 325 TABLET, FILM COATED ORAL at 15:01

## 2021-12-15 RX ADMIN — DEXTROSE SCH MG: 50 INJECTION, SOLUTION INTRAVENOUS at 08:05

## 2021-12-15 RX ADMIN — Medication SCH MCG: at 08:03

## 2021-12-15 RX ADMIN — DEXTROSE SCH MG: 50 INJECTION, SOLUTION INTRAVENOUS at 20:27

## 2021-12-15 RX ADMIN — VALSARTAN SCH MG: 80 TABLET ORAL at 08:05

## 2021-12-15 NOTE — PN
PROGRESS NOTE



DATE OF SERVICE:

12/15/2021



REASON FOR FOLLOWUP:

COVID-19 pneumonia.



INTERVAL HISTORY:

The patient is afebrile.  The patient is breathing slightly comfortably. The patient

denies having any chest pain or worsening cough or sputum production. No vomiting. No

abdominal pain or diarrhea.



PHYSICAL EXAMINATION:

Blood pressure 127/69, pulse of _____, temperature 97.6. General description is an

elderly female up in the chair in no distress. Respiratory system: Unlabored breathing,

decreased intensity of breath sounds. No wheeze.  Heart S1, S2.  Regular rate and

rhythm.  Abdomen soft, no tenderness.



LABS:

Hemoglobin is 11.9, white count 7.1, creatinine 0.8.



DIAGNOSTIC IMPRESSION AND PLAN:

Patient with acute COVID-19 pneumonia in this patient with minimal clinical

improvement. Patient to continue with baricitinib, Lovenox, dexamethasone, zinc and

ascorbic acid along with respiratory support.  Monitor clinical course closely.





MMODL / IJN: [id number] / Job#: [id number]

## 2021-12-15 NOTE — P.PN
Subjective





Patient is a 66-year-old the female with past medical history of COPD and 3 L 

oxygen dependent at home has cold symptoms for about a month comes in with 

shortness of breath presently on telemetry dysfunction CT of the chest did not 

show any pulmonary embolism but did show pulmonary fibrosis as well as 

infiltrates.  Below.  Patient doesn't have any infiltrate consistent with 

pneumonia patient denied any dysuria.  Patient is presently on Rocephin.  

Patient has mild acute renal failure because of which I'm holding ACE inhibitor.

 Severely hypomagnesemic magnesium is being replaced.  Patient last smoked about

17 years ago.





12/08/2021





Patient is seen and evaluated in follow-up and currently maintained on 10 L high

flow and weaning as tolerated.  Patient was on 15 L high flow nasal cannula this

morning and tolerating and nursing staff continuing to wean.  Patient normally 

wears 3 L of oxygen continuously in the outpatient setting.  Patient denies any 

worsening shortness of breath just generalized fatigue and weakness and 

continued diarrhea.  Patient states she is going approximately 2-3 times per day

will add C. diff testing and if negative will add Imodium and Questran.  

Pulmonary is following and venous Doppler was ordered bilateral lower 

extremities which is negative for DVT as d-dimer is elevated today at 2.42.  

Inflammatory markers significantly improved his LDH is 338 and CRP is 7.0, 

repeat magnesium after replacement is 2.9. 





12/09/2021





Patient is seen and evaluated in follow-up this morning currently sitting up in 

the chair and was on 10 L high flow although oxygen saturations were below 90% 

and patient bumped back up to 15 L.  Patient is complaining of the oxygen being 

too high and making her feel more anxious and discussed with nursing staff about

continuing to wean as tolerated.  She continues with diarrhea although is 

improving and C. diff testing was negative and patient was started on Questran 

along with Imodium.  Patient continues to state she has no real appetite 

although encourage the patient to continue with small frequent meals and will 

add Magic cups as well.  Encourage the patient to increase oral intake as she is

weak and needs the energy.  She normally maintained on 3 L via nasal cannula and

will continue to titrate as tolerated.  Pulmonary is following patient is 

maintained on IV dexamethasone twice daily along with vitamin and zinc 

supplements, Lovenox twice daily for elevated d-dimer and will continue.  

Patient was on normal saline and considered discontinuing as patient sodium is 

now 147 and patient is being started on D5 in water and will repeat labs.





12/10/2021


Patient is seen in follow-up this morning currently sitting up in the chair and 

oxygen requirements have increased and patient is now maintained on Airvo along 

with 15 L nonrebreather and oxygen saturations have been in the low 80s to 90 

maximum.  Discussed CODE STATUS with the patient and patient is a full code but 

does not wish to be placed on a ventilator if respiratory status continues to 

decline.  Pulmonary following closely and prognosis remains quite guarded.  

Patient continues on Lovenox along with dexamethasone and vitamin and zinc 

supplements and is being started on Baricitinib.  Patient is extremely weak and 

becomes extremely dyspneic with minimal exertion.  Patient's oral intake 

continues to be extremely poor and again encouraged and stressed the importance 

of eating regardless of not feeling hungry and having no appetite.  May consider

enteral nutrition and dietary consult.  Patient states her diarrhea has 

significantly improved and will use Imodium as needed and discontinue Questran. 

Repeat inflammatory markers ordered and pending.  Chest x-ray today shows 

bilateral airspace disease again noted with prominence and interstitial, and 

findings consistent with pneumonia.





12/11/2021


Patient admitted with bilateral common pneumonia , Her respiratory status today 

is a slightly worse as she is dependent on BiPAP throughout the day.  Also she 

has decreased air entry on both sides


She is saturating 88% while she is on BiPAP.


D-dimer is the same, 4.0 and 3.9.


She continued on dexamethasone, vitamin C, vitamin D, zinc,  barcitininb and 

Lovenox 40 mg twice a day.  Also she is on Protonix





12/12/2021


Sitting in chair looks mildly tachypneic and tolerates BiPAP which she uses his 

yesterday all the time.  She has not been eating since yesterday and may switch 

her for short time to nonrebreather and air or so she can read some oral diet   

Per pulmonary team recommendation.  Other than that she is hemodynamically 

stable.  Labs are stable.Calcitonin 0.19.  C. diff is negative.


She remains on dexamethasone, vitamin C, Z and Lovenox, Protonix and  barcitinib







12/13/2021


This morning she was still on BiPAP, and she was refusing to start TPN as she 

could not come off the BiPAP.  However later on during the day she could be 

placed on nonrebreather 15 L and Aravo 60 L with 94% so she can tolerate oral 

feeding.


Other vitals are stable, afebrile.  Labs are stable as well.


She continued on dexamethasone, vitamin C, vitamin D, zinc,  barcitininb and 

Lovenox 40 mg twice a day





12/14/2021


Patient with bilateral: With pneumonia and hypoxia, she is improving gradually 

today, today she couldn't get off her BiPAP and placed on airvo 60 L with FiO2 

of 85%, with occasional nonrebreather 15 L, patient was happy that she was able 

to eat today and she tolerates diet well.  She has some diarrhea but no 

abdominal pain.  C. diff is negative.


Other than that she is hemodynamically stable


Patient continued today on  dexamethasone, vitamin C, Z and Lovenox, Protonix 

and  barcitinib 


Follow-up chest x-ray and CBC/BMP in the morning





12/15/2021


Patient today is pleasant and smiling as her pulmonary illness is improving 

progressively but slowly, but she still currently on high flow nasal cannula 

with 55 L/m and FiO2 of 88% saturation in low 90s.  Rest of vitals are stable.  

Labs including CBC, BMP are unremarkable.


C. diff test came back negative and her diarrhea stopped today.  She is able to 

eat about 50-25% of her meals and she is satisfied with that for now.


She still on steroids with dexamethasone, vitamin C, vitamin D, zinc, Lovenox 40

mg twice a day, Protonix and barcitininb 





Objective





- Vital Signs


Vital signs: 


                                   Vital Signs











Temp  98.2 F   12/15/21 10:03


 


Pulse  69   12/15/21 10:03


 


Resp  17   12/15/21 10:03


 


BP  145/76   12/15/21 10:03


 


Pulse Ox  88 L  12/15/21 11:46








                                 Intake & Output











 12/14/21 12/15/21 12/15/21





 18:59 06:59 18:59


 


Other:   


 


  Voiding Method Bedside Commode Bedside Commode Bedside Commode


 


  # Voids 2 1 


 


  # Bowel Movements 1  














- Exam





-GENERAL: The patient is alert and oriented , on BiPAP, not in any acute 

distress. Well developed, well nourished. 


HEENT: Pupils are round and equally reacting to light. EOMI. No scleral icterus.

No conjunctival pallor. Normocephalic, atraumatic. No pharyngeal erythema. No 

thyromegaly. 


CARDIOVASCULAR: S1 and S2 present. No murmurs, rubs, or gallops. 


-PULMONARY: Chest is clear to auscultation, no wheezing.  Bilateral crepitation 

and decreased air entry on both sides


ABDOMEN: Soft, nontender, nondistended, normoactive bowel sounds. No palpable 

organomegaly. 


MUSCULOSKELETAL: No joint swelling or deformity. 


EXTREMITIES: No cyanosis, clubbing, or pedal edema. 


NEUROLOGICAL: Gross neurological examination did not reveal any focal deficits. 


SKIN: No rashes. no petechiae.





- Labs


CBC & Chem 7: 


                                 12/15/21 06:24





                                 12/15/21 06:24


Labs: 


                  Abnormal Lab Results - Last 24 Hours (Table)











  12/15/21 12/15/21 Range/Units





  06:24 06:24 


 


Hgb  11.9 L   (12.0-15.0)  g/dL


 


MCH  26.6 L   (27.0-32.0)  pg


 


MCHC  30.7 L   (32.0-37.0)  g/dL


 


RDW  15.3 H   (11.5-14.5)  %


 


Immature Gran #  0.18 H   (0.00-0.04)  X 10*3/uL


 


BUN/Creatinine Ratio   24.25 H  (12.00-20.00)  Ratio


 


Glucose   134 H  ()  mg/dL


 


Alkaline Phosphatase   130 H  ()  U/L


 


Total Protein   5.7 L  (6.2-8.2)  g/dL


 


Albumin   3.4 L  (3.8-4.9)  g/dL


 


Albumin/Globulin Ratio   1.48 L  (1.60-3.17)  g/dL














Assessment and Plan


Assessment: 





-acute hypoxic respiratory failure: secondary to COVID-19 pneumonia 


-chronic hypercapnic respiratory failure secondary to COPD uses 3 L of oxygen at

home


-Diarrhea most likely secondary to Covid, resolved


-Hypernatremia, improving, sodium is 140 a.m. we'll continue D5 in water and 

repeat a.m. labs 


-Severe hypomagnesemia, improved


-Elevated d-dimer secondary to Covid and patient is on Lovenox which will be 

continued, Lovenox is currently twice daily


-Acute renal failure, prerenal, improved with IV fluids


-Hypertension


-Mild anion gap and metabolic acidosis probability of lactic acidosis, most 

likely secondary to increased amounts of diarrhea and dehydration, lactic acid 

is 1.1


-gastroesophageal reflux disease


-Hyperlipidemia


-Hypertension


-History of pulmonary fibrosis


-Sleep apnea uses CPAP machine at home


-Peripheral vascular disease


-Coronary artery disease


-DVT prophylaxis: On Lovenox which will be continued











Plan: 





This is a pleasant 66 years old female who presents with bilateral covid 

pneumonia


Continue with dexamethasone


Continue with vitamin C, vitamin D and zinc


Pulmonary consult 


Also he is on barcitinib


Labs and medication were reviewed..  Continue same treatment.  Continue with 

symptomatic treatment.  Resume home medication.  Monitor lytes and vitals.  DVT 

and GI prophylaxis.  Further recommendations as per clinical course of the 

patient


DVT prophylaxis: Subcutaneous Lovenox


GI Prophylaxis: Ppi


Prognosis is guarded

## 2021-12-15 NOTE — P.PN
Subjective


Progress Note Date: 12/15/21


Principal diagnosis: 


 Dyspnea, hypoxia, COVID-19





This is a pleasant 66-year-old female patient who follows at the [facility] for her

primary care needs.  She has history of hypertension, hyperlipidemia, 

gastroesophageal reflux disease, anxiety/depression, aortic thrombectomy in 2007

, former smoker, COPD, obstructive sleep apnea utilizing CPAP.  He is here 

yesterday to the emergency room with a four-week history of shortness of breath,

cough, congestion, body aches and fevers.  She was tested negative for CoVID 2.

 Her  is positive for CoVID and she is now positive for CoVID.  She is 

not vaccinated.  She does have home oxygen use at 2-3 L.  She is currently 

requiring 10 L high flow nasal cannula to maintain O2 saturations at 90%.  Chest

x-ray reveals bilateral patchy infiltrates. White count 5.9.  Hemoglobin 12.5.  

Platelets 398.  Lymphocytes 0.5.  D-dimer 1.89.  Sodium 140.  Potassium 4.9.  

Creatinine 1.2.  AST 39.  ALT 26.  Alk phos 156.  LDH 1283.  C-reactive protein 

21.7.  Pro-calcitonin 0.32.  She's been initiated on Decadron, Lovenox, vitamin 

supplements.  0.9% normal saline at 75 ML's per hour.





On 12/08/2021 patient is in follow-up on medical surgical floor, she is resting 

comfortably in bed, she states she is feeling better, although she is requiring 

more oxygen compared to when she first presented to the emergency department, 

note that patient does wear home oxygen at 2 L for history of COPD.  She is 

currently on 10 L of oxygen, her pulse ox is 91-92%, breathing comfortably, lung

sounds reveal coarse crackles at bilateral bases, but no wheezing, no rhonchi.  

CT angiogram of the chest showed no evidence of pulmonary embolism, it did show 

pulmonary emphysema and pulmonary interstitial fibrosis with increased 

interstitial infiltrates compared to her old exam.  There was no evidence of a 

suspicious pulmonary mass.  Patient was outside the window for Remdesivir, she 

continues on Decadron 6 mg twice daily, her pro-calcitonin level on admission 

was 0.32, and patient was covered with Rocephin for possibility of underlying 

bacterial infection.  She is on prophylactic Lovenox 40 mg daily.  [facility] of the chest showed no evidence of pulmonary embolism. 0





On 12/09/2001 patient seen in follow-up on medical surgical floor, she is c

urrently on 10 L of oxygen the pulse ox of 86%, FiO2 has since been increased to

15 L, she has a mild cough, but overall seems to be in no acute distress, she is

sitting up in the chair, denies any chest discomfort, looks quite comfortable.  

Lung sounds reveal coarse crackles bilateral bases, she does get short of breath

with exertion.  She states if she sits still she is breathing comfortably, and 

her O2 saturations are at or above 90%.  She's had no fever or chills overnight,

she remains on Decadron 40 mg twice daily.  She is on empiric antibiotics in the

form of Rocephin and IV fluids at 75 ML per hour.  Today's labs have been 

reviewed, white blood cell count is 8.6, hemoglobin is 12, platelet count is 

506, d-dimer is 4.01, sodium is 147, potassium is 4.8, chloride is 112, BUN of 

20 creatinine 0.7, renal profile has significantly improved since admission.  

LDH has significantly improved since admission and is down to 348 on today's 

labs, CRP is still pending, her last pro-calcitonin is negative at 0.13.  Blood 

culture has shown no growth thus far.





On 12/10/2021 patient seen in follow-up on medical surgical floor, she had been 

on 15 L high flow and 100% nonrebreather mask up until this morning, this 

morning patient's FiO2 requirements had increased, patient was getting up in the

chair, she is significantly desaturated, she was placed on irritable at 60 L and

FiO2 of 90% in addition to 100% nonrebreather, and patient is taking quite a 

while to recover her O2 saturations although her work of breathing is not 

increased.  She is still awake and alert, she is oriented 3, she is able to 

make her needs known, her pulse ox is slowly recovering at rest, with deep 

breathing, pursed lip breathing, repositioning.  Overnight she has had no fever 

or chills, blood pressures have been stable.  Breathing fairly comfortably 

although she desaturates with any little exertion.  Currently she is on Decadron

6 Twice daily, she will be started on Baricitinib, patient is also on Plavix, 

and prophylactic Lovenox.  Today's chest x-ray and labs are still pending, she 

remains on D5W at a rate of 100 ML per hour for dehydration related to diarrhea 

and hypernatremia, repeat electrolytes and renal profile are still pending for 

today.  No abdominal pain, no nausea.  The diarrhea has significantly improved 

in the last 48 hours.  Patient is tolerating oral intake, no abdominal pain. 





On 12/12/2021 patient seen in follow-up on medical surgical floor.  She is alert

and alert, she is on BiPAP support with pressures of 14 and 8 and FiO2 100%.  

Earlier this morning her BiPAP pressures have been increased from 12.6 in the 

100% FiO2.  Her pulse oximeter readings are marginal at around 90%, with any 

exertion patient does desaturate and takes her a while to recover although she 

looks very comfortable, nontachypneic, she is sitting up in the recliner, she is

watching TV.  She is complaining of being hungry.  She has not been able to take

much by mouth related to being BiPAP dependent.  She has been afebrile, 

hemodynamics are stable.  Her last chest x-ray from 12/10/2021 showed bilateral 

airspace disease with prominence of interstitium with no evident pneumothorax or

pleural effusion.  Today's labs have been reviewed showing white blood cell 

count of 10.6, hemoglobin 12.2, d-dimer and yesterday's labs was down to 3.98, 

electrolytes and renal profile were within normal limits.  Per pro-calcitonin 

level was negative at 0.19.  Patient remains on Baricitinib, Decadron 6 mg twice

daily, she is receiving IV fluids with D5 W at 100 ML per hour





On 12/13/2021 patient seen in follow-up on medical surgical floor, she was 

switched to Airvo at 60 L and 90%, and nonrebreather mask, she is maintaining 

her O2 saturations between 88 and 91%, she is breathing comfortably, tolerating 

it for a few hours now, she was able to eat something, she is taking in oral 

fluids, she is awake and alert, she is sitting up in the chair, she is being her

bills.  Lung sounds are positive for scattered crackles throughout the lung 

fields, no cough, appears very comfortable.  He is on [place] neb, she is on 

Decadron, and Lovenox.  She is on D5W at a rate of 100 ML per hour related to 

her nothing by mouth status however in view of her tolerating oral intake now, 

and not been BiPAP dependent anymore we'll stop the IV fluids.  Today's labs 

have been reviewed and her white blood cell count is 10.4, hemoglobin is 12.6, 

her electrolytes have been reviewed and are within normal limits, sodium is 141,

potassium is 3.9, chloride is 103, BUN is 15, creatinine is 0.72.  Her 

procalcitonin level was negative.





On 12/15/2021 patient seen in follow-up on medical surgical floor, she is 

sitting up in the recliner, she states her breathing is improving, she is on 

Airvo, and we were able to wean down the flow and FiO2, and the flow is 

currently at 55 L/m, and FiO2 is currently at 88%, her pulse ox is ranging 

between 86-93%, breathing comfortably, minimal crackles at bilateral bases, 

occasional cough, patient has been get not to the bedside commode, tolerates 

activity well, her spirits are high, her oral intake is fair.  She continues on 

Baricitinib, she continues on Decadron 6 blood gram twice daily, and Lovenox 40 

mg twice daily, today's labs have been reviewed showing white blood cell count 

is 7.19, hemoglobin of 11.9, platelet count is 383, electrolytes and renal 

profile are unremarkable.  Cdiff negative.





Objective





- Vital Signs


Vital signs: 


                                   Vital Signs











Temp  98.0 F   12/15/21 14:00


 


Pulse  70   12/15/21 14:00


 


Resp  20   12/15/21 14:00


 


BP  141/67   12/15/21 14:00


 


Pulse Ox  88 L  12/15/21 11:46








                                 Intake & Output











 12/14/21 12/15/21 12/15/21





 18:59 06:59 18:59


 


Other:   


 


  Voiding Method Bedside Commode Bedside Commode Bedside Commode


 


  # Voids 2 1 


 


  # Bowel Movements 1  














- Exam


 GENERAL EXAM: Alert, very pleasant, 66-year-old white female, on low at 55 L an

FiO2 of 88%, and satting 86-91% patient is breathing comfortably, does not 

appear to be in any acute distress


HEAD: Normocephalic/atraumatic.


EYES: Normal reaction of pupils, equal size.  Conjunctiva pink, sclera white.


NOSE: Clear with pink turbinates.


THROAT: No erythema or exudates.


NECK: No masses, no JVD, no thyroid enlargement, no adenopathy.


CHEST: No chest wall deformity.  Symmetrical expansion. 


LUNGS: Equal air entry with with coarse bilateral crackles


CVS: Regular rate and rhythm, normal S1 and S2, no gallops, no murmurs, no rubs


ABDOMEN: Soft, nontender.  No hepatosplenomegaly, normal bowel sounds, no 

guarding or rigidity.


EXTREMITIES: No clubbing, no edema, no cyanosis, 2+ pulses and upper and lower 

extremities.


MUSCULOSKELETAL: Muscle strength and tone normal.


SPINE: No scoliosis or deformity


SKIN: No rashes


CENTRAL NERVOUS SYSTEM: Alert and oriented -3.  No focal deficits, tone is 

normal in all 4 extremities.


PSYCHIATRIC: Alert and oriented -3.  Appropriate affect.  Intact judgment and 

insight.











- Labs


CBC & Chem 7: 


                                 12/15/21 06:24





                                 12/15/21 06:24


Labs: 


                  Abnormal Lab Results - Last 24 Hours (Table)











  12/15/21 12/15/21 Range/Units





  06:24 06:24 


 


Hgb  11.9 L   (12.0-15.0)  g/dL


 


MCH  26.6 L   (27.0-32.0)  pg


 


MCHC  30.7 L   (32.0-37.0)  g/dL


 


RDW  15.3 H   (11.5-14.5)  %


 


Immature Gran #  0.18 H   (0.00-0.04)  X 10*3/uL


 


BUN/Creatinine Ratio   24.25 H  (12.00-20.00)  Ratio


 


Glucose   134 H  ()  mg/dL


 


Alkaline Phosphatase   130 H  ()  U/L


 


Total Protein   5.7 L  (6.2-8.2)  g/dL


 


Albumin   3.4 L  (3.8-4.9)  g/dL


 


Albumin/Globulin Ratio   1.48 L  (1.60-3.17)  g/dL














Assessment and Plan


Plan: 


 Assessment:





#1.  Acute on chronic hypoxic respiratory failure secondary to acute COVID-19 

pneumonia, patient is not vaccinated related to multiple ALLERGIES, patient 

presented with a 4 week history of symptoms, she was outside the window for 

Remdesivir, she is being treated supportively with Decadron, prophylactic 

Lovenox, and empiric antibiotics.  Patient was placed on irritable at 60 L and 

90% FiO2 with nonrebreather mask today on 12/10/2021, we'll start the patient on

Baricitinib.  On 12/12/2021 patient is on BiPAP support remains BiPAP support 

dependent at pressures of 14 and 1800% FiO2 although clinically is comfortable. 

On 12/13/2021 patient has been switched to Airvo at 60 L and 90% and 

nonrebreather mask, tolerating it well so far





#2.  Mildly elevated pro calcitonin, patient is on empiric antibiotics in the 

form of Rocephin, pro calcitonin has improved, no definite sign of infection, 

Rocephin has been discontinued





#3.  Elevated inflammatory markers secondary to the viral pneumonia, improving





#4.  Morbid obesity with BMI of 39.1 kg/m





#5.  Obstructive sleep apnea on CPAP therapy





#6.  History of COPD with chronic hypoxic respiratory failure usually wears 2 L 

of oxygen on the outpatient basis, and CT angiogram of the chest also revealed 

evidence of interstitial pulmonary fibrosis





#7.  Hyperlipidemia





#8.  Anxiety/depression





#9.  History of coronary artery disease with previous stent placement





#10.  History of aortic thrombectomy/aortobifem bypass in 2007





#11.  Former smoker





Plan:





Continue Baricitinib


Continue current dose Decadron 6 blood gram twice daily, continue Lovenox and 

COVID-19 multivitamins


Continue weaning FiO2, currently Airvo is at 55 L and FiO2 of 88%


Accept O2 saturations at 85% and better as the patient is asymptomatic


Patient remains comfortable


Follow-up chest x-ray and inflammatory markers and d-dimer tomorrow








I performed a history & physical examination of the patient and discussed their 

management with my nurse practitioner, Jeannine Belcher.  I reviewed the nurse 

practitioner's note and agree with the documented findings and plan of care.  

Lung sounds are positive for diffuse crackles throughout the lung fields.  The 

findings and the impression was discussed with the patient.  I attest to the 

documentation by the nurse practitioner. 











Time with Patient: Less than 30

## 2021-12-16 LAB
ALBUMIN SERPL-MCNC: 3.4 G/DL (ref 3.5–5)
ALBUMIN/GLOB SERPL: 1.3 {RATIO}
ALP SERPL-CCNC: 131 U/L (ref 38–126)
ALT SERPL-CCNC: 23 U/L (ref 4–34)
ANION GAP SERPL CALC-SCNC: 12 MMOL/L
AST SERPL-CCNC: 25 U/L (ref 14–36)
BASOPHILS # BLD AUTO: 0 K/UL (ref 0–0.2)
BASOPHILS NFR BLD AUTO: 0 %
BUN SERPL-SCNC: 21 MG/DL (ref 7–17)
CALCIUM SPEC-MCNC: 9.2 MG/DL (ref 8.4–10.2)
CHLORIDE SERPL-SCNC: 103 MMOL/L (ref 98–107)
CO2 SERPL-SCNC: 26 MMOL/L (ref 22–30)
EOSINOPHIL # BLD AUTO: 0.1 K/UL (ref 0–0.7)
EOSINOPHIL NFR BLD AUTO: 1 %
ERYTHROCYTE [DISTWIDTH] IN BLOOD BY AUTOMATED COUNT: 4.58 M/UL (ref 3.8–5.4)
ERYTHROCYTE [DISTWIDTH] IN BLOOD: 14.7 % (ref 11.5–15.5)
GLOBULIN SER CALC-MCNC: 2.6 G/DL
GLUCOSE SERPL-MCNC: 100 MG/DL (ref 74–99)
HCT VFR BLD AUTO: 39.6 % (ref 34–46)
HGB BLD-MCNC: 12.6 GM/DL (ref 11.4–16)
LDH SPEC-CCNC: 1029 U/L (ref 313–618)
LYMPHOCYTES # SPEC AUTO: 1 K/UL (ref 1–4.8)
LYMPHOCYTES NFR SPEC AUTO: 11 %
MCH RBC QN AUTO: 27.6 PG (ref 25–35)
MCHC RBC AUTO-ENTMCNC: 31.9 G/DL (ref 31–37)
MCV RBC AUTO: 86.4 FL (ref 80–100)
MONOCYTES # BLD AUTO: 0.4 K/UL (ref 0–1)
MONOCYTES NFR BLD AUTO: 4 %
NEUTROPHILS # BLD AUTO: 7.6 K/UL (ref 1.3–7.7)
NEUTROPHILS NFR BLD AUTO: 83 %
PLATELET # BLD AUTO: 354 K/UL (ref 150–450)
POTASSIUM SERPL-SCNC: 4.3 MMOL/L (ref 3.5–5.1)
PROT SERPL-MCNC: 6 G/DL (ref 6.3–8.2)
SODIUM SERPL-SCNC: 141 MMOL/L (ref 137–145)
WBC # BLD AUTO: 9.2 K/UL (ref 3.8–10.6)

## 2021-12-16 RX ADMIN — DEXTROSE SCH MG: 50 INJECTION, SOLUTION INTRAVENOUS at 08:57

## 2021-12-16 RX ADMIN — Medication SCH MCG: at 08:57

## 2021-12-16 RX ADMIN — OXYCODONE HYDROCHLORIDE AND ACETAMINOPHEN SCH MG: 500 TABLET ORAL at 19:51

## 2021-12-16 RX ADMIN — TIOTROPIUM BROMIDE INHALATION SPRAY SCH PUFF: 3.12 SPRAY, METERED RESPIRATORY (INHALATION) at 09:54

## 2021-12-16 RX ADMIN — ENOXAPARIN SODIUM SCH MG: 40 INJECTION SUBCUTANEOUS at 08:56

## 2021-12-16 RX ADMIN — PANTOPRAZOLE SODIUM SCH MG: 40 TABLET, DELAYED RELEASE ORAL at 08:57

## 2021-12-16 RX ADMIN — METOPROLOL SUCCINATE SCH MG: 25 TABLET, EXTENDED RELEASE ORAL at 08:57

## 2021-12-16 RX ADMIN — ACETAMINOPHEN PRN MG: 325 TABLET, FILM COATED ORAL at 10:37

## 2021-12-16 RX ADMIN — ATORVASTATIN CALCIUM SCH MG: 80 TABLET, FILM COATED ORAL at 08:58

## 2021-12-16 RX ADMIN — GUAIFENESIN AND DEXTROMETHORPHAN PRN ML: 100; 10 SYRUP ORAL at 10:38

## 2021-12-16 RX ADMIN — SERTRALINE HYDROCHLORIDE SCH MG: 100 TABLET ORAL at 08:57

## 2021-12-16 RX ADMIN — LORATADINE SCH MG: 10 TABLET ORAL at 08:58

## 2021-12-16 RX ADMIN — VALSARTAN SCH MG: 80 TABLET ORAL at 08:59

## 2021-12-16 RX ADMIN — OXYCODONE HYDROCHLORIDE AND ACETAMINOPHEN SCH MG: 500 TABLET ORAL at 08:56

## 2021-12-16 RX ADMIN — BARICITINIB SCH MG: 2 TABLET, FILM COATED ORAL at 08:58

## 2021-12-16 RX ADMIN — ENOXAPARIN SODIUM SCH MG: 40 INJECTION SUBCUTANEOUS at 19:51

## 2021-12-16 RX ADMIN — DEXTROSE SCH MG: 50 INJECTION, SOLUTION INTRAVENOUS at 19:51

## 2021-12-16 RX ADMIN — CLOPIDOGREL BISULFATE SCH MG: 75 TABLET ORAL at 08:57

## 2021-12-16 RX ADMIN — Medication SCH MG: at 08:57

## 2021-12-16 RX ADMIN — LOPERAMIDE HYDROCHLORIDE PRN MG: 2 CAPSULE ORAL at 10:37

## 2021-12-16 RX ADMIN — TIOTROPIUM BROMIDE INHALATION SPRAY SCH: 3.12 SPRAY, METERED RESPIRATORY (INHALATION) at 09:00

## 2021-12-16 NOTE — PN
PROGRESS NOTE



DATE OF SERVICE:

12/16/2021



REASON FOR FOLLOWUP:

COVID-19 pneumonia.



INTERVAL HISTORY:

The patient is afebrile.  The patient is currently on AIRVO. The patient denies having

any chest pain or worsening cough.  No abdominal pain or diarrhea.



PHYSICAL EXAMINATION:

Blood pressure 122/69, pulse 68, temperature 97.7.  She is 88% on 90% FiO2. General

description is an elderly female up in the bed in no distress. Respiratory system:

Unlabored breathing, decreased intensity of breath sounds. No wheeze.  Heart S1, S2.

Regular rate and rhythm.  Abdomen soft, no tenderness.



LABS:

Hemoglobin is 12.6, white count 9.2, creatinine 0.73.



DIAGNOSTIC IMPRESSION AND PLAN:

Patient with acute respiratory failure with COVID-19 pneumonia. Patient is currently on

baricitinib, dexamethasone, Lovenox, zinc and ascorbic acid; to continue along with

respiratory support. Monitor clinical course closely.  Prognosis remains guarded.





MMODL / IJN: [id number] / Job#: [id number]

## 2021-12-16 NOTE — P.PN
Subjective


Progress Note Date: 12/16/21


Principal diagnosis: 


 Dyspnea, hypoxia, COVID-19





This is a pleasant 66-year-old female patient who follows at the [facility] for her

primary care needs.  She has history of hypertension, hyperlipidemia, 

gastroesophageal reflux disease, anxiety/depression, aortic thrombectomy in 2007

, former smoker, COPD, obstructive sleep apnea utilizing CPAP.  He is here 

yesterday to the emergency room with a four-week history of shortness of breath,

cough, congestion, body aches and fevers.  She was tested negative for CoVID 2.

 Her  is positive for CoVID and she is now positive for CoVID.  She is 

not vaccinated.  She does have home oxygen use at 2-3 L.  She is currently 

requiring 10 L high flow nasal cannula to maintain O2 saturations at 90%.  Chest

x-ray reveals bilateral patchy infiltrates. White count 5.9.  Hemoglobin 12.5.  

Platelets 398.  Lymphocytes 0.5.  D-dimer 1.89.  Sodium 140.  Potassium 4.9.  

Creatinine 1.2.  AST 39.  ALT 26.  Alk phos 156.  LDH 1283.  C-reactive protein 

21.7.  Pro-calcitonin 0.32.  She's been initiated on Decadron, Lovenox, vitamin 

supplements.  0.9% normal saline at 75 ML's per hour.





On 12/08/2021 patient is in follow-up on medical surgical floor, she is resting 

comfortably in bed, she states she is feeling better, although she is requiring 

more oxygen compared to when she first presented to the emergency department, 

note that patient does wear home oxygen at 2 L for history of COPD.  She is 

currently on 10 L of oxygen, her pulse ox is 91-92%, breathing comfortably, lung

sounds reveal coarse crackles at bilateral bases, but no wheezing, no rhonchi.  

CT angiogram of the chest showed no evidence of pulmonary embolism, it did show 

pulmonary emphysema and pulmonary interstitial fibrosis with increased 

interstitial infiltrates compared to her old exam.  There was no evidence of a 

suspicious pulmonary mass.  Patient was outside the window for Remdesivir, she 

continues on Decadron 6 mg twice daily, her pro-calcitonin level on admission 

was 0.32, and patient was covered with Rocephin for possibility of underlying 

bacterial infection.  She is on prophylactic Lovenox 40 mg daily.  [facility] showed no evidence of pulmonary embolism. 0





On 12/09/2001 patient seen in follow-up on medical surgical floor, she is c

urrently on 10 L of oxygen the pulse ox of 86%, FiO2 has since been increased to

15 L, she has a mild cough, but overall seems to be in no acute distress, she is

sitting up in the chair, denies any chest discomfort, looks quite comfortable.  

Lung sounds reveal coarse crackles bilateral bases, she does get short of breath

with exertion.  She states if she sits still she is breathing comfortably, and 

her O2 saturations are at or above 90%.  She's had no fever or chills overnight,

she remains on Decadron 40 mg twice daily.  She is on empiric antibiotics in the

form of Rocephin and IV fluids at 75 ML per hour.  Today's labs have been 

reviewed, white blood cell count is 8.6, hemoglobin is 12, platelet count is 

506, d-dimer is 4.01, sodium is 147, potassium is 4.8, chloride is 112, BUN of 

20 creatinine 0.7, renal profile has significantly improved since admission.  

LDH has significantly improved since admission and is down to 348 on today's 

labs, CRP is still pending, her last pro-calcitonin is negative at 0.13.  Blood 

culture has shown no growth thus far.





On 12/10/2021 patient seen in follow-up on medical surgical floor, she had been 

on 15 L high flow and 100% nonrebreather mask up until this morning, this 

morning patient's FiO2 requirements had increased, patient was getting up in the

chair, she is significantly desaturated, she was placed on irritable at 60 L and

FiO2 of 90% in addition to 100% nonrebreather, and patient is taking quite a 

while to recover her O2 saturations although her work of breathing is not 

increased.  She is still awake and alert, she is oriented 3, she is able to 

make her needs known, her pulse ox is slowly recovering at rest, with deep 

breathing, pursed lip breathing, repositioning.  Overnight she has had no fever 

or chills, blood pressures have been stable.  Breathing fairly comfortably 

although she desaturates with any little exertion.  Currently she is on Decadron

6 Twice daily, she will be started on Baricitinib, patient is also on Plavix, 

and prophylactic Lovenox.  Today's chest x-ray and labs are still pending, she 

remains on D5W at a rate of 100 ML per hour for dehydration related to diarrhea 

and hypernatremia, repeat electrolytes and renal profile are still pending for 

today.  No abdominal pain, no nausea.  The diarrhea has significantly improved 

in the last 48 hours.  Patient is tolerating oral intake, no abdominal pain. 





On 12/12/2021 patient seen in follow-up on medical surgical floor.  She is alert

and alert, she is on BiPAP support with pressures of 14 and 8 and FiO2 100%.  

Earlier this morning her BiPAP pressures have been increased from 12.6 in the 

100% FiO2.  Her pulse oximeter readings are marginal at around 90%, with any 

exertion patient does desaturate and takes her a while to recover although she 

looks very comfortable, nontachypneic, she is sitting up in the recliner, she is

watching TV.  She is complaining of being hungry.  She has not been able to take

much by mouth related to being BiPAP dependent.  She has been afebrile, 

hemodynamics are stable.  Her last chest x-ray from 12/10/2021 showed bilateral 

airspace disease with prominence of interstitium with no evident pneumothorax or

pleural effusion.  Today's labs have been reviewed showing white blood cell 

count of 10.6, hemoglobin 12.2, d-dimer and yesterday's labs was down to 3.98, 

electrolytes and renal profile were within normal limits.  Per pro-calcitonin 

level was negative at 0.19.  Patient remains on Baricitinib, Decadron 6 mg twice

daily, she is receiving IV fluids with D5 W at 100 ML per hour





On 12/13/2021 patient seen in follow-up on medical surgical floor, she was 

switched to Airvo at 60 L and 90%, and nonrebreather mask, she is maintaining 

her O2 saturations between 88 and 91%, she is breathing comfortably, tolerating 

it for a few hours now, she was able to eat something, she is taking in oral 

fluids, she is awake and alert, she is sitting up in the chair, she is being her

bills.  Lung sounds are positive for scattered crackles throughout the lung 

fields, no cough, appears very comfortable.  He is on [place] neb, she is on 

Decadron, and Lovenox.  She is on D5W at a rate of 100 ML per hour related to 

her nothing by mouth status however in view of her tolerating oral intake now, 

and not been BiPAP dependent anymore we'll stop the IV fluids.  Today's labs 

have been reviewed and her white blood cell count is 10.4, hemoglobin is 12.6, 

her electrolytes have been reviewed and are within normal limits, sodium is 141,

potassium is 3.9, chloride is 103, BUN is 15, creatinine is 0.72.  Her 

procalcitonin level was negative.





On 12/15/2021 patient seen in follow-up on medical surgical floor, she is 

sitting up in the recliner, she states her breathing is improving, she is on 

Airvo, and we were able to wean down the flow and FiO2, and the flow is 

currently at 55 L/m, and FiO2 is currently at 88%, her pulse ox is ranging 

between 86-93%, breathing comfortably, minimal crackles at bilateral bases, 

occasional cough, patient has been get not to the bedside commode, tolerates 

activity well, her spirits are high, her oral intake is fair.  She continues on 

Baricitinib, she continues on Decadron 6 blood gram twice daily, and Lovenox 40 

mg twice daily, today's labs have been reviewed showing white blood cell count 

is 7.19, hemoglobin of 11.9, platelet count is 383, electrolytes and renal 

profile are unremarkable.  Cdiff negative.





On 12/16/2021 patient seen in follow-up on medical surgical floor.  She is awake

and alert, in no acute distress, she is currently resting in bed, she is on her 

left side and patient has been compliant with salt repositioning in bed from 

side to side.  She is breathing comfortably, remains on Airvo at 55 L and FiO2 

of 87%, and she was also placed on Airvo because her pulse ox was less than 85%,

her current O2 saturations are around 88%, patient seems to breathing quite 

comfortably.  Today's chest x-ray shows persistent but slightly improving 

bilateral pneumonia. patient continues on Baricitinib, Decadron 6 mg twice 

daily, she is on Lovenox 40 mg twice daily, COVID-19 vitamins.  His labs have 

been reviewed and there was down to 1.63, CBC was within normal limits, 

electrolytes and renal profile were unremarkable.  Today's LDH is 1029, CRP is 

1.7.  Vital signs have been stable, patient has been afebrile.





Objective





- Vital Signs


Vital signs: 


                                   Vital Signs











Temp  97.2 F L  12/16/21 09:35


 


Pulse  76   12/16/21 09:35


 


Resp  22   12/16/21 06:00


 


BP  114/60   12/16/21 09:35


 


Pulse Ox  88 L  12/16/21 13:11








                                 Intake & Output











 12/15/21 12/16/21 12/16/21





 18:59 06:59 18:59


 


Other:   


 


  Voiding Method Bedside Commode  Bedside Commode


 


  # Voids 3 1 














- Exam


 GENERAL EXAM: Alert, very pleasant, 66-year-old white female, on low at 55 L an

FiO2 of 88%, and nonrebreather mask satting 88% patient is breathing 

comfortably, does not appear to be in any acute distress


HEAD: Normocephalic/atraumatic.


EYES: Normal reaction of pupils, equal size.  Conjunctiva pink, sclera white.


NOSE: Clear with pink turbinates.


THROAT: No erythema or exudates.


NECK: No masses, no JVD, no thyroid enlargement, no adenopathy.


CHEST: No chest wall deformity.  Symmetrical expansion. 


LUNGS: Equal air entry with with coarse bilateral crackles


CVS: Regular rate and rhythm, normal S1 and S2, no gallops, no murmurs, no rubs


ABDOMEN: Soft, nontender.  No hepatosplenomegaly, normal bowel sounds, no 

guarding or rigidity.


EXTREMITIES: No clubbing, no edema, no cyanosis, 2+ pulses and upper and lower 

extremities.


MUSCULOSKELETAL: Muscle strength and tone normal.


SPINE: No scoliosis or deformity


SKIN: No rashes


CENTRAL NERVOUS SYSTEM: Alert and oriented -3.  No focal deficits, tone is norm

al in all 4 extremities.


PSYCHIATRIC: Alert and oriented -3.  Appropriate affect.  Intact judgment and 

insight.











- Labs


CBC & Chem 7: 


                                 12/16/21 06:19





                                 12/16/21 06:19


Labs: 


                  Abnormal Lab Results - Last 24 Hours (Table)











  12/16/21 12/16/21 Range/Units





  06:19 06:19 


 


D-Dimer   1.63 H  (<0.60)  mg/L FEU


 


BUN  21 H   (7-17)  mg/dL


 


Glucose  100 H   (74-99)  mg/dL


 


Alkaline Phosphatase  131 H   ()  U/L


 


Lactate Dehydrogenase  1029 H   (313-618)  U/L


 


C-Reactive Protein  1.7 H   (<1.0)  mg/dL


 


Total Protein  6.0 L   (6.3-8.2)  g/dL


 


Albumin  3.4 L   (3.5-5.0)  g/dL














Assessment and Plan


Plan: 


 Assessment:





#1.  Acute on chronic hypoxic respiratory failure secondary to acute COVID-19 

pneumonia, patient is not vaccinated related to multiple ALLERGIES, patient 

presented with a 4 week history of symptoms, she was outside the window for 

Remdesivir, she is being treated supportively with Decadron, prophylactic 

Lovenox, and empiric antibiotics.  Patient was placed on irritable at 60 L and 

90% FiO2 with nonrebreather mask today on 12/10/2021, we'll start the patient on

Baricitinib.  On 12/12/2021 patient is on BiPAP support remains BiPAP support 

dependent at pressures of 14 and 1800% FiO2 although clinically is comfortable. 

On 12/13/2021 patient has been switched to Airvo at 60 L and 90% and 

nonrebreather mask, tolerating it well so far





#2.  Mildly elevated pro calcitonin, patient is on empiric antibiotics in the 

form of Rocephin, pro calcitonin has improved, no definite sign of infection, 

Rocephin has been discontinued





#3.  Elevated inflammatory markers secondary to the viral pneumonia, improving





#4.  Morbid obesity with BMI of 39.1 kg/m





#5.  Obstructive sleep apnea on CPAP therapy





#6.  History of COPD with chronic hypoxic respiratory failure usually wears 2 L 

of oxygen on the outpatient basis, and CT angiogram of the chest also revealed 

evidence of interstitial pulmonary fibrosis





#7.  Hyperlipidemia





#8.  Anxiety/depression





#9.  History of coronary artery disease with previous stent placement





#10.  History of aortic thrombectomy/aortobifem bypass in 2007





#11.  Former smoker





Plan:





Continue Baricitinib


Continue Decadron, and Lovenox


Lower extremity Dopplers and CT angiogram the chest were negative for DVT and PE


Weaning FiO2 to keep O2 sat saturation's at or above 85%


Clinical patient is stable, she seems to be very comfortable


We'll continue to follow her clinical course


Today's chest x-ray has been reviewed showing some improvement in bilateral 

COVID pneumonia


We'll continue to follow





I performed a history & physical examination of the patient and discussed their 

management with my nurse practitioner, Jeannine Belcher.  I reviewed the nurse 

practitioner's note and agree with the documented findings and plan of care.  

Lung sounds are positive for diffuse crackles throughout the lung fields.  The 

findings and the impression was discussed with the patient.  I attest to the 

documentation by the nurse practitioner. 











Time with Patient: Less than 30

## 2021-12-16 NOTE — XR
EXAMINATION TYPE: XR chest 1V portable

 

DATE OF EXAM: 12/16/2021

 

Comparison: 12/10/2021

 

Clinical History: 66-year-old female covid

 

Findings:

Spinal stimulator array centered along the lower third thoracic spinal canal. Artifact limits of norm
al in size. Diffuse round lucencies suggest underlying emphysema. Interstitial and groundglass densit
ies mid and lower lungs persist but have become slightly less confluence.

 

 

Impression:

Persistent but slightly improving bilateral COVID pneumonia on a backdrop of COPD.

## 2021-12-16 NOTE — P.PN
Subjective





Patient is a 66-year-old the female with past medical history of COPD and 3 L 

oxygen dependent at home has cold symptoms for about a month comes in with 

shortness of breath presently on telemetry dysfunction CT of the chest did not 

show any pulmonary embolism but did show pulmonary fibrosis as well as 

infiltrates.  Below.  Patient doesn't have any infiltrate consistent with 

pneumonia patient denied any dysuria.  Patient is presently on Rocephin.  

Patient has mild acute renal failure because of which I'm holding ACE inhibitor.

 Severely hypomagnesemic magnesium is being replaced.  Patient last smoked about

17 years ago.





12/08/2021





Patient is seen and evaluated in follow-up and currently maintained on 10 L high

flow and weaning as tolerated.  Patient was on 15 L high flow nasal cannula this

morning and tolerating and nursing staff continuing to wean.  Patient normally 

wears 3 L of oxygen continuously in the outpatient setting.  Patient denies any 

worsening shortness of breath just generalized fatigue and weakness and 

continued diarrhea.  Patient states she is going approximately 2-3 times per day

will add C. diff testing and if negative will add Imodium and Questran.  

Pulmonary is following and venous Doppler was ordered bilateral lower 

extremities which is negative for DVT as d-dimer is elevated today at 2.42.  

Inflammatory markers significantly improved his LDH is 338 and CRP is 7.0, 

repeat magnesium after replacement is 2.9. 





12/09/2021





Patient is seen and evaluated in follow-up this morning currently sitting up in 

the chair and was on 10 L high flow although oxygen saturations were below 90% 

and patient bumped back up to 15 L.  Patient is complaining of the oxygen being 

too high and making her feel more anxious and discussed with nursing staff about

continuing to wean as tolerated.  She continues with diarrhea although is 

improving and C. diff testing was negative and patient was started on Questran 

along with Imodium.  Patient continues to state she has no real appetite 

although encourage the patient to continue with small frequent meals and will 

add Magic cups as well.  Encourage the patient to increase oral intake as she is

weak and needs the energy.  She normally maintained on 3 L via nasal cannula and

will continue to titrate as tolerated.  Pulmonary is following patient is 

maintained on IV dexamethasone twice daily along with vitamin and zinc 

supplements, Lovenox twice daily for elevated d-dimer and will continue.  

Patient was on normal saline and considered discontinuing as patient sodium is 

now 147 and patient is being started on D5 in water and will repeat labs.





12/10/2021


Patient is seen in follow-up this morning currently sitting up in the chair and 

oxygen requirements have increased and patient is now maintained on Airvo along 

with 15 L nonrebreather and oxygen saturations have been in the low 80s to 90 

maximum.  Discussed CODE STATUS with the patient and patient is a full code but 

does not wish to be placed on a ventilator if respiratory status continues to 

decline.  Pulmonary following closely and prognosis remains quite guarded.  

Patient continues on Lovenox along with dexamethasone and vitamin and zinc 

supplements and is being started on Baricitinib.  Patient is extremely weak and 

becomes extremely dyspneic with minimal exertion.  Patient's oral intake 

continues to be extremely poor and again encouraged and stressed the importance 

of eating regardless of not feeling hungry and having no appetite.  May consider

enteral nutrition and dietary consult.  Patient states her diarrhea has 

significantly improved and will use Imodium as needed and discontinue Questran. 

Repeat inflammatory markers ordered and pending.  Chest x-ray today shows 

bilateral airspace disease again noted with prominence and interstitial, and 

findings consistent with pneumonia.





12/11/2021


Patient admitted with bilateral common pneumonia , Her respiratory status today 

is a slightly worse as she is dependent on BiPAP throughout the day.  Also she 

has decreased air entry on both sides


She is saturating 88% while she is on BiPAP.


D-dimer is the same, 4.0 and 3.9.


She continued on dexamethasone, vitamin C, vitamin D, zinc,  barcitininb and 

Lovenox 40 mg twice a day.  Also she is on Protonix





12/12/2021


Sitting in chair looks mildly tachypneic and tolerates BiPAP which she uses his 

yesterday all the time.  She has not been eating since yesterday and may switch 

her for short time to nonrebreather and air or so she can read some oral diet   

Per pulmonary team recommendation.  Other than that she is hemodynamically 

stable.  Labs are stable.Calcitonin 0.19.  C. diff is negative.


She remains on dexamethasone, vitamin C, Z and Lovenox, Protonix and  barcitinib







12/13/2021


This morning she was still on BiPAP, and she was refusing to start TPN as she 

could not come off the BiPAP.  However later on during the day she could be 

placed on nonrebreather 15 L and Aravo 60 L with 94% so she can tolerate oral 

feeding.


Other vitals are stable, afebrile.  Labs are stable as well.


She continued on dexamethasone, vitamin C, vitamin D, zinc,  barcitininb and 

Lovenox 40 mg twice a day





12/14/2021


Patient with bilateral: With pneumonia and hypoxia, she is improving gradually 

today, today she couldn't get off her BiPAP and placed on airvo 60 L with FiO2 

of 85%, with occasional nonrebreather 15 L, patient was happy that she was able 

to eat today and she tolerates diet well.  She has some diarrhea but no 

abdominal pain.  C. diff is negative.


Other than that she is hemodynamically stable


Patient continued today on  dexamethasone, vitamin C, Z and Lovenox, Protonix 

and  barcitinib 


Follow-up chest x-ray and CBC/BMP in the morning





12/15/2021


Patient today is pleasant and smiling as her pulmonary illness is improving 

progressively but slowly, but she still currently on high flow nasal cannula 

with 55 L/m and FiO2 of 88% saturation in low 90s.  Rest of vitals are stable.  

Labs including CBC, BMP are unremarkable.


C. diff test came back negative and her diarrhea stopped today.  She is able to 

eat about 50-25% of her meals and she is satisfied with that for now.


She still on steroids with dexamethasone, vitamin C, vitamin D, zinc, Lovenox 40

mg twice a day, Protonix and barcitininb 





12/16/2021


Patient today her breathing slightly worsened and her oxygen requirement today 

was 55 L with 90%, she still can go a little difficulty due to her dyspnea and 

she confirmed me she is able to eat with no issues.


She sitting in chair today.  Hemodynamically stable.


Labs reviewed and shows stability, her d-dimer actually improved 3.9 down to 

1.6.  LDH went up to 1029 and CRP 3.4.


Chest x-ray shows slight improvement in aeration in both lung fields despite 

bilateral pneumonia.


She remains on dexamethasone, vitamin C, D and zinc and  Protonix and 

barcitininb .





Objective





- Vital Signs


Vital signs: 


                                   Vital Signs











Temp  97.2 F L  12/16/21 09:35


 


Pulse  76   12/16/21 09:35


 


Resp  22   12/16/21 06:00


 


BP  114/60   12/16/21 09:35


 


Pulse Ox  84 L  12/16/21 09:35








                                 Intake & Output











 12/15/21 12/16/21 12/16/21





 18:59 06:59 18:59


 


Other:   


 


  Voiding Method Bedside Commode  Bedside Commode


 


  # Voids 3 1 














- Exam





-GENERAL: The patient is alert and oriented , on BiPAP, not in any acute 

distress. Well developed, well nourished. 


HEENT: Pupils are round and equally reacting to light. EOMI. No scleral icterus.

No conjunctival pallor. Normocephalic, atraumatic. No pharyngeal erythema. No 

thyromegaly. 


CARDIOVASCULAR: S1 and S2 present. No murmurs, rubs, or gallops. 


-PULMONARY: Chest is clear to auscultation, no wheezing.  Bilateral crepitation 

and decreased air entry on both sides


ABDOMEN: Soft, nontender, nondistended, normoactive bowel sounds. No palpable 

organomegaly. 


MUSCULOSKELETAL: No joint swelling or deformity. 


EXTREMITIES: No cyanosis, clubbing, or pedal edema. 


NEUROLOGICAL: Gross neurological examination did not reveal any focal deficits. 


SKIN: No rashes. no petechiae.





- Labs


CBC & Chem 7: 


                                 12/16/21 06:19





                                 12/16/21 06:19


Labs: 


                  Abnormal Lab Results - Last 24 Hours (Table)











  12/16/21 12/16/21 Range/Units





  06:19 06:19 


 


D-Dimer   1.63 H  (<0.60)  mg/L FEU


 


BUN  21 H   (7-17)  mg/dL


 


Glucose  100 H   (74-99)  mg/dL


 


Alkaline Phosphatase  131 H   ()  U/L


 


Lactate Dehydrogenase  1029 H   (313-618)  U/L


 


C-Reactive Protein  1.7 H   (<1.0)  mg/dL


 


Total Protein  6.0 L   (6.3-8.2)  g/dL


 


Albumin  3.4 L   (3.5-5.0)  g/dL














Assessment and Plan


Assessment: 





-acute hypoxic respiratory failure: secondary to COVID-19 pneumonia 


-chronic hypercapnic respiratory failure secondary to COPD uses 3 L of oxygen at

home


-Diarrhea most likely secondary to Covid, resolved


-Hypernatremia, improving, sodium is 140 a.m. we'll continue D5 in water and 

repeat a.m. labs 


-Severe hypomagnesemia, improved


-Elevated d-dimer secondary to Covid and patient is on Lovenox which will be 

continued, Lovenox is currently twice daily


-Acute renal failure, prerenal, improved with IV fluids


-Hypertension


-Mild anion gap and metabolic acidosis probability of lactic acidosis, most 

likely secondary to increased amounts of diarrhea and dehydration, lactic acid 

is 1.1


-gastroesophageal reflux disease


-Hyperlipidemia


-Hypertension


-History of pulmonary fibrosis


-Sleep apnea uses CPAP machine at home


-Peripheral vascular disease


-Coronary artery disease


-DVT prophylaxis: On Lovenox which will be continued











Plan: 





This is a pleasant 66 years old female who presents with bilateral covid 

pneumonia


Continue with dexamethasone


Continue with vitamin C, vitamin D and zinc


Pulmonary consult 


Also he is on barcitinib


Labs and medication were reviewed..  Continue same treatment.  Continue with 

symptomatic treatment.  Resume home medication.  Monitor lytes and vitals.  DVT 

and GI prophylaxis.  Further recommendations as per clinical course of the 

patient


DVT prophylaxis: Subcutaneous Lovenox


GI Prophylaxis: Ppi


Prognosis is guarded

## 2021-12-17 LAB
ALBUMIN SERPL-MCNC: 3.6 G/DL (ref 3.5–5)
ALBUMIN/GLOB SERPL: 1.3 {RATIO}
ALP SERPL-CCNC: 138 U/L (ref 38–126)
ALT SERPL-CCNC: 25 U/L (ref 4–34)
ANION GAP SERPL CALC-SCNC: 11 MMOL/L
AST SERPL-CCNC: 29 U/L (ref 14–36)
BASOPHILS # BLD AUTO: 0 K/UL (ref 0–0.2)
BASOPHILS NFR BLD AUTO: 0 %
BUN SERPL-SCNC: 21 MG/DL (ref 7–17)
CALCIUM SPEC-MCNC: 9.6 MG/DL (ref 8.4–10.2)
CHLORIDE SERPL-SCNC: 103 MMOL/L (ref 98–107)
CO2 SERPL-SCNC: 27 MMOL/L (ref 22–30)
EOSINOPHIL # BLD AUTO: 0.1 K/UL (ref 0–0.7)
EOSINOPHIL NFR BLD AUTO: 1 %
ERYTHROCYTE [DISTWIDTH] IN BLOOD BY AUTOMATED COUNT: 4.97 M/UL (ref 3.8–5.4)
ERYTHROCYTE [DISTWIDTH] IN BLOOD: 15.2 % (ref 11.5–15.5)
GLOBULIN SER CALC-MCNC: 2.8 G/DL
GLUCOSE SERPL-MCNC: 93 MG/DL (ref 74–99)
HCT VFR BLD AUTO: 43.3 % (ref 34–46)
HGB BLD-MCNC: 13.9 GM/DL (ref 11.4–16)
LYMPHOCYTES # SPEC AUTO: 1.2 K/UL (ref 1–4.8)
LYMPHOCYTES NFR SPEC AUTO: 12 %
MCH RBC QN AUTO: 27.9 PG (ref 25–35)
MCHC RBC AUTO-ENTMCNC: 32 G/DL (ref 31–37)
MCV RBC AUTO: 87.1 FL (ref 80–100)
MONOCYTES # BLD AUTO: 0.4 K/UL (ref 0–1)
MONOCYTES NFR BLD AUTO: 3 %
NEUTROPHILS # BLD AUTO: 8.8 K/UL (ref 1.3–7.7)
NEUTROPHILS NFR BLD AUTO: 83 %
PLATELET # BLD AUTO: 389 K/UL (ref 150–450)
POTASSIUM SERPL-SCNC: 4.1 MMOL/L (ref 3.5–5.1)
PROT SERPL-MCNC: 6.4 G/DL (ref 6.3–8.2)
SODIUM SERPL-SCNC: 141 MMOL/L (ref 137–145)
WBC # BLD AUTO: 10.5 K/UL (ref 3.8–10.6)

## 2021-12-17 RX ADMIN — CHOLESTYRAMINE SCH GM: 4 POWDER, FOR SUSPENSION ORAL at 17:16

## 2021-12-17 RX ADMIN — ENOXAPARIN SODIUM SCH MG: 40 INJECTION SUBCUTANEOUS at 07:57

## 2021-12-17 RX ADMIN — PANTOPRAZOLE SODIUM SCH MG: 40 TABLET, DELAYED RELEASE ORAL at 07:54

## 2021-12-17 RX ADMIN — ATORVASTATIN CALCIUM SCH MG: 80 TABLET, FILM COATED ORAL at 07:55

## 2021-12-17 RX ADMIN — GUAIFENESIN AND DEXTROMETHORPHAN PRN ML: 100; 10 SYRUP ORAL at 10:18

## 2021-12-17 RX ADMIN — METOPROLOL SUCCINATE SCH MG: 25 TABLET, EXTENDED RELEASE ORAL at 07:55

## 2021-12-17 RX ADMIN — LOPERAMIDE HYDROCHLORIDE PRN MG: 2 CAPSULE ORAL at 07:55

## 2021-12-17 RX ADMIN — ENOXAPARIN SODIUM SCH MG: 40 INJECTION SUBCUTANEOUS at 20:07

## 2021-12-17 RX ADMIN — DEXTROSE SCH MG: 50 INJECTION, SOLUTION INTRAVENOUS at 07:57

## 2021-12-17 RX ADMIN — SERTRALINE HYDROCHLORIDE SCH MG: 100 TABLET ORAL at 07:55

## 2021-12-17 RX ADMIN — OXYCODONE HYDROCHLORIDE AND ACETAMINOPHEN SCH MG: 500 TABLET ORAL at 20:07

## 2021-12-17 RX ADMIN — CLOPIDOGREL BISULFATE SCH MG: 75 TABLET ORAL at 07:55

## 2021-12-17 RX ADMIN — Medication SCH MCG: at 07:54

## 2021-12-17 RX ADMIN — CHOLESTYRAMINE SCH GM: 4 POWDER, FOR SUSPENSION ORAL at 11:14

## 2021-12-17 RX ADMIN — OXYCODONE HYDROCHLORIDE AND ACETAMINOPHEN SCH MG: 500 TABLET ORAL at 07:55

## 2021-12-17 RX ADMIN — FLUCONAZOLE SCH MG: 100 TABLET ORAL at 10:18

## 2021-12-17 RX ADMIN — TIOTROPIUM BROMIDE INHALATION SPRAY SCH PUFF: 3.12 SPRAY, METERED RESPIRATORY (INHALATION) at 09:43

## 2021-12-17 RX ADMIN — VALSARTAN SCH MG: 80 TABLET ORAL at 07:57

## 2021-12-17 RX ADMIN — Medication SCH MG: at 07:54

## 2021-12-17 RX ADMIN — LORATADINE SCH MG: 10 TABLET ORAL at 07:55

## 2021-12-17 RX ADMIN — BARICITINIB SCH MG: 2 TABLET, FILM COATED ORAL at 07:56

## 2021-12-17 RX ADMIN — DEXTROSE SCH MG: 50 INJECTION, SOLUTION INTRAVENOUS at 20:07

## 2021-12-17 NOTE — P.PN
Subjective


Progress Note Date: 12/17/21


Principal diagnosis: 


 Dyspnea, hypoxia, COVID-19





This is a pleasant 66-year-old female patient who follows at the [facility] for her

primary care needs.  She has history of hypertension, hyperlipidemia, 

gastroesophageal reflux disease, anxiety/depression, aortic thrombectomy in 2007

, former smoker, COPD, obstructive sleep apnea utilizing CPAP.  He is here 

yesterday to the emergency room with a four-week history of shortness of breath,

cough, congestion, body aches and fevers.  She was tested negative for CoVID 2.

 Her  is positive for CoVID and she is now positive for CoVID.  She is 

not vaccinated.  She does have home oxygen use at 2-3 L.  She is currently 

requiring 10 L high flow nasal cannula to maintain O2 saturations at 90%.  Chest

x-ray reveals bilateral patchy infiltrates. White count 5.9.  Hemoglobin 12.5.  

Platelets 398.  Lymphocytes 0.5.  D-dimer 1.89.  Sodium 140.  Potassium 4.9.  

Creatinine 1.2.  AST 39.  ALT 26.  Alk phos 156.  LDH 1283.  C-reactive protein 

21.7.  Pro-calcitonin 0.32.  She's been initiated on Decadron, Lovenox, vitamin 

supplements.  0.9% normal saline at 75 ML's per hour.





On 12/08/2021 patient is in follow-up on medical surgical floor, she is resting 

comfortably in bed, she states she is feeling better, although she is requiring 

more oxygen compared to when she first presented to the emergency department, 

note that patient does wear home oxygen at 2 L for history of COPD.  She is 

currently on 10 L of oxygen, her pulse ox is 91-92%, breathing comfortably, lung

sounds reveal coarse crackles at bilateral bases, but no wheezing, no rhonchi.  

CT angiogram of the chest showed no evidence of pulmonary embolism, it did show 

pulmonary emphysema and pulmonary interstitial fibrosis with increased 

interstitial infiltrates compared to her old exam.  There was no evidence of a 

suspicious pulmonary mass.  Patient was outside the window for Remdesivir, she 

continues on Decadron 6 mg twice daily, her pro-calcitonin level on admission 

was 0.32, and patient was covered with Rocephin for possibility of underlying 

bacterial infection.  She is on prophylactic Lovenox 40 mg daily.  CTA [facility] of the chest showed no evidence of pulmonary embolism. 0





On 12/09/2001 patient seen in follow-up on medical surgical floor, she is c

urrently on 10 L of oxygen the pulse ox of 86%, FiO2 has since been increased to

15 L, she has a mild cough, but overall seems to be in no acute distress, she is

sitting up in the chair, denies any chest discomfort, looks quite comfortable.  

Lung sounds reveal coarse crackles bilateral bases, she does get short of breath

with exertion.  She states if she sits still she is breathing comfortably, and 

her O2 saturations are at or above 90%.  She's had no fever or chills overnight,

she remains on Decadron 40 mg twice daily.  She is on empiric antibiotics in the

form of Rocephin and IV fluids at 75 ML per hour.  Today's labs have been 

reviewed, white blood cell count is 8.6, hemoglobin is 12, platelet count is 

506, d-dimer is 4.01, sodium is 147, potassium is 4.8, chloride is 112, BUN of 

20 creatinine 0.7, renal profile has significantly improved since admission.  

LDH has significantly improved since admission and is down to 348 on today's 

labs, CRP is still pending, her last pro-calcitonin is negative at 0.13.  Blood 

culture has shown no growth thus far.





On 12/10/2021 patient seen in follow-up on medical surgical floor, she had been 

on 15 L high flow and 100% nonrebreather mask up until this morning, this 

morning patient's FiO2 requirements had increased, patient was getting up in the

chair, she is significantly desaturated, she was placed on irritable at 60 L and

FiO2 of 90% in addition to 100% nonrebreather, and patient is taking quite a 

while to recover her O2 saturations although her work of breathing is not 

increased.  She is still awake and alert, she is oriented 3, she is able to 

make her needs known, her pulse ox is slowly recovering at rest, with deep 

breathing, pursed lip breathing, repositioning.  Overnight she has had no fever 

or chills, blood pressures have been stable.  Breathing fairly comfortably 

although she desaturates with any little exertion.  Currently she is on Decadron

6 Twice daily, she will be started on Baricitinib, patient is also on Plavix, 

and prophylactic Lovenox.  Today's chest x-ray and labs are still pending, she 

remains on D5W at a rate of 100 ML per hour for dehydration related to diarrhea 

and hypernatremia, repeat electrolytes and renal profile are still pending for 

today.  No abdominal pain, no nausea.  The diarrhea has significantly improved 

in the last 48 hours.  Patient is tolerating oral intake, no abdominal pain. 





On 12/12/2021 patient seen in follow-up on medical surgical floor.  She is alert

and alert, she is on BiPAP support with pressures of 14 and 8 and FiO2 100%.  

Earlier this morning her BiPAP pressures have been increased from 12.6 in the 

100% FiO2.  Her pulse oximeter readings are marginal at around 90%, with any 

exertion patient does desaturate and takes her a while to recover although she 

looks very comfortable, nontachypneic, she is sitting up in the recliner, she is

watching TV.  She is complaining of being hungry.  She has not been able to take

much by mouth related to being BiPAP dependent.  She has been afebrile, 

hemodynamics are stable.  Her last chest x-ray from 12/10/2021 showed bilateral 

airspace disease with prominence of interstitium with no evident pneumothorax or

pleural effusion.  Today's labs have been reviewed showing white blood cell 

count of 10.6, hemoglobin 12.2, d-dimer and yesterday's labs was down to 3.98, 

electrolytes and renal profile were within normal limits.  Per pro-calcitonin 

level was negative at 0.19.  Patient remains on Baricitinib, Decadron 6 mg twice

daily, she is receiving IV fluids with D5 W at 100 ML per hour





On 12/13/2021 patient seen in follow-up on medical surgical floor, she was 

switched to Airvo at 60 L and 90%, and nonrebreather mask, she is maintaining 

her O2 saturations between 88 and 91%, she is breathing comfortably, tolerating 

it for a few hours now, she was able to eat something, she is taking in oral 

fluids, she is awake and alert, she is sitting up in the chair, she is being her

bills.  Lung sounds are positive for scattered crackles throughout the lung 

fields, no cough, appears very comfortable.  He is on [place] neb, she is on 

Decadron, and Lovenox.  She is on D5W at a rate of 100 ML per hour related to 

her nothing by mouth status however in view of her tolerating oral intake now, 

and not been BiPAP dependent anymore we'll stop the IV fluids.  Today's labs 

have been reviewed and her white blood cell count is 10.4, hemoglobin is 12.6, 

her electrolytes have been reviewed and are within normal limits, sodium is 141,

potassium is 3.9, chloride is 103, BUN is 15, creatinine is 0.72.  Her 

procalcitonin level was negative.





On 12/15/2021 patient seen in follow-up on medical surgical floor, she is 

sitting up in the recliner, she states her breathing is improving, she is on 

Airvo, and we were able to wean down the flow and FiO2, and the flow is 

currently at 55 L/m, and FiO2 is currently at 88%, her pulse ox is ranging 

between 86-93%, breathing comfortably, minimal crackles at bilateral bases, 

occasional cough, patient has been get not to the bedside commode, tolerates 

activity well, her spirits are high, her oral intake is fair.  She continues on 

Baricitinib, she continues on Decadron 6 blood gram twice daily, and Lovenox 40 

mg twice daily, today's labs have been reviewed showing white blood cell count 

is 7.19, hemoglobin of 11.9, platelet count is 383, electrolytes and renal 

profile are unremarkable.  Cdiff negative.





On 12/16/2021 patient seen in follow-up on medical surgical floor.  She is awake

and alert, in no acute distress, she is currently resting in bed, she is on her 

left side and patient has been compliant with salt repositioning in bed from 

side to side.  She is breathing comfortably, remains on Airvo at 55 L and FiO2 

of 87%, and she was also placed on Airvo because her pulse ox was less than 85%,

her current O2 saturations are around 88%, patient seems to breathing quite 

comfortably.  Today's chest x-ray shows persistent but slightly improving 

bilateral pneumonia. patient continues on Baricitinib, Decadron 6 mg twice 

daily, she is on Lovenox 40 mg twice daily, COVID-19 vitamins.  His labs have 

been reviewed and there was down to 1.63, CBC was within normal limits, 

electrolytes and renal profile were unremarkable.  Today's LDH is 1029, CRP is 

1.7.  Vital signs have been stable, patient has been afebrile.





On 12/17/2021 patient is seen in follow-up on medical surgical floor.  She is 

breathing comfortably, she is resting in bed, she's been repositioning self from

side to side.  She remains on Airvo at 55 L and FiO2 of 90%, and pulse ox is 89-

93%.  Blood pressure is been stable, patient has been afebrile.  Patient 

continues on Baricitinib, continues on Decadron, Diflucan, Lovenox twice daily. 

Lung sounds revealed mildly diminished breath sounds, and a few scattered c

rackles.  Chest x-ray showing persistent but slightly improving bilateral [place]

pneumonia








Objective





- Vital Signs


Vital signs: 


                                   Vital Signs











Temp  98.6 F   12/17/21 14:00


 


Pulse  68   12/17/21 14:00


 


Resp  24   12/17/21 14:00


 


BP  108/59   12/17/21 14:00


 


Pulse Ox  93 L  12/17/21 14:00








                                 Intake & Output











 12/16/21 12/17/21 12/17/21





 18:59 06:59 18:59


 


Other:   


 


  Voiding Method Bedside Commode  Bedside Commode


 


  # Voids  2 


 


  # Bowel Movements   1














- Exam


 GENERAL EXAM: Alert, very pleasant, 66-year-old white female, on low at 55 L an

FiO2 of 90%, and nonrebreather mask satting 88% patient is breathing 

comfortably, does not appear to be in any acute distress


HEAD: Normocephalic/atraumatic.


EYES: Normal reaction of pupils, equal size.  Conjunctiva pink, sclera white.


NOSE: Clear with pink turbinates.


THROAT: No erythema or exudates.


NECK: No masses, no JVD, no thyroid enlargement, no adenopathy.


CHEST: No chest wall deformity.  Symmetrical expansion. 


LUNGS: Equal air entry with with coarse bilateral crackles


CVS: Regular rate and rhythm, normal S1 and S2, no gallops, no murmurs, no rubs


ABDOMEN: Soft, nontender.  No hepatosplenomegaly, normal bowel sounds, no 

guarding or rigidity.


EXTREMITIES: No clubbing, no edema, no cyanosis, 2+ pulses and upper and lower 

extremities.


MUSCULOSKELETAL: Muscle strength and tone normal.


SPINE: No scoliosis or deformity


SKIN: No rashes


CENTRAL NERVOUS SYSTEM: Alert and oriented -3.  No focal deficits, tone is 

normal in all 4 extremities.


PSYCHIATRIC: Alert and oriented -3.  Appropriate affect.  Intact judgment and 

insight.











- Labs


CBC & Chem 7: 


                                 12/17/21 07:14





                                 12/17/21 07:14


Labs: 


                  Abnormal Lab Results - Last 24 Hours (Table)











  12/17/21 12/17/21 Range/Units





  07:14 07:14 


 


Neutrophils #  8.8 H   (1.3-7.7)  k/uL


 


BUN   21 H  (7-17)  mg/dL


 


Alkaline Phosphatase   138 H  ()  U/L














Assessment and Plan


Plan: 


 Assessment:





#1.  Acute on chronic hypoxic respiratory failure secondary to acute COVID-19 

pneumonia, patient is not vaccinated related to multiple ALLERGIES, patient 

presented with a 4 week history of symptoms, she was outside the window for 

Remdesivir, she is being treated supportively with Decadron, prophylactic 

Lovenox, and empiric antibiotics.  Patient was placed on irritable at 60 L and 

90% FiO2 with nonrebreather mask today on 12/10/2021, we'll start the patient on

Baricitinib.  On 12/12/2021 patient is on BiPAP support remains BiPAP support 

dependent at pressures of 14 and 1800% FiO2 although clinically is comfortable. 

On 12/13/2021 patient has been switched to Airvo at 60 L and 90% and 

nonrebreather mask, tolerating it well so far





#2.  Mildly elevated pro calcitonin, patient is on empiric antibiotics in the 

form of Rocephin, pro calcitonin has improved, no definite sign of infection, 

Rocephin has been discontinued





#3.  Elevated inflammatory markers secondary to the viral pneumonia, improving





#4.  Morbid obesity with BMI of 39.1 kg/m





#5.  Obstructive sleep apnea on CPAP therapy





#6.  History of COPD with chronic hypoxic respiratory failure usually wears 2 L 

of oxygen on the outpatient basis, and CT angiogram of the chest also revealed 

evidence of interstitial pulmonary fibrosis





#7.  Hyperlipidemia





#8.  Anxiety/depression





#9.  History of coronary artery disease with previous stent placement





#10.  History of aortic thrombectomy/aortobifem bypass in 2007





#11.  Former smoker





Plan:





Remains on high flow oxygen, and nonrebreather


Breathing comfortably, no acute distress


Continue Baricitinib


Continue Decadron, and Lovenox


Lower extremity Dopplers and CT angiogram the chest were negative for DVT and PE


Weaning FiO2 to keep O2 sat saturation's at or above 85%


We'll continue to follow her clinical course


Last chest x-ray from 12/16/2021 has been reviewed showing some improvement in 

bilateral COVID pneumonia


We'll continue to follow





I performed a history & physical examination of the patient and discussed their 

management with my nurse practitioner, Jeannine Belcher.  I reviewed the nurse 

practitioner's note and agree with the documented findings and plan of care.  

Lung sounds are positive for diffuse crackles throughout the lung fields.  The 

findings and the impression was discussed with the patient.  I attest to the 

documentation by the nurse practitioner. 











Time with Patient: Less than 30

## 2021-12-18 RX ADMIN — BARICITINIB SCH MG: 2 TABLET, FILM COATED ORAL at 07:54

## 2021-12-18 RX ADMIN — NYSTATIN SCH UNIT: 100000 SUSPENSION ORAL at 17:37

## 2021-12-18 RX ADMIN — CLOPIDOGREL BISULFATE SCH MG: 75 TABLET ORAL at 07:53

## 2021-12-18 RX ADMIN — ENOXAPARIN SODIUM SCH MG: 40 INJECTION SUBCUTANEOUS at 07:52

## 2021-12-18 RX ADMIN — NYSTATIN SCH UNIT: 100000 SUSPENSION ORAL at 21:19

## 2021-12-18 RX ADMIN — Medication SCH MG: at 07:53

## 2021-12-18 RX ADMIN — METOPROLOL SUCCINATE SCH MG: 25 TABLET, EXTENDED RELEASE ORAL at 07:53

## 2021-12-18 RX ADMIN — PANTOPRAZOLE SODIUM SCH MG: 40 TABLET, DELAYED RELEASE ORAL at 07:53

## 2021-12-18 RX ADMIN — OXYCODONE HYDROCHLORIDE AND ACETAMINOPHEN SCH MG: 500 TABLET ORAL at 07:53

## 2021-12-18 RX ADMIN — CHOLESTYRAMINE SCH GM: 4 POWDER, FOR SUSPENSION ORAL at 07:56

## 2021-12-18 RX ADMIN — SERTRALINE HYDROCHLORIDE SCH MG: 100 TABLET ORAL at 07:53

## 2021-12-18 RX ADMIN — Medication SCH MCG: at 07:54

## 2021-12-18 RX ADMIN — ENOXAPARIN SODIUM SCH MG: 40 INJECTION SUBCUTANEOUS at 21:19

## 2021-12-18 RX ADMIN — LORATADINE SCH MG: 10 TABLET ORAL at 07:53

## 2021-12-18 RX ADMIN — CHOLESTYRAMINE SCH GM: 4 POWDER, FOR SUSPENSION ORAL at 17:37

## 2021-12-18 RX ADMIN — TIOTROPIUM BROMIDE INHALATION SPRAY SCH PUFF: 3.12 SPRAY, METERED RESPIRATORY (INHALATION) at 08:48

## 2021-12-18 RX ADMIN — DEXTROSE SCH MG: 50 INJECTION, SOLUTION INTRAVENOUS at 21:19

## 2021-12-18 RX ADMIN — VALSARTAN SCH MG: 80 TABLET ORAL at 07:55

## 2021-12-18 RX ADMIN — ACETAMINOPHEN PRN MG: 325 TABLET, FILM COATED ORAL at 07:59

## 2021-12-18 RX ADMIN — ATORVASTATIN CALCIUM SCH MG: 80 TABLET, FILM COATED ORAL at 07:53

## 2021-12-18 RX ADMIN — DEXTROSE SCH MG: 50 INJECTION, SOLUTION INTRAVENOUS at 07:55

## 2021-12-18 RX ADMIN — FLUCONAZOLE SCH MG: 100 TABLET ORAL at 07:53

## 2021-12-18 RX ADMIN — OXYCODONE HYDROCHLORIDE AND ACETAMINOPHEN SCH MG: 500 TABLET ORAL at 21:18

## 2021-12-18 NOTE — P.PN
Subjective


Progress Note Date: 12/18/21


Principal diagnosis: 





COVID-19 pneumonia





This is a pleasant 66-year-old female patient who follows at the [facility] for her

primary care needs.  She has history of hypertension, hyperlipidemia, 

gastroesophageal reflux disease, anxiety/depression, aortic thrombectomy in 

2007, former smoker, COPD, obstructive sleep apnea utilizing CPAP.  He is here 

yesterday to the emergency room with a four-week history of shortness of breath,

cough, congestion, body aches and fevers.  She was tested negative for CoVID 2.

 Her  is positive for CoVID and she is now positive for CoVID.  She is 

not vaccinated.  She does have home oxygen use at 2-3 L.  She is currently 

requiring 10 L high flow nasal cannula to maintain O2 saturations at 90%.  Chest

x-ray reveals bilateral patchy infiltrates. White count 5.9.  Hemoglobin 12.5.  

Platelets 398.  Lymphocytes 0.5.  D-dimer 1.89.  Sodium 140.  Potassium 4.9.  

Creatinine 1.2.  AST 39.  ALT 26.  Alk phos 156.  LDH 1283.  C-reactive protein 

21.7.  Pro-calcitonin 0.32.  She's been initiated on Decadron, Lovenox, vitamin 

supplements.  0.9% normal saline at 75 ML's per hour.





On 12/08/2021 patient is in follow-up on medical surgical floor, she is resting 

comfortably in bed, she states she is feeling better, although she is requiring 

more oxygen compared to when she first presented to the emergency department, 

note that patient does wear home oxygen at 2 L for history of COPD.  She is 

currently on 10 L of oxygen, her pulse ox is 91-92%, breathing comfortably, lung

sounds reveal coarse crackles at bilateral bases, but no wheezing, no rhonchi.  

CT angiogram of the chest showed no evidence of pulmonary embolism, it did show 

pulmonary emphysema and pulmonary interstitial fibrosis with increased 

interstitial infiltrates compared to her old exam.  There was no evidence of a 

suspicious pulmonary mass.  Patient was outside the window for Remdesivir, she 

continues on Decadron 6 mg twice daily, her pro-calcitonin level on admission wa

s 0.32, and patient was covered with Rocephin for possibility of underlying 

bacterial infection.  She is on prophylactic Lovenox 40 mg daily.  CTA [place] of the chest showed no evidence of pulmonary embolism. 0





On 12/09/2001 patient seen in follow-up on medical surgical floor, she is 

currently on 10 L of oxygen the pulse ox of 86%, FiO2 has since been increased 

to 15 L, she has a mild cough, but overall seems to be in no acute distress, she

is sitting up in the chair, denies any chest discomfort, looks quite 

comfortable.  Lung sounds reveal coarse crackles bilateral bases, she does get s

hort of breath with exertion.  She states if she sits still she is breathing 

comfortably, and her O2 saturations are at or above 90%.  She's had no fever or 

chills overnight, she remains on Decadron 40 mg twice daily.  She is on empiric 

antibiotics in the form of Rocephin and IV fluids at 75 ML per hour.  Today's 

labs have been reviewed, white blood cell count is 8.6, hemoglobin is 12, 

platelet count is 506, d-dimer is 4.01, sodium is 147, potassium is 4.8, 

chloride is 112, BUN of 20 creatinine 0.7, renal profile has significantly 

improved since admission.  LDH has significantly improved since admission and is

down to 348 on today's labs, CRP is still pending, her last pro-calcitonin is 

negative at 0.13.  Blood culture has shown no growth thus far.





On 12/10/2021 patient seen in follow-up on medical surgical floor, she had been 

on 15 L high flow and 100% nonrebreather mask up until this morning, this 

morning patient's FiO2 requirements had increased, patient was getting up in the

chair, she is significantly desaturated, she was placed on irritable at 60 L and

FiO2 of 90% in addition to 100% nonrebreather, and patient is taking quite a 

while to recover her O2 saturations although her work of breathing is not 

increased.  She is still awake and alert, she is oriented 3, she is able to 

make her needs known, her pulse ox is slowly recovering at rest, with deep 

breathing, pursed lip breathing, repositioning.  Overnight she has had no fever 

or chills, blood pressures have been stable.  Breathing fairly comfortably 

although she desaturates with any little exertion.  Currently she is on Decadron

6 Twice daily, she will be started on Baricitinib, patient is also on Plavix, 

and prophylactic Lovenox.  Today's chest x-ray and labs are still pending, she 

remains on D5W at a rate of 100 ML per hour for dehydration related to diarrhea 

and hypernatremia, repeat electrolytes and renal profile are still pending for 

today.  No abdominal pain, no nausea.  The diarrhea has significantly improved 

in the last 48 hours.  Patient is tolerating oral intake, no abdominal pain. 





The patient is seen today 12/11/2021 in follow-up on the regular medical floor. 

She is currently sitting up in a chair at the bedside.  She is now on BiPAP 12/6

and 100% FiO2 with O2 saturations in the mid to upper 80s.  She remains alert.  

No further diarrhea.  No abdominal pain.  The cultures revealed no growth.  D-

dimer 3.98.  She remains on Baricitinib, Decadron, vitamin supplements.  She is 

continued on bronchodilators.








The patient is seen today 12/14/2021 in follow-up on the regular medical floor. 

She is currently sitting up in a chair at the bedside.  She remains on AirVo 

high flow oxygen at 60 L and 85% FiO2.  O2 saturations 84-89%.  Occasionally 

she'll put on a nonrebreather mask.  She's afebrile.  Hemodynamically stable.  

Blood cultures revealed no growth.  C. difficile screen is negative.  She is 

continued on Decadron, Lovenox, vitamin supplements.  She remains on 

Baricitinib.  Continued on bronchodilators. 








The patient is seen today 12/18/2021 in follow-up on the regular medical floor. 

She is currently sitting up in a chair at bedside.  Awake and alert in no acute 

distress.  She is getting better.  Slow to progress.  She continues on the AirVo

high flow oxygen at 60 L and 80% FiO2.  She is having issues with thrush.  

Continued on Baricitinib, Decadron, Lovenox and vitamin supplements.  Continued 

on bronchodilators.  She is on Diflucan.





Objective





- Vital Signs


Vital signs: 


                                   Vital Signs











Temp  97.4 F L  12/18/21 14:00


 


Pulse  72   12/18/21 14:00


 


Resp  20   12/18/21 14:00


 


BP  123/74   12/18/21 14:00


 


Pulse Ox  90 L  12/18/21 14:00








                                 Intake & Output











 12/17/21 12/18/21 12/18/21





 18:59 06:59 18:59


 


Other:   


 


  Voiding Method Bedside Commode Bedside Commode Bedside Commode


 


  # Voids  1 


 


  # Bowel Movements 1  














- Exam





GENERAL EXAM: Alert, very pleasant, 66-year-old female, on on AirVo high flow 

oxygen at 60 L and 80% FiO2, up in the chair, comfortable, no acute distress.


HEAD: Normocephalic/atraumatic.


EYES: Normal reaction of pupils, equal size.  Conjunctiva pink, sclera white.


NOSE: Clear with pink turbinates.


THROAT: No erythema or exudates.


NECK: No masses, no JVD, no thyroid enlargement, no adenopathy.


CHEST: No chest wall deformity.  Symmetrical expansion. 


LUNGS: Equal air entry with with coarse bilateral crackles


CVS: Regular rate and rhythm, normal S1 and S2, no gallops, no murmurs, no rubs


ABDOMEN: Soft, nontender.  No hepatosplenomegaly, normal bowel sounds, no 

guarding or rigidity.


EXTREMITIES: No clubbing, no edema, no cyanosis, 2+ pulses and upper and lower 

extremities.


MUSCULOSKELETAL: Muscle strength and tone normal.


SPINE: No scoliosis or deformity


SKIN: No rashes


CENTRAL NERVOUS SYSTEM: No focal deficits, tone is normal in all 4 extremities.


PSYCHIATRIC: Alert and oriented -3.  Appropriate affect.  Intact judgment and 

insight.





- Labs


CBC & Chem 7: 


                                 12/17/21 07:14





                                 12/17/21 07:14





Assessment and Plan


Assessment: 





1 Acute on chronic hypoxic respiratory failure secondary to COVID-19 pneumonia. 

Not vaccinated.  Initiated on Baricitinib.  Currently on AirVo high flow oxygen 

at 60 L and 80% FiO2





2 Elevated inflammatory markers secondary to above





3 No evidence of acute infection, antibiotics discontinued and started on 

Baricitinib 





4 Obesity





5 Obstructive sleep apnea, on CPAP





6 Hypertension





7 Hyperlipidemia





8 Anxiety/depression





9 History of coronary disease with previous stent placement





10 History of aortic thrombectomy/aortobifem bypass in 2007





11 Multiple medication ALLERGIES





12 Former smoker.  





Plan:





Currently up in a chair at the bedside


On AirVo high flow oxygen at 60 L and 80% FiO2


Continue Baricitinib


Continue Decadron, Lovenox, vitamin supplements


Continue bronchodilators


Titrate the FiO2 as tolerated


Follow-up chest x-ray and labs in the a.m.


She is a DO NOT INTUBATE CODE STATUS


We'll continue to follow and make further recommendations based on her clinical 

status





I, the cosigning physician, performed a history & physical examination of the 

patient. Lungs sounds with basilar coarse crackles, diminished.  Maintaining O2 

saturations in the 90s on AirVo high flow oxygen at 60 L and 80% FiO2.  I 

discussed the assessment and plan of care with my nurse practitioner, Velia Short. I attest to the above note as dictated by her.

## 2021-12-18 NOTE — PN
PROGRESS NOTE



DATE OF SERVICE:

12/18/2021



REASON FOR FOLLOWUP:

COVID-19 pneumonia.



INTERVAL HISTORY:

Patient is afebrile.  The patient is breathing more comfortably.  Patient denies having

any chest pain.  No worsening shortness of breath or cough.  No abdominal pain. No

diarrhea.



PHYSICAL EXAMINATION:

Blood pressure 125/74, pulse of 80, temperature 98.1.  She is 93% on 55% AIRVO. General

description is an elderly female up in the bed in no distress.  Respiratory system:

Unlabored breathing, coarse breath sounds bilaterally.  No wheeze.  Heart S1, S2.

Regular rate and rhythm. Abdomen soft. No tenderness.



LABS:

No new labs have been obtained today.



DIAGNOSTIC IMPRESSION AND PLAN:

1. This patient with acute respiratory failure, secondary to Covid 19 pneumonia.

    Patient to continue with baricitinib, dexamethasone, Lovenox, zinc and ascorbic

    acid.

2. Oral thrush has been started on nystatin swish and swallow and Diflucan. Clinical

    course will be monitored closely.  Continue supportive care.





MMODL / IJN: [id number] / Job#: [id number]

## 2021-12-18 NOTE — P.PN
Subjective





Patient is a 66-year-old the female with past medical history of COPD and 3 L 

oxygen dependent at home has cold symptoms for about a month comes in with 

shortness of breath presently on telemetry dysfunction CT of the chest did not 

show any pulmonary embolism but did show pulmonary fibrosis as well as 

infiltrates.  Below.  Patient doesn't have any infiltrate consistent with 

pneumonia patient denied any dysuria.  Patient is presently on Rocephin.  

Patient has mild acute renal failure because of which I'm holding ACE inhibitor.

 Severely hypomagnesemic magnesium is being replaced.  Patient last smoked about

17 years ago.





12/08/2021





Patient is seen and evaluated in follow-up and currently maintained on 10 L high

flow and weaning as tolerated.  Patient was on 15 L high flow nasal cannula this

morning and tolerating and nursing staff continuing to wean.  Patient normally 

wears 3 L of oxygen continuously in the outpatient setting.  Patient denies any 

worsening shortness of breath just generalized fatigue and weakness and 

continued diarrhea.  Patient states she is going approximately 2-3 times per day

will add C. diff testing and if negative will add Imodium and Questran.  

Pulmonary is following and venous Doppler was ordered bilateral lower 

extremities which is negative for DVT as d-dimer is elevated today at 2.42.  

Inflammatory markers significantly improved his LDH is 338 and CRP is 7.0, 

repeat magnesium after replacement is 2.9. 





12/09/2021





Patient is seen and evaluated in follow-up this morning currently sitting up in 

the chair and was on 10 L high flow although oxygen saturations were below 90% 

and patient bumped back up to 15 L.  Patient is complaining of the oxygen being 

too high and making her feel more anxious and discussed with nursing staff about

continuing to wean as tolerated.  She continues with diarrhea although is 

improving and C. diff testing was negative and patient was started on Questran 

along with Imodium.  Patient continues to state she has no real appetite 

although encourage the patient to continue with small frequent meals and will 

add Magic cups as well.  Encourage the patient to increase oral intake as she is

weak and needs the energy.  She normally maintained on 3 L via nasal cannula and

will continue to titrate as tolerated.  Pulmonary is following patient is 

maintained on IV dexamethasone twice daily along with vitamin and zinc 

supplements, Lovenox twice daily for elevated d-dimer and will continue.  

Patient was on normal saline and considered discontinuing as patient sodium is 

now 147 and patient is being started on D5 in water and will repeat labs.





12/10/2021


Patient is seen in follow-up this morning currently sitting up in the chair and 

oxygen requirements have increased and patient is now maintained on Airvo along 

with 15 L nonrebreather and oxygen saturations have been in the low 80s to 90 

maximum.  Discussed CODE STATUS with the patient and patient is a full code but 

does not wish to be placed on a ventilator if respiratory status continues to 

decline.  Pulmonary following closely and prognosis remains quite guarded.  

Patient continues on Lovenox along with dexamethasone and vitamin and zinc 

supplements and is being started on Baricitinib.  Patient is extremely weak and 

becomes extremely dyspneic with minimal exertion.  Patient's oral intake 

continues to be extremely poor and again encouraged and stressed the importance 

of eating regardless of not feeling hungry and having no appetite.  May consider

enteral nutrition and dietary consult.  Patient states her diarrhea has 

significantly improved and will use Imodium as needed and discontinue Questran. 

Repeat inflammatory markers ordered and pending.  Chest x-ray today shows 

bilateral airspace disease again noted with prominence and interstitial, and 

findings consistent with pneumonia.





12/11/2021


Patient admitted with bilateral common pneumonia , Her respiratory status today 

is a slightly worse as she is dependent on BiPAP throughout the day.  Also she 

has decreased air entry on both sides


She is saturating 88% while she is on BiPAP.


D-dimer is the same, 4.0 and 3.9.


She continued on dexamethasone, vitamin C, vitamin D, zinc,  barcitininb and 

Lovenox 40 mg twice a day.  Also she is on Protonix





12/12/2021


Sitting in chair looks mildly tachypneic and tolerates BiPAP which she uses his 

yesterday all the time.  She has not been eating since yesterday and may switch 

her for short time to nonrebreather and air or so she can read some oral diet   

Per pulmonary team recommendation.  Other than that she is hemodynamically 

stable.  Labs are stable.Calcitonin 0.19.  C. diff is negative.


She remains on dexamethasone, vitamin C, Z and Lovenox, Protonix and  barcitinib







12/13/2021


This morning she was still on BiPAP, and she was refusing to start TPN as she 

could not come off the BiPAP.  However later on during the day she could be 

placed on nonrebreather 15 L and Aravo 60 L with 94% so she can tolerate oral 

feeding.


Other vitals are stable, afebrile.  Labs are stable as well.


She continued on dexamethasone, vitamin C, vitamin D, zinc,  barcitininb and 

Lovenox 40 mg twice a day





12/14/2021


Patient with bilateral: With pneumonia and hypoxia, she is improving gradually 

today, today she couldn't get off her BiPAP and placed on airvo 60 L with FiO2 

of 85%, with occasional nonrebreather 15 L, patient was happy that she was able 

to eat today and she tolerates diet well.  She has some diarrhea but no 

abdominal pain.  C. diff is negative.


Other than that she is hemodynamically stable


Patient continued today on  dexamethasone, vitamin C, Z and Lovenox, Protonix 

and  barcitinib 


Follow-up chest x-ray and CBC/BMP in the morning





12/15/2021


Patient today is pleasant and smiling as her pulmonary illness is improving 

progressively but slowly, but she still currently on high flow nasal cannula 

with 55 L/m and FiO2 of 88% saturation in low 90s.  Rest of vitals are stable.  

Labs including CBC, BMP are unremarkable.


C. diff test came back negative and her diarrhea stopped today.  She is able to 

eat about 50-25% of her meals and she is satisfied with that for now.


She still on steroids with dexamethasone, vitamin C, vitamin D, zinc, Lovenox 40

mg twice a day, Protonix and barcitininb 





12/16/2021


Patient today her breathing slightly worsened and her oxygen requirement today 

was 55 L with 90%, she still can go a little difficulty due to her dyspnea and 

she confirmed me she is able to eat with no issues.


She sitting in chair today.  Hemodynamically stable.


Labs reviewed and shows stability, her d-dimer actually improved 3.9 down to 

1.6.  LDH went up to 1029 and CRP 3.4.


Chest x-ray shows slight improvement in aeration in both lung fields despite 

bilateral pneumonia.


She remains on dexamethasone, vitamin C, D and zinc and  Protonix and 

barcitininb .





12/17/2021


Patient breathing pattern is slowly to improve, today she still on high flow 

nasal cannula 55 L/m and FiO2 of 90%.


Labs including CBC and BMP are unremarkable and stable.  On the top of the 

patient is afebrile


However patient is complaining of from oral thrush and asked for fluconazole.  

Patient refused nystatin.  Also she has persistent loose bowel movement, C. diff

checked twice and there were negative.  We'll start her on short course of 

Questran.  Also patient states she is eating well and tolerates diet with no 

difficulty.


Continue with the other same medication of dexamethasone, multiple vitamin 

cocktail and Lovenox, Protonix and barcitininb 





12/18/2021


Her respiratory status with minimal change and fluctuating, currently she is on 

airvo at 55 L/m and 85%.  She is able to eat and drink.


She states that her most thrush is better and his diarrhea is improving after 

adding nystatin and Questran yesterday.


Afebrile and vitals are stable.


She remains on the same treatment of dexamethasone, vitamin C, D and zinc and  

Protonix and barcitininb .





Objective





- Vital Signs


Vital signs: 


                                   Vital Signs











Temp  98.2 F   12/18/21 10:00


 


Pulse  81   12/18/21 10:00


 


Resp  16   12/18/21 10:00


 


BP  120/70   12/18/21 10:00


 


Pulse Ox  90 L  12/18/21 11:44








                                 Intake & Output











 12/17/21 12/18/21 12/18/21





 18:59 06:59 18:59


 


Other:   


 


  Voiding Method Bedside Commode Bedside Commode Bedside Commode


 


  # Voids  1 


 


  # Bowel Movements 1  














- Exam





-GENERAL: The patient is alert and oriented , on BiPAP, not in any acute 

distress. Well developed, well nourished. 


HEENT: Pupils are round and equally reacting to light. EOMI. No scleral icterus.

No conjunctival pallor. Normocephalic, atraumatic. No pharyngeal erythema. No 

thyromegaly. 


CARDIOVASCULAR: S1 and S2 present. No murmurs, rubs, or gallops. 


-PULMONARY: Chest is clear to auscultation, no wheezing.  Bilateral crepitation 

and decreased air entry on both sides


ABDOMEN: Soft, nontender, nondistended, normoactive bowel sounds. No palpable 

organomegaly. 


MUSCULOSKELETAL: No joint swelling or deformity. 


EXTREMITIES: No cyanosis, clubbing, or pedal edema. 


NEUROLOGICAL: Gross neurological examination did not reveal any focal deficits. 


SKIN: No rashes. no petechiae.





- Labs


CBC & Chem 7: 


                                 12/17/21 07:14





                                 12/17/21 07:14





Assessment and Plan


Assessment: 





-acute hypoxic respiratory failure: secondary to COVID-19 pneumonia 


-chronic hypercapnic respiratory failure secondary to COPD uses 3 L of oxygen at

home


-Diarrhea most likely secondary to Covid


-Oral thrush secondary to steroids


-Hypernatremia, improving, sodium is 140 a.m. we'll continue D5 in water and 

repeat a.m. labs 


-Severe hypomagnesemia, improved


-Elevated d-dimer secondary to Covid and patient is on Lovenox which will be 

continued, Lovenox is currently twice daily


-Acute renal failure, prerenal, improved with IV fluids


-Hypertension


-Mild anion gap and metabolic acidosis probability of lactic acidosis, most 

likely secondary to increased amounts of diarrhea and dehydration, lactic acid 

is 1.1


-gastroesophageal reflux disease


-Hyperlipidemia


-Hypertension


-History of pulmonary fibrosis


-Sleep apnea uses CPAP machine at home


-Peripheral vascular disease


-Coronary artery disease


-DVT prophylaxis: On Lovenox which will be continued











Plan: 





This is a pleasant 66 years old female who presents with bilateral covid 

pneumonia


Continue with dexamethasone


Continue with vitamin C, vitamin D and zinc


Pulmonary consult 


Also he is on barcitinib


Continue with fluconazole and Questran


Labs and medication were reviewed..  Continue same treatment.  Continue with 

symptomatic treatment.  Resume home medication.  Monitor lytes and vitals.  DVT 

and GI prophylaxis.  Further recommendations as per clinical course of the 

patient


DVT prophylaxis: Subcutaneous Lovenox


GI Prophylaxis: Ppi


Prognosis is guarded

## 2021-12-18 NOTE — PN
PROGRESS NOTE



DATE OF SERVICE:

12/17/2021



REASON FOR FOLLOW UP:

COVID-19 pneumonia.



INTERVAL HISTORY:

The patient is afebrile.  The patient is breathing slightly comfortably. Still

requiring a non-rebreather. The patient denies any chest pain.  No worsening cough or

sputum production.  No abdominal pain or worsening diarrhea.



PHYSICAL EXAMINATION:

Blood pressure 146/76, pulse of 80, temperature 98.1.  She is 89% on (  ).  General

description is an elderly female up in the bed in the chair in no distress.

Respiratory system: Unlabored breathing, decreased intensity of breath sounds, no

wheeze.  Heart S1, S2.  Regular rate and rhythm.  Abdomen soft, no tenderness.



LABS:

Hemoglobin is 13.8, white count 10.5, creatinine 0.80.



DIAGNOSTIC IMPRESSION AND PLAN:

Patient with acute respiratory failure secondary to COVID-19 pneumonia and minimal

clinical improvement.  The patient is currently covered with baricitinib,

dexamethasone, Lovenox, zinc and ascorbic acid to continue along with respiratory

support and monitor clinical course closely.





MMODL / IJN: [id number] / Job#: [id number]

## 2021-12-18 NOTE — P.PN
Subjective





Patient is a 66-year-old the female with past medical history of COPD and 3 L 

oxygen dependent at home has cold symptoms for about a month comes in with 

shortness of breath presently on telemetry dysfunction CT of the chest did not 

show any pulmonary embolism but did show pulmonary fibrosis as well as 

infiltrates.  Below.  Patient doesn't have any infiltrate consistent with 

pneumonia patient denied any dysuria.  Patient is presently on Rocephin.  

Patient has mild acute renal failure because of which I'm holding ACE inhibitor.

 Severely hypomagnesemic magnesium is being replaced.  Patient last smoked about

17 years ago.





12/08/2021





Patient is seen and evaluated in follow-up and currently maintained on 10 L high

flow and weaning as tolerated.  Patient was on 15 L high flow nasal cannula this

morning and tolerating and nursing staff continuing to wean.  Patient normally 

wears 3 L of oxygen continuously in the outpatient setting.  Patient denies any 

worsening shortness of breath just generalized fatigue and weakness and 

continued diarrhea.  Patient states she is going approximately 2-3 times per day

will add C. diff testing and if negative will add Imodium and Questran.  

Pulmonary is following and venous Doppler was ordered bilateral lower 

extremities which is negative for DVT as d-dimer is elevated today at 2.42.  

Inflammatory markers significantly improved his LDH is 338 and CRP is 7.0, 

repeat magnesium after replacement is 2.9. 





12/09/2021





Patient is seen and evaluated in follow-up this morning currently sitting up in 

the chair and was on 10 L high flow although oxygen saturations were below 90% 

and patient bumped back up to 15 L.  Patient is complaining of the oxygen being 

too high and making her feel more anxious and discussed with nursing staff about

continuing to wean as tolerated.  She continues with diarrhea although is 

improving and C. diff testing was negative and patient was started on Questran 

along with Imodium.  Patient continues to state she has no real appetite 

although encourage the patient to continue with small frequent meals and will 

add Magic cups as well.  Encourage the patient to increase oral intake as she is

weak and needs the energy.  She normally maintained on 3 L via nasal cannula and

will continue to titrate as tolerated.  Pulmonary is following patient is 

maintained on IV dexamethasone twice daily along with vitamin and zinc 

supplements, Lovenox twice daily for elevated d-dimer and will continue.  

Patient was on normal saline and considered discontinuing as patient sodium is 

now 147 and patient is being started on D5 in water and will repeat labs.





12/10/2021


Patient is seen in follow-up this morning currently sitting up in the chair and 

oxygen requirements have increased and patient is now maintained on Airvo along 

with 15 L nonrebreather and oxygen saturations have been in the low 80s to 90 

maximum.  Discussed CODE STATUS with the patient and patient is a full code but 

does not wish to be placed on a ventilator if respiratory status continues to 

decline.  Pulmonary following closely and prognosis remains quite guarded.  

Patient continues on Lovenox along with dexamethasone and vitamin and zinc 

supplements and is being started on Baricitinib.  Patient is extremely weak and 

becomes extremely dyspneic with minimal exertion.  Patient's oral intake 

continues to be extremely poor and again encouraged and stressed the importance 

of eating regardless of not feeling hungry and having no appetite.  May consider

enteral nutrition and dietary consult.  Patient states her diarrhea has 

significantly improved and will use Imodium as needed and discontinue Questran. 

Repeat inflammatory markers ordered and pending.  Chest x-ray today shows 

bilateral airspace disease again noted with prominence and interstitial, and 

findings consistent with pneumonia.





12/11/2021


Patient admitted with bilateral common pneumonia , Her respiratory status today 

is a slightly worse as she is dependent on BiPAP throughout the day.  Also she 

has decreased air entry on both sides


She is saturating 88% while she is on BiPAP.


D-dimer is the same, 4.0 and 3.9.


She continued on dexamethasone, vitamin C, vitamin D, zinc,  barcitininb and 

Lovenox 40 mg twice a day.  Also she is on Protonix





12/12/2021


Sitting in chair looks mildly tachypneic and tolerates BiPAP which she uses his 

yesterday all the time.  She has not been eating since yesterday and may switch 

her for short time to nonrebreather and air or so she can read some oral diet   

Per pulmonary team recommendation.  Other than that she is hemodynamically 

stable.  Labs are stable.Calcitonin 0.19.  C. diff is negative.


She remains on dexamethasone, vitamin C, Z and Lovenox, Protonix and  barcitinib







12/13/2021


This morning she was still on BiPAP, and she was refusing to start TPN as she 

could not come off the BiPAP.  However later on during the day she could be 

placed on nonrebreather 15 L and Aravo 60 L with 94% so she can tolerate oral 

feeding.


Other vitals are stable, afebrile.  Labs are stable as well.


She continued on dexamethasone, vitamin C, vitamin D, zinc,  barcitininb and 

Lovenox 40 mg twice a day





12/14/2021


Patient with bilateral: With pneumonia and hypoxia, she is improving gradually 

today, today she couldn't get off her BiPAP and placed on airvo 60 L with FiO2 

of 85%, with occasional nonrebreather 15 L, patient was happy that she was able 

to eat today and she tolerates diet well.  She has some diarrhea but no 

abdominal pain.  C. diff is negative.


Other than that she is hemodynamically stable


Patient continued today on  dexamethasone, vitamin C, Z and Lovenox, Protonix 

and  barcitinib 


Follow-up chest x-ray and CBC/BMP in the morning





12/15/2021


Patient today is pleasant and smiling as her pulmonary illness is improving 

progressively but slowly, but she still currently on high flow nasal cannula 

with 55 L/m and FiO2 of 88% saturation in low 90s.  Rest of vitals are stable.  

Labs including CBC, BMP are unremarkable.


C. diff test came back negative and her diarrhea stopped today.  She is able to 

eat about 50-25% of her meals and she is satisfied with that for now.


She still on steroids with dexamethasone, vitamin C, vitamin D, zinc, Lovenox 40

mg twice a day, Protonix and barcitininb 





12/16/2021


Patient today her breathing slightly worsened and her oxygen requirement today 

was 55 L with 90%, she still can go a little difficulty due to her dyspnea and 

she confirmed me she is able to eat with no issues.


She sitting in chair today.  Hemodynamically stable.


Labs reviewed and shows stability, her d-dimer actually improved 3.9 down to 

1.6.  LDH went up to 1029 and CRP 3.4.


Chest x-ray shows slight improvement in aeration in both lung fields despite 

bilateral pneumonia.


She remains on dexamethasone, vitamin C, D and zinc and  Protonix and 

barcitininb .





12/17/2021


Patient breathing pattern is slowly to improve, today she still on high flow 

nasal cannula 55 L/m and FiO2 of 90%.


Labs including CBC and BMP are unremarkable and stable.  On the top of the 

patient is afebrile


However patient is complaining of from oral thrush and asked for fluconazole.  

Patient refused nystatin.  Also she has persistent loose bowel movement, C. diff

checked twice and there were negative.  We'll start her on short course of 

Questran.  Also patient states she is eating well and tolerates diet with no 

difficulty.


Continue with the other same medication of dexamethasone, multiple vitamin 

cocktail and Lovenox, Protonix and barcitininb 





Objective





- Vital Signs


Vital signs: 


                                   Vital Signs











Temp  97.9 F   12/17/21 09:29


 


Pulse  70   12/17/21 09:29


 


Resp  20   12/17/21 09:29


 


BP  105/59   12/17/21 09:29


 


Pulse Ox  89 L  12/17/21 09:43








                                 Intake & Output











 12/16/21 12/17/21 12/17/21





 18:59 06:59 18:59


 


Other:   


 


  Voiding Method Bedside Commode  Bedside Commode


 


  # Voids  2 


 


  # Bowel Movements   1














- Exam





-GENERAL: The patient is alert and oriented , on BiPAP, not in any acute 

distress. Well developed, well nourished. 


HEENT: Pupils are round and equally reacting to light. EOMI. No scleral icterus.

No conjunctival pallor. Normocephalic, atraumatic. No pharyngeal erythema. No 

thyromegaly. 


CARDIOVASCULAR: S1 and S2 present. No murmurs, rubs, or gallops. 


-PULMONARY: Chest is clear to auscultation, no wheezing.  Bilateral crepitation 

and decreased air entry on both sides


ABDOMEN: Soft, nontender, nondistended, normoactive bowel sounds. No palpable 

organomegaly. 


MUSCULOSKELETAL: No joint swelling or deformity. 


EXTREMITIES: No cyanosis, clubbing, or pedal edema. 


NEUROLOGICAL: Gross neurological examination did not reveal any focal deficits. 


SKIN: No rashes. no petechiae.





- Labs


CBC & Chem 7: 


                                 12/17/21 07:14





                                 12/17/21 07:14


Labs: 


                  Abnormal Lab Results - Last 24 Hours (Table)











  12/17/21 12/17/21 Range/Units





  07:14 07:14 


 


Neutrophils #  8.8 H   (1.3-7.7)  k/uL


 


BUN   21 H  (7-17)  mg/dL


 


Alkaline Phosphatase   138 H  ()  U/L














Assessment and Plan


Assessment: 





-acute hypoxic respiratory failure: secondary to COVID-19 pneumonia 


-chronic hypercapnic respiratory failure secondary to COPD uses 3 L of oxygen at

home


-Diarrhea most likely secondary to Covid


-Oral thrush secondary to steroids


-Hypernatremia, improving, sodium is 140 a.m. we'll continue D5 in water and 

repeat a.m. labs 


-Severe hypomagnesemia, improved


-Elevated d-dimer secondary to Covid and patient is on Lovenox which will be 

continued, Lovenox is currently twice daily


-Acute renal failure, prerenal, improved with IV fluids


-Hypertension


-Mild anion gap and metabolic acidosis probability of lactic acidosis, most 

likely secondary to increased amounts of diarrhea and dehydration, lactic acid 

is 1.1


-gastroesophageal reflux disease


-Hyperlipidemia


-Hypertension


-History of pulmonary fibrosis


-Sleep apnea uses CPAP machine at home


-Peripheral vascular disease


-Coronary artery disease


-DVT prophylaxis: On Lovenox which will be continued











Plan: 





This is a pleasant 66 years old female who presents with bilateral covid 

pneumonia


Continue with dexamethasone


Continue with vitamin C, vitamin D and zinc


Pulmonary consult 


Also he is on barcitinib


Continue with fluconazole and Questran


Labs and medication were reviewed..  Continue same treatment.  Continue with 

symptomatic treatment.  Resume home medication.  Monitor lytes and vitals.  DVT 

and GI prophylaxis.  Further recommendations as per clinical course of the 

patient


DVT prophylaxis: Subcutaneous Lovenox


GI Prophylaxis: Ppi


Prognosis is guarded

## 2021-12-19 LAB
ALBUMIN SERPL-MCNC: 3.8 G/DL (ref 3.8–4.9)
ALBUMIN/GLOB SERPL: 1.71 G/DL (ref 1.6–3.17)
ALP SERPL-CCNC: 129 U/L (ref 41–126)
ALT SERPL-CCNC: 29 U/L (ref 8–44)
ANION GAP SERPL CALC-SCNC: 16.8 MMOL/L (ref 10–18)
AST SERPL-CCNC: 22 U/L (ref 13–35)
BASOPHILS # BLD MANUAL: 0 X 10*3/UL (ref 0–0.1)
BUN SERPL-SCNC: 22.7 MG/DL (ref 9–27)
BUN/CREAT SERPL: 28.48 RATIO (ref 12–20)
CALCIUM SPEC-MCNC: 9.5 MG/DL (ref 8.7–10.3)
CHLORIDE SERPL-SCNC: 100 MMOL/L (ref 96–109)
CO2 SERPL-SCNC: 21.3 MMOL/L (ref 20–27.5)
EOSINOPHIL # BLD MANUAL: 0 X 10*3/UL (ref 0.04–0.35)
ERYTHROCYTE [DISTWIDTH] IN BLOOD BY AUTOMATED COUNT: 4.82 X 10*6/UL (ref 4.1–5.2)
ERYTHROCYTE [DISTWIDTH] IN BLOOD: 15.6 % (ref 11.5–14.5)
GLOBULIN SER CALC-MCNC: 2.2 G/DL (ref 1.6–3.3)
GLUCOSE SERPL-MCNC: 139 MG/DL (ref 70–110)
HCT VFR BLD AUTO: 42.1 % (ref 37.2–46.3)
HGB BLD-MCNC: 12.7 G/DL (ref 12–15)
LDH SPEC-CCNC: 442 U/L (ref 120–246)
LYMPHOCYTES # BLD MANUAL: 0.98 X 10*3/UL (ref 0.9–5)
MCH RBC QN AUTO: 26.3 PG (ref 27–32)
MCHC RBC AUTO-ENTMCNC: 30.2 G/DL (ref 32–37)
MCV RBC AUTO: 87.3 FL (ref 80–97)
MONOCYTES # BLD MANUAL: 0.49 X 10*3/UL (ref 0.2–1)
NEUTROPHILS NFR BLD MANUAL: 77 %
NEUTS SEG # BLD MANUAL: 7.58 X 10*3/UL (ref 2–8.9)
PLATELET # BLD AUTO: 373 X 10*3/UL (ref 140–440)
POTASSIUM SERPL-SCNC: 4.9 MMOL/L (ref 3.5–5.5)
PROT SERPL-MCNC: 6 G/DL (ref 6.2–8.2)
SODIUM SERPL-SCNC: 138 MMOL/L (ref 135–145)
WBC # BLD AUTO: 9.84 X 10*3/UL (ref 4.5–10)

## 2021-12-19 RX ADMIN — PANTOPRAZOLE SODIUM SCH MG: 40 TABLET, DELAYED RELEASE ORAL at 08:39

## 2021-12-19 RX ADMIN — NYSTATIN SCH UNIT: 100000 SUSPENSION ORAL at 21:53

## 2021-12-19 RX ADMIN — TIOTROPIUM BROMIDE INHALATION SPRAY SCH: 3.12 SPRAY, METERED RESPIRATORY (INHALATION) at 08:51

## 2021-12-19 RX ADMIN — Medication SCH MCG: at 08:40

## 2021-12-19 RX ADMIN — DEXTROSE SCH MG: 50 INJECTION, SOLUTION INTRAVENOUS at 21:47

## 2021-12-19 RX ADMIN — DEXTROSE SCH MG: 50 INJECTION, SOLUTION INTRAVENOUS at 08:39

## 2021-12-19 RX ADMIN — VALSARTAN SCH MG: 80 TABLET ORAL at 08:40

## 2021-12-19 RX ADMIN — METOPROLOL SUCCINATE SCH MG: 25 TABLET, EXTENDED RELEASE ORAL at 08:39

## 2021-12-19 RX ADMIN — CHOLESTYRAMINE SCH GM: 4 POWDER, FOR SUSPENSION ORAL at 08:39

## 2021-12-19 RX ADMIN — LORATADINE SCH MG: 10 TABLET ORAL at 08:40

## 2021-12-19 RX ADMIN — ENOXAPARIN SODIUM SCH MG: 40 INJECTION SUBCUTANEOUS at 21:48

## 2021-12-19 RX ADMIN — ENOXAPARIN SODIUM SCH MG: 40 INJECTION SUBCUTANEOUS at 08:39

## 2021-12-19 RX ADMIN — NYSTATIN SCH UNIT: 100000 SUSPENSION ORAL at 17:23

## 2021-12-19 RX ADMIN — ATORVASTATIN CALCIUM SCH MG: 80 TABLET, FILM COATED ORAL at 08:40

## 2021-12-19 RX ADMIN — SERTRALINE HYDROCHLORIDE SCH MG: 100 TABLET ORAL at 08:39

## 2021-12-19 RX ADMIN — NYSTATIN SCH UNIT: 100000 SUSPENSION ORAL at 08:40

## 2021-12-19 RX ADMIN — Medication SCH MG: at 08:40

## 2021-12-19 RX ADMIN — FLUCONAZOLE SCH MG: 100 TABLET ORAL at 08:39

## 2021-12-19 RX ADMIN — BARICITINIB SCH MG: 2 TABLET, FILM COATED ORAL at 08:40

## 2021-12-19 RX ADMIN — CLOPIDOGREL BISULFATE SCH MG: 75 TABLET ORAL at 08:39

## 2021-12-19 RX ADMIN — OXYCODONE HYDROCHLORIDE AND ACETAMINOPHEN SCH MG: 500 TABLET ORAL at 21:47

## 2021-12-19 RX ADMIN — OXYCODONE HYDROCHLORIDE AND ACETAMINOPHEN SCH MG: 500 TABLET ORAL at 08:40

## 2021-12-19 RX ADMIN — NYSTATIN SCH UNIT: 100000 SUSPENSION ORAL at 12:05

## 2021-12-19 RX ADMIN — CHOLESTYRAMINE SCH GM: 4 POWDER, FOR SUSPENSION ORAL at 17:23

## 2021-12-19 NOTE — P.PN
Subjective





Patient is a 66-year-old the female with past medical history of COPD and 3 L 

oxygen dependent at home has cold symptoms for about a month comes in with 

shortness of breath presently on telemetry dysfunction CT of the chest did not 

show any pulmonary embolism but did show pulmonary fibrosis as well as 

infiltrates.  Below.  Patient doesn't have any infiltrate consistent with 

pneumonia patient denied any dysuria.  Patient is presently on Rocephin.  

Patient has mild acute renal failure because of which I'm holding ACE inhibitor.

 Severely hypomagnesemic magnesium is being replaced.  Patient last smoked about

17 years ago.





12/08/2021





Patient is seen and evaluated in follow-up and currently maintained on 10 L high

flow and weaning as tolerated.  Patient was on 15 L high flow nasal cannula this

morning and tolerating and nursing staff continuing to wean.  Patient normally 

wears 3 L of oxygen continuously in the outpatient setting.  Patient denies any 

worsening shortness of breath just generalized fatigue and weakness and 

continued diarrhea.  Patient states she is going approximately 2-3 times per day

will add C. diff testing and if negative will add Imodium and Questran.  

Pulmonary is following and venous Doppler was ordered bilateral lower 

extremities which is negative for DVT as d-dimer is elevated today at 2.42.  

Inflammatory markers significantly improved his LDH is 338 and CRP is 7.0, 

repeat magnesium after replacement is 2.9. 





12/09/2021





Patient is seen and evaluated in follow-up this morning currently sitting up in 

the chair and was on 10 L high flow although oxygen saturations were below 90% 

and patient bumped back up to 15 L.  Patient is complaining of the oxygen being 

too high and making her feel more anxious and discussed with nursing staff about

continuing to wean as tolerated.  She continues with diarrhea although is 

improving and C. diff testing was negative and patient was started on Questran 

along with Imodium.  Patient continues to state she has no real appetite 

although encourage the patient to continue with small frequent meals and will 

add Magic cups as well.  Encourage the patient to increase oral intake as she is

weak and needs the energy.  She normally maintained on 3 L via nasal cannula and

will continue to titrate as tolerated.  Pulmonary is following patient is 

maintained on IV dexamethasone twice daily along with vitamin and zinc 

supplements, Lovenox twice daily for elevated d-dimer and will continue.  

Patient was on normal saline and considered discontinuing as patient sodium is 

now 147 and patient is being started on D5 in water and will repeat labs.





12/10/2021


Patient is seen in follow-up this morning currently sitting up in the chair and 

oxygen requirements have increased and patient is now maintained on Airvo along 

with 15 L nonrebreather and oxygen saturations have been in the low 80s to 90 

maximum.  Discussed CODE STATUS with the patient and patient is a full code but 

does not wish to be placed on a ventilator if respiratory status continues to 

decline.  Pulmonary following closely and prognosis remains quite guarded.  

Patient continues on Lovenox along with dexamethasone and vitamin and zinc 

supplements and is being started on Baricitinib.  Patient is extremely weak and 

becomes extremely dyspneic with minimal exertion.  Patient's oral intake 

continues to be extremely poor and again encouraged and stressed the importance 

of eating regardless of not feeling hungry and having no appetite.  May consider

enteral nutrition and dietary consult.  Patient states her diarrhea has 

significantly improved and will use Imodium as needed and discontinue Questran. 

Repeat inflammatory markers ordered and pending.  Chest x-ray today shows 

bilateral airspace disease again noted with prominence and interstitial, and 

findings consistent with pneumonia.





12/11/2021


Patient admitted with bilateral common pneumonia , Her respiratory status today 

is a slightly worse as she is dependent on BiPAP throughout the day.  Also she 

has decreased air entry on both sides


She is saturating 88% while she is on BiPAP.


D-dimer is the same, 4.0 and 3.9.


She continued on dexamethasone, vitamin C, vitamin D, zinc,  barcitininb and 

Lovenox 40 mg twice a day.  Also she is on Protonix





12/12/2021


Sitting in chair looks mildly tachypneic and tolerates BiPAP which she uses his 

yesterday all the time.  She has not been eating since yesterday and may switch 

her for short time to nonrebreather and air or so she can read some oral diet   

Per pulmonary team recommendation.  Other than that she is hemodynamically 

stable.  Labs are stable.Calcitonin 0.19.  C. diff is negative.


She remains on dexamethasone, vitamin C, Z and Lovenox, Protonix and  barcitinib







12/13/2021


This morning she was still on BiPAP, and she was refusing to start TPN as she 

could not come off the BiPAP.  However later on during the day she could be 

placed on nonrebreather 15 L and Aravo 60 L with 94% so she can tolerate oral 

feeding.


Other vitals are stable, afebrile.  Labs are stable as well.


She continued on dexamethasone, vitamin C, vitamin D, zinc,  barcitininb and 

Lovenox 40 mg twice a day





12/14/2021


Patient with bilateral: With pneumonia and hypoxia, she is improving gradually 

today, today she couldn't get off her BiPAP and placed on airvo 60 L with FiO2 

of 85%, with occasional nonrebreather 15 L, patient was happy that she was able 

to eat today and she tolerates diet well.  She has some diarrhea but no 

abdominal pain.  C. diff is negative.


Other than that she is hemodynamically stable


Patient continued today on  dexamethasone, vitamin C, Z and Lovenox, Protonix 

and  barcitinib 


Follow-up chest x-ray and CBC/BMP in the morning





12/15/2021


Patient today is pleasant and smiling as her pulmonary illness is improving 

progressively but slowly, but she still currently on high flow nasal cannula 

with 55 L/m and FiO2 of 88% saturation in low 90s.  Rest of vitals are stable.  

Labs including CBC, BMP are unremarkable.


C. diff test came back negative and her diarrhea stopped today.  She is able to 

eat about 50-25% of her meals and she is satisfied with that for now.


She still on steroids with dexamethasone, vitamin C, vitamin D, zinc, Lovenox 40

mg twice a day, Protonix and barcitininb 





12/16/2021


Patient today her breathing slightly worsened and her oxygen requirement today 

was 55 L with 90%, she still can go a little difficulty due to her dyspnea and 

she confirmed me she is able to eat with no issues.


She sitting in chair today.  Hemodynamically stable.


Labs reviewed and shows stability, her d-dimer actually improved 3.9 down to 

1.6.  LDH went up to 1029 and CRP 3.4.


Chest x-ray shows slight improvement in aeration in both lung fields despite 

bilateral pneumonia.


She remains on dexamethasone, vitamin C, D and zinc and  Protonix and 

barcitininb .





12/17/2021


Patient breathing pattern is slowly to improve, today she still on high flow 

nasal cannula 55 L/m and FiO2 of 90%.


Labs including CBC and BMP are unremarkable and stable.  On the top of the 

patient is afebrile


However patient is complaining of from oral thrush and asked for fluconazole.  

Patient refused nystatin.  Also she has persistent loose bowel movement, C. diff

checked twice and there were negative.  We'll start her on short course of 

Questran.  Also patient states she is eating well and tolerates diet with no 

difficulty.


Continue with the other same medication of dexamethasone, multiple vitamin 

cocktail and Lovenox, Protonix and barcitininb 





12/18/2021


Her respiratory status with minimal change and fluctuating, currently she is on 

airvo at 55 L/m and 85%.  She is able to eat and drink.


She states that her most thrush is better and his diarrhea is improving after 

adding nystatin and Questran yesterday.


Afebrile and vitals are stable.


She remains on the same treatment of dexamethasone, vitamin C, D and zinc and  

Protonix and barcitininb .





12/19/2021 


patient breathing is improving as per patient stated that she is breathing 

easier.  She remains on same dose of oxygen as yesterday she still on 15 L/m at 

88% FiO2.


Also reports improvement in her bowel movements, she said that she almost had 

more formed bowel movement and her oral thrush is also improving with 

fluconazole.


And we will continue with the same treatment for her colon pneumonia with 

dexamethasone, vitamins, Barcitininb, Lovenox and Pepcid








Objective





- Vital Signs


Vital signs: 


                                   Vital Signs











Temp  98.1 F   12/19/21 10:00


 


Pulse  67   12/19/21 10:00


 


Resp  22   12/19/21 10:00


 


BP  121/71   12/19/21 10:00


 


Pulse Ox  94 L  12/19/21 10:00








                                 Intake & Output











 12/18/21 12/19/21 12/19/21





 18:59 06:59 18:59


 


Intake Total 540  


 


Balance 540  


 


Intake:   


 


  Oral 540  


 


Other:   


 


  Voiding Method Bedside Commode  


 


  # Voids 2 2 1


 


  # Bowel Movements   1














- Exam





-GENERAL: The patient is alert and oriented , on BiPAP, not in any acute 

distress. Well developed, well nourished. 


HEENT: Pupils are round and equally reacting to light. EOMI. No scleral icterus.

No conjunctival pallor. Normocephalic, atraumatic. No pharyngeal erythema. No 

thyromegaly. 


CARDIOVASCULAR: S1 and S2 present. No murmurs, rubs, or gallops. 


-PULMONARY: Chest is clear to auscultation, no wheezing.  Bilateral crepitation 

and decreased air entry on both sides


ABDOMEN: Soft, nontender, nondistended, normoactive bowel sounds. No palpable 

organomegaly. 


MUSCULOSKELETAL: No joint swelling or deformity. 


EXTREMITIES: No cyanosis, clubbing, or pedal edema. 


NEUROLOGICAL: Gross neurological examination did not reveal any focal deficits. 


SKIN: No rashes. no petechiae.





- Labs


CBC & Chem 7: 


                                 12/19/21 05:37





                                 12/19/21 05:37


Labs: 


                  Abnormal Lab Results - Last 24 Hours (Table)











  12/19/21 Range/Units





  05:37 


 


D-Dimer  2.10 H  (<0.60)  mg/L FEU














Assessment and Plan


Assessment: 





-acute hypoxic respiratory failure: secondary to COVID-19 pneumonia 


-chronic hypercapnic respiratory failure secondary to COPD uses 3 L of oxygen at

home


-Diarrhea most likely secondary to Covid


-Oral thrush secondary to steroids


-Hypernatremia, improving, sodium is 140 a.m. we'll continue D5 in water and 

repeat a.m. labs 


-Severe hypomagnesemia, improved


-Elevated d-dimer secondary to Covid and patient is on Lovenox which will be co

ntinued, Lovenox is currently twice daily


-Acute renal failure, prerenal, improved with IV fluids


-Hypertension


-Mild anion gap and metabolic acidosis probability of lactic acidosis, most 

likely secondary to increased amounts of diarrhea and dehydration, lactic acid 

is 1.1


-gastroesophageal reflux disease


-Hyperlipidemia


-Hypertension


-History of pulmonary fibrosis


-Sleep apnea uses CPAP machine at home


-Peripheral vascular disease


-Coronary artery disease


-DVT prophylaxis: On Lovenox which will be continued











Plan: 





This is a pleasant 66 years old female who presents with bilateral covid 

pneumonia


Continue with dexamethasone


Continue with vitamin C, vitamin D and zinc


Pulmonary consult 


Also he is on barcitinib


Continue with fluconazole and Questran


Labs and medication were reviewed..  Continue same treatment.  Continue with 

symptomatic treatment.  Resume home medication.  Monitor lytes and vitals.  DVT 

and GI prophylaxis.  Further recommendations as per clinical course of the 

patient


DVT prophylaxis: Subcutaneous Lovenox


GI Prophylaxis: Ppi


Prognosis is guarded

## 2021-12-19 NOTE — PN
PROGRESS NOTE



DATE OF SERVICE:

12/19/2021



REASON FOR FOLLOWUP:

COVID-19 pneumonia.



INTERVAL HISTORY:

Patient is afebrile.  The patient is feeling better.  Breathing comfortably.  The

patient's FiO2 is currently down to 80%.  Denies any chest pain.  No worsening cough or

sputum production.  No abdominal pain. No diarrhea.



PHYSICAL EXAMINATION:

Blood pressure 121/71 with a pulse of 77, temperature 98.1.  She is 94% on 55% AIRVO.

General description is an elderly female up in the chair in no distress.  Respiratory

system: Unlabored breathing, decreased intensity in breath sounds with no wheeze. Heart

S1, S2.  Regular rate and rhythm.  Abdomen soft, no tenderness.



LAB:

Hemoglobin is 12.7, white count 9.84, creatinine 0.8.



DIAGNOSTIC IMPRESSION AND PLAN:

1. Patient with acute respiratory failure secondary to Covid 19 pneumonia.  Chest x-

    ray with no changes.  The patient's FIO2 currently trending down to continue with

    baricitinib, dexamethasone, Lovenox, zinc and ascorbic acid along with respiratory

    support.

2. Patient with oral thrush, continue nystatin swish and swallow.





MMODL / IJN: [id number] / Job#: [id number]

## 2021-12-19 NOTE — P.PN
Subjective


Progress Note Date: 12/19/21


Principal diagnosis: 


 Dyspnea, hypoxia, COVID-19





This is a pleasant 66-year-old female patient who follows at the [facility] for her

primary care needs.  She has history of hypertension, hyperlipidemia, 

gastroesophageal reflux disease, anxiety/depression, aortic thrombectomy in 2007

, former smoker, COPD, obstructive sleep apnea utilizing CPAP.  He is here 

yesterday to the emergency room with a four-week history of shortness of breath,

cough, congestion, body aches and fevers.  She was tested negative for CoVID 2.

 Her  is positive for CoVID and she is now positive for CoVID.  She is 

not vaccinated.  She does have home oxygen use at 2-3 L.  She is currently 

requiring 10 L high flow nasal cannula to maintain O2 saturations at 90%.  Chest

x-ray reveals bilateral patchy infiltrates. White count 5.9.  Hemoglobin 12.5.  

Platelets 398.  Lymphocytes 0.5.  D-dimer 1.89.  Sodium 140.  Potassium 4.9.  

Creatinine 1.2.  AST 39.  ALT 26.  Alk phos 156.  LDH 1283.  C-reactive protein 

21.7.  Pro-calcitonin 0.32.  She's been initiated on Decadron, Lovenox, vitamin 

supplements.  0.9% normal saline at 75 ML's per hour.





On 12/08/2021 patient is in follow-up on medical surgical floor, she is resting 

comfortably in bed, she states she is feeling better, although she is requiring 

more oxygen compared to when she first presented to the emergency department, 

note that patient does wear home oxygen at 2 L for history of COPD.  She is 

currently on 10 L of oxygen, her pulse ox is 91-92%, breathing comfortably, lung

sounds reveal coarse crackles at bilateral bases, but no wheezing, no rhonchi.  

CT angiogram of the chest showed no evidence of pulmonary embolism, it did show 

pulmonary emphysema and pulmonary interstitial fibrosis with increased 

interstitial infiltrates compared to her old exam.  There was no evidence of a 

suspicious pulmonary mass.  Patient was outside the window for Remdesivir, she 

continues on Decadron 6 mg twice daily, her pro-calcitonin level on admission 

was 0.32, and patient was covered with Rocephin for possibility of underlying 

bacterial infection.  She is on prophylactic Lovenox 40 mg daily.  CTA On license of [facility] of the chest showed no evidence of pulmonary embolism. 0





On 12/09/2001 patient seen in follow-up on medical surgical floor, she is c

urrently on 10 L of oxygen the pulse ox of 86%, FiO2 has since been increased to

15 L, she has a mild cough, but overall seems to be in no acute distress, she is

sitting up in the chair, denies any chest discomfort, looks quite comfortable.  

Lung sounds reveal coarse crackles bilateral bases, she does get short of breath

with exertion.  She states if she sits still she is breathing comfortably, and 

her O2 saturations are at or above 90%.  She's had no fever or chills overnight,

she remains on Decadron 40 mg twice daily.  She is on empiric antibiotics in the

form of Rocephin and IV fluids at 75 ML per hour.  Today's labs have been 

reviewed, white blood cell count is 8.6, hemoglobin is 12, platelet count is 

506, d-dimer is 4.01, sodium is 147, potassium is 4.8, chloride is 112, BUN of 

20 creatinine 0.7, renal profile has significantly improved since admission.  

LDH has significantly improved since admission and is down to 348 on today's 

labs, CRP is still pending, her last pro-calcitonin is negative at 0.13.  Blood 

culture has shown no growth thus far.





On 12/10/2021 patient seen in follow-up on medical surgical floor, she had been 

on 15 L high flow and 100% nonrebreather mask up until this morning, this 

morning patient's FiO2 requirements had increased, patient was getting up in the

chair, she is significantly desaturated, she was placed on irritable at 60 L and

FiO2 of 90% in addition to 100% nonrebreather, and patient is taking quite a 

while to recover her O2 saturations although her work of breathing is not 

increased.  She is still awake and alert, she is oriented 3, she is able to 

make her needs known, her pulse ox is slowly recovering at rest, with deep 

breathing, pursed lip breathing, repositioning.  Overnight she has had no fever 

or chills, blood pressures have been stable.  Breathing fairly comfortably 

although she desaturates with any little exertion.  Currently she is on Decadron

6 Twice daily, she will be started on Baricitinib, patient is also on Plavix, 

and prophylactic Lovenox.  Today's chest x-ray and labs are still pending, she 

remains on D5W at a rate of 100 ML per hour for dehydration related to diarrhea 

and hypernatremia, repeat electrolytes and renal profile are still pending for 

today.  No abdominal pain, no nausea.  The diarrhea has significantly improved 

in the last 48 hours.  Patient is tolerating oral intake, no abdominal pain. 





On 12/12/2021 patient seen in follow-up on medical surgical floor.  She is alert

and alert, she is on BiPAP support with pressures of 14 and 8 and FiO2 100%.  

Earlier this morning her BiPAP pressures have been increased from 12.6 in the 

100% FiO2.  Her pulse oximeter readings are marginal at around 90%, with any 

exertion patient does desaturate and takes her a while to recover although she 

looks very comfortable, nontachypneic, she is sitting up in the recliner, she is

watching TV.  She is complaining of being hungry.  She has not been able to take

much by mouth related to being BiPAP dependent.  She has been afebrile, 

hemodynamics are stable.  Her last chest x-ray from 12/10/2021 showed bilateral 

airspace disease with prominence of interstitium with no evident pneumothorax or

pleural effusion.  Today's labs have been reviewed showing white blood cell 

count of 10.6, hemoglobin 12.2, d-dimer and yesterday's labs was down to 3.98, 

electrolytes and renal profile were within normal limits.  Per pro-calcitonin 

level was negative at 0.19.  Patient remains on Baricitinib, Decadron 6 mg twice

daily, she is receiving IV fluids with D5 W at 100 ML per hour





On 12/13/2021 patient seen in follow-up on medical surgical floor, she was 

switched to Airvo at 60 L and 90%, and nonrebreather mask, she is maintaining 

her O2 saturations between 88 and 91%, she is breathing comfortably, tolerating 

it for a few hours now, she was able to eat something, she is taking in oral 

fluids, she is awake and alert, she is sitting up in the chair, she is being her

bills.  Lung sounds are positive for scattered crackles throughout the lung 

fields, no cough, appears very comfortable.  He is on [place] neb, she is on 

Decadron, and Lovenox.  She is on D5W at a rate of 100 ML per hour related to 

her nothing by mouth status however in view of her tolerating oral intake now, 

and not been BiPAP dependent anymore we'll stop the IV fluids.  Today's labs 

have been reviewed and her white blood cell count is 10.4, hemoglobin is 12.6, 

her electrolytes have been reviewed and are within normal limits, sodium is 141,

potassium is 3.9, chloride is 103, BUN is 15, creatinine is 0.72.  Her 

procalcitonin level was negative.





On 12/15/2021 patient seen in follow-up on medical surgical floor, she is 

sitting up in the recliner, she states her breathing is improving, she is on 

Airvo, and we were able to wean down the flow and FiO2, and the flow is 

currently at 55 L/m, and FiO2 is currently at 88%, her pulse ox is ranging 

between 86-93%, breathing comfortably, minimal crackles at bilateral bases, 

occasional cough, patient has been get not to the bedside commode, tolerates 

activity well, her spirits are high, her oral intake is fair.  She continues on 

Baricitinib, she continues on Decadron 6 blood gram twice daily, and Lovenox 40 

mg twice daily, today's labs have been reviewed showing white blood cell count 

is 7.19, hemoglobin of 11.9, platelet count is 383, electrolytes and renal 

profile are unremarkable.  Cdiff negative.





On 12/16/2021 patient seen in follow-up on medical surgical floor.  She is awake

and alert, in no acute distress, she is currently resting in bed, she is on her 

left side and patient has been compliant with salt repositioning in bed from 

side to side.  She is breathing comfortably, remains on Airvo at 55 L and FiO2 

of 87%, and she was also placed on Airvo because her pulse ox was less than 85%,

her current O2 saturations are around 88%, patient seems to breathing quite 

comfortably.  Today's chest x-ray shows persistent but slightly improving 

bilateral pneumonia. patient continues on Baricitinib, Decadron 6 mg twice 

daily, she is on Lovenox 40 mg twice daily, COVID-19 vitamins.  His labs have 

been reviewed and there was down to 1.63, CBC was within normal limits, 

electrolytes and renal profile were unremarkable.  Today's LDH is 1029, CRP is 

1.7.  Vital signs have been stable, patient has been afebrile.





On 12/17/2021 patient is seen in follow-up on medical surgical floor.  She is 

breathing comfortably, she is resting in bed, she's been repositioning self from

side to side.  She remains on Airvo at 55 L and FiO2 of 90%, and pulse ox is 89-

93%.  Blood pressure is been stable, patient has been afebrile.  Patient 

continues on Baricitinib, continues on Decadron, Diflucan, Lovenox twice daily. 

Lung sounds revealed mildly diminished breath sounds, and a few scattered c

rackles.  Chest x-ray showing persistent but slightly improving bilateral Osvaldo

pneumonia





On 12/19/2001 patient seen in follow-up on medical surgical floor.  She is 

resting comfortably in bed, she is currently on 55 L/m and FiO2 of 80%, 

breathing comfortably, she has not required nonrebreather mask at all for the 

past 24 hours, she continues on Decadron, Baricitinib, and prophylactic Lovenox,

she is breathing comfortably, lung sounds revealed minimal crackles bilaterally,

she is tolerating oral intake.  No nausea vomiting or diarrhea.  Fever or 

chills.  Today's chest x-ray shows no change in the appearance of diffuse 

interstitial opacities in both lungs.  Today's labs have been reviewed, white 

count is normal at 9.8, hemoglobin of 12.7, d-dimer is 2.1, electrolytes and 

renal profile are unremarkable, and her inflammatory markers are improving on 

today's labs.








Objective





- Vital Signs


Vital signs: 


                                   Vital Signs











Temp  98.1 F   12/19/21 10:00


 


Pulse  67   12/19/21 10:00


 


Resp  22   12/19/21 10:00


 


BP  121/71   12/19/21 10:00


 


Pulse Ox  88 L  12/19/21 12:15








                                 Intake & Output











 12/18/21 12/19/21 12/19/21





 18:59 06:59 18:59


 


Intake Total 540  


 


Balance 540  


 


Intake:   


 


  Oral 540  


 


Other:   


 


  Voiding Method Bedside Commode  


 


  # Voids 2 2 1


 


  # Bowel Movements   1














- Exam


 GENERAL EXAM: Alert, very pleasant, 66-year-old white female, on low at 55 L an

FiO2 of 80%, and  breathing comfortably, does not appear to be in any acute 

distress


HEAD: Normocephalic/atraumatic.


EYES: Normal reaction of pupils, equal size.  Conjunctiva pink, sclera white.


NOSE: Clear with pink turbinates.


THROAT: No erythema or exudates.


NECK: No masses, no JVD, no thyroid enlargement, no adenopathy.


CHEST: No chest wall deformity.  Symmetrical expansion. 


LUNGS: Equal air entry with with coarse bilateral crackles


CVS: Regular rate and rhythm, normal S1 and S2, no gallops, no murmurs, no rubs


ABDOMEN: Soft, nontender.  No hepatosplenomegaly, normal bowel sounds, no 

guarding or rigidity.


EXTREMITIES: No clubbing, no edema, no cyanosis, 2+ pulses and upper and lower 

extremities.


MUSCULOSKELETAL: Muscle strength and tone normal.


SPINE: No scoliosis or deformity


SKIN: No rashes


CENTRAL NERVOUS SYSTEM: Alert and oriented -3.  No focal deficits, tone is 

normal in all 4 extremities.


PSYCHIATRIC: Alert and oriented -3.  Appropriate affect.  Intact judgment and 

insight.











- Labs


CBC & Chem 7: 


                                 12/19/21 05:37





                                 12/19/21 05:37


Labs: 


                  Abnormal Lab Results - Last 24 Hours (Table)











  12/19/21 12/19/21 12/19/21 Range/Units





  05:37 05:37 05:37 


 


MCH  26.3 L    (27.0-32.0)  pg


 


MCHC  30.2 L    (32.0-37.0)  g/dL


 


RDW  15.6 H    (11.5-14.5)  %


 


Metamyelocytes %  3 H    (0-0)  %


 


Myelocytes %  5 H    (0-0)  %


 


Eosinophils # (Manual)  0 L    (0.04-0.35)  X 10*3/uL


 


D-Dimer   2.10 H   (<0.60)  mg/L FEU


 


BUN/Creatinine Ratio    28.48 H  (12.00-20.00)  Ratio


 


Glucose    139 H  ()  mg/dL


 


Alkaline Phosphatase    129 H  ()  U/L


 


Lactate Dehydrogenase    442 H  (120-246)  U/L


 


Total Protein    6.0 L  (6.2-8.2)  g/dL














Assessment and Plan


Plan: 


 Assessment:





#1.  Acute on chronic hypoxic respiratory failure secondary to acute COVID-19 

pneumonia, patient is not vaccinated related to multiple ALLERGIES, patient 

presented with a 4 week history of symptoms, she was outside the window for 

Remdesivir, she is being treated supportively with Decadron, prophylactic 

Lovenox, and empiric antibiotics.  Patient was placed on irritable at 60 L and 

90% FiO2 with nonrebreather mask today on 12/10/2021, we'll start the patient on

Baricitinib.  On 12/12/2021 patient is on BiPAP support remains BiPAP support 

dependent at pressures of 14 and 1800% FiO2 although clinically is comfortable. 

Today on 12/19/2021 patient is not using nonrebreather mask, and is just on 

Airvo at 55 L and FiO2 of 80%





#2.  Mildly elevated pro calcitonin, patient is on empiric antibiotics in the 

form of Rocephin, pro calcitonin has improved, no definite sign of infection, 

Rocephin has been discontinued





#3.  Elevated inflammatory markers secondary to the viral pneumonia, improving





#4.  Morbid obesity with BMI of 39.1 kg/m





#5.  Obstructive sleep apnea on CPAP therapy





#6.  History of COPD with chronic hypoxic respiratory failure usually wears 2 L 

of oxygen on the outpatient basis, and CT angiogram of the chest also revealed 

evidence of interstitial pulmonary fibrosis





#7.  Hyperlipidemia





#8.  Anxiety/depression





#9.  History of coronary artery disease with previous stent placement





#10.  History of aortic thrombectomy/aortobifem bypass in 2007





#11.  Former smoker





Plan:








Breathing comfortably, no acute distress


Did not use the nonrebreather mask in the last 24 hours


Currently on Airvo at 55 l/min and Fio2 80%


Continue Baricitinib


Continue Decadron, and Lovenox


Lower extremity Dopplers and CT angiogram the chest were negative for DVT and PE


Inflammatory markers are improving,


Patient is quite comfortable,


No specific complaints


Continue current medical treatment





I performed a history & physical examination of the patient and discussed their 

management with my nurse practitioner, Jeannine Belcher.  I reviewed the nurse 

practitioner's note and agree with the documented findings and plan of care.  

Lung sounds are positive for diffuse crackles throughout the lung fields.  The 

findings and the impression was discussed with the patient.  I attest to the 

documentation by the nurse practitioner. 











Time with Patient: Less than 30

## 2021-12-19 NOTE — XR
EXAMINATION TYPE: XR chest 1V portable

 

DATE OF EXAM: 12/19/2021

 

COMPARISON: 12/16/2021

 

HISTORY: Covid pneumonia

 

TECHNIQUE: Single frontal view of the chest is obtained.

 

FINDINGS:  There is diffuse interstitial opacity in both lungs which is stable.

 

Pneumothorax or large pleural effusion. Heart size normal. The osseous structures are intact

 

IMPRESSION:  No change in the acute cardiopulmonary disease.

## 2021-12-20 RX ADMIN — SERTRALINE HYDROCHLORIDE SCH MG: 100 TABLET ORAL at 07:17

## 2021-12-20 RX ADMIN — OXYCODONE HYDROCHLORIDE AND ACETAMINOPHEN SCH MG: 500 TABLET ORAL at 22:20

## 2021-12-20 RX ADMIN — Medication SCH MG: at 07:18

## 2021-12-20 RX ADMIN — LORATADINE SCH MG: 10 TABLET ORAL at 07:18

## 2021-12-20 RX ADMIN — DEXTROSE SCH MG: 50 INJECTION, SOLUTION INTRAVENOUS at 07:18

## 2021-12-20 RX ADMIN — ENOXAPARIN SODIUM SCH MG: 40 INJECTION SUBCUTANEOUS at 07:16

## 2021-12-20 RX ADMIN — CHOLESTYRAMINE SCH GM: 4 POWDER, FOR SUSPENSION ORAL at 07:19

## 2021-12-20 RX ADMIN — METOPROLOL SUCCINATE SCH MG: 25 TABLET, EXTENDED RELEASE ORAL at 07:17

## 2021-12-20 RX ADMIN — LOPERAMIDE HYDROCHLORIDE PRN MG: 2 CAPSULE ORAL at 22:19

## 2021-12-20 RX ADMIN — ENOXAPARIN SODIUM SCH MG: 40 INJECTION SUBCUTANEOUS at 22:20

## 2021-12-20 RX ADMIN — NYSTATIN SCH UNIT: 100000 SUSPENSION ORAL at 14:22

## 2021-12-20 RX ADMIN — OXYCODONE HYDROCHLORIDE AND ACETAMINOPHEN SCH MG: 500 TABLET ORAL at 07:17

## 2021-12-20 RX ADMIN — PANTOPRAZOLE SODIUM SCH MG: 40 TABLET, DELAYED RELEASE ORAL at 07:18

## 2021-12-20 RX ADMIN — NYSTATIN SCH UNIT: 100000 SUSPENSION ORAL at 22:21

## 2021-12-20 RX ADMIN — BARICITINIB SCH MG: 2 TABLET, FILM COATED ORAL at 07:19

## 2021-12-20 RX ADMIN — VALSARTAN SCH MG: 80 TABLET ORAL at 07:20

## 2021-12-20 RX ADMIN — TIOTROPIUM BROMIDE INHALATION SPRAY SCH PUFF: 3.12 SPRAY, METERED RESPIRATORY (INHALATION) at 07:45

## 2021-12-20 RX ADMIN — ATORVASTATIN CALCIUM SCH MG: 80 TABLET, FILM COATED ORAL at 07:17

## 2021-12-20 RX ADMIN — DEXTROSE SCH MG: 50 INJECTION, SOLUTION INTRAVENOUS at 22:19

## 2021-12-20 RX ADMIN — NYSTATIN SCH UNIT: 100000 SUSPENSION ORAL at 07:19

## 2021-12-20 RX ADMIN — FLUCONAZOLE SCH MG: 100 TABLET ORAL at 07:17

## 2021-12-20 RX ADMIN — NYSTATIN SCH UNIT: 100000 SUSPENSION ORAL at 17:10

## 2021-12-20 RX ADMIN — Medication SCH MCG: at 07:17

## 2021-12-20 RX ADMIN — CLOPIDOGREL BISULFATE SCH MG: 75 TABLET ORAL at 07:17

## 2021-12-20 RX ADMIN — CHOLESTYRAMINE SCH: 4 POWDER, FOR SUSPENSION ORAL at 07:27

## 2021-12-20 NOTE — PN
PROGRESS NOTE



DATE OF SERVICE:

12/20/2021



REASON FOR FOLLOWUP:

COVID-19 pneumonia.



INTERVAL HISTORY:

Patient is afebrile.  The patient is breathing comfortably.  FiO2 is currently down to

75%.  The patient denies having any chest pain.  No worsening cough or sputum

production.  No abdominal pain. No diarrhea.



PHYSICAL EXAMINATION:

Blood pressure is 119/66, pulse of 63, temperature 98.7. She is 93% on 75% _____.

General description is an elderly female up in the bed in no distress.  Respiratory

system: Unlabored breathing, decreased intensity of breath sounds.  No wheeze. Heart

S1, S2.  Regular rate and rhythm.  Abdomen soft, no tenderness.  Extremities: No edema

of the feet.



LABS:

Hemoglobin is 12.7, white count 9.84.



DIAGNOSTIC IMPRESSION AND PLAN:

Patient with acute COVID-19 pneumonia in this patient seems to be slowly clinically

improving.  Patient to continue with baricitinib, dexamethasone, Lovenox, zinc and

ascorbic acid. The patient's illness has been for more than 20 days and can be taken

off the isolation per CDC guidelines.





MMODL / IJN: [id number] / Job#: [id number]

## 2021-12-20 NOTE — P.PN
Subjective


Progress Note Date: 12/20/21


Principal diagnosis: 


 Dyspnea, hypoxia, COVID-19





This is a pleasant 66-year-old female patient who follows at the [facility] for her

primary care needs.  She has history of hypertension, hyperlipidemia, 

gastroesophageal reflux disease, anxiety/depression, aortic thrombectomy in 2007

, former smoker, COPD, obstructive sleep apnea utilizing CPAP.  He is here 

yesterday to the emergency room with a four-week history of shortness of breath,

cough, congestion, body aches and fevers.  She was tested negative for CoVID 2.

 Her  is positive for CoVID and she is now positive for CoVID.  She is 

not vaccinated.  She does have home oxygen use at 2-3 L.  She is currently 

requiring 10 L high flow nasal cannula to maintain O2 saturations at 90%.  Chest

x-ray reveals bilateral patchy infiltrates. White count 5.9.  Hemoglobin 12.5.  

Platelets 398.  Lymphocytes 0.5.  D-dimer 1.89.  Sodium 140.  Potassium 4.9.  

Creatinine 1.2.  AST 39.  ALT 26.  Alk phos 156.  LDH 1283.  C-reactive protein 

21.7.  Pro-calcitonin 0.32.  She's been initiated on Decadron, Lovenox, vitamin 

supplements.  0.9% normal saline at 75 ML's per hour.





On 12/08/2021 patient is in follow-up on medical surgical floor, she is resting 

comfortably in bed, she states she is feeling better, although she is requiring 

more oxygen compared to when she first presented to the emergency department, 

note that patient does wear home oxygen at 2 L for history of COPD.  She is 

currently on 10 L of oxygen, her pulse ox is 91-92%, breathing comfortably, lung

sounds reveal coarse crackles at bilateral bases, but no wheezing, no rhonchi.  

CT angiogram of the chest showed no evidence of pulmonary embolism, it did show 

pulmonary emphysema and pulmonary interstitial fibrosis with increased 

interstitial infiltrates compared to her old exam.  There was no evidence of a 

suspicious pulmonary mass.  Patient was outside the window for Remdesivir, she 

continues on Decadron 6 mg twice daily, her pro-calcitonin level on admission 

was 0.32, and patient was covered with Rocephin for possibility of underlying 

bacterial infection.  She is on prophylactic Lovenox 40 mg daily.  [facility] of the chest showed no evidence of pulmonary embolism. 0





On 12/09/2001 patient seen in follow-up on medical surgical floor, she is c

urrently on 10 L of oxygen the pulse ox of 86%, FiO2 has since been increased to

15 L, she has a mild cough, but overall seems to be in no acute distress, she is

sitting up in the chair, denies any chest discomfort, looks quite comfortable.  

Lung sounds reveal coarse crackles bilateral bases, she does get short of breath

with exertion.  She states if she sits still she is breathing comfortably, and 

her O2 saturations are at or above 90%.  She's had no fever or chills overnight,

she remains on Decadron 40 mg twice daily.  She is on empiric antibiotics in the

form of Rocephin and IV fluids at 75 ML per hour.  Today's labs have been 

reviewed, white blood cell count is 8.6, hemoglobin is 12, platelet count is 

506, d-dimer is 4.01, sodium is 147, potassium is 4.8, chloride is 112, BUN of 

20 creatinine 0.7, renal profile has significantly improved since admission.  

LDH has significantly improved since admission and is down to 348 on today's 

labs, CRP is still pending, her last pro-calcitonin is negative at 0.13.  Blood 

culture has shown no growth thus far.





On 12/10/2021 patient seen in follow-up on medical surgical floor, she had been 

on 15 L high flow and 100% nonrebreather mask up until this morning, this 

morning patient's FiO2 requirements had increased, patient was getting up in the

chair, she is significantly desaturated, she was placed on irritable at 60 L and

FiO2 of 90% in addition to 100% nonrebreather, and patient is taking quite a 

while to recover her O2 saturations although her work of breathing is not 

increased.  She is still awake and alert, she is oriented 3, she is able to 

make her needs known, her pulse ox is slowly recovering at rest, with deep 

breathing, pursed lip breathing, repositioning.  Overnight she has had no fever 

or chills, blood pressures have been stable.  Breathing fairly comfortably 

although she desaturates with any little exertion.  Currently she is on Decadron

6 Twice daily, she will be started on Baricitinib, patient is also on Plavix, 

and prophylactic Lovenox.  Today's chest x-ray and labs are still pending, she 

remains on D5W at a rate of 100 ML per hour for dehydration related to diarrhea 

and hypernatremia, repeat electrolytes and renal profile are still pending for 

today.  No abdominal pain, no nausea.  The diarrhea has significantly improved 

in the last 48 hours.  Patient is tolerating oral intake, no abdominal pain. 





On 12/12/2021 patient seen in follow-up on medical surgical floor.  She is alert

and alert, she is on BiPAP support with pressures of 14 and 8 and FiO2 100%.  

Earlier this morning her BiPAP pressures have been increased from 12.6 in the 

100% FiO2.  Her pulse oximeter readings are marginal at around 90%, with any 

exertion patient does desaturate and takes her a while to recover although she 

looks very comfortable, nontachypneic, she is sitting up in the recliner, she is

watching TV.  She is complaining of being hungry.  She has not been able to take

much by mouth related to being BiPAP dependent.  She has been afebrile, 

hemodynamics are stable.  Her last chest x-ray from 12/10/2021 showed bilateral 

airspace disease with prominence of interstitium with no evident pneumothorax or

pleural effusion.  Today's labs have been reviewed showing white blood cell 

count of 10.6, hemoglobin 12.2, d-dimer and yesterday's labs was down to 3.98, 

electrolytes and renal profile were within normal limits.  Per pro-calcitonin 

level was negative at 0.19.  Patient remains on Baricitinib, Decadron 6 mg twice

daily, she is receiving IV fluids with D5 W at 100 ML per hour





On 12/13/2021 patient seen in follow-up on medical surgical floor, she was 

switched to Airvo at 60 L and 90%, and nonrebreather mask, she is maintaining 

her O2 saturations between 88 and 91%, she is breathing comfortably, tolerating 

it for a few hours now, she was able to eat something, she is taking in oral 

fluids, she is awake and alert, she is sitting up in the chair, she is being her

bills.  Lung sounds are positive for scattered crackles throughout the lung 

fields, no cough, appears very comfortable.  He is on [place] neb, she is on 

Decadron, and Lovenox.  She is on D5W at a rate of 100 ML per hour related to 

her nothing by mouth status however in view of her tolerating oral intake now, 

and not been BiPAP dependent anymore we'll stop the IV fluids.  Today's labs 

have been reviewed and her white blood cell count is 10.4, hemoglobin is 12.6, 

her electrolytes have been reviewed and are within normal limits, sodium is 141,

potassium is 3.9, chloride is 103, BUN is 15, creatinine is 0.72.  Her 

procalcitonin level was negative.





On 12/15/2021 patient seen in follow-up on medical surgical floor, she is 

sitting up in the recliner, she states her breathing is improving, she is on 

Airvo, and we were able to wean down the flow and FiO2, and the flow is 

currently at 55 L/m, and FiO2 is currently at 88%, her pulse ox is ranging 

between 86-93%, breathing comfortably, minimal crackles at bilateral bases, 

occasional cough, patient has been get not to the bedside commode, tolerates 

activity well, her spirits are high, her oral intake is fair.  She continues on 

Baricitinib, she continues on Decadron 6 blood gram twice daily, and Lovenox 40 

mg twice daily, today's labs have been reviewed showing white blood cell count 

is 7.19, hemoglobin of 11.9, platelet count is 383, electrolytes and renal 

profile are unremarkable.  Cdiff negative.





On 12/16/2021 patient seen in follow-up on medical surgical floor.  She is awake

and alert, in no acute distress, she is currently resting in bed, she is on her 

left side and patient has been compliant with salt repositioning in bed from 

side to side.  She is breathing comfortably, remains on Airvo at 55 L and FiO2 

of 87%, and she was also placed on Airvo because her pulse ox was less than 85%,

her current O2 saturations are around 88%, patient seems to breathing quite 

comfortably.  Today's chest x-ray shows persistent but slightly improving 

bilateral pneumonia. patient continues on Baricitinib, Decadron 6 mg twice 

daily, she is on Lovenox 40 mg twice daily, COVID-19 vitamins.  His labs have 

been reviewed and there was down to 1.63, CBC was within normal limits, 

electrolytes and renal profile were unremarkable.  Today's LDH is 1029, CRP is 

1.7.  Vital signs have been stable, patient has been afebrile.





On 12/17/2021 patient is seen in follow-up on medical surgical floor.  She is 

breathing comfortably, she is resting in bed, she's been repositioning self from

side to side.  She remains on Airvo at 55 L and FiO2 of 90%, and pulse ox is 89-

93%.  Blood pressure is been stable, patient has been afebrile.  Patient 

continues on Baricitinib, continues on Decadron, Diflucan, Lovenox twice daily. 

Lung sounds revealed mildly diminished breath sounds, and a few scattered c

rackles.  Chest x-ray showing persistent but slightly improving bilateral Osvaldo

pneumonia





On 12/19/2001 patient seen in follow-up on medical surgical floor.  She is 

resting comfortably in bed, she is currently on 55 L/m and FiO2 of 80%, 

breathing comfortably, she has not required nonrebreather mask at all for the 

past 24 hours, she continues on Decadron, Baricitinib, and prophylactic Lovenox,

she is breathing comfortably, lung sounds revealed minimal crackles bilaterally,

she is tolerating oral intake.  No nausea vomiting or diarrhea.  Fever or 

chills.  Today's chest x-ray shows no change in the appearance of diffuse 

interstitial opacities in both lungs.  Today's labs have been reviewed, white 

count is normal at 9.8, hemoglobin of 12.7, d-dimer is 2.1, electrolytes and 

renal profile are unremarkable, and her inflammatory markers are improving on 

today's labs.





On 12/20/2021 patient seen in follow-up on medical surgical floor, she remains 

on a currently on 55 L/m, and FiO2 of 75%, she has not required nonrebreather 

mask.  She is breathing quite comfortably, lung sounds revealed minimal 

crackles, no wheezing, patient continues on Decadron 6. Twice a day, Lovenox 40 

mg twice a day, she is on cough syrup, she is on Xopenex and Spiriva inhalers.  

She remains on Baricitinib.  She's had no acute events overnight, no specific 

complaints.  No new labs today.  Her last set of inflammatory markers from 

yesterday showed improving LDH which was down to 442, and CRP was down to 0.60, 

her d-dimer was 2.10.








Objective





- Vital Signs


Vital signs: 


                                   Vital Signs











Temp  98.1 F   12/20/21 10:36


 


Pulse  58 L  12/20/21 10:36


 


Resp  18   12/20/21 10:36


 


BP  165/76   12/20/21 10:36


 


Pulse Ox  91 L  12/20/21 11:34








                                 Intake & Output











 12/19/21 12/20/21 12/20/21





 18:59 06:59 18:59


 


Intake Total 1080  


 


Balance 1080  


 


Intake:   


 


  Oral 1080  


 


Other:   


 


  # Voids 2 2 


 


  # Bowel Movements 1  














- Exam


 GENERAL EXAM: Alert, very pleasant, 66-year-old white female, on low at 55 L an

FiO2 of 75%, and  breathing comfortably, does not appear to be in any acute 

distress


HEAD: Normocephalic/atraumatic.


EYES: Normal reaction of pupils, equal size.  Conjunctiva pink, sclera white.


NOSE: Clear with pink turbinates.


THROAT: No erythema or exudates.


NECK: No masses, no JVD, no thyroid enlargement, no adenopathy.


CHEST: No chest wall deformity.  Symmetrical expansion. 


LUNGS: Equal air entry with with coarse bilateral crackles


CVS: Regular rate and rhythm, normal S1 and S2, no gallops, no murmurs, no rubs


ABDOMEN: Soft, nontender.  No hepatosplenomegaly, normal bowel sounds, no gua

rding or rigidity.


EXTREMITIES: No clubbing, no edema, no cyanosis, 2+ pulses and upper and lower 

extremities.


MUSCULOSKELETAL: Muscle strength and tone normal.


SPINE: No scoliosis or deformity


SKIN: No rashes


CENTRAL NERVOUS SYSTEM: Alert and oriented -3.  No focal deficits, tone is 

normal in all 4 extremities.


PSYCHIATRIC: Alert and oriented -3.  Appropriate affect.  Intact judgment and 

insight.











- Labs


CBC & Chem 7: 


                                 12/19/21 05:37





                                 12/19/21 05:37





Assessment and Plan


Plan: 


 Assessment:





#1.  Acute on chronic hypoxic respiratory failure secondary to acute COVID-19 

pneumonia, patient is not vaccinated related to multiple ALLERGIES, patient 

presented with a 4 week history of symptoms, she was outside the window for 

Remdesivir, she is being treated supportively with Decadron, prophylactic 

Lovenox, and empiric antibiotics.  Patient was placed on irritable at 60 L and 

90% FiO2 with nonrebreather mask today on 12/10/2021, we'll start the patient on

Baricitinib.  On 12/12/2021 patient is on BiPAP support remains BiPAP support 

dependent at pressures of 14 and 1800% FiO2 although clinically is comfortable. 

Today on 12/19/2021 patient is not using nonrebreather mask, and is just on 

Airvo at 55 L and FiO2 of 75%





#2.  Mildly elevated pro calcitonin, patient is on empiric antibiotics in the 

form of Rocephin, pro calcitonin has improved, no definite sign of infection, 

Rocephin has been discontinued





#3.  Elevated inflammatory markers secondary to the viral pneumonia, improving





#4.  Morbid obesity with BMI of 39.1 kg/m





#5.  Obstructive sleep apnea on CPAP therapy





#6.  History of COPD with chronic hypoxic respiratory failure usually wears 2 L 

of oxygen on the outpatient basis, and CT angiogram of the chest also revealed 

evidence of interstitial pulmonary fibrosis





#7.  Hyperlipidemia





#8.  Anxiety/depression





#9.  History of coronary artery disease with previous stent placement





#10.  History of aortic thrombectomy/aortobifem bypass in 2007





#11.  Former smoker





Plan:





Continue weaning FiO2, she remains on Airvo at 55 L and FiO2 of 75%


Has not been wearing nonrebreather mask


She is breathing comfortably, clinically she is been stable, improving


Continue Baricitinib


Continue Decadron, and Lovenox


Inflammatory markers are improving,


Continue to follow





I performed a history & physical examination of the patient and discussed their 

management with my nurse practitioner, Jeannine Belcher.  I reviewed the nurse 

practitioner's note and agree with the documented findings and plan of care.  

Lung sounds are positive for diffuse crackles throughout the lung fields.  The 

findings and the impression was discussed with the patient.  I attest to the 

documentation by the nurse practitioner. 











Time with Patient: Less than 30

## 2021-12-20 NOTE — P.PN
Subjective





Patient is a 66-year-old the female with past medical history of COPD and 3 L 

oxygen dependent at home has cold symptoms for about a month comes in with 

shortness of breath presently on telemetry dysfunction CT of the chest did not 

show any pulmonary embolism but did show pulmonary fibrosis as well as 

infiltrates.  Below.  Patient doesn't have any infiltrate consistent with 

pneumonia patient denied any dysuria.  Patient is presently on Rocephin.  

Patient has mild acute renal failure because of which I'm holding ACE inhibitor.

 Severely hypomagnesemic magnesium is being replaced.  Patient last smoked about

17 years ago.





12/08/2021





Patient is seen and evaluated in follow-up and currently maintained on 10 L high

flow and weaning as tolerated.  Patient was on 15 L high flow nasal cannula this

morning and tolerating and nursing staff continuing to wean.  Patient normally 

wears 3 L of oxygen continuously in the outpatient setting.  Patient denies any 

worsening shortness of breath just generalized fatigue and weakness and 

continued diarrhea.  Patient states she is going approximately 2-3 times per day

will add C. diff testing and if negative will add Imodium and Questran.  

Pulmonary is following and venous Doppler was ordered bilateral lower 

extremities which is negative for DVT as d-dimer is elevated today at 2.42.  

Inflammatory markers significantly improved his LDH is 338 and CRP is 7.0, 

repeat magnesium after replacement is 2.9. 





12/09/2021





Patient is seen and evaluated in follow-up this morning currently sitting up in 

the chair and was on 10 L high flow although oxygen saturations were below 90% 

and patient bumped back up to 15 L.  Patient is complaining of the oxygen being 

too high and making her feel more anxious and discussed with nursing staff about

continuing to wean as tolerated.  She continues with diarrhea although is 

improving and C. diff testing was negative and patient was started on Questran 

along with Imodium.  Patient continues to state she has no real appetite 

although encourage the patient to continue with small frequent meals and will 

add Magic cups as well.  Encourage the patient to increase oral intake as she is

weak and needs the energy.  She normally maintained on 3 L via nasal cannula and

will continue to titrate as tolerated.  Pulmonary is following patient is 

maintained on IV dexamethasone twice daily along with vitamin and zinc 

supplements, Lovenox twice daily for elevated d-dimer and will continue.  

Patient was on normal saline and considered discontinuing as patient sodium is 

now 147 and patient is being started on D5 in water and will repeat labs.





12/10/2021


Patient is seen in follow-up this morning currently sitting up in the chair and 

oxygen requirements have increased and patient is now maintained on Airvo along 

with 15 L nonrebreather and oxygen saturations have been in the low 80s to 90 

maximum.  Discussed CODE STATUS with the patient and patient is a full code but 

does not wish to be placed on a ventilator if respiratory status continues to 

decline.  Pulmonary following closely and prognosis remains quite guarded.  

Patient continues on Lovenox along with dexamethasone and vitamin and zinc 

supplements and is being started on Baricitinib.  Patient is extremely weak and 

becomes extremely dyspneic with minimal exertion.  Patient's oral intake 

continues to be extremely poor and again encouraged and stressed the importance 

of eating regardless of not feeling hungry and having no appetite.  May consider

enteral nutrition and dietary consult.  Patient states her diarrhea has 

significantly improved and will use Imodium as needed and discontinue Questran. 

Repeat inflammatory markers ordered and pending.  Chest x-ray today shows 

bilateral airspace disease again noted with prominence and interstitial, and 

findings consistent with pneumonia.





12/11/2021


Patient admitted with bilateral common pneumonia , Her respiratory status today 

is a slightly worse as she is dependent on BiPAP throughout the day.  Also she 

has decreased air entry on both sides


She is saturating 88% while she is on BiPAP.


D-dimer is the same, 4.0 and 3.9.


She continued on dexamethasone, vitamin C, vitamin D, zinc,  barcitininb and 

Lovenox 40 mg twice a day.  Also she is on Protonix





12/12/2021


Sitting in chair looks mildly tachypneic and tolerates BiPAP which she uses his 

yesterday all the time.  She has not been eating since yesterday and may switch 

her for short time to nonrebreather and air or so she can read some oral diet   

Per pulmonary team recommendation.  Other than that she is hemodynamically 

stable.  Labs are stable.Calcitonin 0.19.  C. diff is negative.


She remains on dexamethasone, vitamin C, Z and Lovenox, Protonix and  barcitinib







12/13/2021


This morning she was still on BiPAP, and she was refusing to start TPN as she 

could not come off the BiPAP.  However later on during the day she could be 

placed on nonrebreather 15 L and Aravo 60 L with 94% so she can tolerate oral 

feeding.


Other vitals are stable, afebrile.  Labs are stable as well.


She continued on dexamethasone, vitamin C, vitamin D, zinc,  barcitininb and 

Lovenox 40 mg twice a day





12/14/2021


Patient with bilateral: With pneumonia and hypoxia, she is improving gradually 

today, today she couldn't get off her BiPAP and placed on airvo 60 L with FiO2 

of 85%, with occasional nonrebreather 15 L, patient was happy that she was able 

to eat today and she tolerates diet well.  She has some diarrhea but no 

abdominal pain.  C. diff is negative.


Other than that she is hemodynamically stable


Patient continued today on  dexamethasone, vitamin C, Z and Lovenox, Protonix 

and  barcitinib 


Follow-up chest x-ray and CBC/BMP in the morning





12/15/2021


Patient today is pleasant and smiling as her pulmonary illness is improving 

progressively but slowly, but she still currently on high flow nasal cannula 

with 55 L/m and FiO2 of 88% saturation in low 90s.  Rest of vitals are stable.  

Labs including CBC, BMP are unremarkable.


C. diff test came back negative and her diarrhea stopped today.  She is able to 

eat about 50-25% of her meals and she is satisfied with that for now.


She still on steroids with dexamethasone, vitamin C, vitamin D, zinc, Lovenox 40

mg twice a day, Protonix and barcitininb 





12/16/2021


Patient today her breathing slightly worsened and her oxygen requirement today 

was 55 L with 90%, she still can go a little difficulty due to her dyspnea and 

she confirmed me she is able to eat with no issues.


She sitting in chair today.  Hemodynamically stable.


Labs reviewed and shows stability, her d-dimer actually improved 3.9 down to 

1.6.  LDH went up to 1029 and CRP 3.4.


Chest x-ray shows slight improvement in aeration in both lung fields despite 

bilateral pneumonia.


She remains on dexamethasone, vitamin C, D and zinc and  Protonix and 

barcitininb .





12/17/2021


Patient breathing pattern is slowly to improve, today she still on high flow 

nasal cannula 55 L/m and FiO2 of 90%.


Labs including CBC and BMP are unremarkable and stable.  On the top of the 

patient is afebrile


However patient is complaining of from oral thrush and asked for fluconazole.  

Patient refused nystatin.  Also she has persistent loose bowel movement, C. diff

checked twice and there were negative.  We'll start her on short course of 

Questran.  Also patient states she is eating well and tolerates diet with no 

difficulty.


Continue with the other same medication of dexamethasone, multiple vitamin 

cocktail and Lovenox, Protonix and barcitininb 





12/18/2021


Her respiratory status with minimal change and fluctuating, currently she is on 

airvo at 55 L/m and 85%.  She is able to eat and drink.


She states that her most thrush is better and his diarrhea is improving after 

adding nystatin and Questran yesterday.


Afebrile and vitals are stable.


She remains on the same treatment of dexamethasone, vitamin C, D and zinc and  

Protonix and barcitininb .





12/19/2021 


patient breathing is improving as per patient stated that she is breathing 

easier.  She remains on same dose of oxygen as yesterday she still on 15 L/m at 

88% FiO2.


Also reports improvement in her bowel movements, she said that she almost had 

more formed bowel movement and her oral thrush is also improving with 

fluconazole.


And we will continue with the same treatment for her colon pneumonia with 

dexamethasone, vitamins, Barcitininb, Lovenox and Pepcid





12/20/2021


Patient is with bilateral common pneumonia on high dose of records oxygen since 

admission.  Today is awake and alert looks more comfortable and each day she 

states she is breathing easier.  Also she is happy with the progress of her 

bowel movement, she had about 4 months which is almost formed over the last 2 

days.  No more nausea vomiting but she feels some sore throat.  She is eating 

well.  However despite her reported clinical improvement by the patient she 

still on same dose of high need of oxygen and currently 55% and 70-90 L/m.


She remains on the same treatment of dexamethasone, vitamin C, DM zinc, 

,baricitinib.  Also she is on fluconazole, nystatin, Robitussin, Lovenox , and 

patient is not on Questran





Objective





- Vital Signs


Vital signs: 


                                   Vital Signs











Temp  98.1 F   12/20/21 10:36


 


Pulse  58 L  12/20/21 10:36


 


Resp  18   12/20/21 10:36


 


BP  165/76   12/20/21 10:36


 


Pulse Ox  93 L  12/20/21 10:36








                                 Intake & Output











 12/19/21 12/20/21 12/20/21





 18:59 06:59 18:59


 


Intake Total 1080  


 


Balance 1080  


 


Intake:   


 


  Oral 1080  


 


Other:   


 


  # Voids 2 2 


 


  # Bowel Movements 1  














- Exam





-GENERAL: The patient is alert and oriented , on BiPAP, not in any acute 

distress. Well developed, well nourished. 


HEENT: Pupils are round and equally reacting to light. EOMI. No scleral icterus.

No conjunctival pallor. Normocephalic, atraumatic. No pharyngeal erythema. No 

thyromegaly. 


CARDIOVASCULAR: S1 and S2 present. No murmurs, rubs, or gallops. 


-PULMONARY: Chest is clear to auscultation, no wheezing.  Bilateral crepitation 

and decreased air entry on both sides


ABDOMEN: Soft, nontender, nondistended, normoactive bowel sounds. No palpable 

organomegaly. 


MUSCULOSKELETAL: No joint swelling or deformity. 


EXTREMITIES: No cyanosis, clubbing, or pedal edema. 


NEUROLOGICAL: Gross neurological examination did not reveal any focal deficits. 


SKIN: No rashes. no petechiae.





- Labs


CBC & Chem 7: 


                                 12/19/21 05:37





                                 12/19/21 05:37


Labs: 


                  Abnormal Lab Results - Last 24 Hours (Table)











  12/19/21 12/19/21 Range/Units





  05:37 05:37 


 


MCH  26.3 L   (27.0-32.0)  pg


 


MCHC  30.2 L   (32.0-37.0)  g/dL


 


RDW  15.6 H   (11.5-14.5)  %


 


Metamyelocytes %  3 H   (0-0)  %


 


Myelocytes %  5 H   (0-0)  %


 


Eosinophils # (Manual)  0 L   (0.04-0.35)  X 10*3/uL


 


BUN/Creatinine Ratio   28.48 H  (12.00-20.00)  Ratio


 


Glucose   139 H  ()  mg/dL


 


Alkaline Phosphatase   129 H  ()  U/L


 


Lactate Dehydrogenase   442 H  (120-246)  U/L


 


Total Protein   6.0 L  (6.2-8.2)  g/dL














Assessment and Plan


Assessment: 





-acute hypoxic respiratory failure: secondary to COVID-19 pneumonia 


-chronic hypercapnic respiratory failure secondary to COPD uses 3 L of oxygen at

home


-Diarrhea most likely secondary to Covid


-Oral thrush secondary to steroids


-Hypernatremia, improving, sodium is 140 a.m. we'll continue D5 in water and 

repeat a.m. labs 


-Severe hypomagnesemia, improved


-Elevated d-dimer secondary to Covid and patient is on Lovenox which will be 

continued, Lovenox is currently twice daily


-Acute renal failure, prerenal, improved with IV fluids


-Hypertension


-Mild anion gap and metabolic acidosis probability of lactic acidosis, most 

likely secondary to increased amounts of diarrhea and dehydration, lactic acid 

is 1.1


-gastroesophageal reflux disease


-Hyperlipidemia


-Hypertension


-History of pulmonary fibrosis


-Sleep apnea uses CPAP machine at home


-Peripheral vascular disease


-Coronary artery disease


-DVT prophylaxis: On Lovenox which will be continued











Plan: 





This is a pleasant 66 years old female who presents with bilateral covid 

pneumonia


Continue with dexamethasone


Continue with vitamin C, vitamin D and zinc


Pulmonary consult 


Also he is on barcitinib


Continue with fluconazole and Questran


Labs and medication were reviewed..  Continue same treatment.  Continue with 

symptomatic treatment.  Resume home medication.  Monitor lytes and vitals.  DVT 

and GI prophylaxis.  Further recommendations as per clinical course of the 

patient


DVT prophylaxis: Subcutaneous Lovenox


GI Prophylaxis: Ppi


Prognosis is guarded

## 2021-12-21 LAB
ALBUMIN SERPL-MCNC: 3.5 G/DL (ref 3.5–5)
ALBUMIN/GLOB SERPL: 1.4 {RATIO}
ALP SERPL-CCNC: 122 U/L (ref 38–126)
ALT SERPL-CCNC: 34 U/L (ref 4–34)
ANION GAP SERPL CALC-SCNC: 7 MMOL/L
AST SERPL-CCNC: 24 U/L (ref 14–36)
BASOPHILS # BLD AUTO: 0 K/UL (ref 0–0.2)
BASOPHILS NFR BLD AUTO: 0 %
BUN SERPL-SCNC: 31 MG/DL (ref 7–17)
CALCIUM SPEC-MCNC: 9.6 MG/DL (ref 8.4–10.2)
CHLORIDE SERPL-SCNC: 101 MMOL/L (ref 98–107)
CO2 SERPL-SCNC: 29 MMOL/L (ref 22–30)
EOSINOPHIL # BLD AUTO: 0.1 K/UL (ref 0–0.7)
EOSINOPHIL NFR BLD AUTO: 1 %
ERYTHROCYTE [DISTWIDTH] IN BLOOD BY AUTOMATED COUNT: 4.81 M/UL (ref 3.8–5.4)
ERYTHROCYTE [DISTWIDTH] IN BLOOD: 15 % (ref 11.5–15.5)
GLOBULIN SER CALC-MCNC: 2.5 G/DL
GLUCOSE SERPL-MCNC: 95 MG/DL (ref 74–99)
HCT VFR BLD AUTO: 42.2 % (ref 34–46)
HGB BLD-MCNC: 13.2 GM/DL (ref 11.4–16)
LYMPHOCYTES # SPEC AUTO: 1.2 K/UL (ref 1–4.8)
LYMPHOCYTES NFR SPEC AUTO: 11 %
MCH RBC QN AUTO: 27.5 PG (ref 25–35)
MCHC RBC AUTO-ENTMCNC: 31.3 G/DL (ref 31–37)
MCV RBC AUTO: 87.7 FL (ref 80–100)
MONOCYTES # BLD AUTO: 0.5 K/UL (ref 0–1)
MONOCYTES NFR BLD AUTO: 5 %
NEUTROPHILS # BLD AUTO: 8.8 K/UL (ref 1.3–7.7)
NEUTROPHILS NFR BLD AUTO: 82 %
PLATELET # BLD AUTO: 347 K/UL (ref 150–450)
POTASSIUM SERPL-SCNC: 4.4 MMOL/L (ref 3.5–5.1)
PROT SERPL-MCNC: 6 G/DL (ref 6.3–8.2)
SODIUM SERPL-SCNC: 137 MMOL/L (ref 137–145)
WBC # BLD AUTO: 10.7 K/UL (ref 3.8–10.6)

## 2021-12-21 RX ADMIN — TIOTROPIUM BROMIDE INHALATION SPRAY SCH PUFF: 3.12 SPRAY, METERED RESPIRATORY (INHALATION) at 08:12

## 2021-12-21 RX ADMIN — PANTOPRAZOLE SODIUM SCH MG: 40 TABLET, DELAYED RELEASE ORAL at 08:04

## 2021-12-21 RX ADMIN — VALSARTAN SCH MG: 80 TABLET ORAL at 08:04

## 2021-12-21 RX ADMIN — OXYCODONE HYDROCHLORIDE AND ACETAMINOPHEN SCH MG: 500 TABLET ORAL at 08:04

## 2021-12-21 RX ADMIN — DEXTROSE SCH MG: 50 INJECTION, SOLUTION INTRAVENOUS at 22:35

## 2021-12-21 RX ADMIN — METOPROLOL SUCCINATE SCH MG: 25 TABLET, EXTENDED RELEASE ORAL at 08:04

## 2021-12-21 RX ADMIN — Medication SCH MCG: at 08:04

## 2021-12-21 RX ADMIN — OXYCODONE HYDROCHLORIDE AND ACETAMINOPHEN SCH MG: 500 TABLET ORAL at 22:36

## 2021-12-21 RX ADMIN — ENOXAPARIN SODIUM SCH MG: 40 INJECTION SUBCUTANEOUS at 08:03

## 2021-12-21 RX ADMIN — LORATADINE SCH MG: 10 TABLET ORAL at 08:04

## 2021-12-21 RX ADMIN — Medication SCH MG: at 08:04

## 2021-12-21 RX ADMIN — DEXTROSE SCH: 50 INJECTION, SOLUTION INTRAVENOUS at 16:14

## 2021-12-21 RX ADMIN — NYSTATIN SCH UNIT: 100000 SUSPENSION ORAL at 14:17

## 2021-12-21 RX ADMIN — BARICITINIB SCH MG: 2 TABLET, FILM COATED ORAL at 08:04

## 2021-12-21 RX ADMIN — ENOXAPARIN SODIUM SCH MG: 40 INJECTION SUBCUTANEOUS at 22:36

## 2021-12-21 RX ADMIN — NYSTATIN SCH UNIT: 100000 SUSPENSION ORAL at 08:04

## 2021-12-21 RX ADMIN — ATORVASTATIN CALCIUM SCH MG: 80 TABLET, FILM COATED ORAL at 08:04

## 2021-12-21 RX ADMIN — NYSTATIN SCH UNIT: 100000 SUSPENSION ORAL at 17:08

## 2021-12-21 RX ADMIN — SERTRALINE HYDROCHLORIDE SCH MG: 100 TABLET ORAL at 08:04

## 2021-12-21 RX ADMIN — CLOPIDOGREL BISULFATE SCH MG: 75 TABLET ORAL at 08:04

## 2021-12-21 NOTE — P.PN
Subjective





Patient is a 66-year-old the female with past medical history of COPD and 3 L 

oxygen dependent at home has cold symptoms for about a month comes in with 

shortness of breath presently on telemetry dysfunction CT of the chest did not 

show any pulmonary embolism but did show pulmonary fibrosis as well as 

infiltrates.  Below.  Patient doesn't have any infiltrate consistent with 

pneumonia patient denied any dysuria.  Patient is presently on Rocephin.  

Patient has mild acute renal failure because of which I'm holding ACE inhibitor.

 Severely hypomagnesemic magnesium is being replaced.  Patient last smoked about

17 years ago.





12/08/2021





Patient is seen and evaluated in follow-up and currently maintained on 10 L high

flow and weaning as tolerated.  Patient was on 15 L high flow nasal cannula this

morning and tolerating and nursing staff continuing to wean.  Patient normally 

wears 3 L of oxygen continuously in the outpatient setting.  Patient denies any 

worsening shortness of breath just generalized fatigue and weakness and 

continued diarrhea.  Patient states she is going approximately 2-3 times per day

will add C. diff testing and if negative will add Imodium and Questran.  

Pulmonary is following and venous Doppler was ordered bilateral lower 

extremities which is negative for DVT as d-dimer is elevated today at 2.42.  

Inflammatory markers significantly improved his LDH is 338 and CRP is 7.0, 

repeat magnesium after replacement is 2.9. 





12/09/2021





Patient is seen and evaluated in follow-up this morning currently sitting up in 

the chair and was on 10 L high flow although oxygen saturations were below 90% 

and patient bumped back up to 15 L.  Patient is complaining of the oxygen being 

too high and making her feel more anxious and discussed with nursing staff about

continuing to wean as tolerated.  She continues with diarrhea although is 

improving and C. diff testing was negative and patient was started on Questran 

along with Imodium.  Patient continues to state she has no real appetite 

although encourage the patient to continue with small frequent meals and will 

add Magic cups as well.  Encourage the patient to increase oral intake as she is

weak and needs the energy.  She normally maintained on 3 L via nasal cannula and

will continue to titrate as tolerated.  Pulmonary is following patient is 

maintained on IV dexamethasone twice daily along with vitamin and zinc 

supplements, Lovenox twice daily for elevated d-dimer and will continue.  

Patient was on normal saline and considered discontinuing as patient sodium is 

now 147 and patient is being started on D5 in water and will repeat labs.





12/10/2021


Patient is seen in follow-up this morning currently sitting up in the chair and 

oxygen requirements have increased and patient is now maintained on Airvo along 

with 15 L nonrebreather and oxygen saturations have been in the low 80s to 90 

maximum.  Discussed CODE STATUS with the patient and patient is a full code but 

does not wish to be placed on a ventilator if respiratory status continues to 

decline.  Pulmonary following closely and prognosis remains quite guarded.  

Patient continues on Lovenox along with dexamethasone and vitamin and zinc 

supplements and is being started on Baricitinib.  Patient is extremely weak and 

becomes extremely dyspneic with minimal exertion.  Patient's oral intake 

continues to be extremely poor and again encouraged and stressed the importance 

of eating regardless of not feeling hungry and having no appetite.  May consider

enteral nutrition and dietary consult.  Patient states her diarrhea has 

significantly improved and will use Imodium as needed and discontinue Questran. 

Repeat inflammatory markers ordered and pending.  Chest x-ray today shows 

bilateral airspace disease again noted with prominence and interstitial, and 

findings consistent with pneumonia.





12/11/2021


Patient admitted with bilateral common pneumonia , Her respiratory status today 

is a slightly worse as she is dependent on BiPAP throughout the day.  Also she 

has decreased air entry on both sides


She is saturating 88% while she is on BiPAP.


D-dimer is the same, 4.0 and 3.9.


She continued on dexamethasone, vitamin C, vitamin D, zinc,  barcitininb and 

Lovenox 40 mg twice a day.  Also she is on Protonix





12/12/2021


Sitting in chair looks mildly tachypneic and tolerates BiPAP which she uses his 

yesterday all the time.  She has not been eating since yesterday and may switch 

her for short time to nonrebreather and air or so she can read some oral diet   

Per pulmonary team recommendation.  Other than that she is hemodynamically 

stable.  Labs are stable.Calcitonin 0.19.  C. diff is negative.


She remains on dexamethasone, vitamin C, Z and Lovenox, Protonix and  barcitinib







12/13/2021


This morning she was still on BiPAP, and she was refusing to start TPN as she 

could not come off the BiPAP.  However later on during the day she could be 

placed on nonrebreather 15 L and Aravo 60 L with 94% so she can tolerate oral 

feeding.


Other vitals are stable, afebrile.  Labs are stable as well.


She continued on dexamethasone, vitamin C, vitamin D, zinc,  barcitininb and 

Lovenox 40 mg twice a day





12/14/2021


Patient with bilateral: With pneumonia and hypoxia, she is improving gradually 

today, today she couldn't get off her BiPAP and placed on airvo 60 L with FiO2 

of 85%, with occasional nonrebreather 15 L, patient was happy that she was able 

to eat today and she tolerates diet well.  She has some diarrhea but no 

abdominal pain.  C. diff is negative.


Other than that she is hemodynamically stable


Patient continued today on  dexamethasone, vitamin C, Z and Lovenox, Protonix 

and  barcitinib 


Follow-up chest x-ray and CBC/BMP in the morning





12/15/2021


Patient today is pleasant and smiling as her pulmonary illness is improving 

progressively but slowly, but she still currently on high flow nasal cannula 

with 55 L/m and FiO2 of 88% saturation in low 90s.  Rest of vitals are stable.  

Labs including CBC, BMP are unremarkable.


C. diff test came back negative and her diarrhea stopped today.  She is able to 

eat about 50-25% of her meals and she is satisfied with that for now.


She still on steroids with dexamethasone, vitamin C, vitamin D, zinc, Lovenox 40

mg twice a day, Protonix and barcitininb 





12/16/2021


Patient today her breathing slightly worsened and her oxygen requirement today 

was 55 L with 90%, she still can go a little difficulty due to her dyspnea and 

she confirmed me she is able to eat with no issues.


She sitting in chair today.  Hemodynamically stable.


Labs reviewed and shows stability, her d-dimer actually improved 3.9 down to 

1.6.  LDH went up to 1029 and CRP 3.4.


Chest x-ray shows slight improvement in aeration in both lung fields despite 

bilateral pneumonia.


She remains on dexamethasone, vitamin C, D and zinc and  Protonix and 

barcitininb .





12/17/2021


Patient breathing pattern is slowly to improve, today she still on high flow 

nasal cannula 55 L/m and FiO2 of 90%.


Labs including CBC and BMP are unremarkable and stable.  On the top of the 

patient is afebrile


However patient is complaining of from oral thrush and asked for fluconazole.  

Patient refused nystatin.  Also she has persistent loose bowel movement, C. diff

checked twice and there were negative.  We'll start her on short course of 

Questran.  Also patient states she is eating well and tolerates diet with no 

difficulty.


Continue with the other same medication of dexamethasone, multiple vitamin 

cocktail and Lovenox, Protonix and barcitininb 





12/18/2021


Her respiratory status with minimal change and fluctuating, currently she is on 

airvo at 55 L/m and 85%.  She is able to eat and drink.


She states that her most thrush is better and his diarrhea is improving after 

adding nystatin and Questran yesterday.


Afebrile and vitals are stable.


She remains on the same treatment of dexamethasone, vitamin C, D and zinc and  

Protonix and barcitininb .





12/19/2021 


patient breathing is improving as per patient stated that she is breathing 

easier.  She remains on same dose of oxygen as yesterday she still on 15 L/m at 

88% FiO2.


Also reports improvement in her bowel movements, she said that she almost had 

more formed bowel movement and her oral thrush is also improving with 

fluconazole.


And we will continue with the same treatment for her colon pneumonia with 

dexamethasone, vitamins, Barcitininb, Lovenox and Pepcid





12/20/2021


Patient is with bilateral common pneumonia on high dose of records oxygen since 

admission.  Today is awake and alert looks more comfortable and each day she 

states she is breathing easier.  Also she is happy with the progress of her 

bowel movement, she had about 4 months which is almost formed over the last 2 

days.  No more nausea vomiting but she feels some sore throat.  She is eating 

well.  However despite her reported clinical improvement by the patient she 

still on same dose of high need of oxygen and currently 55% and 70-90 L/m.


She remains on the same treatment of dexamethasone, vitamin C, DM zinc, 

,baricitinib.  Also she is on fluconazole, nystatin, Robitussin, Lovenox , and 

patient is not on Questran





12/21/2021


Patient clinically slowly to improve, he states she feels better but however her

oxygen requirements very close to yesterday at 50 L/m and 75% high flow nasal 

cannula.


Her GI symptoms or improvement and she tolerates diet well.


She is fluconazole currently while continue the same treatment of dexamethasone,

vitamin cocktail for Covid and baricitinib. .  Nystatin and Robitussin and 

Lovenox





Objective





- Vital Signs


Vital signs: 


                                   Vital Signs











Temp  97.6 F   12/21/21 10:00


 


Pulse  68   12/21/21 10:00


 


Resp  22   12/21/21 10:00


 


BP  121/68   12/21/21 10:00


 


Pulse Ox  92 L  12/21/21 10:00








                                 Intake & Output











 12/20/21 12/21/21 12/21/21





 18:59 06:59 18:59


 


Intake Total  1560 


 


Output Total  400 


 


Balance  1160 


 


Intake:   


 


  Oral  1560 


 


Output:   


 


  Urine  400 


 


Other:   


 


  Voiding Method  Bedside Commode Bedside Commode


 


  # Voids 3 3 


 


  # Bowel Movements 1 1 














- Exam





-GENERAL: The patient is alert and oriented , on BiPAP, not in any acute 

distress. Well developed, well nourished. 


HEENT: Pupils are round and equally reacting to light. EOMI. No scleral icterus.

No conjunctival pallor. Normocephalic, atraumatic. No pharyngeal erythema. No 

thyromegaly. 


CARDIOVASCULAR: S1 and S2 present. No murmurs, rubs, or gallops. 


-PULMONARY: Chest is clear to auscultation, no wheezing.  Bilateral crepitation 

and decreased air entry on both sides


ABDOMEN: Soft, nontender, nondistended, normoactive bowel sounds. No palpable 

organomegaly. 


MUSCULOSKELETAL: No joint swelling or deformity. 


EXTREMITIES: No cyanosis, clubbing, or pedal edema. 


NEUROLOGICAL: Gross neurological examination did not reveal any focal deficits. 


SKIN: No rashes. no petechiae.





- Labs


CBC & Chem 7: 


                                 12/21/21 06:31





                                 12/21/21 06:31


Labs: 


                  Abnormal Lab Results - Last 24 Hours (Table)











  12/21/21 12/21/21 Range/Units





  06:31 06:31 


 


WBC  10.7 H   (3.8-10.6)  k/uL


 


Neutrophils #  8.8 H   (1.3-7.7)  k/uL


 


BUN   31 H  (7-17)  mg/dL


 


Total Protein   6.0 L  (6.3-8.2)  g/dL














Assessment and Plan


Assessment: 





-acute hypoxic respiratory failure: secondary to COVID-19 pneumonia 


-chronic hypercapnic respiratory failure secondary to COPD uses 3 L of oxygen at

home


-Diarrhea most likely secondary to Covid


-Oral thrush secondary to steroids


-Hypernatremia, improving, sodium is 140 a.m. we'll continue D5 in water and 

repeat a.m. labs 


-Severe hypomagnesemia, improved


-Elevated d-dimer secondary to Covid and patient is on Lovenox which will be 

continued, Lovenox is currently twice daily


-Acute renal failure, prerenal, improved with IV fluids


-Hypertension


-Mild anion gap and metabolic acidosis probability of lactic acidosis, most 

likely secondary to increased amounts of diarrhea and dehydration, lactic acid 

is 1.1


-gastroesophageal reflux disease


-Hyperlipidemia


-Hypertension


-History of pulmonary fibrosis


-Sleep apnea uses CPAP machine at home


-Peripheral vascular disease


-Coronary artery disease


-DVT prophylaxis: On Lovenox which will be continued











Plan: 





This is a pleasant 66 years old female who presents with bilateral covid 

pneumonia


Continue with dexamethasone


Continue with vitamin C, vitamin D and zinc


Pulmonary consult 


Also he is on barcitinib


Continue with fluconazole and Questran


Labs and medication were reviewed..  Continue same treatment.  Continue with 

symptomatic treatment.  Resume home medication.  Monitor lytes and vitals.  DVT 

and GI prophylaxis.  Further recommendations as per clinical course of the 

patient


DVT prophylaxis: Subcutaneous Lovenox


GI Prophylaxis: Ppi


Prognosis is guarded

## 2021-12-21 NOTE — PN
PROGRESS NOTE



DATE OF SERVICE:

12/21/2021



REASON FOR FOLLOWUP:

COVID-19 pneumonia.



INTERVAL HISTORY:

The patient is afebrile.  The patient is breathing more comfortably.  The patient

denies having any chest pain.  No worsening cough or sputum production.  No abdominal

pain or diarrhea.



PHYSICAL EXAMINATION:

Blood pressure 122/77 with a pulse of 66, temperature 97.9.  She is 91% on AIRVO.

General description is an elderly female up in the bed in no distress. Respiratory

system: Unlabored breathing, decreased intensity of breath sounds. No wheeze.  Heart

S1, S2.  Regular rate and rhythm.  Abdomen soft, no tenderness.



LABS:

Hemoglobin is 13.0, white count 10.7, creatinine 1.02.



DIAGNOSTIC IMPRESSION AND PLAN:

1. Patient with acute COVID-19 pneumonia and slow clinical improvement.  Patient to

    continue baricitinib, dexamethasone, Lovenox, zinc and ascorbic acid.

2. Patient with thrush. Continue with nystatin swish and swallow and Diflucan.





MMODL / IJN: [id number] / Job#: [id number]

## 2021-12-21 NOTE — P.PN
Subjective


Progress Note Date: 12/21/21


Principal diagnosis: 


 Dyspnea, hypoxia, COVID-19





This is a pleasant 66-year-old female patient who follows at the [facility] for her

primary care needs.  She has history of hypertension, hyperlipidemia, 

gastroesophageal reflux disease, anxiety/depression, aortic thrombectomy in 2007

, former smoker, COPD, obstructive sleep apnea utilizing CPAP.  He is here 

yesterday to the emergency room with a four-week history of shortness of breath,

cough, congestion, body aches and fevers.  She was tested negative for CoVID 2.

 Her  is positive for CoVID and she is now positive for CoVID.  She is 

not vaccinated.  She does have home oxygen use at 2-3 L.  She is currently 

requiring 10 L high flow nasal cannula to maintain O2 saturations at 90%.  Chest

x-ray reveals bilateral patchy infiltrates. White count 5.9.  Hemoglobin 12.5.  

Platelets 398.  Lymphocytes 0.5.  D-dimer 1.89.  Sodium 140.  Potassium 4.9.  

Creatinine 1.2.  AST 39.  ALT 26.  Alk phos 156.  LDH 1283.  C-reactive protein 

21.7.  Pro-calcitonin 0.32.  She's been initiated on Decadron, Lovenox, vitamin 

supplements.  0.9% normal saline at 75 ML's per hour.





On 12/08/2021 patient is in follow-up on medical surgical floor, she is resting 

comfortably in bed, she states she is feeling better, although she is requiring 

more oxygen compared to when she first presented to the emergency department, 

note that patient does wear home oxygen at 2 L for history of COPD.  She is 

currently on 10 L of oxygen, her pulse ox is 91-92%, breathing comfortably, lung

sounds reveal coarse crackles at bilateral bases, but no wheezing, no rhonchi.  

CT angiogram of the chest showed no evidence of pulmonary embolism, it did show 

pulmonary emphysema and pulmonary interstitial fibrosis with increased 

interstitial infiltrates compared to her old exam.  There was no evidence of a 

suspicious pulmonary mass.  Patient was outside the window for Remdesivir, she 

continues on Decadron 6 mg twice daily, her pro-calcitonin level on admission 

was 0.32, and patient was covered with Rocephin for possibility of underlying 

bacterial infection.  She is on prophylactic Lovenox 40 mg daily.  CTA [facility] of the chest showed no evidence of pulmonary embolism. 0





On 12/09/2001 patient seen in follow-up on medical surgical floor, she is c

urrently on 10 L of oxygen the pulse ox of 86%, FiO2 has since been increased to

15 L, she has a mild cough, but overall seems to be in no acute distress, she is

sitting up in the chair, denies any chest discomfort, looks quite comfortable.  

Lung sounds reveal coarse crackles bilateral bases, she does get short of breath

with exertion.  She states if she sits still she is breathing comfortably, and 

her O2 saturations are at or above 90%.  She's had no fever or chills overnight,

she remains on Decadron 40 mg twice daily.  She is on empiric antibiotics in the

form of Rocephin and IV fluids at 75 ML per hour.  Today's labs have been 

reviewed, white blood cell count is 8.6, hemoglobin is 12, platelet count is 

506, d-dimer is 4.01, sodium is 147, potassium is 4.8, chloride is 112, BUN of 

20 creatinine 0.7, renal profile has significantly improved since admission.  

LDH has significantly improved since admission and is down to 348 on today's 

labs, CRP is still pending, her last pro-calcitonin is negative at 0.13.  Blood 

culture has shown no growth thus far.





On 12/10/2021 patient seen in follow-up on medical surgical floor, she had been 

on 15 L high flow and 100% nonrebreather mask up until this morning, this 

morning patient's FiO2 requirements had increased, patient was getting up in the

chair, she is significantly desaturated, she was placed on irritable at 60 L and

FiO2 of 90% in addition to 100% nonrebreather, and patient is taking quite a 

while to recover her O2 saturations although her work of breathing is not 

increased.  She is still awake and alert, she is oriented 3, she is able to 

make her needs known, her pulse ox is slowly recovering at rest, with deep 

breathing, pursed lip breathing, repositioning.  Overnight she has had no fever 

or chills, blood pressures have been stable.  Breathing fairly comfortably 

although she desaturates with any little exertion.  Currently she is on Decadron

6 Twice daily, she will be started on Baricitinib, patient is also on Plavix, 

and prophylactic Lovenox.  Today's chest x-ray and labs are still pending, she 

remains on D5W at a rate of 100 ML per hour for dehydration related to diarrhea 

and hypernatremia, repeat electrolytes and renal profile are still pending for 

today.  No abdominal pain, no nausea.  The diarrhea has significantly improved 

in the last 48 hours.  Patient is tolerating oral intake, no abdominal pain. 





On 12/12/2021 patient seen in follow-up on medical surgical floor.  She is alert

and alert, she is on BiPAP support with pressures of 14 and 8 and FiO2 100%.  

Earlier this morning her BiPAP pressures have been increased from 12.6 in the 

100% FiO2.  Her pulse oximeter readings are marginal at around 90%, with any 

exertion patient does desaturate and takes her a while to recover although she 

looks very comfortable, nontachypneic, she is sitting up in the recliner, she is

watching TV.  She is complaining of being hungry.  She has not been able to take

much by mouth related to being BiPAP dependent.  She has been afebrile, 

hemodynamics are stable.  Her last chest x-ray from 12/10/2021 showed bilateral 

airspace disease with prominence of interstitium with no evident pneumothorax or

pleural effusion.  Today's labs have been reviewed showing white blood cell 

count of 10.6, hemoglobin 12.2, d-dimer and yesterday's labs was down to 3.98, 

electrolytes and renal profile were within normal limits.  Per pro-calcitonin 

level was negative at 0.19.  Patient remains on Baricitinib, Decadron 6 mg twice

daily, she is receiving IV fluids with D5 W at 100 ML per hour





On 12/13/2021 patient seen in follow-up on medical surgical floor, she was 

switched to Airvo at 60 L and 90%, and nonrebreather mask, she is maintaining 

her O2 saturations between 88 and 91%, she is breathing comfortably, tolerating 

it for a few hours now, she was able to eat something, she is taking in oral 

fluids, she is awake and alert, she is sitting up in the chair, she is being her

bills.  Lung sounds are positive for scattered crackles throughout the lung 

fields, no cough, appears very comfortable.  He is on [place] neb, she is on 

Decadron, and Lovenox.  She is on D5W at a rate of 100 ML per hour related to 

her nothing by mouth status however in view of her tolerating oral intake now, 

and not been BiPAP dependent anymore we'll stop the IV fluids.  Today's labs 

have been reviewed and her white blood cell count is 10.4, hemoglobin is 12.6, 

her electrolytes have been reviewed and are within normal limits, sodium is 141,

potassium is 3.9, chloride is 103, BUN is 15, creatinine is 0.72.  Her 

procalcitonin level was negative.





On 12/15/2021 patient seen in follow-up on medical surgical floor, she is 

sitting up in the recliner, she states her breathing is improving, she is on 

Airvo, and we were able to wean down the flow and FiO2, and the flow is 

currently at 55 L/m, and FiO2 is currently at 88%, her pulse ox is ranging 

between 86-93%, breathing comfortably, minimal crackles at bilateral bases, 

occasional cough, patient has been get not to the bedside commode, tolerates 

activity well, her spirits are high, her oral intake is fair.  She continues on 

Baricitinib, she continues on Decadron 6 blood gram twice daily, and Lovenox 40 

mg twice daily, today's labs have been reviewed showing white blood cell count 

is 7.19, hemoglobin of 11.9, platelet count is 383, electrolytes and renal 

profile are unremarkable.  Cdiff negative.





On 12/16/2021 patient seen in follow-up on medical surgical floor.  She is awake

and alert, in no acute distress, she is currently resting in bed, she is on her 

left side and patient has been compliant with salt repositioning in bed from 

side to side.  She is breathing comfortably, remains on Airvo at 55 L and FiO2 

of 87%, and she was also placed on Airvo because her pulse ox was less than 85%,

her current O2 saturations are around 88%, patient seems to breathing quite 

comfortably.  Today's chest x-ray shows persistent but slightly improving 

bilateral pneumonia. patient continues on Baricitinib, Decadron 6 mg twice 

daily, she is on Lovenox 40 mg twice daily, COVID-19 vitamins.  His labs have 

been reviewed and there was down to 1.63, CBC was within normal limits, 

electrolytes and renal profile were unremarkable.  Today's LDH is 1029, CRP is 

1.7.  Vital signs have been stable, patient has been afebrile.





On 12/17/2021 patient is seen in follow-up on medical surgical floor.  She is 

breathing comfortably, she is resting in bed, she's been repositioning self from

side to side.  She remains on Airvo at 55 L and FiO2 of 90%, and pulse ox is 89-

93%.  Blood pressure is been stable, patient has been afebrile.  Patient 

continues on Baricitinib, continues on Decadron, Diflucan, Lovenox twice daily. 

Lung sounds revealed mildly diminished breath sounds, and a few scattered c

rackles.  Chest x-ray showing persistent but slightly improving bilateral [place]

pneumonia





On 12/19/2001 patient seen in follow-up on medical surgical floor.  She is 

resting comfortably in bed, she is currently on 55 L/m and FiO2 of 80%, 

breathing comfortably, she has not required nonrebreather mask at all for the 

past 24 hours, she continues on Decadron, Baricitinib, and prophylactic Lovenox,

she is breathing comfortably, lung sounds revealed minimal crackles bilaterally,

she is tolerating oral intake.  No nausea vomiting or diarrhea.  Fever or 

chills.  Today's chest x-ray shows no change in the appearance of diffuse 

interstitial opacities in both lungs.  Today's labs have been reviewed, white 

count is normal at 9.8, hemoglobin of 12.7, d-dimer is 2.1, electrolytes and 

renal profile are unremarkable, and her inflammatory markers are improving on 

today's labs.





On 12/20/2021 patient seen in follow-up on medical surgical floor, she remains 

on a currently on 55 L/m, and FiO2 of 75%, she has not required nonrebreather 

mask.  She is breathing quite comfortably, lung sounds revealed minimal 

crackles, no wheezing, patient continues on Decadron 6. Twice a day, Lovenox 40 

mg twice a day, she is on cough syrup, she is on Xopenex and Spiriva inhalers.  

She remains on Baricitinib.  She's had no acute events overnight, no specific 

complaints.  No new labs today.  Her last set of inflammatory markers from 

yesterday showed improving LDH which was down to 442, and CRP was down to 0.60, 

her d-dimer was 2.10.





On 12/21/2001 patient seen in follow-up on medical surgical floor.  She is breat

julia comfortably, she remains on Airvo, and the flow has been decreased down 

further to 50 L/m, and FiO2 is currently at 77%, she is maintaining O2 

saturations between 90-92%, breathing very comfortably, minimal cough, lung 

sounds are clear, no rhonchi or wheezing, she is awake and alert, oriented 3, 

no altered mentation, no fever or chills, she is tolerating oral intake, no 

nausea or vomiting, clinically she's been stable, slowly improving in terms of 

oxygenation as well.  Remains on Decadron 6 Coumadin twice daily, Lovenox 40 mg 

twice daily, she is on COVID-19 multivitamins, she remains on Baricitinib 4 mg 

daily.  She's had no acute events overnight.  Today's labs have been reviewed, 

white blood cell count is 10.7, hemoglobin 13.7, electrolytes and renal profile 

are unremarkable, her inflammatory markers were improving on labs from 2 days 

ago. 





Objective





- Vital Signs


Vital signs: 


                                   Vital Signs











Temp  97.4 F L  12/21/21 14:00


 


Pulse  84   12/21/21 14:00


 


Resp  20   12/21/21 14:00


 


BP  100/56   12/21/21 14:00


 


Pulse Ox  90 L  12/21/21 14:00








                                 Intake & Output











 12/20/21 12/21/21 12/21/21





 18:59 06:59 18:59


 


Intake Total  1560 


 


Output Total  400 


 


Balance  1160 


 


Intake:   


 


  Oral  1560 


 


Output:   


 


  Urine  400 


 


Other:   


 


  Voiding Method  Bedside Commode Bedside Commode


 


  # Voids 3 3 


 


  # Bowel Movements 1 1 














- Exam


 GENERAL EXAM: Alert, very pleasant, 66-year-old white female, on low at 55 L an

FiO2 of 75%, and  breathing comfortably, does not appear to be in any acute 

distress


HEAD: Normocephalic/atraumatic.


EYES: Normal reaction of pupils, equal size.  Conjunctiva pink, sclera white.


NOSE: Clear with pink turbinates.


THROAT: No erythema or exudates.


NECK: No masses, no JVD, no thyroid enlargement, no adenopathy.


CHEST: No chest wall deformity.  Symmetrical expansion. 


LUNGS: Equal air entry with with coarse bilateral crackles


CVS: Regular rate and rhythm, normal S1 and S2, no gallops, no murmurs, no rubs


ABDOMEN: Soft, nontender.  No hepatosplenomegaly, normal bowel sounds, no 

guarding or rigidity.


EXTREMITIES: No clubbing, no edema, no cyanosis, 2+ pulses and upper and lower 

extremities.


MUSCULOSKELETAL: Muscle strength and tone normal.


SPINE: No scoliosis or deformity


SKIN: No rashes


CENTRAL NERVOUS SYSTEM: Alert and oriented -3.  No focal deficits, tone is 

normal in all 4 extremities.


PSYCHIATRIC: Alert and oriented -3.  Appropriate affect.  Intact judgment and 

insight.











- Labs


CBC & Chem 7: 


                                 12/21/21 06:31





                                 12/21/21 06:31


Labs: 


                  Abnormal Lab Results - Last 24 Hours (Table)











  12/21/21 12/21/21 Range/Units





  06:31 06:31 


 


WBC  10.7 H   (3.8-10.6)  k/uL


 


Neutrophils #  8.8 H   (1.3-7.7)  k/uL


 


BUN   31 H  (7-17)  mg/dL


 


Total Protein   6.0 L  (6.3-8.2)  g/dL














Assessment and Plan


Plan: 


 Assessment:





#1.  Acute on chronic hypoxic respiratory failure secondary to acute COVID-19 

pneumonia, patient is not vaccinated related to multiple ALLERGIES, patient 

presented with a 4 week history of symptoms, she was outside the window for 

Remdesivir, she is being treated supportively with Decadron, prophylactic 

Lovenox, and empiric antibiotics.  Patient was placed on irritable at 60 L and 

90% FiO2 with nonrebreather mask today on 12/10/2021, we'll start the patient on

Baricitinib.  On 12/12/2021 patient is on BiPAP support remains BiPAP support 

dependent at pressures of 14 and 1800% FiO2 although clinically is comfortable. 

Today on 12/19/2021 patient is not using nonrebreather mask, and is just on Air

vo at 55 L and FiO2 of 75%





#2.  Mildly elevated pro calcitonin, patient is on empiric antibiotics in the 

form of Rocephin, pro calcitonin has improved, no definite sign of infection, 

Rocephin has been discontinued





#3.  Elevated inflammatory markers secondary to the viral pneumonia, improving





#4.  Morbid obesity with BMI of 39.1 kg/m





#5.  Obstructive sleep apnea on CPAP therapy





#6.  History of COPD with chronic hypoxic respiratory failure usually wears 2 L 

of oxygen on the outpatient basis, and CT angiogram of the chest also revealed 

evidence of interstitial pulmonary fibrosis





#7.  Hyperlipidemia





#8.  Anxiety/depression





#9.  History of coronary artery disease with previous stent placement





#10.  History of aortic thrombectomy/aortobifem bypass in 2007





#11.  Former smoker





Plan:





Continue weaning FiO2, patient is slowly improving in terms of oxygenation


Airvo at 50 L and FiO2 of 77%


She has not required nonrebreather mask


She is breathing comfortably, clinically she is been stable, improving


Continue Baricitinib


Continue Decadron, and Lovenox


Inflammatory markers are improving,


Continue to follow


Seems to be stable clinically,


Increase activity as tolerated, encouraged the patient to set up in the recliner


Once her oxygen demand is down to 5 L of supplemental oxygen per minute or less 

may consider discharge home





I performed a history & physical examination of the patient and discussed their 

management with my nurse practitioner, Jeannine Belcher.  I reviewed the nurse 

practitioner's note and agree with the documented findings and plan of care.  

Lung sounds are positive for diffuse crackles throughout the lung fields.  The 

findings and the impression was discussed with the patient.  I attest to the 

documentation by the nurse practitioner. 











Time with Patient: Less than 30

## 2021-12-22 LAB
ALBUMIN SERPL-MCNC: 3.5 G/DL (ref 3.5–5)
ALBUMIN/GLOB SERPL: 1.4 {RATIO}
ALP SERPL-CCNC: 122 U/L (ref 38–126)
ALT SERPL-CCNC: 38 U/L (ref 4–34)
ANION GAP SERPL CALC-SCNC: 8 MMOL/L
AST SERPL-CCNC: 29 U/L (ref 14–36)
BASOPHILS # BLD AUTO: 0 K/UL (ref 0–0.2)
BASOPHILS NFR BLD AUTO: 0 %
BUN SERPL-SCNC: 37 MG/DL (ref 7–17)
CALCIUM SPEC-MCNC: 9.7 MG/DL (ref 8.4–10.2)
CHLORIDE SERPL-SCNC: 102 MMOL/L (ref 98–107)
CO2 SERPL-SCNC: 27 MMOL/L (ref 22–30)
EOSINOPHIL # BLD AUTO: 0.1 K/UL (ref 0–0.7)
EOSINOPHIL NFR BLD AUTO: 1 %
ERYTHROCYTE [DISTWIDTH] IN BLOOD BY AUTOMATED COUNT: 4.88 M/UL (ref 3.8–5.4)
ERYTHROCYTE [DISTWIDTH] IN BLOOD: 15.1 % (ref 11.5–15.5)
GLOBULIN SER CALC-MCNC: 2.5 G/DL
GLUCOSE SERPL-MCNC: 188 MG/DL (ref 74–99)
HCT VFR BLD AUTO: 42.7 % (ref 34–46)
HGB BLD-MCNC: 13.2 GM/DL (ref 11.4–16)
LYMPHOCYTES # SPEC AUTO: 0.9 K/UL (ref 1–4.8)
LYMPHOCYTES NFR SPEC AUTO: 11 %
MCH RBC QN AUTO: 27 PG (ref 25–35)
MCHC RBC AUTO-ENTMCNC: 30.9 G/DL (ref 31–37)
MCV RBC AUTO: 87.4 FL (ref 80–100)
MONOCYTES # BLD AUTO: 0.2 K/UL (ref 0–1)
MONOCYTES NFR BLD AUTO: 3 %
NEUTROPHILS # BLD AUTO: 7.4 K/UL (ref 1.3–7.7)
NEUTROPHILS NFR BLD AUTO: 85 %
PLATELET # BLD AUTO: 336 K/UL (ref 150–450)
POTASSIUM SERPL-SCNC: 5.4 MMOL/L (ref 3.5–5.1)
PROT SERPL-MCNC: 6 G/DL (ref 6.3–8.2)
SODIUM SERPL-SCNC: 137 MMOL/L (ref 137–145)
WBC # BLD AUTO: 8.6 K/UL (ref 3.8–10.6)

## 2021-12-22 RX ADMIN — DEXTROSE SCH MG: 50 INJECTION, SOLUTION INTRAVENOUS at 22:38

## 2021-12-22 RX ADMIN — METOPROLOL SUCCINATE SCH MG: 25 TABLET, EXTENDED RELEASE ORAL at 08:35

## 2021-12-22 RX ADMIN — Medication SCH MG: at 08:34

## 2021-12-22 RX ADMIN — NYSTATIN SCH UNIT: 100000 SUSPENSION ORAL at 11:38

## 2021-12-22 RX ADMIN — SERTRALINE HYDROCHLORIDE SCH MG: 100 TABLET ORAL at 08:35

## 2021-12-22 RX ADMIN — ENOXAPARIN SODIUM SCH MG: 40 INJECTION SUBCUTANEOUS at 22:38

## 2021-12-22 RX ADMIN — NYSTATIN SCH UNIT: 100000 SUSPENSION ORAL at 22:38

## 2021-12-22 RX ADMIN — OXYCODONE HYDROCHLORIDE AND ACETAMINOPHEN SCH MG: 500 TABLET ORAL at 22:38

## 2021-12-22 RX ADMIN — ENOXAPARIN SODIUM SCH MG: 40 INJECTION SUBCUTANEOUS at 08:37

## 2021-12-22 RX ADMIN — TIOTROPIUM BROMIDE INHALATION SPRAY SCH PUFF: 3.12 SPRAY, METERED RESPIRATORY (INHALATION) at 07:13

## 2021-12-22 RX ADMIN — BARICITINIB SCH MG: 2 TABLET, FILM COATED ORAL at 08:36

## 2021-12-22 RX ADMIN — NYSTATIN SCH UNIT: 100000 SUSPENSION ORAL at 08:37

## 2021-12-22 RX ADMIN — ATORVASTATIN CALCIUM SCH MG: 80 TABLET, FILM COATED ORAL at 08:34

## 2021-12-22 RX ADMIN — LORATADINE SCH MG: 10 TABLET ORAL at 08:35

## 2021-12-22 RX ADMIN — DEXTROSE SCH MG: 50 INJECTION, SOLUTION INTRAVENOUS at 08:37

## 2021-12-22 RX ADMIN — NYSTATIN SCH: 100000 SUSPENSION ORAL at 01:13

## 2021-12-22 RX ADMIN — PANTOPRAZOLE SODIUM SCH MG: 40 TABLET, DELAYED RELEASE ORAL at 08:34

## 2021-12-22 RX ADMIN — NYSTATIN SCH UNIT: 100000 SUSPENSION ORAL at 17:04

## 2021-12-22 RX ADMIN — Medication SCH MCG: at 08:35

## 2021-12-22 RX ADMIN — VALSARTAN SCH MG: 80 TABLET ORAL at 08:37

## 2021-12-22 RX ADMIN — CLOPIDOGREL BISULFATE SCH MG: 75 TABLET ORAL at 08:34

## 2021-12-22 RX ADMIN — OXYCODONE HYDROCHLORIDE AND ACETAMINOPHEN SCH MG: 500 TABLET ORAL at 08:35

## 2021-12-22 NOTE — P.PN
Subjective





Patient is a 66-year-old the female with past medical history of COPD and 3 L 

oxygen dependent at home has cold symptoms for about a month comes in with 

shortness of breath presently on telemetry dysfunction CT of the chest did not 

show any pulmonary embolism but did show pulmonary fibrosis as well as 

infiltrates.  Below.  Patient doesn't have any infiltrate consistent with 

pneumonia patient denied any dysuria.  Patient is presently on Rocephin.  

Patient has mild acute renal failure because of which I'm holding ACE inhibitor.

 Severely hypomagnesemic magnesium is being replaced.  Patient last smoked about

17 years ago.





12/08/2021





Patient is seen and evaluated in follow-up and currently maintained on 10 L high

flow and weaning as tolerated.  Patient was on 15 L high flow nasal cannula this

morning and tolerating and nursing staff continuing to wean.  Patient normally 

wears 3 L of oxygen continuously in the outpatient setting.  Patient denies any 

worsening shortness of breath just generalized fatigue and weakness and 

continued diarrhea.  Patient states she is going approximately 2-3 times per day

will add C. diff testing and if negative will add Imodium and Questran.  

Pulmonary is following and venous Doppler was ordered bilateral lower 

extremities which is negative for DVT as d-dimer is elevated today at 2.42.  

Inflammatory markers significantly improved his LDH is 338 and CRP is 7.0, 

repeat magnesium after replacement is 2.9. 





12/09/2021





Patient is seen and evaluated in follow-up this morning currently sitting up in 

the chair and was on 10 L high flow although oxygen saturations were below 90% 

and patient bumped back up to 15 L.  Patient is complaining of the oxygen being 

too high and making her feel more anxious and discussed with nursing staff about

continuing to wean as tolerated.  She continues with diarrhea although is 

improving and C. diff testing was negative and patient was started on Questran 

along with Imodium.  Patient continues to state she has no real appetite 

although encourage the patient to continue with small frequent meals and will 

add Magic cups as well.  Encourage the patient to increase oral intake as she is

weak and needs the energy.  She normally maintained on 3 L via nasal cannula and

will continue to titrate as tolerated.  Pulmonary is following patient is 

maintained on IV dexamethasone twice daily along with vitamin and zinc 

supplements, Lovenox twice daily for elevated d-dimer and will continue.  

Patient was on normal saline and considered discontinuing as patient sodium is 

now 147 and patient is being started on D5 in water and will repeat labs.





12/10/2021


Patient is seen in follow-up this morning currently sitting up in the chair and 

oxygen requirements have increased and patient is now maintained on Airvo along 

with 15 L nonrebreather and oxygen saturations have been in the low 80s to 90 

maximum.  Discussed CODE STATUS with the patient and patient is a full code but 

does not wish to be placed on a ventilator if respiratory status continues to 

decline.  Pulmonary following closely and prognosis remains quite guarded.  

Patient continues on Lovenox along with dexamethasone and vitamin and zinc 

supplements and is being started on Baricitinib.  Patient is extremely weak and 

becomes extremely dyspneic with minimal exertion.  Patient's oral intake 

continues to be extremely poor and again encouraged and stressed the importance 

of eating regardless of not feeling hungry and having no appetite.  May consider

enteral nutrition and dietary consult.  Patient states her diarrhea has 

significantly improved and will use Imodium as needed and discontinue Questran. 

Repeat inflammatory markers ordered and pending.  Chest x-ray today shows 

bilateral airspace disease again noted with prominence and interstitial, and 

findings consistent with pneumonia.





12/11/2021


Patient admitted with bilateral common pneumonia , Her respiratory status today 

is a slightly worse as she is dependent on BiPAP throughout the day.  Also she 

has decreased air entry on both sides


She is saturating 88% while she is on BiPAP.


D-dimer is the same, 4.0 and 3.9.


She continued on dexamethasone, vitamin C, vitamin D, zinc,  barcitininb and 

Lovenox 40 mg twice a day.  Also she is on Protonix





12/12/2021


Sitting in chair looks mildly tachypneic and tolerates BiPAP which she uses his 

yesterday all the time.  She has not been eating since yesterday and may switch 

her for short time to nonrebreather and air or so she can read some oral diet   

Per pulmonary team recommendation.  Other than that she is hemodynamically 

stable.  Labs are stable.Calcitonin 0.19.  C. diff is negative.


She remains on dexamethasone, vitamin C, Z and Lovenox, Protonix and  barcitinib







12/13/2021


This morning she was still on BiPAP, and she was refusing to start TPN as she 

could not come off the BiPAP.  However later on during the day she could be 

placed on nonrebreather 15 L and Aravo 60 L with 94% so she can tolerate oral 

feeding.


Other vitals are stable, afebrile.  Labs are stable as well.


She continued on dexamethasone, vitamin C, vitamin D, zinc,  barcitininb and 

Lovenox 40 mg twice a day





12/14/2021


Patient with bilateral: With pneumonia and hypoxia, she is improving gradually 

today, today she couldn't get off her BiPAP and placed on airvo 60 L with FiO2 

of 85%, with occasional nonrebreather 15 L, patient was happy that she was able 

to eat today and she tolerates diet well.  She has some diarrhea but no 

abdominal pain.  C. diff is negative.


Other than that she is hemodynamically stable


Patient continued today on  dexamethasone, vitamin C, Z and Lovenox, Protonix 

and  barcitinib 


Follow-up chest x-ray and CBC/BMP in the morning





12/15/2021


Patient today is pleasant and smiling as her pulmonary illness is improving 

progressively but slowly, but she still currently on high flow nasal cannula 

with 55 L/m and FiO2 of 88% saturation in low 90s.  Rest of vitals are stable.  

Labs including CBC, BMP are unremarkable.


C. diff test came back negative and her diarrhea stopped today.  She is able to 

eat about 50-25% of her meals and she is satisfied with that for now.


She still on steroids with dexamethasone, vitamin C, vitamin D, zinc, Lovenox 40

mg twice a day, Protonix and barcitininb 





12/16/2021


Patient today her breathing slightly worsened and her oxygen requirement today 

was 55 L with 90%, she still can go a little difficulty due to her dyspnea and 

she confirmed me she is able to eat with no issues.


She sitting in chair today.  Hemodynamically stable.


Labs reviewed and shows stability, her d-dimer actually improved 3.9 down to 

1.6.  LDH went up to 1029 and CRP 3.4.


Chest x-ray shows slight improvement in aeration in both lung fields despite 

bilateral pneumonia.


She remains on dexamethasone, vitamin C, D and zinc and  Protonix and 

barcitininb .





12/17/2021


Patient breathing pattern is slowly to improve, today she still on high flow 

nasal cannula 55 L/m and FiO2 of 90%.


Labs including CBC and BMP are unremarkable and stable.  On the top of the 

patient is afebrile


However patient is complaining of from oral thrush and asked for fluconazole.  

Patient refused nystatin.  Also she has persistent loose bowel movement, C. diff

checked twice and there were negative.  We'll start her on short course of 

Questran.  Also patient states she is eating well and tolerates diet with no 

difficulty.


Continue with the other same medication of dexamethasone, multiple vitamin 

cocktail and Lovenox, Protonix and barcitininb 





12/18/2021


Her respiratory status with minimal change and fluctuating, currently she is on 

airvo at 55 L/m and 85%.  She is able to eat and drink.


She states that her most thrush is better and his diarrhea is improving after 

adding nystatin and Questran yesterday.


Afebrile and vitals are stable.


She remains on the same treatment of dexamethasone, vitamin C, D and zinc and  

Protonix and barcitininb .





12/19/2021 


patient breathing is improving as per patient stated that she is breathing 

easier.  She remains on same dose of oxygen as yesterday she still on 15 L/m at 

88% FiO2.


Also reports improvement in her bowel movements, she said that she almost had 

more formed bowel movement and her oral thrush is also improving with 

fluconazole.


And we will continue with the same treatment for her colon pneumonia with 

dexamethasone, vitamins, Barcitininb, Lovenox and Pepcid





12/20/2021


Patient is with bilateral common pneumonia on high dose of records oxygen since 

admission.  Today is awake and alert looks more comfortable and each day she 

states she is breathing easier.  Also she is happy with the progress of her 

bowel movement, she had about 4 months which is almost formed over the last 2 

days.  No more nausea vomiting but she feels some sore throat.  She is eating 

well.  However despite her reported clinical improvement by the patient she 

still on same dose of high need of oxygen and currently 55% and 70-90 L/m.


She remains on the same treatment of dexamethasone, vitamin C, DM zinc, 

,baricitinib.  Also she is on fluconazole, nystatin, Robitussin, Lovenox , and 

patient is not on Questran





12/21/2021


Patient clinically slowly to improve, he states she feels better but however her

oxygen requirements very close to yesterday at 50 L/m and 75% high flow nasal 

cannula.


Her GI symptoms or improvement and she tolerates diet well.


She is fluconazole currently while continue the same treatment of dexamethasone,

vitamin cocktail for Covid and baricitinib. .  Nystatin and Robitussin and 

Lovenox





12/22/2021


Patient respiratory status not improving or worsening much compared to 

yesterday.  She still breathing easily although she requires high flow nasal 

cannula at 50 L/m and FiO2 of 75%.  No GI symptoms, no significant sore throat 

which is responding to nystatin.  Patient tolerates diet well.


We'll keep the same medication and keep monitoring the patient for now.


Her creatinine trending up over the last 2-3 days, today is 0.08 went up to 1.2.

 We helped her Diovan which is a home medication.  And we'll check creatinine 

tomorrow and bladder scan.


Repeat labs in the morning.  Follow-up chest x-ray





Objective





- Vital Signs


Vital signs: 


                                   Vital Signs











Temp  97.9 F   12/22/21 18:56


 


Pulse  66   12/22/21 18:56


 


Resp  20   12/22/21 18:56


 


BP  98/54   12/22/21 18:56


 


Pulse Ox  88 L  12/22/21 18:56








                                 Intake & Output











 12/22/21 12/22/21 12/23/21





 06:59 18:59 06:59


 


Intake Total 960  


 


Output Total 400  


 


Balance 560  


 


Intake:   


 


  Oral 960  


 


Output:   


 


  Urine 400  


 


Other:   


 


  Voiding Method Bedside Commode Bedside Commode 


 


  # Voids 2 2 














- Exam





-GENERAL: The patient is alert and oriented , on BiPAP, not in any acute 

distress. Well developed, well nourished. 


HEENT: Pupils are round and equally reacting to light. EOMI. No scleral icterus.

No conjunctival pallor. Normocephalic, atraumatic. No pharyngeal erythema. No 

thyromegaly. 


CARDIOVASCULAR: S1 and S2 present. No murmurs, rubs, or gallops. 


-PULMONARY: Chest is clear to auscultation, no wheezing.  Bilateral crepitation 

and decreased air entry on both sides


ABDOMEN: Soft, nontender, nondistended, normoactive bowel sounds. No palpable 

organomegaly. 


MUSCULOSKELETAL: No joint swelling or deformity. 


EXTREMITIES: No cyanosis, clubbing, or pedal edema. 


NEUROLOGICAL: Gross neurological examination did not reveal any focal deficits. 


SKIN: No rashes. no petechiae.





- Labs


CBC & Chem 7: 


                                 12/22/21 06:09





                                 12/22/21 06:09


Labs: 


                  Abnormal Lab Results - Last 24 Hours (Table)











  12/22/21 12/22/21 Range/Units





  06:09 06:09 


 


MCHC  30.9 L   (31.0-37.0)  g/dL


 


Lymphocytes #  0.9 L   (1.0-4.8)  k/uL


 


Potassium   5.4 H  (3.5-5.1)  mmol/L


 


BUN   37 H  (7-17)  mg/dL


 


Creatinine   1.23 H  (0.52-1.04)  mg/dL


 


Glucose   188 H  (74-99)  mg/dL


 


ALT   38 H  (4-34)  U/L


 


Total Protein   6.0 L  (6.3-8.2)  g/dL














Assessment and Plan


Assessment: 





-acute hypoxic respiratory failure: secondary to COVID-19 pneumonia 


-chronic hypercapnic respiratory failure secondary to COPD uses 3 L of oxygen at

home


-Acute kidney injury


-Diarrhea most likely secondary to Covid, resolved


-Oral thrush secondary to steroids


-Hypernatremia, improving, sodium is 140 a.m. we'll continue D5 in water and 

repeat a.m. labs 


-Severe hypomagnesemia, improved


-Elevated d-dimer secondary to Covid and patient is on Lovenox which will be 

continued, Lovenox is currently twice daily


-Acute renal failure, prerenal, improved with IV fluids


-Hypertension


-Mild anion gap and metabolic acidosis probability of lactic acidosis, most 

likely secondary to increased amounts of diarrhea and dehydration, lactic acid 

is 1.1


-gastroesophageal reflux disease


-Hyperlipidemia


-Hypertension


-History of pulmonary fibrosis


-Sleep apnea uses CPAP machine at home


-Peripheral vascular disease


-Coronary artery disease


-DVT prophylaxis: On Lovenox which will be continued











Plan: 





This is a pleasant 66 years old female who presents with bilateral covid p

neumonia


Continue with dexamethasone


Continue with vitamin C, vitamin D and zinc


Pulmonary consult 


Also he is on barcitinib


Continue with stenting


Hold Diovan and monitor blood pressure.  Check a bladder scan


Labs and medication were reviewed..  Continue same treatment.  Continue with 

symptomatic treatment.  Resume home medication.  Monitor lytes and vitals.  DVT 

and GI prophylaxis.  Further recommendations as per clinical course of the 

patient


DVT prophylaxis: Subcutaneous Lovenox


GI Prophylaxis: Ppi


Prognosis is guarded

## 2021-12-22 NOTE — P.PN
Subjective


Progress Note Date: 12/22/21


Principal diagnosis: 


 Dyspnea, hypoxia, COVID-19





This is a pleasant 66-year-old female patient who follows at the [facility] for her

primary care needs.  She has history of hypertension, hyperlipidemia, 

gastroesophageal reflux disease, anxiety/depression, aortic thrombectomy in 2007

, former smoker, COPD, obstructive sleep apnea utilizing CPAP.  He is here 

yesterday to the emergency room with a four-week history of shortness of breath,

cough, congestion, body aches and fevers.  She was tested negative for CoVID 2.

 Her  is positive for CoVID and she is now positive for CoVID.  She is 

not vaccinated.  She does have home oxygen use at 2-3 L.  She is currently 

requiring 10 L high flow nasal cannula to maintain O2 saturations at 90%.  Chest

x-ray reveals bilateral patchy infiltrates. White count 5.9.  Hemoglobin 12.5.  

Platelets 398.  Lymphocytes 0.5.  D-dimer 1.89.  Sodium 140.  Potassium 4.9.  

Creatinine 1.2.  AST 39.  ALT 26.  Alk phos 156.  LDH 1283.  C-reactive protein 

21.7.  Pro-calcitonin 0.32.  She's been initiated on Decadron, Lovenox, vitamin 

supplements.  0.9% normal saline at 75 ML's per hour.





On 12/08/2021 patient is in follow-up on medical surgical floor, she is resting 

comfortably in bed, she states she is feeling better, although she is requiring 

more oxygen compared to when she first presented to the emergency department, 

note that patient does wear home oxygen at 2 L for history of COPD.  She is 

currently on 10 L of oxygen, her pulse ox is 91-92%, breathing comfortably, lung

sounds reveal coarse crackles at bilateral bases, but no wheezing, no rhonchi.  

CT angiogram of the chest showed no evidence of pulmonary embolism, it did show 

pulmonary emphysema and pulmonary interstitial fibrosis with increased 

interstitial infiltrates compared to her old exam.  There was no evidence of a 

suspicious pulmonary mass.  Patient was outside the window for Remdesivir, she 

continues on Decadron 6 mg twice daily, her pro-calcitonin level on admission 

was 0.32, and patient was covered with Rocephin for possibility of underlying 

bacterial infection.  She is on prophylactic Lovenox 40 mg daily.  [facility] showed no evidence of pulmonary embolism. 0





On 12/09/2001 patient seen in follow-up on medical surgical floor, she is c

urrently on 10 L of oxygen the pulse ox of 86%, FiO2 has since been increased to

15 L, she has a mild cough, but overall seems to be in no acute distress, she is

sitting up in the chair, denies any chest discomfort, looks quite comfortable.  

Lung sounds reveal coarse crackles bilateral bases, she does get short of breath

with exertion.  She states if she sits still she is breathing comfortably, and 

her O2 saturations are at or above 90%.  She's had no fever or chills overnight,

she remains on Decadron 40 mg twice daily.  She is on empiric antibiotics in the

form of Rocephin and IV fluids at 75 ML per hour.  Today's labs have been 

reviewed, white blood cell count is 8.6, hemoglobin is 12, platelet count is 

506, d-dimer is 4.01, sodium is 147, potassium is 4.8, chloride is 112, BUN of 

20 creatinine 0.7, renal profile has significantly improved since admission.  

LDH has significantly improved since admission and is down to 348 on today's 

labs, CRP is still pending, her last pro-calcitonin is negative at 0.13.  Blood 

culture has shown no growth thus far.





On 12/10/2021 patient seen in follow-up on medical surgical floor, she had been 

on 15 L high flow and 100% nonrebreather mask up until this morning, this 

morning patient's FiO2 requirements had increased, patient was getting up in the

chair, she is significantly desaturated, she was placed on irritable at 60 L and

FiO2 of 90% in addition to 100% nonrebreather, and patient is taking quite a 

while to recover her O2 saturations although her work of breathing is not 

increased.  She is still awake and alert, she is oriented 3, she is able to 

make her needs known, her pulse ox is slowly recovering at rest, with deep 

breathing, pursed lip breathing, repositioning.  Overnight she has had no fever 

or chills, blood pressures have been stable.  Breathing fairly comfortably 

although she desaturates with any little exertion.  Currently she is on Decadron

6 Twice daily, she will be started on Baricitinib, patient is also on Plavix, 

and prophylactic Lovenox.  Today's chest x-ray and labs are still pending, she 

remains on D5W at a rate of 100 ML per hour for dehydration related to diarrhea 

and hypernatremia, repeat electrolytes and renal profile are still pending for 

today.  No abdominal pain, no nausea.  The diarrhea has significantly improved 

in the last 48 hours.  Patient is tolerating oral intake, no abdominal pain. 





On 12/12/2021 patient seen in follow-up on medical surgical floor.  She is alert

and alert, she is on BiPAP support with pressures of 14 and 8 and FiO2 100%.  

Earlier this morning her BiPAP pressures have been increased from 12.6 in the 

100% FiO2.  Her pulse oximeter readings are marginal at around 90%, with any 

exertion patient does desaturate and takes her a while to recover although she 

looks very comfortable, nontachypneic, she is sitting up in the recliner, she is

watching TV.  She is complaining of being hungry.  She has not been able to take

much by mouth related to being BiPAP dependent.  She has been afebrile, 

hemodynamics are stable.  Her last chest x-ray from 12/10/2021 showed bilateral 

airspace disease with prominence of interstitium with no evident pneumothorax or

pleural effusion.  Today's labs have been reviewed showing white blood cell 

count of 10.6, hemoglobin 12.2, d-dimer and yesterday's labs was down to 3.98, 

electrolytes and renal profile were within normal limits.  Per pro-calcitonin 

level was negative at 0.19.  Patient remains on Baricitinib, Decadron 6 mg twice

daily, she is receiving IV fluids with D5 W at 100 ML per hour





On 12/13/2021 patient seen in follow-up on medical surgical floor, she was 

switched to Airvo at 60 L and 90%, and nonrebreather mask, she is maintaining 

her O2 saturations between 88 and 91%, she is breathing comfortably, tolerating 

it for a few hours now, she was able to eat something, she is taking in oral 

fluids, she is awake and alert, she is sitting up in the chair, she is being her

bills.  Lung sounds are positive for scattered crackles throughout the lung 

fields, no cough, appears very comfortable.  He is on [place] neb, she is on 

Decadron, and Lovenox.  She is on D5W at a rate of 100 ML per hour related to 

her nothing by mouth status however in view of her tolerating oral intake now, 

and not been BiPAP dependent anymore we'll stop the IV fluids.  Today's labs 

have been reviewed and her white blood cell count is 10.4, hemoglobin is 12.6, 

her electrolytes have been reviewed and are within normal limits, sodium is 141,

potassium is 3.9, chloride is 103, BUN is 15, creatinine is 0.72.  Her 

procalcitonin level was negative.





On 12/15/2021 patient seen in follow-up on medical surgical floor, she is 

sitting up in the recliner, she states her breathing is improving, she is on 

Airvo, and we were able to wean down the flow and FiO2, and the flow is 

currently at 55 L/m, and FiO2 is currently at 88%, her pulse ox is ranging 

between 86-93%, breathing comfortably, minimal crackles at bilateral bases, 

occasional cough, patient has been get not to the bedside commode, tolerates 

activity well, her spirits are high, her oral intake is fair.  She continues on 

Baricitinib, she continues on Decadron 6 blood gram twice daily, and Lovenox 40 

mg twice daily, today's labs have been reviewed showing white blood cell count 

is 7.19, hemoglobin of 11.9, platelet count is 383, electrolytes and renal 

profile are unremarkable.  Cdiff negative.





On 12/16/2021 patient seen in follow-up on medical surgical floor.  She is awake

and alert, in no acute distress, she is currently resting in bed, she is on her 

left side and patient has been compliant with salt repositioning in bed from 

side to side.  She is breathing comfortably, remains on Airvo at 55 L and FiO2 

of 87%, and she was also placed on Airvo because her pulse ox was less than 85%,

her current O2 saturations are around 88%, patient seems to breathing quite 

comfortably.  Today's chest x-ray shows persistent but slightly improving 

bilateral pneumonia. patient continues on Baricitinib, Decadron 6 mg twice 

daily, she is on Lovenox 40 mg twice daily, COVID-19 vitamins.  His labs have 

been reviewed and there was down to 1.63, CBC was within normal limits, 

electrolytes and renal profile were unremarkable.  Today's LDH is 1029, CRP is 

1.7.  Vital signs have been stable, patient has been afebrile.





On 12/17/2021 patient is seen in follow-up on medical surgical floor.  She is 

breathing comfortably, she is resting in bed, she's been repositioning self from

side to side.  She remains on Airvo at 55 L and FiO2 of 90%, and pulse ox is 89-

93%.  Blood pressure is been stable, patient has been afebrile.  Patient 

continues on Baricitinib, continues on Decadron, Diflucan, Lovenox twice daily. 

Lung sounds revealed mildly diminished breath sounds, and a few scattered c

rackles.  Chest x-ray showing persistent but slightly improving bilateral Osvaldo

pneumonia





On 12/19/2001 patient seen in follow-up on medical surgical floor.  She is 

resting comfortably in bed, she is currently on 55 L/m and FiO2 of 80%, 

breathing comfortably, she has not required nonrebreather mask at all for the 

past 24 hours, she continues on Decadron, Baricitinib, and prophylactic Lovenox,

she is breathing comfortably, lung sounds revealed minimal crackles bilaterally,

she is tolerating oral intake.  No nausea vomiting or diarrhea.  Fever or 

chills.  Today's chest x-ray shows no change in the appearance of diffuse 

interstitial opacities in both lungs.  Today's labs have been reviewed, white 

count is normal at 9.8, hemoglobin of 12.7, d-dimer is 2.1, electrolytes and 

renal profile are unremarkable, and her inflammatory markers are improving on 

today's labs.





On 12/20/2021 patient seen in follow-up on medical surgical floor, she remains 

on a currently on 55 L/m, and FiO2 of 75%, she has not required nonrebreather 

mask.  She is breathing quite comfortably, lung sounds revealed minimal 

crackles, no wheezing, patient continues on Decadron 6. Twice a day, Lovenox 40 

mg twice a day, she is on cough syrup, she is on Xopenex and Spiriva inhalers.  

She remains on Baricitinib.  She's had no acute events overnight, no specific 

complaints.  No new labs today.  Her last set of inflammatory markers from 

yesterday showed improving LDH which was down to 442, and CRP was down to 0.60, 

her d-dimer was 2.10.





On 12/21/2001 patient seen in follow-up on medical surgical floor.  She is breat

julia comfortably, she remains on Airvo, and the flow has been decreased down 

further to 50 L/m, and FiO2 is currently at 77%, she is maintaining O2 

saturations between 90-92%, breathing very comfortably, minimal cough, lung 

sounds are clear, no rhonchi or wheezing, she is awake and alert, oriented 3, 

no altered mentation, no fever or chills, she is tolerating oral intake, no 

nausea or vomiting, clinically she's been stable, slowly improving in terms of 

oxygenation as well.  Remains on Decadron 6 Coumadin twice daily, Lovenox 40 mg 

twice daily, she is on COVID-19 multivitamins, she remains on Baricitinib 4 mg 

daily.  She's had no acute events overnight.  Today's labs have been reviewed, 

white blood cell count is 10.7, hemoglobin 13.7, electrolytes and renal profile 

are unremarkable, her inflammatory markers were improving on labs from 2 days 

ago. 





On 12/22/2021 patient seen in follow-up on medical surgical floor.  She is 

breathing very comfortably, she is currently sitting up in the chair, on Airvo, 

and currently on 50 L/m, and FiO2 is at 75%, her pulse ox is 94%.  Only 

occasional cough, minimal crackles, no rhonchi or wheezing.  No fever or chills.

 She continues on Baricitinib, Decadron 6 mg twice daily, Lovenox 40 mg twice 

daily, multivitamins.  Hemodynamically she is stable, no fever or chills.





Objective





- Vital Signs


Vital signs: 


                                   Vital Signs











Temp  97.7 F   12/22/21 14:00


 


Pulse  67   12/22/21 14:00


 


Resp  18   12/22/21 14:00


 


BP  114/66   12/22/21 14:00


 


Pulse Ox  94 L  12/22/21 14:00








                                 Intake & Output











 12/21/21 12/22/21 12/22/21





 18:59 06:59 18:59


 


Intake Total  960 


 


Output Total  400 


 


Balance  560 


 


Weight 90.718 kg  


 


Intake:   


 


  Oral  960 


 


Output:   


 


  Urine  400 


 


Other:   


 


  Voiding Method Bedside Commode Bedside Commode Bedside Commode


 


  # Voids  2 














- Exam


 GENERAL EXAM: Alert, very pleasant, 66-year-old white female, on low at 50 L an

FiO2 of 75%, and  breathing comfortably, does not appear to be in any acute 

distress


HEAD: Normocephalic/atraumatic.


EYES: Normal reaction of pupils, equal size.  Conjunctiva pink, sclera white.


NOSE: Clear with pink turbinates.


THROAT: No erythema or exudates.


NECK: No masses, no JVD, no thyroid enlargement, no adenopathy.


CHEST: No chest wall deformity.  Symmetrical expansion. 


LUNGS: Equal air entry with with coarse bilateral crackles


CVS: Regular rate and rhythm, normal S1 and S2, no gallops, no murmurs, no rubs


ABDOMEN: Soft, nontender.  No hepatosplenomegaly, normal bowel sounds, no 

guarding or rigidity.


EXTREMITIES: No clubbing, no edema, no cyanosis, 2+ pulses and upper and lower 

extremities.


MUSCULOSKELETAL: Muscle strength and tone normal.


SPINE: No scoliosis or deformity


SKIN: No rashes


CENTRAL NERVOUS SYSTEM: Alert and oriented -3.  No focal deficits, tone is 

normal in all 4 extremities.


PSYCHIATRIC: Alert and oriented -3.  Appropriate affect.  Intact judgment and 

insight.











- Labs


CBC & Chem 7: 


                                 12/22/21 06:09





                                 12/22/21 06:09


Labs: 


                  Abnormal Lab Results - Last 24 Hours (Table)











  12/22/21 12/22/21 Range/Units





  06:09 06:09 


 


MCHC  30.9 L   (31.0-37.0)  g/dL


 


Lymphocytes #  0.9 L   (1.0-4.8)  k/uL


 


Potassium   5.4 H  (3.5-5.1)  mmol/L


 


BUN   37 H  (7-17)  mg/dL


 


Creatinine   1.23 H  (0.52-1.04)  mg/dL


 


Glucose   188 H  (74-99)  mg/dL


 


ALT   38 H  (4-34)  U/L


 


Total Protein   6.0 L  (6.3-8.2)  g/dL














Assessment and Plan


Plan: 


 Assessment:





#1.  Acute on chronic hypoxic respiratory failure secondary to acute COVID-19 

pneumonia, patient is not vaccinated related to multiple ALLERGIES, patient 

presented with a 4 week history of symptoms, she was outside the window for 

Remdesivir, she is being treated supportively with Decadron, prophylactic 

Lovenox, and empiric antibiotics.  Patient was placed on irritable at 60 L and 

90% FiO2 with nonrebreather mask today on 12/10/2021, we'll start the patient on

Baricitinib.  On 12/12/2021 patient is on BiPAP support remains BiPAP support 

dependent at pressures of 14 and 1800% FiO2 although clinically is comfortable. 

Today on 12/19/2021 patient is not using nonrebreather mask, and is just on 

Airvo at 55 L and FiO2 of 75%





#2.  Mildly elevated pro calcitonin, patient is on empiric antibiotics in the 

form of Rocephin, pro calcitonin has improved, no definite sign of infection, 

Rocephin has been discontinued





#3.  Elevated inflammatory markers secondary to the viral pneumonia, improving





#4.  Morbid obesity with BMI of 39.1 kg/m





#5.  Obstructive sleep apnea on CPAP therapy





#6.  History of COPD with chronic hypoxic respiratory failure usually wears 2 L 

of oxygen on the outpatient basis, and CT angiogram of the chest also revealed 

evidence of interstitial pulmonary fibrosis





#7.  Hyperlipidemia





#8.  Anxiety/depression





#9.  History of coronary artery disease with previous stent placement





#10.  History of aortic thrombectomy/aortobifem bypass in 2007





#11.  Former smoker





Plan:





Continues on Airvo at 50 L and FiO2 of 75%


Breathing comfortably, does not appear to be in any acute distress


She is up out of bed, she sits up in the chair, tolerates activity well


Continue Baricitinib


Continue Decadron, and Lovenox


Inflammatory markers are improving,


Continue to follow


Seems to be stable clinically,


Obtain follow-up chest x-ray tomorrow


Once her oxygen demand is down to 5 L of supplemental oxygen per minute or less 

may consider discharge home





I performed a history & physical examination of the patient and discussed their 

management with my nurse practitioner, Jeannine Belcher.  I reviewed the nurse 

practitioner's note and agree with the documented findings and plan of care.  

Lung sounds are positive for diffuse crackles throughout the lung fields.  The 

findings and the impression was discussed with the patient.  I attest to the 

documentation by the nurse practitioner. 











Time with Patient: Less than 30

## 2021-12-22 NOTE — PN
PROGRESS NOTE



DATE OF SERVICE:

12/22/2021



REASON FOR FOLLOWUP:

COVID-19 pneumonia.



INTERVAL HISTORY:

The patient is afebrile.  The patient is breathing comfortably.  The patient is

currently on 75% FiO2.  The patient denies having any chest pain or worsening cough or

sputum production.  No abdominal pain or diarrhea.



PHYSICAL EXAMINATION:

Blood pressure 98/54 with a pulse of 56, temperature 97.9.  She is 88% on 75% FiO2.

General description is an elderly female up in the bed in no distress. Respiratory

system: Unlabored breathing, decreased intensity of breath sounds. No wheeze.  Heart

S1, S2.  Regular rate and rhythm.  Abdomen soft, no tenderness.



LABS:

Hemoglobin is 13.1, white count 8.6.  Creatinine is 1.23.



DIAGNOSTIC IMPRESSION AND PLAN:

1. Patient with acute COVID-19 pneumonia with respiratory failure. Patient is on

    baricitinib, dexamethasone, Rocephin, zinc and ascorbic acid; to continue.

2. Patient with oral thrush. To continue with nystatin swish and swallow and Diflucan.





MMODL / IJN: [id number] / Job#: [id number]

## 2021-12-23 LAB
ALBUMIN SERPL-MCNC: 3.6 G/DL (ref 3.5–5)
ALBUMIN/GLOB SERPL: 1.4 {RATIO}
ALP SERPL-CCNC: 113 U/L (ref 38–126)
ALT SERPL-CCNC: 42 U/L (ref 4–34)
ANION GAP SERPL CALC-SCNC: 9 MMOL/L
AST SERPL-CCNC: 24 U/L (ref 14–36)
BASOPHILS # BLD AUTO: 0 K/UL (ref 0–0.2)
BASOPHILS NFR BLD AUTO: 0 %
BUN SERPL-SCNC: 34 MG/DL (ref 7–17)
CALCIUM SPEC-MCNC: 9.6 MG/DL (ref 8.4–10.2)
CHLORIDE SERPL-SCNC: 102 MMOL/L (ref 98–107)
CO2 SERPL-SCNC: 26 MMOL/L (ref 22–30)
EOSINOPHIL # BLD AUTO: 0 K/UL (ref 0–0.7)
EOSINOPHIL NFR BLD AUTO: 0 %
ERYTHROCYTE [DISTWIDTH] IN BLOOD BY AUTOMATED COUNT: 4.82 M/UL (ref 3.8–5.4)
ERYTHROCYTE [DISTWIDTH] IN BLOOD: 15.1 % (ref 11.5–15.5)
GLOBULIN SER CALC-MCNC: 2.6 G/DL
GLUCOSE SERPL-MCNC: 165 MG/DL (ref 74–99)
HCT VFR BLD AUTO: 41.9 % (ref 34–46)
HGB BLD-MCNC: 13.2 GM/DL (ref 11.4–16)
LDH SPEC-CCNC: 734 U/L (ref 313–618)
LYMPHOCYTES # SPEC AUTO: 0.7 K/UL (ref 1–4.8)
LYMPHOCYTES NFR SPEC AUTO: 6 %
MAGNESIUM SPEC-SCNC: 1.9 MG/DL (ref 1.6–2.3)
MCH RBC QN AUTO: 27.3 PG (ref 25–35)
MCHC RBC AUTO-ENTMCNC: 31.4 G/DL (ref 31–37)
MCV RBC AUTO: 86.9 FL (ref 80–100)
MONOCYTES # BLD AUTO: 0.4 K/UL (ref 0–1)
MONOCYTES NFR BLD AUTO: 3 %
NEUTROPHILS # BLD AUTO: 10.6 K/UL (ref 1.3–7.7)
NEUTROPHILS NFR BLD AUTO: 90 %
PLATELET # BLD AUTO: 345 K/UL (ref 150–450)
POTASSIUM SERPL-SCNC: 4.9 MMOL/L (ref 3.5–5.1)
PROT SERPL-MCNC: 6.2 G/DL (ref 6.3–8.2)
SODIUM SERPL-SCNC: 137 MMOL/L (ref 137–145)
WBC # BLD AUTO: 11.8 K/UL (ref 3.8–10.6)

## 2021-12-23 RX ADMIN — BARICITINIB SCH MG: 2 TABLET, FILM COATED ORAL at 08:23

## 2021-12-23 RX ADMIN — NYSTATIN SCH UNIT: 100000 SUSPENSION ORAL at 23:01

## 2021-12-23 RX ADMIN — LORATADINE SCH MG: 10 TABLET ORAL at 08:22

## 2021-12-23 RX ADMIN — PANTOPRAZOLE SODIUM SCH MG: 40 TABLET, DELAYED RELEASE ORAL at 08:22

## 2021-12-23 RX ADMIN — TIOTROPIUM BROMIDE INHALATION SPRAY SCH PUFF: 3.12 SPRAY, METERED RESPIRATORY (INHALATION) at 09:24

## 2021-12-23 RX ADMIN — Medication SCH MCG: at 08:22

## 2021-12-23 RX ADMIN — NYSTATIN SCH UNIT: 100000 SUSPENSION ORAL at 18:25

## 2021-12-23 RX ADMIN — ATORVASTATIN CALCIUM SCH MG: 80 TABLET, FILM COATED ORAL at 08:22

## 2021-12-23 RX ADMIN — ENOXAPARIN SODIUM SCH MG: 40 INJECTION SUBCUTANEOUS at 08:23

## 2021-12-23 RX ADMIN — OXYCODONE HYDROCHLORIDE AND ACETAMINOPHEN SCH MG: 500 TABLET ORAL at 08:22

## 2021-12-23 RX ADMIN — NYSTATIN SCH UNIT: 100000 SUSPENSION ORAL at 08:22

## 2021-12-23 RX ADMIN — CLOPIDOGREL BISULFATE SCH MG: 75 TABLET ORAL at 08:21

## 2021-12-23 RX ADMIN — ENOXAPARIN SODIUM SCH MG: 40 INJECTION SUBCUTANEOUS at 20:07

## 2021-12-23 RX ADMIN — Medication SCH MG: at 08:22

## 2021-12-23 RX ADMIN — NYSTATIN SCH UNIT: 100000 SUSPENSION ORAL at 12:08

## 2021-12-23 RX ADMIN — DEXTROSE SCH MG: 50 INJECTION, SOLUTION INTRAVENOUS at 20:07

## 2021-12-23 RX ADMIN — METOPROLOL SUCCINATE SCH MG: 25 TABLET, EXTENDED RELEASE ORAL at 08:21

## 2021-12-23 RX ADMIN — DEXTROSE SCH MG: 50 INJECTION, SOLUTION INTRAVENOUS at 08:23

## 2021-12-23 RX ADMIN — SERTRALINE HYDROCHLORIDE SCH MG: 100 TABLET ORAL at 08:22

## 2021-12-23 RX ADMIN — OXYCODONE HYDROCHLORIDE AND ACETAMINOPHEN SCH MG: 500 TABLET ORAL at 20:07

## 2021-12-23 RX ADMIN — ACETAMINOPHEN PRN MG: 325 TABLET, FILM COATED ORAL at 20:07

## 2021-12-23 NOTE — P.PN
Subjective


Progress Note Date: 12/23/21


Principal diagnosis: 


 Dyspnea, hypoxia, COVID-19





This is a pleasant 66-year-old female patient who follows at the [facility] for her

primary care needs.  She has history of hypertension, hyperlipidemia, 

gastroesophageal reflux disease, anxiety/depression, aortic thrombectomy in 2007

, former smoker, COPD, obstructive sleep apnea utilizing CPAP.  He is here 

yesterday to the emergency room with a four-week history of shortness of breath,

cough, congestion, body aches and fevers.  She was tested negative for CoVID 2.

 Her  is positive for CoVID and she is now positive for CoVID.  She is 

not vaccinated.  She does have home oxygen use at 2-3 L.  She is currently 

requiring 10 L high flow nasal cannula to maintain O2 saturations at 90%.  Chest

x-ray reveals bilateral patchy infiltrates. White count 5.9.  Hemoglobin 12.5.  

Platelets 398.  Lymphocytes 0.5.  D-dimer 1.89.  Sodium 140.  Potassium 4.9.  

Creatinine 1.2.  AST 39.  ALT 26.  Alk phos 156.  LDH 1283.  C-reactive protein 

21.7.  Pro-calcitonin 0.32.  She's been initiated on Decadron, Lovenox, vitamin 

supplements.  0.9% normal saline at 75 ML's per hour.





On 12/08/2021 patient is in follow-up on medical surgical floor, she is resting 

comfortably in bed, she states she is feeling better, although she is requiring 

more oxygen compared to when she first presented to the emergency department, 

note that patient does wear home oxygen at 2 L for history of COPD.  She is 

currently on 10 L of oxygen, her pulse ox is 91-92%, breathing comfortably, lung

sounds reveal coarse crackles at bilateral bases, but no wheezing, no rhonchi.  

CT angiogram of the chest showed no evidence of pulmonary embolism, it did show 

pulmonary emphysema and pulmonary interstitial fibrosis with increased 

interstitial infiltrates compared to her old exam.  There was no evidence of a 

suspicious pulmonary mass.  Patient was outside the window for Remdesivir, she 

continues on Decadron 6 mg twice daily, her pro-calcitonin level on admission 

was 0.32, and patient was covered with Rocephin for possibility of underlying 

bacterial infection.  She is on prophylactic Lovenox 40 mg daily.  [facility]A [facility] of the chest showed no evidence of pulmonary embolism. 0





On 12/09/2001 patient seen in follow-up on medical surgical floor, she is c

urrently on 10 L of oxygen the pulse ox of 86%, FiO2 has since been increased to

15 L, she has a mild cough, but overall seems to be in no acute distress, she is

sitting up in the chair, denies any chest discomfort, looks quite comfortable.  

Lung sounds reveal coarse crackles bilateral bases, she does get short of breath

with exertion.  She states if she sits still she is breathing comfortably, and 

her O2 saturations are at or above 90%.  She's had no fever or chills overnight,

she remains on Decadron 40 mg twice daily.  She is on empiric antibiotics in the

form of Rocephin and IV fluids at 75 ML per hour.  Today's labs have been 

reviewed, white blood cell count is 8.6, hemoglobin is 12, platelet count is 

506, d-dimer is 4.01, sodium is 147, potassium is 4.8, chloride is 112, BUN of 

20 creatinine 0.7, renal profile has significantly improved since admission.  

LDH has significantly improved since admission and is down to 348 on today's 

labs, CRP is still pending, her last pro-calcitonin is negative at 0.13.  Blood 

culture has shown no growth thus far.





On 12/10/2021 patient seen in follow-up on medical surgical floor, she had been 

on 15 L high flow and 100% nonrebreather mask up until this morning, this 

morning patient's FiO2 requirements had increased, patient was getting up in the

chair, she is significantly desaturated, she was placed on irritable at 60 L and

FiO2 of 90% in addition to 100% nonrebreather, and patient is taking quite a 

while to recover her O2 saturations although her work of breathing is not 

increased.  She is still awake and alert, she is oriented 3, she is able to 

make her needs known, her pulse ox is slowly recovering at rest, with deep 

breathing, pursed lip breathing, repositioning.  Overnight she has had no fever 

or chills, blood pressures have been stable.  Breathing fairly comfortably 

although she desaturates with any little exertion.  Currently she is on Decadron

6 Twice daily, she will be started on Baricitinib, patient is also on Plavix, 

and prophylactic Lovenox.  Today's chest x-ray and labs are still pending, she 

remains on D5W at a rate of 100 ML per hour for dehydration related to diarrhea 

and hypernatremia, repeat electrolytes and renal profile are still pending for 

today.  No abdominal pain, no nausea.  The diarrhea has significantly improved 

in the last 48 hours.  Patient is tolerating oral intake, no abdominal pain. 





On 12/12/2021 patient seen in follow-up on medical surgical floor.  She is alert

and alert, she is on BiPAP support with pressures of 14 and 8 and FiO2 100%.  

Earlier this morning her BiPAP pressures have been increased from 12.6 in the 

100% FiO2.  Her pulse oximeter readings are marginal at around 90%, with any 

exertion patient does desaturate and takes her a while to recover although she 

looks very comfortable, nontachypneic, she is sitting up in the recliner, she is

watching TV.  She is complaining of being hungry.  She has not been able to take

much by mouth related to being BiPAP dependent.  She has been afebrile, 

hemodynamics are stable.  Her last chest x-ray from 12/10/2021 showed bilateral 

airspace disease with prominence of interstitium with no evident pneumothorax or

pleural effusion.  Today's labs have been reviewed showing white blood cell 

count of 10.6, hemoglobin 12.2, d-dimer and yesterday's labs was down to 3.98, 

electrolytes and renal profile were within normal limits.  Per pro-calcitonin 

level was negative at 0.19.  Patient remains on Baricitinib, Decadron 6 mg twice

daily, she is receiving IV fluids with D5 W at 100 ML per hour





On 12/13/2021 patient seen in follow-up on medical surgical floor, she was 

switched to Airvo at 60 L and 90%, and nonrebreather mask, she is maintaining 

her O2 saturations between 88 and 91%, she is breathing comfortably, tolerating 

it for a few hours now, she was able to eat something, she is taking in oral 

fluids, she is awake and alert, she is sitting up in the chair, she is being her

bills.  Lung sounds are positive for scattered crackles throughout the lung 

fields, no cough, appears very comfortable.  He is on [place] neb, she is on 

Decadron, and Lovenox.  She is on D5W at a rate of 100 ML per hour related to 

her nothing by mouth status however in view of her tolerating oral intake now, 

and not been BiPAP dependent anymore we'll stop the IV fluids.  Today's labs 

have been reviewed and her white blood cell count is 10.4, hemoglobin is 12.6, 

her electrolytes have been reviewed and are within normal limits, sodium is 141,

potassium is 3.9, chloride is 103, BUN is 15, creatinine is 0.72.  Her 

procalcitonin level was negative.





On 12/15/2021 patient seen in follow-up on medical surgical floor, she is 

sitting up in the recliner, she states her breathing is improving, she is on 

Airvo, and we were able to wean down the flow and FiO2, and the flow is 

currently at 55 L/m, and FiO2 is currently at 88%, her pulse ox is ranging 

between 86-93%, breathing comfortably, minimal crackles at bilateral bases, 

occasional cough, patient has been get not to the bedside commode, tolerates 

activity well, her spirits are high, her oral intake is fair.  She continues on 

Baricitinib, she continues on Decadron 6 blood gram twice daily, and Lovenox 40 

mg twice daily, today's labs have been reviewed showing white blood cell count 

is 7.19, hemoglobin of 11.9, platelet count is 383, electrolytes and renal 

profile are unremarkable.  Cdiff negative.





On 12/16/2021 patient seen in follow-up on medical surgical floor.  She is awake

and alert, in no acute distress, she is currently resting in bed, she is on her 

left side and patient has been compliant with salt repositioning in bed from 

side to side.  She is breathing comfortably, remains on Airvo at 55 L and FiO2 

of 87%, and she was also placed on Airvo because her pulse ox was less than 85%,

her current O2 saturations are around 88%, patient seems to breathing quite 

comfortably.  Today's chest x-ray shows persistent but slightly improving 

bilateral pneumonia. patient continues on Baricitinib, Decadron 6 mg twice 

daily, she is on Lovenox 40 mg twice daily, COVID-19 vitamins.  His labs have 

been reviewed and there was down to 1.63, CBC was within normal limits, 

electrolytes and renal profile were unremarkable.  Today's LDH is 1029, CRP is 

1.7.  Vital signs have been stable, patient has been afebrile.





On 12/17/2021 patient is seen in follow-up on medical surgical floor.  She is 

breathing comfortably, she is resting in bed, she's been repositioning self from

side to side.  She remains on Airvo at 55 L and FiO2 of 90%, and pulse ox is 89-

93%.  Blood pressure is been stable, patient has been afebrile.  Patient 

continues on Baricitinib, continues on Decadron, Diflucan, Lovenox twice daily. 

Lung sounds revealed mildly diminished breath sounds, and a few scattered c

rackles.  Chest x-ray showing persistent but slightly improving bilateral Osvaldo

pneumonia





On 12/19/2001 patient seen in follow-up on medical surgical floor.  She is 

resting comfortably in bed, she is currently on 55 L/m and FiO2 of 80%, 

breathing comfortably, she has not required nonrebreather mask at all for the 

past 24 hours, she continues on Decadron, Baricitinib, and prophylactic Lovenox,

she is breathing comfortably, lung sounds revealed minimal crackles bilaterally,

she is tolerating oral intake.  No nausea vomiting or diarrhea.  Fever or 

chills.  Today's chest x-ray shows no change in the appearance of diffuse 

interstitial opacities in both lungs.  Today's labs have been reviewed, white 

count is normal at 9.8, hemoglobin of 12.7, d-dimer is 2.1, electrolytes and 

renal profile are unremarkable, and her inflammatory markers are improving on 

today's labs.





On 12/20/2021 patient seen in follow-up on medical surgical floor, she remains 

on a currently on 55 L/m, and FiO2 of 75%, she has not required nonrebreather 

mask.  She is breathing quite comfortably, lung sounds revealed minimal 

crackles, no wheezing, patient continues on Decadron 6. Twice a day, Lovenox 40 

mg twice a day, she is on cough syrup, she is on Xopenex and Spiriva inhalers.  

She remains on Baricitinib.  She's had no acute events overnight, no specific 

complaints.  No new labs today.  Her last set of inflammatory markers from 

yesterday showed improving LDH which was down to 442, and CRP was down to 0.60, 

her d-dimer was 2.10.





On 12/21/2001 patient seen in follow-up on medical surgical floor.  She is breat

julia comfortably, she remains on Airvo, and the flow has been decreased down 

further to 50 L/m, and FiO2 is currently at 77%, she is maintaining O2 

saturations between 90-92%, breathing very comfortably, minimal cough, lung 

sounds are clear, no rhonchi or wheezing, she is awake and alert, oriented 3, 

no altered mentation, no fever or chills, she is tolerating oral intake, no 

nausea or vomiting, clinically she's been stable, slowly improving in terms of 

oxygenation as well.  Remains on Decadron 6 Coumadin twice daily, Lovenox 40 mg 

twice daily, she is on COVID-19 multivitamins, she remains on Baricitinib 4 mg 

daily.  She's had no acute events overnight.  Today's labs have been reviewed, 

white blood cell count is 10.7, hemoglobin 13.7, electrolytes and renal profile 

are unremarkable, her inflammatory markers were improving on labs from 2 days 

ago. 





On 12/22/2021 patient seen in follow-up on medical surgical floor.  She is 

breathing very comfortably, she is currently sitting up in the chair, on Airvo, 

and currently on 50 L/m, and FiO2 is at 75%, her pulse ox is 94%.  Only 

occasional cough, minimal crackles, no rhonchi or wheezing.  No fever or chills.

 She continues on Baricitinib, Decadron 6 mg twice daily, Lovenox 40 mg twice 

daily, multivitamins.  Hemodynamically she is stable, no fever or chills.





On 12/23/2021 patient seen in follow-up on medical surgical floor, she is sitt

ing up in the chair, breathing comfortably, she remains on Airvo and the flow is

currently at 15 L and FiO2 has been decreased to 69%, her pulse ox is 91-94%, 

she is breathing comfortably, minimal crackles at the left lung base, no fever 

or chills, only occasional cough, no significant phlegm production, no chest 

discomfort.  Clinically she is improving, she's had no acute events overnight, 

she is tolerating oral intake, she's been admitted to the bedside commode, she 

does get exertional dyspnea, but recovers.  D-dimer is improving and is down to 

1.11 on today's labs, her last LDH from yesterday was 734, was increased from 

most previous value but overall improved during this admission, CRP level was 

less than 0.5.  She has completed her course of Baricitinib, she continues on 

Decadron 6 mg twice a day, Lovenox 40 mg twice daily, she is on cough syrup, and

COVID-19 multivitamins.





Objective





- Vital Signs


Vital signs: 


                                   Vital Signs











Temp  97.8 F   12/23/21 10:00


 


Pulse  66   12/23/21 10:00


 


Resp  16   12/23/21 10:00


 


BP  138/75   12/23/21 10:00


 


Pulse Ox  91 L  12/23/21 10:00








                                 Intake & Output











 12/22/21 12/23/21 12/23/21





 18:59 06:59 18:59


 


Intake Total  960 


 


Output Total  400 


 


Balance  560 


 


Intake:   


 


  Oral  960 


 


Output:   


 


  Urine  400 


 


Other:   


 


  Voiding Method Bedside Commode Bedside Commode Bedside Commode


 


  # Voids 2 1 














- Exam


 GENERAL EXAM: Alert, very pleasant, 66-year-old white female, on low at 50 L an

FiO2 of 70%, and  breathing comfortably, does not appear to be in any acute 

distress


HEAD: Normocephalic/atraumatic.


EYES: Normal reaction of pupils, equal size.  Conjunctiva pink, sclera white.


NOSE: Clear with pink turbinates.


THROAT: No erythema or exudates.


NECK: No masses, no JVD, no thyroid enlargement, no adenopathy.


CHEST: No chest wall deformity.  Symmetrical expansion. 


LUNGS: Equal air entry with with coarse bilateral crackles


CVS: Regular rate and rhythm, normal S1 and S2, no gallops, no murmurs, no rubs


ABDOMEN: Soft, nontender.  No hepatosplenomegaly, normal bowel sounds, no 

guarding or rigidity.


EXTREMITIES: No clubbing, no edema, no cyanosis, 2+ pulses and upper and lower 

extremities.


MUSCULOSKELETAL: Muscle strength and tone normal.


SPINE: No scoliosis or deformity


SKIN: No rashes


CENTRAL NERVOUS SYSTEM: Alert and oriented -3.  No focal deficits, tone is 

normal in all 4 extremities.


PSYCHIATRIC: Alert and oriented -3.  Appropriate affect.  Intact judgment and 

insight.











- Labs


CBC & Chem 7: 


                                 12/23/21 06:21





                                 12/23/21 06:21


Labs: 


                  Abnormal Lab Results - Last 24 Hours (Table)











  12/23/21 12/23/21 12/23/21 Range/Units





  06:21 06:21 10:09 


 


WBC  11.8 H    (3.8-10.6)  k/uL


 


Neutrophils #  10.6 H    (1.3-7.7)  k/uL


 


Lymphocytes #  0.7 L    (1.0-4.8)  k/uL


 


D-Dimer    1.11 H  (<0.60)  mg/L FEU


 


BUN   34 H   (7-17)  mg/dL


 


Glucose   165 H   (74-99)  mg/dL


 


ALT   42 H   (4-34)  U/L


 


Lactate Dehydrogenase   734 H   (313-618)  U/L


 


Total Protein   6.2 L   (6.3-8.2)  g/dL














Assessment and Plan


Plan: 


 Assessment:





#1.  Acute on chronic hypoxic respiratory failure secondary to acute COVID-19 

pneumonia, patient is not vaccinated related to multiple ALLERGIES, patient 

presented with a 4 week history of symptoms, she was outside the window for 

Remdesivir, she is being treated supportively with Decadron, prophylactic 

Lovenox, and empiric antibiotics.  Patient was placed on irritable at 60 L and 

90% FiO2 with nonrebreather mask today on 12/10/2021, we'll start the patient on

Baricitinib.  On 12/12/2021 patient is on BiPAP support remains BiPAP support 

dependent at pressures of 14 and 1800% FiO2 although clinically is comfortable. 

Today on 12/23/2021 patient is on Airvo at 50 L and FiO2 of 70%. Baricitinib 

completed on 12/23/2021





#2.  Mildly elevated pro calcitonin, patient is on empiric antibiotics in the 

form of Rocephin, pro calcitonin has improved, no definite sign of infection, 

Rocephin has been discontinued





#3.  Elevated inflammatory markers secondary to the viral pneumonia, improving





#4.  Morbid obesity with BMI of 39.1 kg/m





#5.  Obstructive sleep apnea on CPAP therapy





#6.  History of COPD with chronic hypoxic respiratory failure usually wears 2 L 

of oxygen on the outpatient basis, and CT angiogram of the chest also revealed 

evidence of interstitial pulmonary fibrosis





#7.  Hyperlipidemia





#8.  Anxiety/depression





#9.  History of coronary artery disease with previous stent placement





#10.  History of aortic thrombectomy/aortobifem bypass in 2007





#11.  Former smoker





Plan:





Continues on Airvo, and we're continuing to wean the flow and the FiO2 that are 

currently at 50 L and FiO2 of 70%


Breathing comfortably, does not appear to be in any acute distress


She is up out of bed, she sits up in the chair, tolerates activity well


Completed Baricitinib


Continue Decadron, 


Continue to follow


Seems to be stable clinically, and continues to improve


Follow-up chest x-ray has been reviewed showing COPD, and chronic interstitial 

pulmonary fibrosis with basilar infiltrates, without significant change


No acute events overnight


Increase activity as tolerated


Continue weaning FiO2 to keep O2 sats at 88% and above


Once her oxygen demand is down to 5 L of supplemental oxygen per minute or less 

may consider discharge home





I performed a history & physical examination of the patient and discussed their 

management with my nurse practitioner, Jeannine Belcher.  I reviewed the nurse 

practitioner's note and agree with the documented findings and plan of care.  L

jeanna sounds are positive for diffuse crackles throughout the lung fields.  The 

findings and the impression was discussed with the patient.  I attest to the 

documentation by the nurse practitioner. 











Time with Patient: Less than 30

## 2021-12-23 NOTE — XR
EXAMINATION TYPE: XR chest 1V portable

 

DATE OF EXAM: 12/23/2021

 

COMPARISON: 12/19/2026

 

HISTORY: Cough

 

TECHNIQUE: Single frontal view of the chest is obtained.

 

FINDINGS:  Interstitial coarsening noted with bibasilar subsegmental consolidation. No sizable pleura
l effusion or pneumothorax. Biapical pleural thickening. Stimulator device or lead overlying the thor
acic spine. Hypertrophic and degenerative change of the spine.

 

IMPRESSION:  

1. Correlate for COPD and chronic interstitial pulmonary fibrosis with basilar infiltrate stable. Int
erstitial pneumonitis superimposed in the differential diagnosis.

## 2021-12-23 NOTE — PN
PROGRESS NOTE



DATE OF SERVICE:

12/23/2021



REASON FOR FOLLOWUP:

1. COVID-19 pneumonia.

2. Oral thrush.



INTERVAL HISTORY:

The patient is afebrile.  The patient is breathing slightly comfortably. Still

requiring low oxygen _____ .  The patient denies having any chest pain or worsening

cough or sputum production.  No abdominal pain or diarrhea.



PHYSICAL EXAMINATION:

Blood pressure 121/71 with a pulse of 60, temperature 98.1. She is 92% on 65% FiO2.

General description is an elderly female up in the bed in no distress. Respiratory

system: Unlabored breathing, decreased intensity of breath sounds. No wheeze.  Heart

S1, S2.  Regular rate and rhythm.  Abdomen soft, no tenderness.



LABS:

D-dimer is 1.11.



DIAGNOSTIC IMPRESSION AND PLAN:

1. Patient with acute respiratory failure with significant COVID-19 infection. The

    patient has had slow clinical improvement; to continue with baricitinib and

    dexamethasone, Lovenox, zinc and ascorbic acid along with respiratory support.

2. Oral thrush. Continue with nystatin swish and swallow and Diflucan.





MMODL / IJN: [id number] / Job#: [id number]

## 2021-12-23 NOTE — P.PN
Subjective





Patient is a 66-year-old the female with past medical history of COPD and 3 L 

oxygen dependent at home has cold symptoms for about a month comes in with 

shortness of breath presently on telemetry dysfunction CT of the chest did not 

show any pulmonary embolism but did show pulmonary fibrosis as well as 

infiltrates.  Below.  Patient doesn't have any infiltrate consistent with 

pneumonia patient denied any dysuria.  Patient is presently on Rocephin.  

Patient has mild acute renal failure because of which I'm holding ACE inhibitor.

 Severely hypomagnesemic magnesium is being replaced.  Patient last smoked about

17 years ago.





12/08/2021





Patient is seen and evaluated in follow-up and currently maintained on 10 L high

flow and weaning as tolerated.  Patient was on 15 L high flow nasal cannula this

morning and tolerating and nursing staff continuing to wean.  Patient normally 

wears 3 L of oxygen continuously in the outpatient setting.  Patient denies any 

worsening shortness of breath just generalized fatigue and weakness and 

continued diarrhea.  Patient states she is going approximately 2-3 times per day

will add C. diff testing and if negative will add Imodium and Questran.  

Pulmonary is following and venous Doppler was ordered bilateral lower 

extremities which is negative for DVT as d-dimer is elevated today at 2.42.  

Inflammatory markers significantly improved his LDH is 338 and CRP is 7.0, 

repeat magnesium after replacement is 2.9. 





12/09/2021





Patient is seen and evaluated in follow-up this morning currently sitting up in 

the chair and was on 10 L high flow although oxygen saturations were below 90% 

and patient bumped back up to 15 L.  Patient is complaining of the oxygen being 

too high and making her feel more anxious and discussed with nursing staff about

continuing to wean as tolerated.  She continues with diarrhea although is 

improving and C. diff testing was negative and patient was started on Questran 

along with Imodium.  Patient continues to state she has no real appetite 

although encourage the patient to continue with small frequent meals and will 

add Magic cups as well.  Encourage the patient to increase oral intake as she is

weak and needs the energy.  She normally maintained on 3 L via nasal cannula and

will continue to titrate as tolerated.  Pulmonary is following patient is 

maintained on IV dexamethasone twice daily along with vitamin and zinc 

supplements, Lovenox twice daily for elevated d-dimer and will continue.  

Patient was on normal saline and considered discontinuing as patient sodium is 

now 147 and patient is being started on D5 in water and will repeat labs.





12/10/2021


Patient is seen in follow-up this morning currently sitting up in the chair and 

oxygen requirements have increased and patient is now maintained on Airvo along 

with 15 L nonrebreather and oxygen saturations have been in the low 80s to 90 

maximum.  Discussed CODE STATUS with the patient and patient is a full code but 

does not wish to be placed on a ventilator if respiratory status continues to 

decline.  Pulmonary following closely and prognosis remains quite guarded.  

Patient continues on Lovenox along with dexamethasone and vitamin and zinc 

supplements and is being started on Baricitinib.  Patient is extremely weak and 

becomes extremely dyspneic with minimal exertion.  Patient's oral intake 

continues to be extremely poor and again encouraged and stressed the importance 

of eating regardless of not feeling hungry and having no appetite.  May consider

enteral nutrition and dietary consult.  Patient states her diarrhea has 

significantly improved and will use Imodium as needed and discontinue Questran. 

Repeat inflammatory markers ordered and pending.  Chest x-ray today shows 

bilateral airspace disease again noted with prominence and interstitial, and 

findings consistent with pneumonia.





12/11/2021


Patient admitted with bilateral common pneumonia , Her respiratory status today 

is a slightly worse as she is dependent on BiPAP throughout the day.  Also she 

has decreased air entry on both sides


She is saturating 88% while she is on BiPAP.


D-dimer is the same, 4.0 and 3.9.


She continued on dexamethasone, vitamin C, vitamin D, zinc,  barcitininb and 

Lovenox 40 mg twice a day.  Also she is on Protonix





12/12/2021


Sitting in chair looks mildly tachypneic and tolerates BiPAP which she uses his 

yesterday all the time.  She has not been eating since yesterday and may switch 

her for short time to nonrebreather and air or so she can read some oral diet   

Per pulmonary team recommendation.  Other than that she is hemodynamically 

stable.  Labs are stable.Calcitonin 0.19.  C. diff is negative.


She remains on dexamethasone, vitamin C, Z and Lovenox, Protonix and  barcitinib







12/13/2021


This morning she was still on BiPAP, and she was refusing to start TPN as she 

could not come off the BiPAP.  However later on during the day she could be 

placed on nonrebreather 15 L and Aravo 60 L with 94% so she can tolerate oral 

feeding.


Other vitals are stable, afebrile.  Labs are stable as well.


She continued on dexamethasone, vitamin C, vitamin D, zinc,  barcitininb and 

Lovenox 40 mg twice a day





12/14/2021


Patient with bilateral: With pneumonia and hypoxia, she is improving gradually 

today, today she couldn't get off her BiPAP and placed on airvo 60 L with FiO2 

of 85%, with occasional nonrebreather 15 L, patient was happy that she was able 

to eat today and she tolerates diet well.  She has some diarrhea but no 

abdominal pain.  C. diff is negative.


Other than that she is hemodynamically stable


Patient continued today on  dexamethasone, vitamin C, Z and Lovenox, Protonix 

and  barcitinib 


Follow-up chest x-ray and CBC/BMP in the morning





12/15/2021


Patient today is pleasant and smiling as her pulmonary illness is improving 

progressively but slowly, but she still currently on high flow nasal cannula 

with 55 L/m and FiO2 of 88% saturation in low 90s.  Rest of vitals are stable.  

Labs including CBC, BMP are unremarkable.


C. diff test came back negative and her diarrhea stopped today.  She is able to 

eat about 50-25% of her meals and she is satisfied with that for now.


She still on steroids with dexamethasone, vitamin C, vitamin D, zinc, Lovenox 40

mg twice a day, Protonix and barcitininb 





12/16/2021


Patient today her breathing slightly worsened and her oxygen requirement today 

was 55 L with 90%, she still can go a little difficulty due to her dyspnea and 

she confirmed me she is able to eat with no issues.


She sitting in chair today.  Hemodynamically stable.


Labs reviewed and shows stability, her d-dimer actually improved 3.9 down to 

1.6.  LDH went up to 1029 and CRP 3.4.


Chest x-ray shows slight improvement in aeration in both lung fields despite 

bilateral pneumonia.


She remains on dexamethasone, vitamin C, D and zinc and  Protonix and 

barcitininb .





12/17/2021


Patient breathing pattern is slowly to improve, today she still on high flow 

nasal cannula 55 L/m and FiO2 of 90%.


Labs including CBC and BMP are unremarkable and stable.  On the top of the 

patient is afebrile


However patient is complaining of from oral thrush and asked for fluconazole.  

Patient refused nystatin.  Also she has persistent loose bowel movement, C. diff

checked twice and there were negative.  We'll start her on short course of 

Questran.  Also patient states she is eating well and tolerates diet with no 

difficulty.


Continue with the other same medication of dexamethasone, multiple vitamin 

cocktail and Lovenox, Protonix and barcitininb 





12/18/2021


Her respiratory status with minimal change and fluctuating, currently she is on 

airvo at 55 L/m and 85%.  She is able to eat and drink.


She states that her most thrush is better and his diarrhea is improving after 

adding nystatin and Questran yesterday.


Afebrile and vitals are stable.


She remains on the same treatment of dexamethasone, vitamin C, D and zinc and  

Protonix and barcitininb .





12/19/2021 


patient breathing is improving as per patient stated that she is breathing 

easier.  She remains on same dose of oxygen as yesterday she still on 15 L/m at 

88% FiO2.


Also reports improvement in her bowel movements, she said that she almost had 

more formed bowel movement and her oral thrush is also improving with 

fluconazole.


And we will continue with the same treatment for her colon pneumonia with 

dexamethasone, vitamins, Barcitininb, Lovenox and Pepcid





12/20/2021


Patient is with bilateral common pneumonia on high dose of records oxygen since 

admission.  Today is awake and alert looks more comfortable and each day she 

states she is breathing easier.  Also she is happy with the progress of her 

bowel movement, she had about 4 months which is almost formed over the last 2 

days.  No more nausea vomiting but she feels some sore throat.  She is eating 

well.  However despite her reported clinical improvement by the patient she 

still on same dose of high need of oxygen and currently 55% and 70-90 L/m.


She remains on the same treatment of dexamethasone, vitamin C, DM zinc, 

,baricitinib.  Also she is on fluconazole, nystatin, Robitussin, Lovenox , and 

patient is not on Questran





12/21/2021


Patient clinically slowly to improve, he states she feels better but however her

oxygen requirements very close to yesterday at 50 L/m and 75% high flow nasal 

cannula.


Her GI symptoms or improvement and she tolerates diet well.


She is fluconazole currently while continue the same treatment of dexamethasone,

vitamin cocktail for Covid and baricitinib. .  Nystatin and Robitussin and 

Lovenox





12/22/2021


Patient respiratory status not improving or worsening much compared to 

yesterday.  She still breathing easily although she requires high flow nasal 

cannula at 50 L/m and FiO2 of 75%.  No GI symptoms, no significant sore throat 

which is responding to nystatin.  Patient tolerates diet well.


We'll keep the same medication and keep monitoring the patient for now.


Her creatinine trending up over the last 2-3 days, today is 0.08 went up to 1.2.

 We held her Diovan which is a home medication.  And we'll check creatinine 

tomorrow and bladder scan.


Repeat labs in the morning.  Follow-up chest x-ray





12/23/2021


 patient still improved slowly and gradually and her oxygen requirement today is

slightly down to 50 L/m and FiO2 of 68%


Other than that she is a pleasant subjective she feels improved and she breathes

easily, no diarrhea or vomiting.  Tolerates diet well.  Occasional sore throat.


Labs looks stable with WBCs 11.8.  D-dimer is down to 1.1.  LDH slightly up 734 

and CRP up to 6.2.


Continue with same treatment dexamethasone, vitamin cocktail for Covid and 

baricitinib.  Also Lovenox and Protonix





Objective





- Vital Signs


Vital signs: 


                                   Vital Signs











Temp  97.8 F   12/23/21 10:00


 


Pulse  66   12/23/21 10:00


 


Resp  16   12/23/21 10:00


 


BP  138/75   12/23/21 10:00


 


Pulse Ox  91 L  12/23/21 10:00








                                 Intake & Output











 12/22/21 12/23/21 12/23/21





 18:59 06:59 18:59


 


Intake Total  960 


 


Output Total  400 


 


Balance  560 


 


Intake:   


 


  Oral  960 


 


Output:   


 


  Urine  400 


 


Other:   


 


  Voiding Method Bedside Commode Bedside Commode Bedside Commode


 


  # Voids 2 1 














- Exam





-GENERAL: The patient is alert and oriented , on BiPAP, not in any acute 

distress. Well developed, well nourished. 


HEENT: Pupils are round and equally reacting to light. EOMI. No scleral icterus.

No conjunctival pallor. Normocephalic, atraumatic. No pharyngeal erythema. No 

thyromegaly. 


CARDIOVASCULAR: S1 and S2 present. No murmurs, rubs, or gallops. 


-PULMONARY: Chest is clear to auscultation, no wheezing.  Bilateral crepitation 

and decreased air entry on both sides


ABDOMEN: Soft, nontender, nondistended, normoactive bowel sounds. No palpable 

organomegaly. 


MUSCULOSKELETAL: No joint swelling or deformity. 


EXTREMITIES: No cyanosis, clubbing, or pedal edema. 


NEUROLOGICAL: Gross neurological examination did not reveal any focal deficits. 


SKIN: No rashes. no petechiae.





- Labs


CBC & Chem 7: 


                                 12/23/21 06:21





                                 12/23/21 06:21


Labs: 


                  Abnormal Lab Results - Last 24 Hours (Table)











  12/23/21 12/23/21 12/23/21 Range/Units





  06:21 06:21 10:09 


 


WBC  11.8 H    (3.8-10.6)  k/uL


 


Neutrophils #  10.6 H    (1.3-7.7)  k/uL


 


Lymphocytes #  0.7 L    (1.0-4.8)  k/uL


 


D-Dimer    1.11 H  (<0.60)  mg/L FEU


 


BUN   34 H   (7-17)  mg/dL


 


Glucose   165 H   (74-99)  mg/dL


 


ALT   42 H   (4-34)  U/L


 


Lactate Dehydrogenase   734 H   (313-618)  U/L


 


Total Protein   6.2 L   (6.3-8.2)  g/dL














Assessment and Plan


Assessment: 





-acute hypoxic respiratory failure: secondary to COVID-19 pneumonia 


-chronic hypercapnic respiratory failure secondary to COPD uses 3 L of oxygen at

home


-Acute kidney injury


-Diarrhea most likely secondary to Covid, resolved


-Oral thrush secondary to steroids


-Hypernatremia, improving, sodium is 140 a.m. we'll continue D5 in water and 

repeat a.m. labs 


-Severe hypomagnesemia, improved


-Elevated d-dimer secondary to Covid and patient is on Lovenox which will be 

continued, Lovenox is currently twice daily


-Acute renal failure, prerenal, improved with IV fluids


-Hypertension


-Mild anion gap and metabolic acidosis probability of lactic acidosis, most 

likely secondary to increased amounts of diarrhea and dehydration, lactic acid 

is 1.1


-gastroesophageal reflux disease


-Hyperlipidemia


-Hypertension


-History of pulmonary fibrosis


-Sleep apnea uses CPAP machine at home


-Peripheral vascular disease


-Coronary artery disease


-DVT prophylaxis: On Lovenox which will be continued











Plan: 





This is a pleasant 66 years old female who presents with bilateral covid 

pneumonia


Continue with dexamethasone


Continue with vitamin C, vitamin D and zinc


Pulmonary consult 


Also he is on barcitinib


Continue with stenting


Hold Diovan and monitor blood pressure.  Check a bladder scan


Labs and medication were reviewed..  Continue same treatment.  Continue with 

symptomatic treatment.  Resume home medication.  Monitor lytes and vitals.  DVT 

and GI prophylaxis.  Further recommendations as per clinical course of the 

patient


DVT prophylaxis: Subcutaneous Lovenox


GI Prophylaxis: Ppi


Prognosis is guarded

## 2021-12-24 LAB — LDH SPEC-CCNC: 290 U/L (ref 120–246)

## 2021-12-24 RX ADMIN — TIOTROPIUM BROMIDE INHALATION SPRAY SCH PUFF: 3.12 SPRAY, METERED RESPIRATORY (INHALATION) at 08:26

## 2021-12-24 RX ADMIN — OXYCODONE HYDROCHLORIDE AND ACETAMINOPHEN SCH MG: 500 TABLET ORAL at 09:08

## 2021-12-24 RX ADMIN — LORATADINE SCH MG: 10 TABLET ORAL at 09:08

## 2021-12-24 RX ADMIN — ENOXAPARIN SODIUM SCH MG: 40 INJECTION SUBCUTANEOUS at 20:41

## 2021-12-24 RX ADMIN — Medication SCH MCG: at 09:08

## 2021-12-24 RX ADMIN — NYSTATIN SCH UNIT: 100000 SUSPENSION ORAL at 20:41

## 2021-12-24 RX ADMIN — METOPROLOL SUCCINATE SCH MG: 25 TABLET, EXTENDED RELEASE ORAL at 09:08

## 2021-12-24 RX ADMIN — CLOPIDOGREL BISULFATE SCH MG: 75 TABLET ORAL at 09:08

## 2021-12-24 RX ADMIN — ATORVASTATIN CALCIUM SCH MG: 80 TABLET, FILM COATED ORAL at 09:08

## 2021-12-24 RX ADMIN — PANTOPRAZOLE SODIUM SCH MG: 40 TABLET, DELAYED RELEASE ORAL at 09:08

## 2021-12-24 RX ADMIN — OXYCODONE HYDROCHLORIDE AND ACETAMINOPHEN SCH MG: 500 TABLET ORAL at 20:41

## 2021-12-24 RX ADMIN — ENOXAPARIN SODIUM SCH MG: 40 INJECTION SUBCUTANEOUS at 09:07

## 2021-12-24 RX ADMIN — Medication SCH MG: at 09:08

## 2021-12-24 RX ADMIN — NYSTATIN SCH UNIT: 100000 SUSPENSION ORAL at 09:08

## 2021-12-24 RX ADMIN — NYSTATIN SCH UNIT: 100000 SUSPENSION ORAL at 13:25

## 2021-12-24 RX ADMIN — NYSTATIN SCH UNIT: 100000 SUSPENSION ORAL at 17:02

## 2021-12-24 RX ADMIN — DEXTROSE SCH MG: 50 INJECTION, SOLUTION INTRAVENOUS at 09:07

## 2021-12-24 RX ADMIN — ACETAMINOPHEN PRN MG: 325 TABLET, FILM COATED ORAL at 13:27

## 2021-12-24 RX ADMIN — DEXTROSE SCH MG: 50 INJECTION, SOLUTION INTRAVENOUS at 20:42

## 2021-12-24 RX ADMIN — SERTRALINE HYDROCHLORIDE SCH MG: 100 TABLET ORAL at 09:08

## 2021-12-24 NOTE — PN
PROGRESS NOTE



DATE OF SERVICE:

12/24/2021



REASON FOR FOLLOWUP:

1. COVID-19 pneumonia.

2. Oral thrush.



INTERVAL HISTORY:

The patient is afebrile.  The patient is breathing comfortably.  FiO2 is currently

down.  The patient denies having any chest pain.  No worsening cough or sputum

production.  No abdominal pain or diarrhea.



PHYSICAL EXAMINATION:

Blood pressure is 125/75 with a pulse of 67, temperature 97.8. She is 92% on 55% FiO2.

General description is an elderly female up in the bed in no distress. Respiratory

system: Unlabored breathing, decreased intensity of breath sounds. No wheeze.  Heart

S1, S2.  Regular rate and rhythm.  Abdomen soft, no tenderness.



LABS:

D-dimer is 0.60.  CRP less than 0.30.



DIAGNOSTIC IMPRESSION AND PLAN:

1. Patient with acute respiratory failure secondary to COVID-19 pneumonia. Patient has

    completed her baricitinib therapy. Patient is currently on  decadron, Lovenox, zinc

    and ascorbic acid; to continue.

2. Patient with oral thrush.  Continue with nystatin swish and swallow and supportive

    care.





MMODL / IJN: [id number] / Job#: [id number]

## 2021-12-24 NOTE — P.PN
Subjective





Patient is a 66-year-old the female with past medical history of COPD and 3 L 

oxygen dependent at home has cold symptoms for about a month comes in with 

shortness of breath presently on telemetry dysfunction CT of the chest did not 

show any pulmonary embolism but did show pulmonary fibrosis as well as 

infiltrates.  Below.  Patient doesn't have any infiltrate consistent with 

pneumonia patient denied any dysuria.  Patient is presently on Rocephin.  

Patient has mild acute renal failure because of which I'm holding ACE inhibitor.

 Severely hypomagnesemic magnesium is being replaced.  Patient last smoked about

17 years ago.





12/08/2021





Patient is seen and evaluated in follow-up and currently maintained on 10 L high

flow and weaning as tolerated.  Patient was on 15 L high flow nasal cannula this

morning and tolerating and nursing staff continuing to wean.  Patient normally 

wears 3 L of oxygen continuously in the outpatient setting.  Patient denies any 

worsening shortness of breath just generalized fatigue and weakness and 

continued diarrhea.  Patient states she is going approximately 2-3 times per day

will add C. diff testing and if negative will add Imodium and Questran.  

Pulmonary is following and venous Doppler was ordered bilateral lower 

extremities which is negative for DVT as d-dimer is elevated today at 2.42.  

Inflammatory markers significantly improved his LDH is 338 and CRP is 7.0, 

repeat magnesium after replacement is 2.9. 





12/09/2021





Patient is seen and evaluated in follow-up this morning currently sitting up in 

the chair and was on 10 L high flow although oxygen saturations were below 90% 

and patient bumped back up to 15 L.  Patient is complaining of the oxygen being 

too high and making her feel more anxious and discussed with nursing staff about

continuing to wean as tolerated.  She continues with diarrhea although is 

improving and C. diff testing was negative and patient was started on Questran 

along with Imodium.  Patient continues to state she has no real appetite 

although encourage the patient to continue with small frequent meals and will 

add Magic cups as well.  Encourage the patient to increase oral intake as she is

weak and needs the energy.  She normally maintained on 3 L via nasal cannula and

will continue to titrate as tolerated.  Pulmonary is following patient is 

maintained on IV dexamethasone twice daily along with vitamin and zinc 

supplements, Lovenox twice daily for elevated d-dimer and will continue.  

Patient was on normal saline and considered discontinuing as patient sodium is 

now 147 and patient is being started on D5 in water and will repeat labs.





12/10/2021


Patient is seen in follow-up this morning currently sitting up in the chair and 

oxygen requirements have increased and patient is now maintained on Airvo along 

with 15 L nonrebreather and oxygen saturations have been in the low 80s to 90 

maximum.  Discussed CODE STATUS with the patient and patient is a full code but 

does not wish to be placed on a ventilator if respiratory status continues to 

decline.  Pulmonary following closely and prognosis remains quite guarded.  

Patient continues on Lovenox along with dexamethasone and vitamin and zinc 

supplements and is being started on Baricitinib.  Patient is extremely weak and 

becomes extremely dyspneic with minimal exertion.  Patient's oral intake 

continues to be extremely poor and again encouraged and stressed the importance 

of eating regardless of not feeling hungry and having no appetite.  May consider

enteral nutrition and dietary consult.  Patient states her diarrhea has 

significantly improved and will use Imodium as needed and discontinue Questran. 

Repeat inflammatory markers ordered and pending.  Chest x-ray today shows 

bilateral airspace disease again noted with prominence and interstitial, and 

findings consistent with pneumonia.





12/11/2021


Patient admitted with bilateral common pneumonia , Her respiratory status today 

is a slightly worse as she is dependent on BiPAP throughout the day.  Also she 

has decreased air entry on both sides


She is saturating 88% while she is on BiPAP.


D-dimer is the same, 4.0 and 3.9.


She continued on dexamethasone, vitamin C, vitamin D, zinc,  barcitininb and 

Lovenox 40 mg twice a day.  Also she is on Protonix





12/12/2021


Sitting in chair looks mildly tachypneic and tolerates BiPAP which she uses his 

yesterday all the time.  She has not been eating since yesterday and may switch 

her for short time to nonrebreather and air or so she can read some oral diet   

Per pulmonary team recommendation.  Other than that she is hemodynamically 

stable.  Labs are stable.Calcitonin 0.19.  C. diff is negative.


She remains on dexamethasone, vitamin C, Z and Lovenox, Protonix and  barcitinib







12/13/2021


This morning she was still on BiPAP, and she was refusing to start TPN as she 

could not come off the BiPAP.  However later on during the day she could be 

placed on nonrebreather 15 L and Aravo 60 L with 94% so she can tolerate oral 

feeding.


Other vitals are stable, afebrile.  Labs are stable as well.


She continued on dexamethasone, vitamin C, vitamin D, zinc,  barcitininb and 

Lovenox 40 mg twice a day





12/14/2021


Patient with bilateral: With pneumonia and hypoxia, she is improving gradually 

today, today she couldn't get off her BiPAP and placed on airvo 60 L with FiO2 

of 85%, with occasional nonrebreather 15 L, patient was happy that she was able 

to eat today and she tolerates diet well.  She has some diarrhea but no 

abdominal pain.  C. diff is negative.


Other than that she is hemodynamically stable


Patient continued today on  dexamethasone, vitamin C, Z and Lovenox, Protonix 

and  barcitinib 


Follow-up chest x-ray and CBC/BMP in the morning





12/15/2021


Patient today is pleasant and smiling as her pulmonary illness is improving 

progressively but slowly, but she still currently on high flow nasal cannula 

with 55 L/m and FiO2 of 88% saturation in low 90s.  Rest of vitals are stable.  

Labs including CBC, BMP are unremarkable.


C. diff test came back negative and her diarrhea stopped today.  She is able to 

eat about 50-25% of her meals and she is satisfied with that for now.


She still on steroids with dexamethasone, vitamin C, vitamin D, zinc, Lovenox 40

mg twice a day, Protonix and barcitininb 





12/16/2021


Patient today her breathing slightly worsened and her oxygen requirement today 

was 55 L with 90%, she still can go a little difficulty due to her dyspnea and 

she confirmed me she is able to eat with no issues.


She sitting in chair today.  Hemodynamically stable.


Labs reviewed and shows stability, her d-dimer actually improved 3.9 down to 

1.6.  LDH went up to 1029 and CRP 3.4.


Chest x-ray shows slight improvement in aeration in both lung fields despite 

bilateral pneumonia.


She remains on dexamethasone, vitamin C, D and zinc and  Protonix and 

barcitininb .





12/17/2021


Patient breathing pattern is slowly to improve, today she still on high flow 

nasal cannula 55 L/m and FiO2 of 90%.


Labs including CBC and BMP are unremarkable and stable.  On the top of the 

patient is afebrile


However patient is complaining of from oral thrush and asked for fluconazole.  

Patient refused nystatin.  Also she has persistent loose bowel movement, C. diff

checked twice and there were negative.  We'll start her on short course of 

Questran.  Also patient states she is eating well and tolerates diet with no 

difficulty.


Continue with the other same medication of dexamethasone, multiple vitamin 

cocktail and Lovenox, Protonix and barcitininb 





12/18/2021


Her respiratory status with minimal change and fluctuating, currently she is on 

airvo at 55 L/m and 85%.  She is able to eat and drink.


She states that her most thrush is better and his diarrhea is improving after 

adding nystatin and Questran yesterday.


Afebrile and vitals are stable.


She remains on the same treatment of dexamethasone, vitamin C, D and zinc and  

Protonix and barcitininb .





12/19/2021 


patient breathing is improving as per patient stated that she is breathing 

easier.  She remains on same dose of oxygen as yesterday she still on 15 L/m at 

88% FiO2.


Also reports improvement in her bowel movements, she said that she almost had 

more formed bowel movement and her oral thrush is also improving with 

fluconazole.


And we will continue with the same treatment for her colon pneumonia with 

dexamethasone, vitamins, Barcitininb, Lovenox and Pepcid





12/20/2021


Patient is with bilateral common pneumonia on high dose of records oxygen since 

admission.  Today is awake and alert looks more comfortable and each day she 

states she is breathing easier.  Also she is happy with the progress of her 

bowel movement, she had about 4 months which is almost formed over the last 2 

days.  No more nausea vomiting but she feels some sore throat.  She is eating 

well.  However despite her reported clinical improvement by the patient she 

still on same dose of high need of oxygen and currently 55% and 70-90 L/m.


She remains on the same treatment of dexamethasone, vitamin C, DM zinc, 

,baricitinib.  Also she is on fluconazole, nystatin, Robitussin, Lovenox , and 

patient is not on Questran





12/21/2021


Patient clinically slowly to improve, he states she feels better but however her

oxygen requirements very close to yesterday at 50 L/m and 75% high flow nasal 

cannula.


Her GI symptoms or improvement and she tolerates diet well.


She is fluconazole currently while continue the same treatment of dexamethasone,

vitamin cocktail for Covid and baricitinib. .  Nystatin and Robitussin and 

Lovenox





12/22/2021


Patient respiratory status not improving or worsening much compared to 

yesterday.  She still breathing easily although she requires high flow nasal 

cannula at 50 L/m and FiO2 of 75%.  No GI symptoms, no significant sore throat 

which is responding to nystatin.  Patient tolerates diet well.


We'll keep the same medication and keep monitoring the patient for now.


Her creatinine trending up over the last 2-3 days, today is 0.08 went up to 1.2.

 We held her Diovan which is a home medication.  And we'll check creatinine 

tomorrow and bladder scan.


Repeat labs in the morning.  Follow-up chest x-ray





12/23/2021


 patient still improved slowly and gradually and her oxygen requirement today is

slightly down to 50 L/m and FiO2 of 68%


Other than that she is a pleasant subjective she feels improved and she breathes

easily, no diarrhea or vomiting.  Tolerates diet well.  Occasional sore throat.


Labs looks stable with WBCs 11.8.  D-dimer is down to 1.1.  LDH slightly up 734 

and CRP up to 6.2.


Continue with same treatment dexamethasone, vitamin cocktail for Covid and 

baricitinib.  Also Lovenox and Protonix





12/24/2021


Patient continued to report improvement in her breathing pattern however she 

still on high flow nasal cannula 50 liter per minute with FiO2 of 45%


Inflammatory markers improvement, d-dimer down to 0.6, LDH down from 734 and 2-

90 and CRP is normal less than 0.3.


Patient finished her dose of baricitinib breath continue with the rest of 

treatment including dexamethasone, vitamin C, D and zinc.  Lovenox Protonix





Objective





- Vital Signs


Vital signs: 


                                   Vital Signs











Temp  97.8 F   12/24/21 13:47


 


Pulse  67   12/24/21 13:47


 


Resp  18   12/24/21 13:47


 


BP  125/75   12/24/21 13:47


 


Pulse Ox  92 L  12/24/21 13:47








                                 Intake & Output











 12/23/21 12/24/21 12/24/21





 18:59 06:59 18:59


 


Intake Total  600 340


 


Output Total  200 


 


Balance  400 340


 


Intake:   


 


  Oral  600 340


 


Output:   


 


  Urine  200 


 


Other:   


 


  Voiding Method Bedside Commode Bedside Commode Bedside Commode


 


  # Voids 2 2 














- Exam





-GENERAL: The patient is alert and oriented , on BiPAP, not in any acute 

distress. Well developed, well nourished. 


HEENT: Pupils are round and equally reacting to light. EOMI. No scleral icterus.

No conjunctival pallor. Normocephalic, atraumatic. No pharyngeal erythema. No 

thyromegaly. 


CARDIOVASCULAR: S1 and S2 present. No murmurs, rubs, or gallops. 


-PULMONARY: Chest is clear to auscultation, no wheezing.  Bilateral crepitation 

and decreased air entry on both sides


ABDOMEN: Soft, nontender, nondistended, normoactive bowel sounds. No palpable 

organomegaly. 


MUSCULOSKELETAL: No joint swelling or deformity. 


EXTREMITIES: No cyanosis, clubbing, or pedal edema. 


NEUROLOGICAL: Gross neurological examination did not reveal any focal deficits. 


SKIN: No rashes. no petechiae.





- Labs


CBC & Chem 7: 


                                 12/23/21 06:21





                                 12/23/21 06:21


Labs: 


                  Abnormal Lab Results - Last 24 Hours (Table)











  12/24/21 12/24/21 Range/Units





  06:27 06:28 


 


D-Dimer   0.60 H  (<0.60)  mg/L FEU


 


Lactate Dehydrogenase  290 H   (120-246)  U/L














Assessment and Plan


Assessment: 





-acute hypoxic respiratory failure: secondary to COVID-19 pneumonia 


-chronic hypercapnic respiratory failure secondary to COPD uses 3 L of oxygen at

home


-Acute kidney injury


-Diarrhea most likely secondary to Covid, resolved


-Oral thrush secondary to steroids


-Hypernatremia, improving, sodium is 140 a.m. we'll continue D5 in water and 

repeat a.m. labs 


-Severe hypomagnesemia, improved


-Elevated d-dimer secondary to Covid and patient is on Lovenox which will be 

continued, Lovenox is currently twice daily


-Acute renal failure, prerenal, improved with IV fluids


-Hypertension


-Mild anion gap and metabolic acidosis probability of lactic acidosis, most 

likely secondary to increased amounts of diarrhea and dehydration, lactic acid 

is 1.1


-gastroesophageal reflux disease


-Hyperlipidemia


-Hypertension


-History of pulmonary fibrosis


-Sleep apnea uses CPAP machine at home


-Peripheral vascular disease


-Coronary artery disease


-DVT prophylaxis: On Lovenox which will be continued











Plan: 





This is a pleasant 66 years old female who presents with bilateral covid 

pneumonia


Continue with dexamethasone


Continue with vitamin C, vitamin D and zinc


Pulmonary consult 


Also he is on barcitinib


Continue with stenting


Hold Diovan and monitor blood pressure.  Check a bladder scan


Labs and medication were reviewed..  Continue same treatment.  Continue with 

symptomatic treatment.  Resume home medication.  Monitor lytes and vitals.  DVT 

and GI prophylaxis.  Further recommendations as per clinical course of the pat

ient


DVT prophylaxis: Subcutaneous Lovenox


GI Prophylaxis: Ppi


Prognosis is guarded

## 2021-12-24 NOTE — P.PN
Subjective


Progress Note Date: 12/24/21


Principal diagnosis: 


 Dyspnea, hypoxia, COVID-19





This is a pleasant 66-year-old female patient who follows at the [facility] for her

primary care needs.  She has history of hypertension, hyperlipidemia, 

gastroesophageal reflux disease, anxiety/depression, aortic thrombectomy in 2007

, former smoker, COPD, obstructive sleep apnea utilizing CPAP.  He is here 

yesterday to the emergency room with a four-week history of shortness of breath,

cough, congestion, body aches and fevers.  She was tested negative for CoVID 2.

 Her  is positive for CoVID and she is now positive for CoVID.  She is 

not vaccinated.  She does have home oxygen use at 2-3 L.  She is currently 

requiring 10 L high flow nasal cannula to maintain O2 saturations at 90%.  Chest

x-ray reveals bilateral patchy infiltrates. White count 5.9.  Hemoglobin 12.5.  

Platelets 398.  Lymphocytes 0.5.  D-dimer 1.89.  Sodium 140.  Potassium 4.9.  

Creatinine 1.2.  AST 39.  ALT 26.  Alk phos 156.  LDH 1283.  C-reactive protein 

21.7.  Pro-calcitonin 0.32.  She's been initiated on Decadron, Lovenox, vitamin 

supplements.  0.9% normal saline at 75 ML's per hour.





On 12/08/2021 patient is in follow-up on medical surgical floor, she is resting 

comfortably in bed, she states she is feeling better, although she is requiring 

more oxygen compared to when she first presented to the emergency department, 

note that patient does wear home oxygen at 2 L for history of COPD.  She is 

currently on 10 L of oxygen, her pulse ox is 91-92%, breathing comfortably, lung

sounds reveal coarse crackles at bilateral bases, but no wheezing, no rhonchi.  

CT angiogram of the chest showed no evidence of pulmonary embolism, it did show 

pulmonary emphysema and pulmonary interstitial fibrosis with increased 

interstitial infiltrates compared to her old exam.  There was no evidence of a 

suspicious pulmonary mass.  Patient was outside the window for Remdesivir, she 

continues on Decadron 6 mg twice daily, her pro-calcitonin level on admission 

was 0.32, and patient was covered with Rocephin for possibility of underlying 

bacterial infection.  She is on prophylactic Lovenox 40 mg daily.  CTA [facility] of the chest showed no evidence of pulmonary embolism. 0





On 12/09/2001 patient seen in follow-up on medical surgical floor, she is c

urrently on 10 L of oxygen the pulse ox of 86%, FiO2 has since been increased to

15 L, she has a mild cough, but overall seems to be in no acute distress, she is

sitting up in the chair, denies any chest discomfort, looks quite comfortable.  

Lung sounds reveal coarse crackles bilateral bases, she does get short of breath

with exertion.  She states if she sits still she is breathing comfortably, and 

her O2 saturations are at or above 90%.  She's had no fever or chills overnight,

she remains on Decadron 40 mg twice daily.  She is on empiric antibiotics in the

form of Rocephin and IV fluids at 75 ML per hour.  Today's labs have been 

reviewed, white blood cell count is 8.6, hemoglobin is 12, platelet count is 

506, d-dimer is 4.01, sodium is 147, potassium is 4.8, chloride is 112, BUN of 

20 creatinine 0.7, renal profile has significantly improved since admission.  

LDH has significantly improved since admission and is down to 348 on today's 

labs, CRP is still pending, her last pro-calcitonin is negative at 0.13.  Blood 

culture has shown no growth thus far.





On 12/10/2021 patient seen in follow-up on medical surgical floor, she had been 

on 15 L high flow and 100% nonrebreather mask up until this morning, this 

morning patient's FiO2 requirements had increased, patient was getting up in the

chair, she is significantly desaturated, she was placed on irritable at 60 L and

FiO2 of 90% in addition to 100% nonrebreather, and patient is taking quite a 

while to recover her O2 saturations although her work of breathing is not 

increased.  She is still awake and alert, she is oriented 3, she is able to 

make her needs known, her pulse ox is slowly recovering at rest, with deep 

breathing, pursed lip breathing, repositioning.  Overnight she has had no fever 

or chills, blood pressures have been stable.  Breathing fairly comfortably 

although she desaturates with any little exertion.  Currently she is on Decadron

6 Twice daily, she will be started on Baricitinib, patient is also on Plavix, 

and prophylactic Lovenox.  Today's chest x-ray and labs are still pending, she 

remains on D5W at a rate of 100 ML per hour for dehydration related to diarrhea 

and hypernatremia, repeat electrolytes and renal profile are still pending for 

today.  No abdominal pain, no nausea.  The diarrhea has significantly improved 

in the last 48 hours.  Patient is tolerating oral intake, no abdominal pain. 





On 12/12/2021 patient seen in follow-up on medical surgical floor.  She is alert

and alert, she is on BiPAP support with pressures of 14 and 8 and FiO2 100%.  

Earlier this morning her BiPAP pressures have been increased from 12.6 in the 

100% FiO2.  Her pulse oximeter readings are marginal at around 90%, with any 

exertion patient does desaturate and takes her a while to recover although she 

looks very comfortable, nontachypneic, she is sitting up in the recliner, she is

watching TV.  She is complaining of being hungry.  She has not been able to take

much by mouth related to being BiPAP dependent.  She has been afebrile, 

hemodynamics are stable.  Her last chest x-ray from 12/10/2021 showed bilateral 

airspace disease with prominence of interstitium with no evident pneumothorax or

pleural effusion.  Today's labs have been reviewed showing white blood cell 

count of 10.6, hemoglobin 12.2, d-dimer and yesterday's labs was down to 3.98, 

electrolytes and renal profile were within normal limits.  Per pro-calcitonin 

level was negative at 0.19.  Patient remains on Baricitinib, Decadron 6 mg twice

daily, she is receiving IV fluids with D5 W at 100 ML per hour





On 12/13/2021 patient seen in follow-up on medical surgical floor, she was 

switched to Airvo at 60 L and 90%, and nonrebreather mask, she is maintaining 

her O2 saturations between 88 and 91%, she is breathing comfortably, tolerating 

it for a few hours now, she was able to eat something, she is taking in oral 

fluids, she is awake and alert, she is sitting up in the chair, she is being her

bills.  Lung sounds are positive for scattered crackles throughout the lung 

fields, no cough, appears very comfortable.  He is on [place] neb, she is on 

Decadron, and Lovenox.  She is on D5W at a rate of 100 ML per hour related to 

her nothing by mouth status however in view of her tolerating oral intake now, 

and not been BiPAP dependent anymore we'll stop the IV fluids.  Today's labs 

have been reviewed and her white blood cell count is 10.4, hemoglobin is 12.6, 

her electrolytes have been reviewed and are within normal limits, sodium is 141,

potassium is 3.9, chloride is 103, BUN is 15, creatinine is 0.72.  Her 

procalcitonin level was negative.





On 12/15/2021 patient seen in follow-up on medical surgical floor, she is 

sitting up in the recliner, she states her breathing is improving, she is on 

Airvo, and we were able to wean down the flow and FiO2, and the flow is 

currently at 55 L/m, and FiO2 is currently at 88%, her pulse ox is ranging 

between 86-93%, breathing comfortably, minimal crackles at bilateral bases, 

occasional cough, patient has been get not to the bedside commode, tolerates 

activity well, her spirits are high, her oral intake is fair.  She continues on 

Baricitinib, she continues on Decadron 6 blood gram twice daily, and Lovenox 40 

mg twice daily, today's labs have been reviewed showing white blood cell count 

is 7.19, hemoglobin of 11.9, platelet count is 383, electrolytes and renal 

profile are unremarkable.  Cdiff negative.





On 12/16/2021 patient seen in follow-up on medical surgical floor.  She is awake

and alert, in no acute distress, she is currently resting in bed, she is on her 

left side and patient has been compliant with salt repositioning in bed from 

side to side.  She is breathing comfortably, remains on Airvo at 55 L and FiO2 

of 87%, and she was also placed on Airvo because her pulse ox was less than 85%,

her current O2 saturations are around 88%, patient seems to breathing quite 

comfortably.  Today's chest x-ray shows persistent but slightly improving 

bilateral pneumonia. patient continues on Baricitinib, Decadron 6 mg twice 

daily, she is on Lovenox 40 mg twice daily, COVID-19 vitamins.  His labs have 

been reviewed and there was down to 1.63, CBC was within normal limits, 

electrolytes and renal profile were unremarkable.  Today's LDH is 1029, CRP is 

1.7.  Vital signs have been stable, patient has been afebrile.





On 12/17/2021 patient is seen in follow-up on medical surgical floor.  She is 

breathing comfortably, she is resting in bed, she's been repositioning self from

side to side.  She remains on Airvo at 55 L and FiO2 of 90%, and pulse ox is 89-

93%.  Blood pressure is been stable, patient has been afebrile.  Patient 

continues on Baricitinib, continues on Decadron, Diflucan, Lovenox twice daily. 

Lung sounds revealed mildly diminished breath sounds, and a few scattered c

rackles.  Chest x-ray showing persistent but slightly improving bilateral [place]

pneumonia





On 12/19/2001 patient seen in follow-up on medical surgical floor.  She is 

resting comfortably in bed, she is currently on 55 L/m and FiO2 of 80%, 

breathing comfortably, she has not required nonrebreather mask at all for the 

past 24 hours, she continues on Decadron, Baricitinib, and prophylactic Lovenox,

she is breathing comfortably, lung sounds revealed minimal crackles bilaterally,

she is tolerating oral intake.  No nausea vomiting or diarrhea.  Fever or 

chills.  Today's chest x-ray shows no change in the appearance of diffuse 

interstitial opacities in both lungs.  Today's labs have been reviewed, white 

count is normal at 9.8, hemoglobin of 12.7, d-dimer is 2.1, electrolytes and 

renal profile are unremarkable, and her inflammatory markers are improving on 

today's labs.





On 12/20/2021 patient seen in follow-up on medical surgical floor, she remains 

on a currently on 55 L/m, and FiO2 of 75%, she has not required nonrebreather 

mask.  She is breathing quite comfortably, lung sounds revealed minimal 

crackles, no wheezing, patient continues on Decadron 6. Twice a day, Lovenox 40 

mg twice a day, she is on cough syrup, she is on Xopenex and Spiriva inhalers.  

She remains on Baricitinib.  She's had no acute events overnight, no specific 

complaints.  No new labs today.  Her last set of inflammatory markers from 

yesterday showed improving LDH which was down to 442, and CRP was down to 0.60, 

her d-dimer was 2.10.





On 12/21/2001 patient seen in follow-up on medical surgical floor.  She is breat

julia comfortably, she remains on Airvo, and the flow has been decreased down 

further to 50 L/m, and FiO2 is currently at 77%, she is maintaining O2 

saturations between 90-92%, breathing very comfortably, minimal cough, lung 

sounds are clear, no rhonchi or wheezing, she is awake and alert, oriented 3, 

no altered mentation, no fever or chills, she is tolerating oral intake, no 

nausea or vomiting, clinically she's been stable, slowly improving in terms of 

oxygenation as well.  Remains on Decadron 6 Coumadin twice daily, Lovenox 40 mg 

twice daily, she is on COVID-19 multivitamins, she remains on Baricitinib 4 mg 

daily.  She's had no acute events overnight.  Today's labs have been reviewed, 

white blood cell count is 10.7, hemoglobin 13.7, electrolytes and renal profile 

are unremarkable, her inflammatory markers were improving on labs from 2 days 

ago. 





On 12/22/2021 patient seen in follow-up on medical surgical floor.  She is 

breathing very comfortably, she is currently sitting up in the chair, on Airvo, 

and currently on 50 L/m, and FiO2 is at 75%, her pulse ox is 94%.  Only 

occasional cough, minimal crackles, no rhonchi or wheezing.  No fever or chills.

 She continues on Baricitinib, Decadron 6 mg twice daily, Lovenox 40 mg twice 

daily, multivitamins.  Hemodynamically she is stable, no fever or chills.





On 12/23/2021 patient seen in follow-up on medical surgical floor, she is sitt

ing up in the chair, breathing comfortably, she remains on Airvo and the flow is

currently at 15 L and FiO2 has been decreased to 69%, her pulse ox is 91-94%, 

she is breathing comfortably, minimal crackles at the left lung base, no fever 

or chills, only occasional cough, no significant phlegm production, no chest 

discomfort.  Clinically she is improving, she's had no acute events overnight, 

she is tolerating oral intake, she's been admitted to the bedside commode, she 

does get exertional dyspnea, but recovers.  D-dimer is improving and is down to 

1.11 on today's labs, her last LDH from yesterday was 734, was increased from 

most previous value but overall improved during this admission, CRP level was 

less than 0.5.  She has completed her course of Baricitinib, she continues on 

Decadron 6 mg twice a day, Lovenox 40 mg twice daily, she is on cough syrup, and

COVID-19 multivitamins.





On 12/24/2021 patient seen in follow-up on medical surgical floor, she remains 

on Airvo and her flow and FiO2 are being continuously weaned down and she is 

currently at 45 L/m and FiO2 is currently down to 55%.  She is maintaining O2 

saturations at 93-97%.  Breathing comfortably, lung sounds are essentially clear

to auscultation, with minimal fine rales at bilateral bases, no wheezing, no 

complaints of chest pain.  Afebrile.  Patient has been sitting up in the 

recliner most of the day, she gets up to use the commode, tolerates exertion 

fairly well, yesterday's chest x-ray showed COPD and chronic interstitial 

pulmonary fibrosis with basilar infiltrates that are stable in appearance.  

Today's labs have been reviewed showing improving d-dimer which is down to 0.60,

inflammatory markers have further decreased and LDH is down to 290, and CRP is 

less than 0.30.





Objective





- Vital Signs


Vital signs: 


                                   Vital Signs











Temp  98.1 F   12/24/21 07:09


 


Pulse  55 L  12/24/21 07:09


 


Resp  17   12/24/21 07:09


 


BP  131/76   12/24/21 07:09


 


Pulse Ox  97   12/24/21 07:09








                                 Intake & Output











 12/23/21 12/24/21 12/24/21





 18:59 06:59 18:59


 


Intake Total  600 340


 


Output Total  200 


 


Balance  400 340


 


Intake:   


 


  Oral  600 340


 


Output:   


 


  Urine  200 


 


Other:   


 


  Voiding Method Bedside Commode Bedside Commode Bedside Commode


 


  # Voids 2 2 














- Exam


 GENERAL EXAM: Alert, very pleasant, 66-year-old white female, on Airvo at 45 L 

an FiO2 of 55%, and  breathing comfortably, does not appear to be in any acute 

distress


HEAD: Normocephalic/atraumatic.


EYES: Normal reaction of pupils, equal size.  Conjunctiva pink, sclera white.


NOSE: Clear with pink turbinates.


THROAT: No erythema or exudates.


NECK: No masses, no JVD, no thyroid enlargement, no adenopathy.


CHEST: No chest wall deformity.  Symmetrical expansion. 


LUNGS: Equal air entry with with coarse bilateral crackles


CVS: Regular rate and rhythm, normal S1 and S2, no gallops, no murmurs, no rubs


ABDOMEN: Soft, nontender.  No hepatosplenomegaly, normal bowel sounds, no 

guarding or rigidity.


EXTREMITIES: No clubbing, no edema, no cyanosis, 2+ pulses and upper and lower 

extremities.


MUSCULOSKELETAL: Muscle strength and tone normal.


SPINE: No scoliosis or deformity


SKIN: No rashes


CENTRAL NERVOUS SYSTEM: Alert and oriented -3.  No focal deficits, tone is 

normal in all 4 extremities.


PSYCHIATRIC: Alert and oriented -3.  Appropriate affect.  Intact judgment and 

insight.











- Labs


CBC & Chem 7: 


                                 12/23/21 06:21





                                 12/23/21 06:21


Labs: 


                  Abnormal Lab Results - Last 24 Hours (Table)











  12/24/21 12/24/21 Range/Units





  06:27 06:28 


 


D-Dimer   0.60 H  (<0.60)  mg/L FEU


 


Lactate Dehydrogenase  290 H   (120-246)  U/L














Assessment and Plan


Plan: 


 Assessment:





#1.  Acute on chronic hypoxic respiratory failure secondary to acute COVID-19 

pneumonia, patient is not vaccinated related to multiple ALLERGIES, patient 

presented with a 4 week history of symptoms, she was outside the window for 

Remdesivir, she is being treated supportively with Decadron, prophylactic 

Lovenox, and empiric antibiotics.  Patient was placed on irritable at 60 L and 

90% FiO2 with nonrebreather mask today on 12/10/2021, we'll start the patient on

Baricitinib.  On 12/12/2021 patient is on BiPAP support remains BiPAP support 

dependent at pressures of 14 and 1800% FiO2 although clinically is comfortable. 

Today on 12/23/2021 patient is on Airvo at 50 L and FiO2 of 70%. Baricitinib 

completed on 12/23/2021





#2.  Mildly elevated pro calcitonin, patient is on empiric antibiotics in the 

form of Rocephin, pro calcitonin has improved, no definite sign of infection, 

Rocephin has been discontinued





#3.  Elevated inflammatory markers secondary to the viral pneumonia, improving





#4.  Morbid obesity with BMI of 39.1 kg/m





#5.  Obstructive sleep apnea on CPAP therapy





#6.  History of COPD with chronic hypoxic respiratory failure usually wears 2 L 

of oxygen on the outpatient basis, and CT angiogram of the chest also revealed 

evidence of interstitial pulmonary fibrosis





#7.  Hyperlipidemia





#8.  Anxiety/depression





#9.  History of coronary artery disease with previous stent placement





#10.  History of aortic thrombectomy/aortobifem bypass in 2007





#11.  Former smoker





Plan:





Continues on Airvo, and we're continuing to wean the flow and the FiO2 that are 

currently at 45 L and FiO2 of 55%


Vital signs have been stable, no fever or chills


Inflammatory markers continue to improve


Patient has completed her course of Baricitinib


Continue current dose Decadron


Continue prophylactic anticoagulation with Lovenox


May attempt to switch her to regular high flow nasal cannula later on today or 

possibly tomorrow


We'll continue to follow


Once her oxygen demand is down to 5 L of supplemental oxygen per minute or less 

may consider discharge home





I performed a history & physical examination of the patient and discussed their 

management with my nurse practitioner, Jeannine Belcher.  I reviewed the nurse 

practitioner's note and agree with the documented findings and plan of care.  

Lung sounds are positive for diffuse crackles throughout the lung fields.  The 

findings and the impression was discussed with the patient.  I attest to the 

documentation by the nurse practitioner. 











Time with Patient: Less than 30

## 2021-12-25 RX ADMIN — ENOXAPARIN SODIUM SCH MG: 40 INJECTION SUBCUTANEOUS at 19:27

## 2021-12-25 RX ADMIN — METOPROLOL SUCCINATE SCH MG: 25 TABLET, EXTENDED RELEASE ORAL at 08:06

## 2021-12-25 RX ADMIN — Medication SCH MCG: at 08:07

## 2021-12-25 RX ADMIN — SERTRALINE HYDROCHLORIDE SCH MG: 100 TABLET ORAL at 08:06

## 2021-12-25 RX ADMIN — NYSTATIN SCH UNIT: 100000 SUSPENSION ORAL at 17:26

## 2021-12-25 RX ADMIN — ACETAMINOPHEN PRN MG: 325 TABLET, FILM COATED ORAL at 17:29

## 2021-12-25 RX ADMIN — ATORVASTATIN CALCIUM SCH MG: 80 TABLET, FILM COATED ORAL at 08:06

## 2021-12-25 RX ADMIN — PANTOPRAZOLE SODIUM SCH MG: 40 TABLET, DELAYED RELEASE ORAL at 08:07

## 2021-12-25 RX ADMIN — CLOPIDOGREL BISULFATE SCH MG: 75 TABLET ORAL at 08:07

## 2021-12-25 RX ADMIN — NYSTATIN SCH UNIT: 100000 SUSPENSION ORAL at 11:57

## 2021-12-25 RX ADMIN — DEXTROSE SCH MG: 50 INJECTION, SOLUTION INTRAVENOUS at 19:27

## 2021-12-25 RX ADMIN — NYSTATIN SCH UNIT: 100000 SUSPENSION ORAL at 08:08

## 2021-12-25 RX ADMIN — OXYCODONE HYDROCHLORIDE AND ACETAMINOPHEN SCH MG: 500 TABLET ORAL at 19:27

## 2021-12-25 RX ADMIN — LORATADINE SCH MG: 10 TABLET ORAL at 08:06

## 2021-12-25 RX ADMIN — NYSTATIN SCH UNIT: 100000 SUSPENSION ORAL at 19:27

## 2021-12-25 RX ADMIN — OXYCODONE HYDROCHLORIDE AND ACETAMINOPHEN SCH MG: 500 TABLET ORAL at 08:07

## 2021-12-25 RX ADMIN — DEXTROSE SCH MG: 50 INJECTION, SOLUTION INTRAVENOUS at 08:07

## 2021-12-25 RX ADMIN — ENOXAPARIN SODIUM SCH MG: 40 INJECTION SUBCUTANEOUS at 08:08

## 2021-12-25 RX ADMIN — Medication SCH MG: at 08:07

## 2021-12-25 NOTE — P.PN
Subjective





Patient is a 66-year-old the female with past medical history of COPD and 3 L 

oxygen dependent at home has cold symptoms for about a month comes in with 

shortness of breath presently on telemetry dysfunction CT of the chest did not 

show any pulmonary embolism but did show pulmonary fibrosis as well as 

infiltrates.  Below.  Patient doesn't have any infiltrate consistent with 

pneumonia patient denied any dysuria.  Patient is presently on Rocephin.  

Patient has mild acute renal failure because of which I'm holding ACE inhibitor.

 Severely hypomagnesemic magnesium is being replaced.  Patient last smoked about

17 years ago.





12/08/2021





Patient is seen and evaluated in follow-up and currently maintained on 10 L high

flow and weaning as tolerated.  Patient was on 15 L high flow nasal cannula this

morning and tolerating and nursing staff continuing to wean.  Patient normally 

wears 3 L of oxygen continuously in the outpatient setting.  Patient denies any 

worsening shortness of breath just generalized fatigue and weakness and 

continued diarrhea.  Patient states she is going approximately 2-3 times per day

will add C. diff testing and if negative will add Imodium and Questran.  

Pulmonary is following and venous Doppler was ordered bilateral lower 

extremities which is negative for DVT as d-dimer is elevated today at 2.42.  

Inflammatory markers significantly improved his LDH is 338 and CRP is 7.0, 

repeat magnesium after replacement is 2.9. 





12/09/2021





Patient is seen and evaluated in follow-up this morning currently sitting up in 

the chair and was on 10 L high flow although oxygen saturations were below 90% 

and patient bumped back up to 15 L.  Patient is complaining of the oxygen being 

too high and making her feel more anxious and discussed with nursing staff about

continuing to wean as tolerated.  She continues with diarrhea although is 

improving and C. diff testing was negative and patient was started on Questran 

along with Imodium.  Patient continues to state she has no real appetite 

although encourage the patient to continue with small frequent meals and will 

add Magic cups as well.  Encourage the patient to increase oral intake as she is

weak and needs the energy.  She normally maintained on 3 L via nasal cannula and

will continue to titrate as tolerated.  Pulmonary is following patient is 

maintained on IV dexamethasone twice daily along with vitamin and zinc 

supplements, Lovenox twice daily for elevated d-dimer and will continue.  

Patient was on normal saline and considered discontinuing as patient sodium is 

now 147 and patient is being started on D5 in water and will repeat labs.





12/10/2021


Patient is seen in follow-up this morning currently sitting up in the chair and 

oxygen requirements have increased and patient is now maintained on Airvo along 

with 15 L nonrebreather and oxygen saturations have been in the low 80s to 90 

maximum.  Discussed CODE STATUS with the patient and patient is a full code but 

does not wish to be placed on a ventilator if respiratory status continues to 

decline.  Pulmonary following closely and prognosis remains quite guarded.  

Patient continues on Lovenox along with dexamethasone and vitamin and zinc 

supplements and is being started on Baricitinib.  Patient is extremely weak and 

becomes extremely dyspneic with minimal exertion.  Patient's oral intake 

continues to be extremely poor and again encouraged and stressed the importance 

of eating regardless of not feeling hungry and having no appetite.  May consider

enteral nutrition and dietary consult.  Patient states her diarrhea has 

significantly improved and will use Imodium as needed and discontinue Questran. 

Repeat inflammatory markers ordered and pending.  Chest x-ray today shows 

bilateral airspace disease again noted with prominence and interstitial, and 

findings consistent with pneumonia.





12/11/2021


Patient admitted with bilateral common pneumonia , Her respiratory status today 

is a slightly worse as she is dependent on BiPAP throughout the day.  Also she 

has decreased air entry on both sides


She is saturating 88% while she is on BiPAP.


D-dimer is the same, 4.0 and 3.9.


She continued on dexamethasone, vitamin C, vitamin D, zinc,  barcitininb and 

Lovenox 40 mg twice a day.  Also she is on Protonix





12/12/2021


Sitting in chair looks mildly tachypneic and tolerates BiPAP which she uses his 

yesterday all the time.  She has not been eating since yesterday and may switch 

her for short time to nonrebreather and air or so she can read some oral diet   

Per pulmonary team recommendation.  Other than that she is hemodynamically 

stable.  Labs are stable.Calcitonin 0.19.  C. diff is negative.


She remains on dexamethasone, vitamin C, Z and Lovenox, Protonix and  barcitinib







12/13/2021


This morning she was still on BiPAP, and she was refusing to start TPN as she 

could not come off the BiPAP.  However later on during the day she could be 

placed on nonrebreather 15 L and Aravo 60 L with 94% so she can tolerate oral 

feeding.


Other vitals are stable, afebrile.  Labs are stable as well.


She continued on dexamethasone, vitamin C, vitamin D, zinc,  barcitininb and 

Lovenox 40 mg twice a day





12/14/2021


Patient with bilateral: With pneumonia and hypoxia, she is improving gradually 

today, today she couldn't get off her BiPAP and placed on airvo 60 L with FiO2 

of 85%, with occasional nonrebreather 15 L, patient was happy that she was able 

to eat today and she tolerates diet well.  She has some diarrhea but no 

abdominal pain.  C. diff is negative.


Other than that she is hemodynamically stable


Patient continued today on  dexamethasone, vitamin C, Z and Lovenox, Protonix 

and  barcitinib 


Follow-up chest x-ray and CBC/BMP in the morning





12/15/2021


Patient today is pleasant and smiling as her pulmonary illness is improving 

progressively but slowly, but she still currently on high flow nasal cannula 

with 55 L/m and FiO2 of 88% saturation in low 90s.  Rest of vitals are stable.  

Labs including CBC, BMP are unremarkable.


C. diff test came back negative and her diarrhea stopped today.  She is able to 

eat about 50-25% of her meals and she is satisfied with that for now.


She still on steroids with dexamethasone, vitamin C, vitamin D, zinc, Lovenox 40

mg twice a day, Protonix and barcitininb 





12/16/2021


Patient today her breathing slightly worsened and her oxygen requirement today 

was 55 L with 90%, she still can go a little difficulty due to her dyspnea and 

she confirmed me she is able to eat with no issues.


She sitting in chair today.  Hemodynamically stable.


Labs reviewed and shows stability, her d-dimer actually improved 3.9 down to 

1.6.  LDH went up to 1029 and CRP 3.4.


Chest x-ray shows slight improvement in aeration in both lung fields despite 

bilateral pneumonia.


She remains on dexamethasone, vitamin C, D and zinc and  Protonix and 

barcitininb .





12/17/2021


Patient breathing pattern is slowly to improve, today she still on high flow 

nasal cannula 55 L/m and FiO2 of 90%.


Labs including CBC and BMP are unremarkable and stable.  On the top of the 

patient is afebrile


However patient is complaining of from oral thrush and asked for fluconazole.  

Patient refused nystatin.  Also she has persistent loose bowel movement, C. diff

checked twice and there were negative.  We'll start her on short course of 

Questran.  Also patient states she is eating well and tolerates diet with no 

difficulty.


Continue with the other same medication of dexamethasone, multiple vitamin 

cocktail and Lovenox, Protonix and barcitininb 





12/18/2021


Her respiratory status with minimal change and fluctuating, currently she is on 

airvo at 55 L/m and 85%.  She is able to eat and drink.


She states that her most thrush is better and his diarrhea is improving after 

adding nystatin and Questran yesterday.


Afebrile and vitals are stable.


She remains on the same treatment of dexamethasone, vitamin C, D and zinc and  

Protonix and barcitininb .





12/19/2021 


patient breathing is improving as per patient stated that she is breathing 

easier.  She remains on same dose of oxygen as yesterday she still on 15 L/m at 

88% FiO2.


Also reports improvement in her bowel movements, she said that she almost had 

more formed bowel movement and her oral thrush is also improving with 

fluconazole.


And we will continue with the same treatment for her colon pneumonia with 

dexamethasone, vitamins, Barcitininb, Lovenox and Pepcid





12/20/2021


Patient is with bilateral common pneumonia on high dose of records oxygen since 

admission.  Today is awake and alert looks more comfortable and each day she 

states she is breathing easier.  Also she is happy with the progress of her 

bowel movement, she had about 4 months which is almost formed over the last 2 

days.  No more nausea vomiting but she feels some sore throat.  She is eating 

well.  However despite her reported clinical improvement by the patient she 

still on same dose of high need of oxygen and currently 55% and 70-90 L/m.


She remains on the same treatment of dexamethasone, vitamin C, DM zinc, 

,baricitinib.  Also she is on fluconazole, nystatin, Robitussin, Lovenox , and 

patient is not on Questran





12/21/2021


Patient clinically slowly to improve, he states she feels better but however her

oxygen requirements very close to yesterday at 50 L/m and 75% high flow nasal 

cannula.


Her GI symptoms or improvement and she tolerates diet well.


She is fluconazole currently while continue the same treatment of dexamethasone,

vitamin cocktail for Covid and baricitinib. .  Nystatin and Robitussin and 

Lovenox





12/22/2021


Patient respiratory status not improving or worsening much compared to 

yesterday.  She still breathing easily although she requires high flow nasal 

cannula at 50 L/m and FiO2 of 75%.  No GI symptoms, no significant sore throat 

which is responding to nystatin.  Patient tolerates diet well.


We'll keep the same medication and keep monitoring the patient for now.


Her creatinine trending up over the last 2-3 days, today is 0.08 went up to 1.2.

 We held her Diovan which is a home medication.  And we'll check creatinine 

tomorrow and bladder scan.


Repeat labs in the morning.  Follow-up chest x-ray





12/23/2021


 patient still improved slowly and gradually and her oxygen requirement today is

slightly down to 50 L/m and FiO2 of 68%


Other than that she is a pleasant subjective she feels improved and she breathes

easily, no diarrhea or vomiting.  Tolerates diet well.  Occasional sore throat.


Labs looks stable with WBCs 11.8.  D-dimer is down to 1.1.  LDH slightly up 734 

and CRP up to 6.2.


Continue with same treatment dexamethasone, vitamin cocktail for Covid and 

baricitinib.  Also Lovenox and Protonix





12/24/2021


Patient continued to report improvement in her breathing pattern however she 

still on high flow nasal cannula 50 liter per minute with FiO2 of 45%


Inflammatory markers improvement, d-dimer down to 0.6, LDH down from 734 and 2-

90 and CRP is normal less than 0.3.


Patient finished her dose of baricitinib breath continue with the rest of 

treatment including dexamethasone, vitamin C, D and zinc.  Lovenox Protonix





12/25/2021


Patient breathing easily, eats well.  Her oxygen requirement down to 45 L and 

45-50 FiO2.


We will continue with the same and treatment of steroids, vitamins, Lovenox and 

Protonix.  She finished her dose of baricitnib





Objective





- Vital Signs


Vital signs: 


                                   Vital Signs











Temp  97.5 F L  12/25/21 11:04


 


Pulse  70   12/25/21 11:04


 


Resp  20   12/25/21 11:04


 


BP  130/66   12/25/21 11:04


 


Pulse Ox  91 L  12/25/21 11:04








                                 Intake & Output











 12/24/21 12/25/21 12/25/21





 18:59 06:59 18:59


 


Intake Total 340  


 


Balance 340  


 


Intake:   


 


  Oral 340  


 


Other:   


 


  Voiding Method Bedside Commode Bedside Commode Bedside Commode


 


  # Voids 3 2 


 


  # Bowel Movements  1 














- Exam





-GENERAL: The patient is alert and oriented , on BiPAP, not in any acute 

distress. Well developed, well nourished. 


HEENT: Pupils are round and equally reacting to light. EOMI. No scleral icterus.

No conjunctival pallor. Normocephalic, atraumatic. No pharyngeal erythema. No 

thyromegaly. 


CARDIOVASCULAR: S1 and S2 present. No murmurs, rubs, or gallops. 


-PULMONARY: Chest is clear to auscultation, no wheezing.  Bilateral crepitation 

and decreased air entry on both sides


ABDOMEN: Soft, nontender, nondistended, normoactive bowel sounds. No palpable 

organomegaly. 


MUSCULOSKELETAL: No joint swelling or deformity. 


EXTREMITIES: No cyanosis, clubbing, or pedal edema. 


NEUROLOGICAL: Gross neurological examination did not reveal any focal deficits. 


SKIN: No rashes. no petechiae.





- Labs


CBC & Chem 7: 


                                 12/23/21 06:21





                                 12/23/21 06:21





Assessment and Plan


Assessment: 





-acute hypoxic respiratory failure: secondary to COVID-19 pneumonia 


-chronic hypercapnic respiratory failure secondary to COPD uses 3 L of oxygen at

home


-Acute kidney injury


-Diarrhea most likely secondary to Covid, resolved


-Oral thrush secondary to steroids


-Hypernatremia, improving, sodium is 140 a.m. we'll continue D5 in water and 

repeat a.m. labs 


-Severe hypomagnesemia, improved


-Elevated d-dimer secondary to Covid and patient is on Lovenox which will be 

continued, Lovenox is currently twice daily


-Acute renal failure, prerenal, improved with IV fluids


-Hypertension


-Mild anion gap and metabolic acidosis probability of lactic acidosis, most li

shantell secondary to increased amounts of diarrhea and dehydration, lactic acid is 

1.1


-gastroesophageal reflux disease


-Hyperlipidemia


-Hypertension


-History of pulmonary fibrosis


-Sleep apnea uses CPAP machine at home


-Peripheral vascular disease


-Coronary artery disease


-DVT prophylaxis: On Lovenox which will be continued











Plan: 





This is a pleasant 66 years old female who presents with bilateral covid 

pneumonia


Continue with dexamethasone


Continue with vitamin C, vitamin D and zinc


Pulmonary consult 


Also he is on barcitinib


Continue with stenting


Hold Diovan and monitor blood pressure.  Check a bladder scan


Labs and medication were reviewed..  Continue same treatment.  Continue with 

symptomatic treatment.  Resume home medication.  Monitor lytes and vitals.  DVT 

and GI prophylaxis.  Further recommendations as per clinical course of the 

patient


DVT prophylaxis: Subcutaneous Lovenox


GI Prophylaxis: Ppi


Prognosis is guarded

## 2021-12-25 NOTE — P.PN
Subjective


Progress Note Date: 12/25/21


Principal diagnosis: 





COVID-19 pneumonia





This is a pleasant 66-year-old female patient who follows at the [facility] for her

primary care needs.  She has history of hypertension, hyperlipidemia, 

gastroesophageal reflux disease, anxiety/depression, aortic thrombectomy in 

2007, former smoker, COPD, obstructive sleep apnea utilizing CPAP.  He is here 

yesterday to the emergency room with a four-week history of shortness of breath,

cough, congestion, body aches and fevers.  She was tested negative for CoVID 2.

 Her  is positive for CoVID and she is now positive for CoVID.  She is 

not vaccinated.  She does have home oxygen use at 2-3 L.  She is currently 

requiring 10 L high flow nasal cannula to maintain O2 saturations at 90%.  Chest

x-ray reveals bilateral patchy infiltrates. White count 5.9.  Hemoglobin 12.5.  

Platelets 398.  Lymphocytes 0.5.  D-dimer 1.89.  Sodium 140.  Potassium 4.9.  

Creatinine 1.2.  AST 39.  ALT 26.  Alk phos 156.  LDH 1283.  C-reactive protein 

21.7.  Pro-calcitonin 0.32.  She's been initiated on Decadron, Lovenox, vitamin 

supplements.  0.9% normal saline at 75 ML's per hour.





On 12/08/2021 patient is in follow-up on medical surgical floor, she is resting 

comfortably in bed, she states she is feeling better, although she is requiring 

more oxygen compared to when she first presented to the emergency department, 

note that patient does wear home oxygen at 2 L for history of COPD.  She is 

currently on 10 L of oxygen, her pulse ox is 91-92%, breathing comfortably, lung

sounds reveal coarse crackles at bilateral bases, but no wheezing, no rhonchi.  

CT angiogram of the chest showed no evidence of pulmonary embolism, it did show 

pulmonary emphysema and pulmonary interstitial fibrosis with increased 

interstitial infiltrates compared to her old exam.  There was no evidence of a 

suspicious pulmonary mass.  Patient was outside the window for Remdesivir, she 

continues on Decadron 6 mg twice daily, her pro-calcitonin level on admission wa

s 0.32, and patient was covered with Rocephin for possibility of underlying 

bacterial infection.  She is on prophylactic Lovenox 40 mg daily.  CTA [place] 

Ye of the chest showed no evidence of pulmonary embolism. 0





On 12/09/2001 patient seen in follow-up on medical surgical floor, she is 

currently on 10 L of oxygen the pulse ox of 86%, FiO2 has since been increased 

to 15 L, she has a mild cough, but overall seems to be in no acute distress, she

is sitting up in the chair, denies any chest discomfort, looks quite 

comfortable.  Lung sounds reveal coarse crackles bilateral bases, she does get s

hort of breath with exertion.  She states if she sits still she is breathing 

comfortably, and her O2 saturations are at or above 90%.  She's had no fever or 

chills overnight, she remains on Decadron 40 mg twice daily.  She is on empiric 

antibiotics in the form of Rocephin and IV fluids at 75 ML per hour.  Today's 

labs have been reviewed, white blood cell count is 8.6, hemoglobin is 12, 

platelet count is 506, d-dimer is 4.01, sodium is 147, potassium is 4.8, 

chloride is 112, BUN of 20 creatinine 0.7, renal profile has significantly 

improved since admission.  LDH has significantly improved since admission and is

down to 348 on today's labs, CRP is still pending, her last pro-calcitonin is 

negative at 0.13.  Blood culture has shown no growth thus far.





On 12/10/2021 patient seen in follow-up on medical surgical floor, she had been 

on 15 L high flow and 100% nonrebreather mask up until this morning, this 

morning patient's FiO2 requirements had increased, patient was getting up in the

chair, she is significantly desaturated, she was placed on irritable at 60 L and

FiO2 of 90% in addition to 100% nonrebreather, and patient is taking quite a 

while to recover her O2 saturations although her work of breathing is not 

increased.  She is still awake and alert, she is oriented 3, she is able to 

make her needs known, her pulse ox is slowly recovering at rest, with deep 

breathing, pursed lip breathing, repositioning.  Overnight she has had no fever 

or chills, blood pressures have been stable.  Breathing fairly comfortably 

although she desaturates with any little exertion.  Currently she is on Decadron

6 Twice daily, she will be started on Baricitinib, patient is also on Plavix, 

and prophylactic Lovenox.  Today's chest x-ray and labs are still pending, she 

remains on D5W at a rate of 100 ML per hour for dehydration related to diarrhea 

and hypernatremia, repeat electrolytes and renal profile are still pending for 

today.  No abdominal pain, no nausea.  The diarrhea has significantly improved 

in the last 48 hours.  Patient is tolerating oral intake, no abdominal pain. 





The patient is seen today 12/11/2021 in follow-up on the regular medical floor. 

She is currently sitting up in a chair at the bedside.  She is now on BiPAP 12/6

and 100% FiO2 with O2 saturations in the mid to upper 80s.  She remains alert.  

No further diarrhea.  No abdominal pain.  The cultures revealed no growth.  D-

dimer 3.98.  She remains on Baricitinib, Decadron, vitamin supplements.  She is 

continued on bronchodilators.








The patient is seen today 12/14/2021 in follow-up on the regular medical floor. 

She is currently sitting up in a chair at the bedside.  She remains on AirVo 

high flow oxygen at 60 L and 85% FiO2.  O2 saturations 84-89%.  Occasionally 

she'll put on a nonrebreather mask.  She's afebrile.  Hemodynamically stable.  

Blood cultures revealed no growth.  C. difficile screen is negative.  She is 

continued on Decadron, Lovenox, vitamin supplements.  She remains on 

Baricitinib.  Continued on bronchodilators. 








The patient is seen today 12/18/2021 in follow-up on the regular medical floor. 

She is currently sitting up in a chair at bedside.  Awake and alert in no acute 

distress.  She is getting better.  Slow to progress.  She continues on the AirVo

high flow oxygen at 60 L and 80% FiO2.  She is having issues with thrush.  

Continued on Baricitinib, Decadron, Lovenox and vitamin supplements.  Continued 

on bronchodilators.  She is on Diflucan.











The patient is seen today 12/25/2021 in follow-up on the regular medical floor. 

She is awake and alert in no acute distress.  Up in a chair at the bedside.  

Still requiring AirVo high flow oxygen at 45 L and 45% with O2 saturation 91%.  

No worsening shortness of breath, cough or congestion.  She is afebrile.  

Hemodynamically stable.  Last d-dimer 0.60.  .  C-reactive protein less 

than 0.3.  She is continued on Decadron, Lovenox, vitamin supplements.





Objective





- Vital Signs


Vital signs: 


                                   Vital Signs











Temp  98.4 F   12/25/21 14:00


 


Pulse  55 L  12/25/21 14:00


 


Resp  20   12/25/21 14:00


 


BP  114/64   12/25/21 14:00


 


Pulse Ox  91 L  12/25/21 14:00








                                 Intake & Output











 12/24/21 12/25/21 12/25/21





 18:59 06:59 18:59


 


Intake Total 340  


 


Balance 340  


 


Intake:   


 


  Oral 340  


 


Other:   


 


  Voiding Method Bedside Commode Bedside Commode Bedside Commode


 


  # Voids 3 2 


 


  # Bowel Movements  1 














- Exam





GENERAL EXAM: Alert, very pleasant, 66-year-old female, on on AirVo high flow 

oxygen at 45 L and 45% FiO2, up in the chair, comfortable, no acute distress.


HEAD: Normocephalic/atraumatic.


EYES: Normal reaction of pupils, equal size.  Conjunctiva pink, sclera white.


NOSE: Clear with pink turbinates.


THROAT: No erythema or exudates.


NECK: No masses, no JVD, no thyroid enlargement, no adenopathy.


CHEST: No chest wall deformity.  Symmetrical expansion. 


LUNGS: Equal air entry with with coarse bilateral crackles


CVS: Regular rate and rhythm, normal S1 and S2, no gallops, no murmurs, no rubs


ABDOMEN: Soft, nontender.  No hepatosplenomegaly, normal bowel sounds, no 

guarding or rigidity.


EXTREMITIES: No clubbing, no edema, no cyanosis, 2+ pulses and upper and lower 

extremities.


MUSCULOSKELETAL: Muscle strength and tone normal.


SPINE: No scoliosis or deformity


SKIN: No rashes


CENTRAL NERVOUS SYSTEM: No focal deficits, tone is normal in all 4 extremities.


PSYCHIATRIC: Alert and oriented -3.  Appropriate affect.  Intact judgment and 

insight.





- Labs


CBC & Chem 7: 


                                 12/23/21 06:21





                                 12/23/21 06:21





Assessment and Plan


Assessment: 





1 Acute on chronic hypoxic respiratory failure secondary to COVID-19 pneumonia. 

Not vaccinated.  Initiated on Baricitinib.  Currently on AirVo high flow oxygen 

at 45 L and 80% FiO2





2 Elevated inflammatory markers secondary to above





3 No evidence of acute infection, antibiotics discontinued and started on 

Baricitinib 





4 Obesity





5 Obstructive sleep apnea, on CPAP





6 Hypertension





7 Hyperlipidemia





8 Anxiety/depression





9 History of coronary disease with previous stent placement





10 History of aortic thrombectomy/aortobifem bypass in 2007





11 Multiple medication ALLERGIES





12 Former smoker.  





Plan:





Currently up in a chair at the bedside


On AirVo high flow oxygen at 45 L and 45% FiO2


Continue Decadron, Lovenox, vitamin supplements


Continue bronchodilators


Titrate the FiO2 as tolerated


We'll continue to follow 





I, the cosigning physician, performed a history & physical examination of the 

patient. Lungs sounds with basilar coarse crackles, diminished.  Maintaining O2 

saturations in the 90s on AirVo high flow oxygen at 45 L and 45% FiO2.  I 

discussed the assessment and plan of care with my nurse practitioner, Velia Short. I attest to the above note as dictated by her.

## 2021-12-25 NOTE — PN
PROGRESS NOTE



DATE OF SERVICE:

12/25/2021



REASON FOR FOLLOWUP:

1. Covid 19 pneumonia.

2. Oral thrush.



INTERVAL HISTORY:

Patient is afebrile.  The patient is breathing slightly comfortably.  The patient is

now down to 45% FiO2.  The patient denies having any chest pain or worsening cough or

sputum production.  No abdominal pain.  No diarrhea.



PHYSICAL EXAMINATION:

Blood pressure 145/79 with a pulse of 55, temperature is 98.2.  She is 98% on 45% FiO2.

General description is an elderly female up in the chair in no distress.  Respiratory

system: Unlabored breathing, decreased intensity of breath sounds.  No wheeze. Heart

S1, S2.  Regular rate and rhythm.  Abdomen soft, no tenderness.



LABS:

D-dimer 0.60.  CRP less than 0.30.



DIAGNOSTIC IMPRESSION AND PLAN:

Patient with acute respiratory failure secondary to COVID-19 pneumonia in this patient

who has shown overall improvement.  The patient has completed her baricitinib therapy.

Patient is currently on Decadron, Lovenox, zinc and ascorbic acid _____ oral thrush,

continue nystatin swish and swallow and monitor clinical course closely.





MMODL / IJN: [id number] / Job#: [id number]

## 2021-12-26 RX ADMIN — ENOXAPARIN SODIUM SCH MG: 40 INJECTION SUBCUTANEOUS at 21:13

## 2021-12-26 RX ADMIN — DEXTROSE SCH MG: 50 INJECTION, SOLUTION INTRAVENOUS at 21:13

## 2021-12-26 RX ADMIN — PANTOPRAZOLE SODIUM SCH MG: 40 TABLET, DELAYED RELEASE ORAL at 06:49

## 2021-12-26 RX ADMIN — NYSTATIN SCH UNIT: 100000 SUSPENSION ORAL at 16:56

## 2021-12-26 RX ADMIN — OXYCODONE HYDROCHLORIDE AND ACETAMINOPHEN SCH MG: 500 TABLET ORAL at 21:13

## 2021-12-26 RX ADMIN — CLOPIDOGREL BISULFATE SCH MG: 75 TABLET ORAL at 06:50

## 2021-12-26 RX ADMIN — ATORVASTATIN CALCIUM SCH MG: 80 TABLET, FILM COATED ORAL at 06:49

## 2021-12-26 RX ADMIN — NYSTATIN SCH UNIT: 100000 SUSPENSION ORAL at 12:05

## 2021-12-26 RX ADMIN — ENOXAPARIN SODIUM SCH MG: 40 INJECTION SUBCUTANEOUS at 06:51

## 2021-12-26 RX ADMIN — TIOTROPIUM BROMIDE INHALATION SPRAY SCH PUFF: 3.12 SPRAY, METERED RESPIRATORY (INHALATION) at 08:55

## 2021-12-26 RX ADMIN — METOPROLOL SUCCINATE SCH MG: 25 TABLET, EXTENDED RELEASE ORAL at 06:49

## 2021-12-26 RX ADMIN — NYSTATIN SCH UNIT: 100000 SUSPENSION ORAL at 21:13

## 2021-12-26 RX ADMIN — LORATADINE SCH MG: 10 TABLET ORAL at 06:49

## 2021-12-26 RX ADMIN — OXYCODONE HYDROCHLORIDE AND ACETAMINOPHEN SCH MG: 500 TABLET ORAL at 06:50

## 2021-12-26 RX ADMIN — DEXTROSE SCH MG: 50 INJECTION, SOLUTION INTRAVENOUS at 06:51

## 2021-12-26 RX ADMIN — TIOTROPIUM BROMIDE INHALATION SPRAY SCH PUFF: 3.12 SPRAY, METERED RESPIRATORY (INHALATION) at 09:01

## 2021-12-26 RX ADMIN — NYSTATIN SCH UNIT: 100000 SUSPENSION ORAL at 06:50

## 2021-12-26 RX ADMIN — Medication SCH MG: at 06:50

## 2021-12-26 RX ADMIN — Medication SCH MCG: at 06:50

## 2021-12-26 RX ADMIN — SERTRALINE HYDROCHLORIDE SCH MG: 100 TABLET ORAL at 06:50

## 2021-12-26 NOTE — P.PN
Subjective


Progress Note Date: 12/26/21


Principal diagnosis: 


 Dyspnea, hypoxia, COVID-19





This is a pleasant 66-year-old female patient who follows at the [facility] for her

primary care needs.  She has history of hypertension, hyperlipidemia, 

gastroesophageal reflux disease, anxiety/depression, aortic thrombectomy in 2007

, former smoker, COPD, obstructive sleep apnea utilizing CPAP.  He is here 

yesterday to the emergency room with a four-week history of shortness of breath,

cough, congestion, body aches and fevers.  She was tested negative for CoVID 2.

 Her  is positive for CoVID and she is now positive for CoVID.  She is 

not vaccinated.  She does have home oxygen use at 2-3 L.  She is currently 

requiring 10 L high flow nasal cannula to maintain O2 saturations at 90%.  Chest

x-ray reveals bilateral patchy infiltrates. White count 5.9.  Hemoglobin 12.5.  

Platelets 398.  Lymphocytes 0.5.  D-dimer 1.89.  Sodium 140.  Potassium 4.9.  

Creatinine 1.2.  AST 39.  ALT 26.  Alk phos 156.  LDH 1283.  C-reactive protein 

21.7.  Pro-calcitonin 0.32.  She's been initiated on Decadron, Lovenox, vitamin 

supplements.  0.9% normal saline at 75 ML's per hour.





On 12/08/2021 patient is in follow-up on medical surgical floor, she is resting 

comfortably in bed, she states she is feeling better, although she is requiring 

more oxygen compared to when she first presented to the emergency department, 

note that patient does wear home oxygen at 2 L for history of COPD.  She is 

currently on 10 L of oxygen, her pulse ox is 91-92%, breathing comfortably, lung

sounds reveal coarse crackles at bilateral bases, but no wheezing, no rhonchi.  

CT angiogram of the chest showed no evidence of pulmonary embolism, it did show 

pulmonary emphysema and pulmonary interstitial fibrosis with increased 

interstitial infiltrates compared to her old exam.  There was no evidence of a 

suspicious pulmonary mass.  Patient was outside the window for Remdesivir, she 

continues on Decadron 6 mg twice daily, her pro-calcitonin level on admission 

was 0.32, and patient was covered with Rocephin for possibility of underlying 

bacterial infection.  She is on prophylactic Lovenox 40 mg daily.  CTA [facility] of the chest showed no evidence of pulmonary embolism. 0





On 12/09/2001 patient seen in follow-up on medical surgical floor, she is c

urrently on 10 L of oxygen the pulse ox of 86%, FiO2 has since been increased to

15 L, she has a mild cough, but overall seems to be in no acute distress, she is

sitting up in the chair, denies any chest discomfort, looks quite comfortable.  

Lung sounds reveal coarse crackles bilateral bases, she does get short of breath

with exertion.  She states if she sits still she is breathing comfortably, and 

her O2 saturations are at or above 90%.  She's had no fever or chills overnight,

she remains on Decadron 40 mg twice daily.  She is on empiric antibiotics in the

form of Rocephin and IV fluids at 75 ML per hour.  Today's labs have been 

reviewed, white blood cell count is 8.6, hemoglobin is 12, platelet count is 

506, d-dimer is 4.01, sodium is 147, potassium is 4.8, chloride is 112, BUN of 

20 creatinine 0.7, renal profile has significantly improved since admission.  

LDH has significantly improved since admission and is down to 348 on today's 

labs, CRP is still pending, her last pro-calcitonin is negative at 0.13.  Blood 

culture has shown no growth thus far.





On 12/10/2021 patient seen in follow-up on medical surgical floor, she had been 

on 15 L high flow and 100% nonrebreather mask up until this morning, this 

morning patient's FiO2 requirements had increased, patient was getting up in the

chair, she is significantly desaturated, she was placed on irritable at 60 L and

FiO2 of 90% in addition to 100% nonrebreather, and patient is taking quite a 

while to recover her O2 saturations although her work of breathing is not 

increased.  She is still awake and alert, she is oriented 3, she is able to 

make her needs known, her pulse ox is slowly recovering at rest, with deep 

breathing, pursed lip breathing, repositioning.  Overnight she has had no fever 

or chills, blood pressures have been stable.  Breathing fairly comfortably 

although she desaturates with any little exertion.  Currently she is on Decadron

6 Twice daily, she will be started on Baricitinib, patient is also on Plavix, 

and prophylactic Lovenox.  Today's chest x-ray and labs are still pending, she 

remains on D5W at a rate of 100 ML per hour for dehydration related to diarrhea 

and hypernatremia, repeat electrolytes and renal profile are still pending for 

today.  No abdominal pain, no nausea.  The diarrhea has significantly improved 

in the last 48 hours.  Patient is tolerating oral intake, no abdominal pain. 





On 12/12/2021 patient seen in follow-up on medical surgical floor.  She is alert

and alert, she is on BiPAP support with pressures of 14 and 8 and FiO2 100%.  

Earlier this morning her BiPAP pressures have been increased from 12.6 in the 

100% FiO2.  Her pulse oximeter readings are marginal at around 90%, with any 

exertion patient does desaturate and takes her a while to recover although she 

looks very comfortable, nontachypneic, she is sitting up in the recliner, she is

watching TV.  She is complaining of being hungry.  She has not been able to take

much by mouth related to being BiPAP dependent.  She has been afebrile, 

hemodynamics are stable.  Her last chest x-ray from 12/10/2021 showed bilateral 

airspace disease with prominence of interstitium with no evident pneumothorax or

pleural effusion.  Today's labs have been reviewed showing white blood cell 

count of 10.6, hemoglobin 12.2, d-dimer and yesterday's labs was down to 3.98, 

electrolytes and renal profile were within normal limits.  Per pro-calcitonin 

level was negative at 0.19.  Patient remains on Baricitinib, Decadron 6 mg twice

daily, she is receiving IV fluids with D5 W at 100 ML per hour





On 12/13/2021 patient seen in follow-up on medical surgical floor, she was 

switched to Airvo at 60 L and 90%, and nonrebreather mask, she is maintaining 

her O2 saturations between 88 and 91%, she is breathing comfortably, tolerating 

it for a few hours now, she was able to eat something, she is taking in oral 

fluids, she is awake and alert, she is sitting up in the chair, she is being her

bills.  Lung sounds are positive for scattered crackles throughout the lung 

fields, no cough, appears very comfortable.  He is on [place] neb, she is on 

Decadron, and Lovenox.  She is on D5W at a rate of 100 ML per hour related to 

her nothing by mouth status however in view of her tolerating oral intake now, 

and not been BiPAP dependent anymore we'll stop the IV fluids.  Today's labs 

have been reviewed and her white blood cell count is 10.4, hemoglobin is 12.6, 

her electrolytes have been reviewed and are within normal limits, sodium is 141,

potassium is 3.9, chloride is 103, BUN is 15, creatinine is 0.72.  Her 

procalcitonin level was negative.





On 12/15/2021 patient seen in follow-up on medical surgical floor, she is 

sitting up in the recliner, she states her breathing is improving, she is on 

Airvo, and we were able to wean down the flow and FiO2, and the flow is 

currently at 55 L/m, and FiO2 is currently at 88%, her pulse ox is ranging 

between 86-93%, breathing comfortably, minimal crackles at bilateral bases, 

occasional cough, patient has been get not to the bedside commode, tolerates 

activity well, her spirits are high, her oral intake is fair.  She continues on 

Baricitinib, she continues on Decadron 6 blood gram twice daily, and Lovenox 40 

mg twice daily, today's labs have been reviewed showing white blood cell count 

is 7.19, hemoglobin of 11.9, platelet count is 383, electrolytes and renal 

profile are unremarkable.  Cdiff negative.





On 12/16/2021 patient seen in follow-up on medical surgical floor.  She is awake

and alert, in no acute distress, she is currently resting in bed, she is on her 

left side and patient has been compliant with salt repositioning in bed from 

side to side.  She is breathing comfortably, remains on Airvo at 55 L and FiO2 

of 87%, and she was also placed on Airvo because her pulse ox was less than 85%,

her current O2 saturations are around 88%, patient seems to breathing quite 

comfortably.  Today's chest x-ray shows persistent but slightly improving 

bilateral pneumonia. patient continues on Baricitinib, Decadron 6 mg twice 

daily, she is on Lovenox 40 mg twice daily, COVID-19 vitamins.  His labs have 

been reviewed and there was down to 1.63, CBC was within normal limits, 

electrolytes and renal profile were unremarkable.  Today's LDH is 1029, CRP is 

1.7.  Vital signs have been stable, patient has been afebrile.





On 12/17/2021 patient is seen in follow-up on medical surgical floor.  She is 

breathing comfortably, she is resting in bed, she's been repositioning self from

side to side.  She remains on Airvo at 55 L and FiO2 of 90%, and pulse ox is 89-

93%.  Blood pressure is been stable, patient has been afebrile.  Patient 

continues on Baricitinib, continues on Decadron, Diflucan, Lovenox twice daily. 

Lung sounds revealed mildly diminished breath sounds, and a few scattered c

rackles.  Chest x-ray showing persistent but slightly improving bilateral Osvaldo

pneumonia





On 12/19/2001 patient seen in follow-up on medical surgical floor.  She is 

resting comfortably in bed, she is currently on 55 L/m and FiO2 of 80%, 

breathing comfortably, she has not required nonrebreather mask at all for the 

past 24 hours, she continues on Decadron, Baricitinib, and prophylactic Lovenox,

she is breathing comfortably, lung sounds revealed minimal crackles bilaterally,

she is tolerating oral intake.  No nausea vomiting or diarrhea.  Fever or 

chills.  Today's chest x-ray shows no change in the appearance of diffuse 

interstitial opacities in both lungs.  Today's labs have been reviewed, white 

count is normal at 9.8, hemoglobin of 12.7, d-dimer is 2.1, electrolytes and 

renal profile are unremarkable, and her inflammatory markers are improving on 

today's labs.





On 12/20/2021 patient seen in follow-up on medical surgical floor, she remains 

on a currently on 55 L/m, and FiO2 of 75%, she has not required nonrebreather 

mask.  She is breathing quite comfortably, lung sounds revealed minimal 

crackles, no wheezing, patient continues on Decadron 6. Twice a day, Lovenox 40 

mg twice a day, she is on cough syrup, she is on Xopenex and Spiriva inhalers.  

She remains on Baricitinib.  She's had no acute events overnight, no specific 

complaints.  No new labs today.  Her last set of inflammatory markers from 

yesterday showed improving LDH which was down to 442, and CRP was down to 0.60, 

her d-dimer was 2.10.





On 12/21/2001 patient seen in follow-up on medical surgical floor.  She is breat

julia comfortably, she remains on Airvo, and the flow has been decreased down 

further to 50 L/m, and FiO2 is currently at 77%, she is maintaining O2 

saturations between 90-92%, breathing very comfortably, minimal cough, lung 

sounds are clear, no rhonchi or wheezing, she is awake and alert, oriented 3, 

no altered mentation, no fever or chills, she is tolerating oral intake, no 

nausea or vomiting, clinically she's been stable, slowly improving in terms of 

oxygenation as well.  Remains on Decadron 6 Coumadin twice daily, Lovenox 40 mg 

twice daily, she is on COVID-19 multivitamins, she remains on Baricitinib 4 mg 

daily.  She's had no acute events overnight.  Today's labs have been reviewed, 

white blood cell count is 10.7, hemoglobin 13.7, electrolytes and renal profile 

are unremarkable, her inflammatory markers were improving on labs from 2 days 

ago. 





On 12/22/2021 patient seen in follow-up on medical surgical floor.  She is 

breathing very comfortably, she is currently sitting up in the chair, on Airvo, 

and currently on 50 L/m, and FiO2 is at 75%, her pulse ox is 94%.  Only 

occasional cough, minimal crackles, no rhonchi or wheezing.  No fever or chills.

 She continues on Baricitinib, Decadron 6 mg twice daily, Lovenox 40 mg twice 

daily, multivitamins.  Hemodynamically she is stable, no fever or chills.





On 12/23/2021 patient seen in follow-up on medical surgical floor, she is sitt

ing up in the chair, breathing comfortably, she remains on Airvo and the flow is

currently at 15 L and FiO2 has been decreased to 69%, her pulse ox is 91-94%, 

she is breathing comfortably, minimal crackles at the left lung base, no fever 

or chills, only occasional cough, no significant phlegm production, no chest 

discomfort.  Clinically she is improving, she's had no acute events overnight, 

she is tolerating oral intake, she's been admitted to the bedside commode, she 

does get exertional dyspnea, but recovers.  D-dimer is improving and is down to 

1.11 on today's labs, her last LDH from yesterday was 734, was increased from 

most previous value but overall improved during this admission, CRP level was 

less than 0.5.  She has completed her course of Baricitinib, she continues on 

Decadron 6 mg twice a day, Lovenox 40 mg twice daily, she is on cough syrup, and

COVID-19 multivitamins.





On 12/24/2021 patient seen in follow-up on medical surgical floor, she remains 

on Airvo and her flow and FiO2 are being continuously weaned down and she is 

currently at 45 L/m and FiO2 is currently down to 55%.  She is maintaining O2 

saturations at 93-97%.  Breathing comfortably, lung sounds are essentially clear

to auscultation, with minimal fine rales at bilateral bases, no wheezing, no 

complaints of chest pain.  Afebrile.  Patient has been sitting up in the 

recliner most of the day, she gets up to use the commode, tolerates exertion 

fairly well, yesterday's chest x-ray showed COPD and chronic interstitial 

pulmonary fibrosis with basilar infiltrates that are stable in appearance.  

Today's labs have been reviewed showing improving d-dimer which is down to 0.60,

inflammatory markers have further decreased and LDH is down to 290, and CRP is 

less than 0.30.





On 12/25/2021 patient seen in follow-up on medical surgical floor.  Remains on 

Airvo and her flow is down to 45 L and FiO2 of 45% with the O2 saturations at 

92%.  Breathing very comfortably, lung sounds are essentially clear, no rhonchi 

or wheezing.  No cough, patient has been tolerating activity, has been getting 

up in a recliner, and bedside commode, tolerates it well.  Vital signs have been

stable, no fever or chills. She has completed Baricitinib, continues Decadron, 

and Lovenox











Objective





- Vital Signs


Vital signs: 


                                   Vital Signs











Temp  98.1 F   12/26/21 05:58


 


Pulse  58 L  12/26/21 05:58


 


Resp  18   12/26/21 08:00


 


BP  134/61   12/26/21 05:58


 


Pulse Ox  90 L  12/26/21 11:25








                                 Intake & Output











 12/25/21 12/26/21 12/26/21





 18:59 06:59 18:59


 


Intake Total 1080  


 


Balance 1080  


 


Intake:   


 


  Oral 1080  


 


Other:   


 


  Voiding Method Bedside Commode  Bedside Commode


 


  # Voids 4  


 


  # Bowel Movements 1  














- Exam


 GENERAL EXAM: Alert, very pleasant, 66-year-old white female, on Airvo at 45 L 

an FiO2 of 45%, and  breathing comfortably, does not appear to be in any acute 

distress


HEAD: Normocephalic/atraumatic.


EYES: Normal reaction of pupils, equal size.  Conjunctiva pink, sclera white.


NOSE: Clear with pink turbinates.


THROAT: No erythema or exudates.


NECK: No masses, no JVD, no thyroid enlargement, no adenopathy.


CHEST: No chest wall deformity.  Symmetrical expansion. 


LUNGS: Equal air entry with with coarse bilateral crackles


CVS: Regular rate and rhythm, normal S1 and S2, no gallops, no murmurs, no rubs


ABDOMEN: Soft, nontender.  No hepatosplenomegaly, normal bowel sounds, no 

guarding or rigidity.


EXTREMITIES: No clubbing, no edema, no cyanosis, 2+ pulses and upper and lower 

extremities.


MUSCULOSKELETAL: Muscle strength and tone normal.


SPINE: No scoliosis or deformity


SKIN: No rashes


CENTRAL NERVOUS SYSTEM: Alert and oriented -3.  No focal deficits, tone is 

normal in all 4 extremities.


PSYCHIATRIC: Alert and oriented -3.  Appropriate affect.  Intact judgment and 

insight.











- Labs


CBC & Chem 7: 


                                 12/23/21 06:21





                                 12/23/21 06:21





Assessment and Plan


Plan: 


 Assessment:





#1.  Acute on chronic hypoxic respiratory failure secondary to acute COVID-19 

pneumonia, patient is not vaccinated related to multiple ALLERGIES, patient 

presented with a 4 week history of symptoms, she was outside the window for 

Remdesivir, she is being treated supportively with Decadron, prophylactic 

Lovenox, and empiric antibiotics.  Patient was placed on irritable at 60 L and 

90% FiO2 with nonrebreather mask today on 12/10/2021, we'll start the patient on

Baricitinib.  On 12/12/2021 patient is on BiPAP support remains BiPAP support 

dependent at pressures of 14 and 1800% FiO2 although clinically is comfortable. 

Today on 12/23/2021 patient is on Airvo at 50 L and FiO2 of 70%. Baricitinib 

completed on 12/23/2021





#2.  Mildly elevated pro calcitonin, patient is on empiric antibiotics in the 

form of Rocephin, pro calcitonin has improved, no definite sign of infection, 

Rocephin has been discontinued





#3.  Elevated inflammatory markers secondary to the viral pneumonia, improving





#4.  Morbid obesity with BMI of 39.1 kg/m





#5.  Obstructive sleep apnea on CPAP therapy





#6.  History of COPD with chronic hypoxic respiratory failure usually wears 2 L 

of oxygen on the outpatient basis, and CT angiogram of the chest also revealed 

evidence of interstitial pulmonary fibrosis





#7.  Hyperlipidemia





#8.  Anxiety/depression





#9.  History of coronary artery disease with previous stent placement





#10.  History of aortic thrombectomy/aortobifem bypass in 2007





#11.  Former smoker





Plan:





Continues on Airvo, and we're continuing to wean the flow and the FiO2 that are 

currently at 45 L and FiO2 of 45%


We'll switch to regular nasal cannula this afternoon


Vital signs have been stable, no fever or chills


Inflammatory markers continue to improve


Patient has completed her course of Baricitinib


Continue current dose Decadron


Continue prophylactic anticoagulation with Lovenox


We'll continue to follow


Once her oxygen demand is down to 5 L of supplemental oxygen per minute or less 

may consider discharge home





I performed a history & physical examination of the patient and discussed their 

management with my nurse practitioner, Jeannine Belcher.  I reviewed the nurse 

practitioner's note and agree with the documented findings and plan of care.  

Lung sounds are positive for diffuse crackles throughout the lung fields.  The 

findings and the impression was discussed with the patient.  I attest to the 

documentation by the nurse practitioner. 











Time with Patient: Less than 30

## 2021-12-26 NOTE — P.PN
Subjective





Patient is a 66-year-old the female with past medical history of COPD and 3 L 

oxygen dependent at home has cold symptoms for about a month comes in with 

shortness of breath presently on telemetry dysfunction CT of the chest did not 

show any pulmonary embolism but did show pulmonary fibrosis as well as 

infiltrates.  Below.  Patient doesn't have any infiltrate consistent with 

pneumonia patient denied any dysuria.  Patient is presently on Rocephin.  

Patient has mild acute renal failure because of which I'm holding ACE inhibitor.

 Severely hypomagnesemic magnesium is being replaced.  Patient last smoked about

17 years ago.





12/08/2021





Patient is seen and evaluated in follow-up and currently maintained on 10 L high

flow and weaning as tolerated.  Patient was on 15 L high flow nasal cannula this

morning and tolerating and nursing staff continuing to wean.  Patient normally 

wears 3 L of oxygen continuously in the outpatient setting.  Patient denies any 

worsening shortness of breath just generalized fatigue and weakness and 

continued diarrhea.  Patient states she is going approximately 2-3 times per day

will add C. diff testing and if negative will add Imodium and Questran.  

Pulmonary is following and venous Doppler was ordered bilateral lower 

extremities which is negative for DVT as d-dimer is elevated today at 2.42.  

Inflammatory markers significantly improved his LDH is 338 and CRP is 7.0, 

repeat magnesium after replacement is 2.9. 





12/09/2021





Patient is seen and evaluated in follow-up this morning currently sitting up in 

the chair and was on 10 L high flow although oxygen saturations were below 90% 

and patient bumped back up to 15 L.  Patient is complaining of the oxygen being 

too high and making her feel more anxious and discussed with nursing staff about

continuing to wean as tolerated.  She continues with diarrhea although is 

improving and C. diff testing was negative and patient was started on Questran 

along with Imodium.  Patient continues to state she has no real appetite 

although encourage the patient to continue with small frequent meals and will 

add Magic cups as well.  Encourage the patient to increase oral intake as she is

weak and needs the energy.  She normally maintained on 3 L via nasal cannula and

will continue to titrate as tolerated.  Pulmonary is following patient is 

maintained on IV dexamethasone twice daily along with vitamin and zinc 

supplements, Lovenox twice daily for elevated d-dimer and will continue.  

Patient was on normal saline and considered discontinuing as patient sodium is 

now 147 and patient is being started on D5 in water and will repeat labs.





12/10/2021


Patient is seen in follow-up this morning currently sitting up in the chair and 

oxygen requirements have increased and patient is now maintained on Airvo along 

with 15 L nonrebreather and oxygen saturations have been in the low 80s to 90 

maximum.  Discussed CODE STATUS with the patient and patient is a full code but 

does not wish to be placed on a ventilator if respiratory status continues to 

decline.  Pulmonary following closely and prognosis remains quite guarded.  

Patient continues on Lovenox along with dexamethasone and vitamin and zinc 

supplements and is being started on Baricitinib.  Patient is extremely weak and 

becomes extremely dyspneic with minimal exertion.  Patient's oral intake 

continues to be extremely poor and again encouraged and stressed the importance 

of eating regardless of not feeling hungry and having no appetite.  May consider

enteral nutrition and dietary consult.  Patient states her diarrhea has 

significantly improved and will use Imodium as needed and discontinue Questran. 

Repeat inflammatory markers ordered and pending.  Chest x-ray today shows 

bilateral airspace disease again noted with prominence and interstitial, and 

findings consistent with pneumonia.





12/11/2021


Patient admitted with bilateral common pneumonia , Her respiratory status today 

is a slightly worse as she is dependent on BiPAP throughout the day.  Also she 

has decreased air entry on both sides


She is saturating 88% while she is on BiPAP.


D-dimer is the same, 4.0 and 3.9.


She continued on dexamethasone, vitamin C, vitamin D, zinc,  barcitininb and 

Lovenox 40 mg twice a day.  Also she is on Protonix





12/12/2021


Sitting in chair looks mildly tachypneic and tolerates BiPAP which she uses his 

yesterday all the time.  She has not been eating since yesterday and may switch 

her for short time to nonrebreather and air or so she can read some oral diet   

Per pulmonary team recommendation.  Other than that she is hemodynamically 

stable.  Labs are stable.Calcitonin 0.19.  C. diff is negative.


She remains on dexamethasone, vitamin C, Z and Lovenox, Protonix and  barcitinib







12/13/2021


This morning she was still on BiPAP, and she was refusing to start TPN as she 

could not come off the BiPAP.  However later on during the day she could be 

placed on nonrebreather 15 L and Aravo 60 L with 94% so she can tolerate oral 

feeding.


Other vitals are stable, afebrile.  Labs are stable as well.


She continued on dexamethasone, vitamin C, vitamin D, zinc,  barcitininb and 

Lovenox 40 mg twice a day





12/14/2021


Patient with bilateral: With pneumonia and hypoxia, she is improving gradually 

today, today she couldn't get off her BiPAP and placed on airvo 60 L with FiO2 

of 85%, with occasional nonrebreather 15 L, patient was happy that she was able 

to eat today and she tolerates diet well.  She has some diarrhea but no 

abdominal pain.  C. diff is negative.


Other than that she is hemodynamically stable


Patient continued today on  dexamethasone, vitamin C, Z and Lovenox, Protonix 

and  barcitinib 


Follow-up chest x-ray and CBC/BMP in the morning





12/15/2021


Patient today is pleasant and smiling as her pulmonary illness is improving 

progressively but slowly, but she still currently on high flow nasal cannula 

with 55 L/m and FiO2 of 88% saturation in low 90s.  Rest of vitals are stable.  

Labs including CBC, BMP are unremarkable.


C. diff test came back negative and her diarrhea stopped today.  She is able to 

eat about 50-25% of her meals and she is satisfied with that for now.


She still on steroids with dexamethasone, vitamin C, vitamin D, zinc, Lovenox 40

mg twice a day, Protonix and barcitininb 





12/16/2021


Patient today her breathing slightly worsened and her oxygen requirement today 

was 55 L with 90%, she still can go a little difficulty due to her dyspnea and 

she confirmed me she is able to eat with no issues.


She sitting in chair today.  Hemodynamically stable.


Labs reviewed and shows stability, her d-dimer actually improved 3.9 down to 

1.6.  LDH went up to 1029 and CRP 3.4.


Chest x-ray shows slight improvement in aeration in both lung fields despite 

bilateral pneumonia.


She remains on dexamethasone, vitamin C, D and zinc and  Protonix and 

barcitininb .





12/17/2021


Patient breathing pattern is slowly to improve, today she still on high flow 

nasal cannula 55 L/m and FiO2 of 90%.


Labs including CBC and BMP are unremarkable and stable.  On the top of the 

patient is afebrile


However patient is complaining of from oral thrush and asked for fluconazole.  

Patient refused nystatin.  Also she has persistent loose bowel movement, C. diff

checked twice and there were negative.  We'll start her on short course of 

Questran.  Also patient states she is eating well and tolerates diet with no 

difficulty.


Continue with the other same medication of dexamethasone, multiple vitamin 

cocktail and Lovenox, Protonix and barcitininb 





12/18/2021


Her respiratory status with minimal change and fluctuating, currently she is on 

airvo at 55 L/m and 85%.  She is able to eat and drink.


She states that her most thrush is better and his diarrhea is improving after 

adding nystatin and Questran yesterday.


Afebrile and vitals are stable.


She remains on the same treatment of dexamethasone, vitamin C, D and zinc and  

Protonix and barcitininb .





12/19/2021 


patient breathing is improving as per patient stated that she is breathing 

easier.  She remains on same dose of oxygen as yesterday she still on 15 L/m at 

88% FiO2.


Also reports improvement in her bowel movements, she said that she almost had 

more formed bowel movement and her oral thrush is also improving with 

fluconazole.


And we will continue with the same treatment for her colon pneumonia with 

dexamethasone, vitamins, Barcitininb, Lovenox and Pepcid





12/20/2021


Patient is with bilateral common pneumonia on high dose of records oxygen since 

admission.  Today is awake and alert looks more comfortable and each day she 

states she is breathing easier.  Also she is happy with the progress of her 

bowel movement, she had about 4 months which is almost formed over the last 2 

days.  No more nausea vomiting but she feels some sore throat.  She is eating 

well.  However despite her reported clinical improvement by the patient she 

still on same dose of high need of oxygen and currently 55% and 70-90 L/m.


She remains on the same treatment of dexamethasone, vitamin C, DM zinc, 

,baricitinib.  Also she is on fluconazole, nystatin, Robitussin, Lovenox , and 

patient is not on Questran





12/21/2021


Patient clinically slowly to improve, he states she feels better but however her

oxygen requirements very close to yesterday at 50 L/m and 75% high flow nasal 

cannula.


Her GI symptoms or improvement and she tolerates diet well.


She is fluconazole currently while continue the same treatment of dexamethasone,

vitamin cocktail for Covid and baricitinib. .  Nystatin and Robitussin and 

Lovenox





12/22/2021


Patient respiratory status not improving or worsening much compared to 

yesterday.  She still breathing easily although she requires high flow nasal 

cannula at 50 L/m and FiO2 of 75%.  No GI symptoms, no significant sore throat 

which is responding to nystatin.  Patient tolerates diet well.


We'll keep the same medication and keep monitoring the patient for now.


Her creatinine trending up over the last 2-3 days, today is 0.08 went up to 1.2.

 We held her Diovan which is a home medication.  And we'll check creatinine 

tomorrow and bladder scan.


Repeat labs in the morning.  Follow-up chest x-ray





12/23/2021


 patient still improved slowly and gradually and her oxygen requirement today is

slightly down to 50 L/m and FiO2 of 68%


Other than that she is a pleasant subjective she feels improved and she breathes

easily, no diarrhea or vomiting.  Tolerates diet well.  Occasional sore throat.


Labs looks stable with WBCs 11.8.  D-dimer is down to 1.1.  LDH slightly up 734 

and CRP up to 6.2.


Continue with same treatment dexamethasone, vitamin cocktail for Covid and 

baricitinib.  Also Lovenox and Protonix





12/24/2021


Patient continued to report improvement in her breathing pattern however she 

still on high flow nasal cannula 50 liter per minute with FiO2 of 45%


Inflammatory markers improvement, d-dimer down to 0.6, LDH down from 734 and 2-

90 and CRP is normal less than 0.3.


Patient finished her dose of baricitinib breath continue with the rest of 

treatment including dexamethasone, vitamin C, D and zinc.  Lovenox Protonix





12/25/2021


Patient breathing easily, eats well.  Her oxygen requirement down to 45 L and 

45-50 FiO2.


We will continue with the same and treatment of steroids, vitamins, Lovenox and 

Protonix.  She finished her dose of baricitnib





10/26/2021 patient continued to improve slowly and gradually and steadily.  

Today her oxygen requirements significantly lowered to telemetry later per 

minute via high flow nasal cannula.  Patient herself feels improvement each day 

little by little.


Hemodynamically stable.  No labs.


Continue with steroids and vitamins.  No GI symptoms but she wants to keep 

nystatin records status with sterile she will get fungal infection on the thrush








Objective





- Vital Signs


Vital signs: 


                                   Vital Signs











Temp  98.1 F   12/26/21 05:58


 


Pulse  58 L  12/26/21 05:58


 


Resp  18   12/26/21 08:00


 


BP  134/61   12/26/21 05:58


 


Pulse Ox  90 L  12/26/21 11:25








                                 Intake & Output











 12/25/21 12/26/21 12/26/21





 18:59 06:59 18:59


 


Intake Total 1080  


 


Balance 1080  


 


Intake:   


 


  Oral 1080  


 


Other:   


 


  Voiding Method Bedside Commode  Bedside Commode


 


  # Voids 4  


 


  # Bowel Movements 1  














- Exam





-GENERAL: The patient is alert and oriented , on BiPAP, not in any acute distres

s. Well developed, well nourished. 


HEENT: Pupils are round and equally reacting to light. EOMI. No scleral icterus.

No conjunctival pallor. Normocephalic, atraumatic. No pharyngeal erythema. No 

thyromegaly. 


CARDIOVASCULAR: S1 and S2 present. No murmurs, rubs, or gallops. 


-PULMONARY: Chest is clear to auscultation, no wheezing.  Bilateral crepitation 

and decreased air entry on both sides


ABDOMEN: Soft, nontender, nondistended, normoactive bowel sounds. No palpable 

organomegaly. 


MUSCULOSKELETAL: No joint swelling or deformity. 


EXTREMITIES: No cyanosis, clubbing, or pedal edema. 


NEUROLOGICAL: Gross neurological examination did not reveal any focal deficits. 


SKIN: No rashes. no petechiae.





- Labs


CBC & Chem 7: 


                                 12/23/21 06:21





                                 12/23/21 06:21





Assessment and Plan


Assessment: 





-acute hypoxic respiratory failure: secondary to COVID-19 pneumonia 


-chronic hypercapnic respiratory failure secondary to COPD uses 3 L of oxygen at

home


-Acute kidney injury


-Diarrhea most likely secondary to Covid, resolved


-Oral thrush secondary to steroids


-Hypernatremia, improving, sodium is 140 a.m. we'll continue D5 in water and 

repeat a.m. labs 


-Severe hypomagnesemia, improved


-Elevated d-dimer secondary to Covid and patient is on Lovenox which will be 

continued, Lovenox is currently twice daily


-Acute renal failure, prerenal, improved with IV fluids


-Hypertension


-Mild anion gap and metabolic acidosis probability of lactic acidosis, most 

likely secondary to increased amounts of diarrhea and dehydration, lactic acid 

is 1.1


-gastroesophageal reflux disease


-Hyperlipidemia


-Hypertension


-History of pulmonary fibrosis


-Sleep apnea uses CPAP machine at home


-Peripheral vascular disease


-Coronary artery disease


-DVT prophylaxis: On Lovenox which will be continued











Plan: 





This is a pleasant 66 years old female who presents with bilateral covid 

pneumonia


Continue with dexamethasone


Continue with vitamin C, vitamin D and zinc


Pulmonary consult 


Also he is on barcitinib


Continue with stenting


Hold Diovan and monitor blood pressure.  Check a bladder scan


Labs and medication were reviewed..  Continue same treatment.  Continue with 

symptomatic treatment.  Resume home medication.  Monitor lytes and vitals.  DVT 

and GI prophylaxis.  Further recommendations as per clinical course of the 

patient


DVT prophylaxis: Subcutaneous Lovenox


GI Prophylaxis: Ppi


Prognosis is guarded

## 2021-12-27 RX ADMIN — CLOPIDOGREL BISULFATE SCH MG: 75 TABLET ORAL at 09:13

## 2021-12-27 RX ADMIN — OXYCODONE HYDROCHLORIDE AND ACETAMINOPHEN SCH MG: 500 TABLET ORAL at 20:32

## 2021-12-27 RX ADMIN — NYSTATIN SCH: 100000 SUSPENSION ORAL at 14:08

## 2021-12-27 RX ADMIN — ENOXAPARIN SODIUM SCH MG: 40 INJECTION SUBCUTANEOUS at 20:32

## 2021-12-27 RX ADMIN — LORATADINE SCH MG: 10 TABLET ORAL at 09:13

## 2021-12-27 RX ADMIN — NYSTATIN SCH UNIT: 100000 SUSPENSION ORAL at 21:17

## 2021-12-27 RX ADMIN — PANTOPRAZOLE SODIUM SCH MG: 40 TABLET, DELAYED RELEASE ORAL at 09:13

## 2021-12-27 RX ADMIN — OXYCODONE HYDROCHLORIDE AND ACETAMINOPHEN SCH MG: 500 TABLET ORAL at 09:13

## 2021-12-27 RX ADMIN — ENOXAPARIN SODIUM SCH MG: 40 INJECTION SUBCUTANEOUS at 09:13

## 2021-12-27 RX ADMIN — TIOTROPIUM BROMIDE INHALATION SPRAY SCH PUFF: 3.12 SPRAY, METERED RESPIRATORY (INHALATION) at 09:28

## 2021-12-27 RX ADMIN — Medication SCH MG: at 09:13

## 2021-12-27 RX ADMIN — DEXTROSE SCH MG: 50 INJECTION, SOLUTION INTRAVENOUS at 20:32

## 2021-12-27 RX ADMIN — METOPROLOL SUCCINATE SCH MG: 25 TABLET, EXTENDED RELEASE ORAL at 09:13

## 2021-12-27 RX ADMIN — Medication SCH MCG: at 09:13

## 2021-12-27 RX ADMIN — NYSTATIN SCH UNIT: 100000 SUSPENSION ORAL at 09:14

## 2021-12-27 RX ADMIN — SERTRALINE HYDROCHLORIDE SCH MG: 100 TABLET ORAL at 09:13

## 2021-12-27 RX ADMIN — DEXTROSE SCH MG: 50 INJECTION, SOLUTION INTRAVENOUS at 09:13

## 2021-12-27 RX ADMIN — NYSTATIN SCH UNIT: 100000 SUSPENSION ORAL at 18:32

## 2021-12-27 RX ADMIN — ATORVASTATIN CALCIUM SCH MG: 80 TABLET, FILM COATED ORAL at 09:13

## 2021-12-27 RX ADMIN — ACETAMINOPHEN PRN MG: 325 TABLET, FILM COATED ORAL at 09:21

## 2021-12-27 NOTE — XR
EXAMINATION TYPE: XR chest 1V portable

 

DATE OF EXAM: 12/27/2021

 

HISTORY: Shortness of breath.

 

COMPARISON: 12/23/2021

 

TECHNIQUE: Single view of the chest is submitted.

 

FINDINGS:

Demonstrated are scattered senescent parenchymal change.  

 

Progressive reticulonodular infiltrates noted.

 

The heart is stable.

 

Hilar and mediastinal structures are within normal limits.  

 

Degenerative changes are seen of the dorsal spine. 

 

 IMPRESSION: 

 

1.  Progressive reticulonodular infiltrates noted.

## 2021-12-27 NOTE — PN
PROGRESS NOTE



DATE OF SERVICE:

12/27/2021



REASON FOR FOLLOWUP:

1. COVID-19 pneumonia.

2. Oral thrush.



INTERVAL HISTORY:

The patient is afebrile.  The patient is breathing comfortably.  Still requiring 10 L

high-flow oxygen.  The denies having any chest pain.  No worsening cough or sputum

production.  No abdominal pain or diarrhea.



PHYSICAL EXAMINATION:

Blood pressure 129/68 with a pulse of 65, temperature 98.1. She is 94% on 10 L high-

flow oxygen. General description is an elderly female up in the chair in no distress.

Respiratory system: Unlabored breathing, decreased intensity of breath sounds. No

wheeze.  Heart S1, S2.  Regular rate and rhythm.  Abdomen soft, no tenderness.



LABS:

No new labs have been obtained today.



DIAGNOSTIC IMPRESSION AND PLAN:

1. Patient with acute respiratory failure secondary to COVID-19 pneumonia in this

    patient with overall clinical improvement.  Patient to continue with decadron,

    Lovenox, zinc and ascorbic acid.

2. Oral thrush. Continue with nystatin swish and swallow and monitor clinical course

    closely.





MMODL / IJN: [id number] / Job#: [id number]

## 2021-12-27 NOTE — P.PN
Subjective


Progress Note Date: 12/27/21


Principal diagnosis: 


 Dyspnea, hypoxia, COVID-19





This is a pleasant 66-year-old female patient who follows at the [facility] for her

primary care needs.  She has history of hypertension, hyperlipidemia, 

gastroesophageal reflux disease, anxiety/depression, aortic thrombectomy in 2007

, former smoker, COPD, obstructive sleep apnea utilizing CPAP.  He is here 

yesterday to the emergency room with a four-week history of shortness of breath,

cough, congestion, body aches and fevers.  She was tested negative for CoVID 2.

 Her  is positive for CoVID and she is now positive for CoVID.  She is 

not vaccinated.  She does have home oxygen use at 2-3 L.  She is currently 

requiring 10 L high flow nasal cannula to maintain O2 saturations at 90%.  Chest

x-ray reveals bilateral patchy infiltrates. White count 5.9.  Hemoglobin 12.5.  

Platelets 398.  Lymphocytes 0.5.  D-dimer 1.89.  Sodium 140.  Potassium 4.9.  

Creatinine 1.2.  AST 39.  ALT 26.  Alk phos 156.  LDH 1283.  C-reactive protein 

21.7.  Pro-calcitonin 0.32.  She's been initiated on Decadron, Lovenox, vitamin 

supplements.  0.9% normal saline at 75 ML's per hour.





On 12/08/2021 patient is in follow-up on medical surgical floor, she is resting 

comfortably in bed, she states she is feeling better, although she is requiring 

more oxygen compared to when she first presented to the emergency department, 

note that patient does wear home oxygen at 2 L for history of COPD.  She is 

currently on 10 L of oxygen, her pulse ox is 91-92%, breathing comfortably, lung

sounds reveal coarse crackles at bilateral bases, but no wheezing, no rhonchi.  

CT angiogram of the chest showed no evidence of pulmonary embolism, it did show 

pulmonary emphysema and pulmonary interstitial fibrosis with increased 

interstitial infiltrates compared to her old exam.  There was no evidence of a 

suspicious pulmonary mass.  Patient was outside the window for Remdesivir, she 

continues on Decadron 6 mg twice daily, her pro-calcitonin level on admission 

was 0.32, and patient was covered with Rocephin for possibility of underlying 

bacterial infection.  She is on prophylactic Lovenox 40 mg daily.  CTA [facility] of the chest showed no evidence of pulmonary embolism. 0





On 12/09/2001 patient seen in follow-up on medical surgical floor, she is c

urrently on 10 L of oxygen the pulse ox of 86%, FiO2 has since been increased to

15 L, she has a mild cough, but overall seems to be in no acute distress, she is

sitting up in the chair, denies any chest discomfort, looks quite comfortable.  

Lung sounds reveal coarse crackles bilateral bases, she does get short of breath

with exertion.  She states if she sits still she is breathing comfortably, and 

her O2 saturations are at or above 90%.  She's had no fever or chills overnight,

she remains on Decadron 40 mg twice daily.  She is on empiric antibiotics in the

form of Rocephin and IV fluids at 75 ML per hour.  Today's labs have been 

reviewed, white blood cell count is 8.6, hemoglobin is 12, platelet count is 

506, d-dimer is 4.01, sodium is 147, potassium is 4.8, chloride is 112, BUN of 

20 creatinine 0.7, renal profile has significantly improved since admission.  

LDH has significantly improved since admission and is down to 348 on today's 

labs, CRP is still pending, her last pro-calcitonin is negative at 0.13.  Blood 

culture has shown no growth thus far.





On 12/10/2021 patient seen in follow-up on medical surgical floor, she had been 

on 15 L high flow and 100% nonrebreather mask up until this morning, this 

morning patient's FiO2 requirements had increased, patient was getting up in the

chair, she is significantly desaturated, she was placed on irritable at 60 L and

FiO2 of 90% in addition to 100% nonrebreather, and patient is taking quite a 

while to recover her O2 saturations although her work of breathing is not 

increased.  She is still awake and alert, she is oriented 3, she is able to 

make her needs known, her pulse ox is slowly recovering at rest, with deep 

breathing, pursed lip breathing, repositioning.  Overnight she has had no fever 

or chills, blood pressures have been stable.  Breathing fairly comfortably 

although she desaturates with any little exertion.  Currently she is on Decadron

6 Twice daily, she will be started on Baricitinib, patient is also on Plavix, 

and prophylactic Lovenox.  Today's chest x-ray and labs are still pending, she 

remains on D5W at a rate of 100 ML per hour for dehydration related to diarrhea 

and hypernatremia, repeat electrolytes and renal profile are still pending for 

today.  No abdominal pain, no nausea.  The diarrhea has significantly improved 

in the last 48 hours.  Patient is tolerating oral intake, no abdominal pain. 





On 12/12/2021 patient seen in follow-up on medical surgical floor.  She is alert

and alert, she is on BiPAP support with pressures of 14 and 8 and FiO2 100%.  

Earlier this morning her BiPAP pressures have been increased from 12.6 in the 

100% FiO2.  Her pulse oximeter readings are marginal at around 90%, with any 

exertion patient does desaturate and takes her a while to recover although she 

looks very comfortable, nontachypneic, she is sitting up in the recliner, she is

watching TV.  She is complaining of being hungry.  She has not been able to take

much by mouth related to being BiPAP dependent.  She has been afebrile, 

hemodynamics are stable.  Her last chest x-ray from 12/10/2021 showed bilateral 

airspace disease with prominence of interstitium with no evident pneumothorax or

pleural effusion.  Today's labs have been reviewed showing white blood cell 

count of 10.6, hemoglobin 12.2, d-dimer and yesterday's labs was down to 3.98, 

electrolytes and renal profile were within normal limits.  Per pro-calcitonin 

level was negative at 0.19.  Patient remains on Baricitinib, Decadron 6 mg twice

daily, she is receiving IV fluids with D5 W at 100 ML per hour





On 12/13/2021 patient seen in follow-up on medical surgical floor, she was 

switched to Airvo at 60 L and 90%, and nonrebreather mask, she is maintaining 

her O2 saturations between 88 and 91%, she is breathing comfortably, tolerating 

it for a few hours now, she was able to eat something, she is taking in oral 

fluids, she is awake and alert, she is sitting up in the chair, she is being her

bills.  Lung sounds are positive for scattered crackles throughout the lung 

fields, no cough, appears very comfortable.  He is on [place] neb, she is on 

Decadron, and Lovenox.  She is on D5W at a rate of 100 ML per hour related to 

her nothing by mouth status however in view of her tolerating oral intake now, 

and not been BiPAP dependent anymore we'll stop the IV fluids.  Today's labs 

have been reviewed and her white blood cell count is 10.4, hemoglobin is 12.6, 

her electrolytes have been reviewed and are within normal limits, sodium is 141,

potassium is 3.9, chloride is 103, BUN is 15, creatinine is 0.72.  Her 

procalcitonin level was negative.





On 12/15/2021 patient seen in follow-up on medical surgical floor, she is 

sitting up in the recliner, she states her breathing is improving, she is on 

Airvo, and we were able to wean down the flow and FiO2, and the flow is 

currently at 55 L/m, and FiO2 is currently at 88%, her pulse ox is ranging 

between 86-93%, breathing comfortably, minimal crackles at bilateral bases, 

occasional cough, patient has been get not to the bedside commode, tolerates 

activity well, her spirits are high, her oral intake is fair.  She continues on 

Baricitinib, she continues on Decadron 6 blood gram twice daily, and Lovenox 40 

mg twice daily, today's labs have been reviewed showing white blood cell count 

is 7.19, hemoglobin of 11.9, platelet count is 383, electrolytes and renal 

profile are unremarkable.  Cdiff negative.





On 12/16/2021 patient seen in follow-up on medical surgical floor.  She is awake

and alert, in no acute distress, she is currently resting in bed, she is on her 

left side and patient has been compliant with salt repositioning in bed from 

side to side.  She is breathing comfortably, remains on Airvo at 55 L and FiO2 

of 87%, and she was also placed on Airvo because her pulse ox was less than 85%,

her current O2 saturations are around 88%, patient seems to breathing quite 

comfortably.  Today's chest x-ray shows persistent but slightly improving 

bilateral pneumonia. patient continues on Baricitinib, Decadron 6 mg twice 

daily, she is on Lovenox 40 mg twice daily, COVID-19 vitamins.  His labs have 

been reviewed and there was down to 1.63, CBC was within normal limits, 

electrolytes and renal profile were unremarkable.  Today's LDH is 1029, CRP is 

1.7.  Vital signs have been stable, patient has been afebrile.





On 12/17/2021 patient is seen in follow-up on medical surgical floor.  She is 

breathing comfortably, she is resting in bed, she's been repositioning self from

side to side.  She remains on Airvo at 55 L and FiO2 of 90%, and pulse ox is 89-

93%.  Blood pressure is been stable, patient has been afebrile.  Patient 

continues on Baricitinib, continues on Decadron, Diflucan, Lovenox twice daily. 

Lung sounds revealed mildly diminished breath sounds, and a few scattered c

rackles.  Chest x-ray showing persistent but slightly improving bilateral [place]

pneumonia





On 12/19/2001 patient seen in follow-up on medical surgical floor.  She is 

resting comfortably in bed, she is currently on 55 L/m and FiO2 of 80%, 

breathing comfortably, she has not required nonrebreather mask at all for the 

past 24 hours, she continues on Decadron, Baricitinib, and prophylactic Lovenox,

she is breathing comfortably, lung sounds revealed minimal crackles bilaterally,

she is tolerating oral intake.  No nausea vomiting or diarrhea.  Fever or 

chills.  Today's chest x-ray shows no change in the appearance of diffuse 

interstitial opacities in both lungs.  Today's labs have been reviewed, white 

count is normal at 9.8, hemoglobin of 12.7, d-dimer is 2.1, electrolytes and 

renal profile are unremarkable, and her inflammatory markers are improving on 

today's labs.





On 12/20/2021 patient seen in follow-up on medical surgical floor, she remains 

on a currently on 55 L/m, and FiO2 of 75%, she has not required nonrebreather 

mask.  She is breathing quite comfortably, lung sounds revealed minimal 

crackles, no wheezing, patient continues on Decadron 6. Twice a day, Lovenox 40 

mg twice a day, she is on cough syrup, she is on Xopenex and Spiriva inhalers.  

She remains on Baricitinib.  She's had no acute events overnight, no specific 

complaints.  No new labs today.  Her last set of inflammatory markers from 

yesterday showed improving LDH which was down to 442, and CRP was down to 0.60, 

her d-dimer was 2.10.





On 12/21/2001 patient seen in follow-up on medical surgical floor.  She is breat

julia comfortably, she remains on Airvo, and the flow has been decreased down 

further to 50 L/m, and FiO2 is currently at 77%, she is maintaining O2 

saturations between 90-92%, breathing very comfortably, minimal cough, lung 

sounds are clear, no rhonchi or wheezing, she is awake and alert, oriented 3, 

no altered mentation, no fever or chills, she is tolerating oral intake, no 

nausea or vomiting, clinically she's been stable, slowly improving in terms of 

oxygenation as well.  Remains on Decadron 6 Coumadin twice daily, Lovenox 40 mg 

twice daily, she is on COVID-19 multivitamins, she remains on Baricitinib 4 mg 

daily.  She's had no acute events overnight.  Today's labs have been reviewed, 

white blood cell count is 10.7, hemoglobin 13.7, electrolytes and renal profile 

are unremarkable, her inflammatory markers were improving on labs from 2 days 

ago. 





On 12/22/2021 patient seen in follow-up on medical surgical floor.  She is 

breathing very comfortably, she is currently sitting up in the chair, on Airvo, 

and currently on 50 L/m, and FiO2 is at 75%, her pulse ox is 94%.  Only 

occasional cough, minimal crackles, no rhonchi or wheezing.  No fever or chills.

 She continues on Baricitinib, Decadron 6 mg twice daily, Lovenox 40 mg twice 

daily, multivitamins.  Hemodynamically she is stable, no fever or chills.





On 12/23/2021 patient seen in follow-up on medical surgical floor, she is sitt

ing up in the chair, breathing comfortably, she remains on Airvo and the flow is

currently at 15 L and FiO2 has been decreased to 69%, her pulse ox is 91-94%, 

she is breathing comfortably, minimal crackles at the left lung base, no fever 

or chills, only occasional cough, no significant phlegm production, no chest 

discomfort.  Clinically she is improving, she's had no acute events overnight, 

she is tolerating oral intake, she's been admitted to the bedside commode, she 

does get exertional dyspnea, but recovers.  D-dimer is improving and is down to 

1.11 on today's labs, her last LDH from yesterday was 734, was increased from 

most previous value but overall improved during this admission, CRP level was 

less than 0.5.  She has completed her course of Baricitinib, she continues on 

Decadron 6 mg twice a day, Lovenox 40 mg twice daily, she is on cough syrup, and

COVID-19 multivitamins.





On 12/24/2021 patient seen in follow-up on medical surgical floor, she remains 

on Airvo and her flow and FiO2 are being continuously weaned down and she is 

currently at 45 L/m and FiO2 is currently down to 55%.  She is maintaining O2 

saturations at 93-97%.  Breathing comfortably, lung sounds are essentially clear

to auscultation, with minimal fine rales at bilateral bases, no wheezing, no 

complaints of chest pain.  Afebrile.  Patient has been sitting up in the 

recliner most of the day, she gets up to use the commode, tolerates exertion 

fairly well, yesterday's chest x-ray showed COPD and chronic interstitial 

pulmonary fibrosis with basilar infiltrates that are stable in appearance.  

Today's labs have been reviewed showing improving d-dimer which is down to 0.60,

inflammatory markers have further decreased and LDH is down to 290, and CRP is 

less than 0.30.





On 12/25/2021 patient seen in follow-up on medical surgical floor.  Remains on 

Airvo and her flow is down to 45 L and FiO2 of 45% with the O2 saturations at 

92%.  Breathing very comfortably, lung sounds are essentially clear, no rhonchi 

or wheezing.  No cough, patient has been tolerating activity, has been getting 

up in a recliner, and bedside commode, tolerates it well.  Vital signs have been

stable, no fever or chills. She has completed Baricitinib, continues Decadron, 

and Lovenox





On 12/27/2021 patient is seen in follow-up on medical surgical floor.  He was 

switched over to regular high flow nasal cannula yesterday and she is currently 

on 10 L per high flow with a pulse ox of 91-95%, breathing comfortably, lung 

sounds are clear, no rhonchi or wheezing, no rales, no chest pain, minimal 

cough, patient has been get not to the commode, tolerating activity well.  She 

has completed her course of Baricitinib, she continues on Decadron 6 mg twice 

daily, and twice daily dosing of Lovenox in addition to multivitamins.  No new 

labs.  Follow-up chest x-ray showing progressive reticulonodular infiltrates.





Objective





- Vital Signs


Vital signs: 


                                   Vital Signs











Temp  98.4 F   12/27/21 08:00


 


Pulse  63   12/27/21 08:00


 


Resp  22   12/27/21 08:00


 


BP  115/63   12/27/21 08:00


 


Pulse Ox  95   12/27/21 08:00








                                 Intake & Output











 12/26/21 12/27/21 12/27/21





 18:59 06:59 18:59


 


Intake Total 1080 260 


 


Balance 1080 260 


 


Intake:   


 


  Oral 1080 260 


 


Other:   


 


  Voiding Method Bedside Commode Toilet Toilet


 


  # Voids 4 2 


 


  # Bowel Movements 1 1 














- Exam


 GENERAL EXAM: Alert, very pleasant, 66-year-old white female, on 10 L per high 

flow nasal cannula with a pulse ox of 95% does not appear to be in any acute 

distress


HEAD: Normocephalic/atraumatic.


EYES: Normal reaction of pupils, equal size.  Conjunctiva pink, sclera white.


NOSE: Clear with pink turbinates.


THROAT: No erythema or exudates.


NECK: No masses, no JVD, no thyroid enlargement, no adenopathy.


CHEST: No chest wall deformity.  Symmetrical expansion. 


LUNGS: Equal air entry with with coarse bilateral crackles


CVS: Regular rate and rhythm, normal S1 and S2, no gallops, no murmurs, no rubs


ABDOMEN: Soft, nontender.  No hepatosplenomegaly, normal bowel sounds, no 

guarding or rigidity.


EXTREMITIES: No clubbing, no edema, no cyanosis, 2+ pulses and upper and lower 

extremities.


MUSCULOSKELETAL: Muscle strength and tone normal.


SPINE: No scoliosis or deformity


SKIN: No rashes


CENTRAL NERVOUS SYSTEM: Alert and oriented -3.  No focal deficits, tone is no

rmal in all 4 extremities.


PSYCHIATRIC: Alert and oriented -3.  Appropriate affect.  Intact judgment and 

insight.











- Labs


CBC & Chem 7: 


                                 12/23/21 06:21





                                 12/23/21 06:21





Assessment and Plan


Plan: 


 Assessment:





#1.  Acute on chronic hypoxic respiratory failure secondary to acute COVID-19 

pneumonia, patient is not vaccinated related to multiple ALLERGIES, patient 

presented with a 4 week history of symptoms, she was outside the window for 

Remdesivir, she is being treated supportively with Decadron, prophylactic [place]

nox, and empiric antibiotics.  Patient was placed on irritable at 60 L and 90% 

FiO2 with nonrebreather mask today on 12/10/2021, we'll start the patient on 

Baricitinib.  On 12/12/2021 patient is on BiPAP support remains BiPAP support 

dependent at pressures of 14 and 1800% FiO2 although clinically is comfortable. 

Today on 12/23/2021 patient is on Airvo at 50 L and FiO2 of 70%. Baricitinib 

completed on 12/23/2021





#2.  Mildly elevated pro calcitonin, patient is on empiric antibiotics in the 

form of Rocephin, pro calcitonin has improved, no definite sign of infection, 

Rocephin has been discontinued





#3.  Elevated inflammatory markers secondary to the viral pneumonia, improving





#4.  Morbid obesity with BMI of 39.1 kg/m





#5.  Obstructive sleep apnea on CPAP therapy





#6.  History of COPD with chronic hypoxic respiratory failure usually wears 2 L 

of oxygen on the outpatient basis, and CT angiogram of the chest also revealed 

evidence of interstitial pulmonary fibrosis





#7.  Hyperlipidemia





#8.  Anxiety/depression





#9.  History of coronary artery disease with previous stent placement





#10.  History of aortic thrombectomy/aortobifem bypass in 2007





#11.  Former smoker





Plan:





Patient has transitioned to regular high flow nasal cannula currently at 10 L


Doing well, continue weaning FiO2 to keep O2 sats at 88% and above


Vital signs are stable


No fever or chills


Continue current dose Decadron and Lovenox


Increase activity as tolerated


Follow-up chest x-ray has been reviewed showing progressive reticulonodular 

infiltrates


Clinically patient has remained stable, and improved


Once her oxygen demand is down to 5 L of supplemental oxygen per minute or less 

may consider discharge home





I performed a history & physical examination of the patient and discussed their 

management with my nurse practitioner, Jeannine Belcher.  I reviewed the nurse 

practitioner's note and agree with the documented findings and plan of care.  

Lung sounds are positive for diffuse crackles throughout the lung fields.  The 

findings and the impression was discussed with the patient.  I attest to the 

documentation by the nurse practitioner. 











Time with Patient: Less than 30

## 2021-12-27 NOTE — PN
PROGRESS NOTE



DATE OF SERVICE:

12/26/2021.



REASON FOR FOLLOW UP:

Covid 19 pneumonia.



INTERVAL HISTORY:

The patient is afebrile.  The patient is feeling better.  She is breathing comfortably.

The patient _____ 10 L high-flow oxygen.  Denies any chest pain.  No nausea or

vomiting, sputum production _____ no diarrhea.



PHYSICAL EXAMINATION:

Blood pressure 125/73 with a pulse of 69, temperature 96.8.  She is 92% on 10 L high-

flow oxygen. General description is an elderly female up in the chair in no distress.

Respiratory system: Unlabored breathing, decreased intensity of breath sounds.  No

wheeze. Heart S1, S2.  Regular rate and rhythm. Abdomen soft, no tenderness.



LABS:

No new labs have been obtained today.



DIAGNOSTIC IMPRESSION AND PLAN:

1. Patient with acute COVID-19 pneumonia with _____ on clinical improvement, currently

    on dexamethasone, Lovenox _____.

2. Oral thrush, continue with nystatin swish and swallow.





MMODL / IJN: [id number] / Job#: [id number]

## 2021-12-28 RX ADMIN — CLOPIDOGREL BISULFATE SCH MG: 75 TABLET ORAL at 09:01

## 2021-12-28 RX ADMIN — DEXTROSE SCH MG: 50 INJECTION, SOLUTION INTRAVENOUS at 21:54

## 2021-12-28 RX ADMIN — NYSTATIN SCH UNIT: 100000 SUSPENSION ORAL at 23:41

## 2021-12-28 RX ADMIN — NYSTATIN SCH UNIT: 100000 SUSPENSION ORAL at 13:18

## 2021-12-28 RX ADMIN — ENOXAPARIN SODIUM SCH MG: 40 INJECTION SUBCUTANEOUS at 09:01

## 2021-12-28 RX ADMIN — Medication SCH MG: at 09:01

## 2021-12-28 RX ADMIN — SERTRALINE HYDROCHLORIDE SCH MG: 100 TABLET ORAL at 09:00

## 2021-12-28 RX ADMIN — NYSTATIN SCH UNIT: 100000 SUSPENSION ORAL at 09:02

## 2021-12-28 RX ADMIN — DEXTROSE SCH MG: 50 INJECTION, SOLUTION INTRAVENOUS at 09:01

## 2021-12-28 RX ADMIN — OXYCODONE HYDROCHLORIDE AND ACETAMINOPHEN SCH MG: 500 TABLET ORAL at 21:54

## 2021-12-28 RX ADMIN — LORATADINE SCH MG: 10 TABLET ORAL at 09:00

## 2021-12-28 RX ADMIN — ENOXAPARIN SODIUM SCH MG: 40 INJECTION SUBCUTANEOUS at 21:54

## 2021-12-28 RX ADMIN — ATORVASTATIN CALCIUM SCH MG: 80 TABLET, FILM COATED ORAL at 09:01

## 2021-12-28 RX ADMIN — TIOTROPIUM BROMIDE INHALATION SPRAY SCH PUFF: 3.12 SPRAY, METERED RESPIRATORY (INHALATION) at 09:53

## 2021-12-28 RX ADMIN — ASPIRIN SCH: 325 TABLET ORAL at 21:03

## 2021-12-28 RX ADMIN — Medication SCH MCG: at 09:01

## 2021-12-28 RX ADMIN — ASPIRIN SCH: 325 TABLET ORAL at 19:18

## 2021-12-28 RX ADMIN — PANTOPRAZOLE SODIUM SCH MG: 40 TABLET, DELAYED RELEASE ORAL at 09:01

## 2021-12-28 RX ADMIN — OXYCODONE HYDROCHLORIDE AND ACETAMINOPHEN SCH MG: 500 TABLET ORAL at 09:01

## 2021-12-28 RX ADMIN — NYSTATIN SCH UNIT: 100000 SUSPENSION ORAL at 17:32

## 2021-12-28 RX ADMIN — METOPROLOL SUCCINATE SCH MG: 25 TABLET, EXTENDED RELEASE ORAL at 09:00

## 2021-12-28 NOTE — P.PN
Subjective


Progress Note Date: 12/28/21











Patient is a 66-year-old the female with past medical history of COPD and 3 L 

oxygen dependent at home has cold symptoms for about a month comes in with 

shortness of breath presently on telemetry dysfunction CT of the chest did not 

show any pulmonary embolism but did show pulmonary fibrosis as well as i

nfiltrates.  Below.  Patient doesn't have any infiltrate consistent with 

pneumonia patient denied any dysuria.  Patient is presently on Rocephin.  

Patient has mild acute renal failure because of which I'm holding ACE inhibitor.

 Severely hypomagnesemic magnesium is being replaced.  Patient last smoked about

17 years ago.





12/08/2021





Patient is seen and evaluated in follow-up and currently maintained on 10 L high

flow and weaning as tolerated.  Patient was on 15 L high flow nasal cannula this

morning and tolerating and nursing staff continuing to wean.  Patient normally 

wears 3 L of oxygen continuously in the outpatient setting.  Patient denies any 

worsening shortness of breath just generalized fatigue and weakness and 

continued diarrhea.  Patient states she is going approximately 2-3 times per day

will add C. diff testing and if negative will add Imodium and Questran.  

Pulmonary is following and venous Doppler was ordered bilateral lower 

extremities which is negative for DVT as d-dimer is elevated today at 2.42.  

Inflammatory markers significantly improved his LDH is 338 and CRP is 7.0, 

repeat magnesium after replacement is 2.9. 





12/09/2021





Patient is seen and evaluated in follow-up this morning currently sitting up in 

the chair and was on 10 L high flow although oxygen saturations were below 90% 

and patient bumped back up to 15 L.  Patient is complaining of the oxygen being 

too high and making her feel more anxious and discussed with nursing staff about

continuing to wean as tolerated.  She continues with diarrhea although is 

improving and C. diff testing was negative and patient was started on Questran 

along with Imodium.  Patient continues to state she has no real appetite 

although encourage the patient to continue with small frequent meals and will 

add Magic cups as well.  Encourage the patient to increase oral intake as she is

weak and needs the energy.  She normally maintained on 3 L via nasal cannula and

will continue to titrate as tolerated.  Pulmonary is following patient is 

maintained on IV dexamethasone twice daily along with vitamin and zinc supplem

ents, Lovenox twice daily for elevated d-dimer and will continue.  Patient was 

on normal saline and considered discontinuing as patient sodium is now 147 and 

patient is being started on D5 in water and will repeat labs.





12/10/2021


Patient is seen in follow-up this morning currently sitting up in the chair and 

oxygen requirements have increased and patient is now maintained on Airvo along 

with 15 L nonrebreather and oxygen saturations have been in the low 80s to 90 

maximum.  Discussed CODE STATUS with the patient and patient is a full code but 

does not wish to be placed on a ventilator if respiratory status continues to 

decline.  Pulmonary following closely and prognosis remains quite guarded.  

Patient continues on Lovenox along with dexamethasone and vitamin and zinc 

supplements and is being started on Baricitinib.  Patient is extremely weak and 

becomes extremely dyspneic with minimal exertion.  Patient's oral intake 

continues to be extremely poor and again encouraged and stressed the importance 

of eating regardless of not feeling hungry and having no appetite.  May consider

enteral nutrition and dietary consult.  Patient states her diarrhea has 

significantly improved and will use Imodium as needed and discontinue Questran. 

Repeat inflammatory markers ordered and pending.  Chest x-ray today shows 

bilateral airspace disease again noted with prominence and interstitial, and fin

dings consistent with pneumonia.





12/11/2021


Patient admitted with bilateral common pneumonia , Her respiratory status today 

is a slightly worse as she is dependent on BiPAP throughout the day.  Also she 

has decreased air entry on both sides


She is saturating 88% while she is on BiPAP.


D-dimer is the same, 4.0 and 3.9.


She continued on dexamethasone, vitamin C, vitamin D, zinc,  barcitininb and Lo

venox 40 mg twice a day.  Also she is on Protonix





12/12/2021


Sitting in chair looks mildly tachypneic and tolerates BiPAP which she uses his 

yesterday all the time.  She has not been eating since yesterday and may switch 

her for short time to nonrebreather and air or so she can read some oral diet   

Per pulmonary team recommendation.  Other than that she is hemodynamically 

stable.  Labs are stable.Calcitonin 0.19.  C. diff is negative.


She remains on dexamethasone, vitamin C, Z and Lovenox, Protonix and  barcitinib







12/13/2021


This morning she was still on BiPAP, and she was refusing to start TPN as she 

could not come off the BiPAP.  However later on during the day she could be 

placed on nonrebreather 15 L and Aravo 60 L with 94% so she can tolerate oral 

feeding.


Other vitals are stable, afebrile.  Labs are stable as well.


She continued on dexamethasone, vitamin C, vitamin D, zinc,  barcitininb and 

Lovenox 40 mg twice a day





12/14/2021


Patient with bilateral: With pneumonia and hypoxia, she is improving gradually 

today, today she couldn't get off her BiPAP and placed on airvo 60 L with FiO2 

of 85%, with occasional nonrebreather 15 L, patient was happy that she was able 

to eat today and she tolerates diet well.  She has some diarrhea but no 

abdominal pain.  C. diff is negative.


Other than that she is hemodynamically stable


Patient continued today on  dexamethasone, vitamin C, Z and Lovenox, Protonix 

and  barcitinib 


Follow-up chest x-ray and CBC/BMP in the morning





12/15/2021


Patient today is pleasant and smiling as her pulmonary illness is improving 

progressively but slowly, but she still currently on high flow nasal cannula 

with 55 L/m and FiO2 of 88% saturation in low 90s.  Rest of vitals are stable.  

Labs including CBC, BMP are unremarkable.


C. diff test came back negative and her diarrhea stopped today.  She is able to 

eat about 50-25% of her meals and she is satisfied with that for now.


She still on steroids with dexamethasone, vitamin C, vitamin D, zinc, Lovenox 40

mg twice a day, Protonix and barcitininb 





12/16/2021


Patient today her breathing slightly worsened and her oxygen requirement today 

was 55 L with 90%, she still can go a little difficulty due to her dyspnea and 

she confirmed me she is able to eat with no issues.


She sitting in chair today.  Hemodynamically stable.


Labs reviewed and shows stability, her d-dimer actually improved 3.9 down to 

1.6.  LDH went up to 1029 and CRP 3.4.


Chest x-ray shows slight improvement in aeration in both lung fields despite 

bilateral pneumonia.


She remains on dexamethasone, vitamin C, D and zinc and  Protonix and 

barcitininb .





12/17/2021


Patient breathing pattern is slowly to improve, today she still on high flow 

nasal cannula 55 L/m and FiO2 of 90%.


Labs including CBC and BMP are unremarkable and stable.  On the top of the 

patient is afebrile


However patient is complaining of from oral thrush and asked for fluconazole.  

Patient refused nystatin.  Also she has persistent loose bowel movement, C. diff

checked twice and there were negative.  We'll start her on short course of 

Questran.  Also patient states she is eating well and tolerates diet with no 

difficulty.


Continue with the other same medication of dexamethasone, multiple vitamin 

cocktail and Lovenox, Protonix and barcitininb 





12/18/2021


Her respiratory status with minimal change and fluctuating, currently she is on 

airvo at 55 L/m and 85%.  She is able to eat and drink.


She states that her most thrush is better and his diarrhea is improving after 

adding nystatin and Questran yesterday.


Afebrile and vitals are stable.


She remains on the same treatment of dexamethasone, vitamin C, D and zinc and  

Protonix and barcitininb .





12/19/2021 


patient breathing is improving as per patient stated that she is breathing 

easier.  She remains on same dose of oxygen as yesterday she still on 15 L/m at 

88% FiO2.


Also reports improvement in her bowel movements, she said that she almost had 

more formed bowel movement and her oral thrush is also improving with 

fluconazole.


And we will continue with the same treatment for her colon pneumonia with 

dexamethasone, vitamins, Barcitininb, Lovenox and Pepcid





12/20/2021


Patient is with bilateral common pneumonia on high dose of records oxygen since 

admission.  Today is awake and alert looks more comfortable and each day she 

states she is breathing easier.  Also she is happy with the progress of her 

bowel movement, she had about 4 months which is almost formed over the last 2 

days.  No more nausea vomiting but she feels some sore throat.  She is eating 

well.  However despite her reported clinical improvement by the patient she 

still on same dose of high need of oxygen and currently 55% and 70-90 L/m.


She remains on the same treatment of dexamethasone, vitamin C, DM zinc, 

,baricitinib.  Also she is on fluconazole, nystatin, Robitussin, Lovenox , and 

patient is not on Questran





12/21/2021


Patient clinically slowly to improve, he states she feels better but however her

oxygen requirements very close to yesterday at 50 L/m and 75% high flow nasal 

cannula.


Her GI symptoms or improvement and she tolerates diet well.


She is fluconazole currently while continue the same treatment of dexamethasone,

vitamin cocktail for Covid and baricitinib. .  Nystatin and Robitussin and 

Lovenox





12/22/2021


Patient respiratory status not improving or worsening much compared to 

yesterday.  She still breathing easily although she requires high flow nasal 

cannula at 50 L/m and FiO2 of 75%.  No GI symptoms, no significant sore throat 

which is responding to nystatin.  Patient tolerates diet well.


We'll keep the same medication and keep monitoring the patient for now.


Her creatinine trending up over the last 2-3 days, today is 0.08 went up to 1.2.

 We held her Diovan which is a home medication.  And we'll check creatinine 

tomorrow and bladder scan.


Repeat labs in the morning.  Follow-up chest x-ray





12/23/2021


 patient still improved slowly and gradually and her oxygen requirement today is

slightly down to 50 L/m and FiO2 of 68%


Other than that she is a pleasant subjective she feels improved and she breathes

easily, no diarrhea or vomiting.  Tolerates diet well.  Occasional sore throat.


Labs looks stable with WBCs 11.8.  D-dimer is down to 1.1.  LDH slightly up 734 

and CRP up to 6.2.


Continue with same treatment dexamethasone, vitamin cocktail for Covid and 

baricitinib.  Also Lovenox and Protonix





12/24/2021


Patient continued to report improvement in her breathing pattern however she 

still on high flow nasal cannula 50 liter per minute with FiO2 of 45%


Inflammatory markers improvement, d-dimer down to 0.6, LDH down from 734 and 2-

90 and CRP is normal less than 0.3.


Patient finished her dose of baricitinib breath continue with the rest of 

treatment including dexamethasone, vitamin C, D and zinc.  Lovenox Protonix





12/25/2021


Patient breathing easily, eats well.  Her oxygen requirement down to 45 L and 

45-50 FiO2.


We will continue with the same and treatment of steroids, vitamins, Lovenox and 

Protonix.  She finished her dose of baricitnib





10/26/2021 


patient continued to improve slowly and gradually and steadily.  Today her 

oxygen requirements significantly lowered to telemetry later per minute via high

flow nasal cannula.  Patient herself feels improvement each day little by 

little.


Hemodynamically stable.  No labs.


Continue with steroids and vitamins.  No GI symptoms but she wants to keep 

nystatin records status with sterile she will get fungal infection on the thrush








12/27/2021


Patient admitted with bilateral Covid pneumonia, she slowly improved but pr

ogressively as well.


Today just x-ray showing bilateral reticulonodular infiltrate more progressive 

by radiologist.  However patient clinically is improving and actually today she 

is down to 10 L/m via high flow nasal cannula, patient was happy because today 

for the first time she is off  her airvo .


She remains on dexamethasone, vitamin C, D and zinc.  Nystatin, Lovenox 40 mg 

twice a day and Protonix





12/28/2021





Patient is seen this morning in follow up and continues on HF at 10 liters with 

pulmonary and infectious disease following. Patient continues on IV 

dexamethasone and lovenox along with vitamins and zinc and will continue. 

Recommend to continue to wean FI02 as tolerated. Patient continues to be weak 

and will have PT evaluate the patient. Patient has refused rehab and would like 

to return home with family. Will repeat am labs and continue to monitor closely.

Patient denies any worsening shortness of breath, chest pains, or palpitations. 

Patient is afebrile. No reports of nausea or vomiting. 





Active Medications





Acetaminophen (Acetaminophen Tab 325 Mg Tab)  650 mg PO Q4HR PRN


   PRN Reason: Fever>101


   Last Admin: 12/27/21 09:21 Dose:  650 mg


   Documented by: 


Ascorbic Acid (Ascorbic Acid 500 Mg Tab)  500 mg PO BID [facility]


   Last Admin: 12/28/21 21:54 Dose:  500 mg


   Documented by: 


Atorvastatin Calcium (Atorvastatin 80 Mg Tab)  80 mg PO DAILY [facility]


   Last Admin: 12/28/21 09:01 Dose:  80 mg


   Documented by: 


Bupropion HCl (Bupropion 100 Mg Tab)  100 mg PO DAILY [facility]


   Last Admin: 12/28/21 09:01 Dose:  100 mg


   Documented by: 


Cholecalciferol (Cholecalciferol 25 Mcg (1000 Iu) Tablet)  25 mcg PO DAILY [facility]


   Last Admin: 12/28/21 09:01 Dose:  25 mcg


   Documented by: 


Clopidogrel Bisulfate (Clopidogrel 75 Mg Tab)  75 mg PO DAILY [facility]


   Last Admin: 12/28/21 09:01 Dose:  75 mg


   Documented by: 


Dexamethasone Sodium Phosphate (Dexamethasone Sod Phosphate 10 Mg/Ml 1 Ml Vial) 

6 mg IVP BID [facility]


   Last Admin: 12/28/21 21:54 Dose:  6 mg


   Documented by: 


Enoxaparin Sodium (Enoxaparin 40 Mg/0.4 Ml Syringe)  40 mg SQ BID [facility]


   Last Admin: 12/28/21 21:54 Dose:  40 mg


   Documented by: 


Guaifenesin/Dextromethorphan (Guaifenesin-Dm 100-10mg/5ml 10 Ml Cup)  10 ml PO 

Q6HR PRN


   PRN Reason: Cough


   Last Admin: 12/17/21 10:18 Dose:  10 ml


   Documented by: 


Loperamide HCl (Loperamide 2 Mg Cap)  2 mg PO QID PRN


   PRN Reason: Diarrhea


   Last Admin: 12/20/21 22:19 Dose:  2 mg


   Documented by: 


Loratadine (Loratadine 10 Mg Tab)  10 mg PO DAILY [facility]


   Last Admin: 12/28/21 09:00 Dose:  10 mg


   Documented by: 


Metoprolol Succinate (Metoprolol Succinate (Er) 25 Mg Tab.Er.24h)  25 mg PO D

AILY [facility]


   Last Admin: 12/28/21 09:00 Dose:  25 mg


   Documented by: 


Non-Formulary Medication (Levalbuterol Hfa Inhaler)  2 puff INHALATION RT-QID 

[facility]


   Last Admin: 12/28/21 21:03 Dose:  Not Given


   Documented by: 


Non-Formulary Medication (Levalbuterol Nebulized)  1.25 mg INHALATION RT-Q6H PRN


   PRN Reason: Shortness Of Breath


Nystatin (Nystatin 100,000 Unit/Ml Susp 500,000 Unit/5 Ml Cup)  500,000 unit PO 

QID [facility]


   Last Admin: 12/28/21 17:32 Dose:  500,000 unit


   Documented by: 


Pantoprazole Sodium (Pantoprazole 40 Mg Tablet)  40 mg PO DAILY [facility]


   Last Admin: 12/28/21 09:01 Dose:  40 mg


   Documented by: 


Sertraline HCl (Sertraline 100 Mg Tab)  100 mg PO DAILY [facility]


   Last Admin: 12/28/21 09:00 Dose:  100 mg


   Documented by: 


Tiotropium Bromide (Tiotropium 2.5 Mcg Inhaler)  2 puff INHALATION RT-DAILY [facility]


   Last Admin: 12/28/21 09:53 Dose:  2 puff


   Documented by: 


Zinc Sulfate (Zinc Sulfate 220 Mg Cap)  220 mg PO DAILY [facility]


   Last Admin: 12/28/21 09:01 Dose:  220 mg


   Documented by: 














Physical Exam:








GENERAL: The patient is alert and oriented , on 10L high flow. Well developed, 

well nourished. Temp is 98.3F, pulse is 77, resp are 24, blood pressure is 

124/71, 02 saturation is 88-91% on 10L HF


HEENT: Pupils are round and equally reacting to light. EOMI. No scleral icterus.

No conjunctival pallor. Normocephalic, atraumatic. No pharyngeal erythema. No 

thyromegaly. 


CARDIOVASCULAR: S1 and S2 muffled


PULMONARY: diminished breath sounds bilaterally with  coarse bilateral rhonchi 

noted


ABDOMEN: Soft, nontender, nondistended, normoactive bowel sounds. No palpable 

organomegaly. 


MUSCULOSKELETAL: No joint swelling or deformity. 


EXTREMITIES: No cyanosis, clubbing, or pedal edema. 


NEUROLOGICAL: Gross neurological examination did not reveal any focal deficits. 


SKIN: No rashes. no petechiae.





Assessment: 





-acute hypoxic respiratory failure: secondary to COVID-19 pneumonia 


-chronic hypercapnic respiratory failure secondary to COPD uses 3 L of oxygen at

home


-Acute kidney injury


-Diarrhea most likely secondary to Covid, resolved


-Oral thrush secondary to steroids


-Hypernatremia, improving


-Severe hypomagnesemia, improved


-Elevated d-dimer secondary to Covid and patient is on Lovenox which will be 

continued, Lovenox is currently twice daily


-Acute renal failure, prerenal


-Hypertension


-Mild anion gap and metabolic acidosis probability of lactic acidosis, most 

likely secondary to increased amounts of diarrhea and dehydration, improved


-gastroesophageal reflux disease


-Hyperlipidemia


-Hypertension


-History of pulmonary fibrosis


-Sleep apnea uses CPAP machine at home


-Peripheral vascular disease


-Coronary artery disease


-DVT prophylaxis: On Lovenox which will be continued


-GI prophylaxis


-full code without mechanical ventilation











Plan: 





This is a pleasant 66 years old female who presents with bilateral covid 

pneumonia and is being followed closely by pulmonary and ID. Patient continues 

on 10L HF and weaning as tolerated. Recommend to continue with dexamethasone BID

along with lovenox bid and vitamin and zinc supplements. Will repeat am labs and

continue to monitor closely. PT to evaluate the patient. Due to multiple complex

medical issues, prognosis is guarded. Recommend to continue to wean FI02 as 

tolerated and get to 5L or less to consider discharge home. Encourage incentive 

spirometer and oral intake with increased activity as tolerated. 








Objective





- Vital Signs


Vital signs: 


                                   Vital Signs











Temp  97.5 F L  12/28/21 14:00


 


Pulse  79   12/28/21 14:00


 


Resp  22   12/28/21 14:00


 


BP  120/68   12/28/21 14:00


 


Pulse Ox  90 L  12/28/21 14:00








                                 Intake & Output











 12/28/21 12/28/21 12/29/21





 06:59 18:59 06:59


 


Intake Total  450 


 


Output Total  300 


 


Balance  150 


 


Intake:   


 


  Oral  450 


 


Output:   


 


  Urine  300 


 


Other:   


 


  Voiding Method Bedside Commode Bedside Commode 


 


  # Voids 1 2 


 


  # Bowel Movements 1 1 














- Labs


CBC & Chem 7: 


                                 12/23/21 06:21





                                 12/23/21 06:21

## 2021-12-28 NOTE — P.PN
Subjective


Progress Note Date: 12/28/21


Principal diagnosis: 


 Dyspnea, hypoxia, COVID-19





This is a pleasant 66-year-old female patient who follows at the [facility] for her

primary care needs.  She has history of hypertension, hyperlipidemia, 

gastroesophageal reflux disease, anxiety/depression, aortic thrombectomy in 2007

, former smoker, COPD, obstructive sleep apnea utilizing CPAP.  He is here 

yesterday to the emergency room with a four-week history of shortness of breath,

cough, congestion, body aches and fevers.  She was tested negative for CoVID 2.

 Her  is positive for CoVID and she is now positive for CoVID.  She is 

not vaccinated.  She does have home oxygen use at 2-3 L.  She is currently 

requiring 10 L high flow nasal cannula to maintain O2 saturations at 90%.  Chest

x-ray reveals bilateral patchy infiltrates. White count 5.9.  Hemoglobin 12.5.  

Platelets 398.  Lymphocytes 0.5.  D-dimer 1.89.  Sodium 140.  Potassium 4.9.  

Creatinine 1.2.  AST 39.  ALT 26.  Alk phos 156.  LDH 1283.  C-reactive protein 

21.7.  Pro-calcitonin 0.32.  She's been initiated on Decadron, Lovenox, vitamin 

supplements.  0.9% normal saline at 75 ML's per hour.





On 12/08/2021 patient is in follow-up on medical surgical floor, she is resting 

comfortably in bed, she states she is feeling better, although she is requiring 

more oxygen compared to when she first presented to the emergency department, 

note that patient does wear home oxygen at 2 L for history of COPD.  She is 

currently on 10 L of oxygen, her pulse ox is 91-92%, breathing comfortably, lung

sounds reveal coarse crackles at bilateral bases, but no wheezing, no rhonchi.  

CT angiogram of the chest showed no evidence of pulmonary embolism, it did show 

pulmonary emphysema and pulmonary interstitial fibrosis with increased 

interstitial infiltrates compared to her old exam.  There was no evidence of a 

suspicious pulmonary mass.  Patient was outside the window for Remdesivir, she 

continues on Decadron 6 mg twice daily, her pro-calcitonin level on admission 

was 0.32, and patient was covered with Rocephin for possibility of underlying 

bacterial infection.  She is on prophylactic Lovenox 40 mg daily.  [facility] of the chest showed no evidence of pulmonary embolism. 0





On 12/09/2001 patient seen in follow-up on medical surgical floor, she is c

urrently on 10 L of oxygen the pulse ox of 86%, FiO2 has since been increased to

15 L, she has a mild cough, but overall seems to be in no acute distress, she is

sitting up in the chair, denies any chest discomfort, looks quite comfortable.  

Lung sounds reveal coarse crackles bilateral bases, she does get short of breath

with exertion.  She states if she sits still she is breathing comfortably, and 

her O2 saturations are at or above 90%.  She's had no fever or chills overnight,

she remains on Decadron 40 mg twice daily.  She is on empiric antibiotics in the

form of Rocephin and IV fluids at 75 ML per hour.  Today's labs have been 

reviewed, white blood cell count is 8.6, hemoglobin is 12, platelet count is 

506, d-dimer is 4.01, sodium is 147, potassium is 4.8, chloride is 112, BUN of 

20 creatinine 0.7, renal profile has significantly improved since admission.  

LDH has significantly improved since admission and is down to 348 on today's 

labs, CRP is still pending, her last pro-calcitonin is negative at 0.13.  Blood 

culture has shown no growth thus far.





On 12/10/2021 patient seen in follow-up on medical surgical floor, she had been 

on 15 L high flow and 100% nonrebreather mask up until this morning, this 

morning patient's FiO2 requirements had increased, patient was getting up in the

chair, she is significantly desaturated, she was placed on irritable at 60 L and

FiO2 of 90% in addition to 100% nonrebreather, and patient is taking quite a 

while to recover her O2 saturations although her work of breathing is not 

increased.  She is still awake and alert, she is oriented 3, she is able to 

make her needs known, her pulse ox is slowly recovering at rest, with deep 

breathing, pursed lip breathing, repositioning.  Overnight she has had no fever 

or chills, blood pressures have been stable.  Breathing fairly comfortably 

although she desaturates with any little exertion.  Currently she is on Decadron

6 Twice daily, she will be started on Baricitinib, patient is also on Plavix, 

and prophylactic Lovenox.  Today's chest x-ray and labs are still pending, she 

remains on D5W at a rate of 100 ML per hour for dehydration related to diarrhea 

and hypernatremia, repeat electrolytes and renal profile are still pending for 

today.  No abdominal pain, no nausea.  The diarrhea has significantly improved 

in the last 48 hours.  Patient is tolerating oral intake, no abdominal pain. 





On 12/12/2021 patient seen in follow-up on medical surgical floor.  She is alert

and alert, she is on BiPAP support with pressures of 14 and 8 and FiO2 100%.  

Earlier this morning her BiPAP pressures have been increased from 12.6 in the 

100% FiO2.  Her pulse oximeter readings are marginal at around 90%, with any 

exertion patient does desaturate and takes her a while to recover although she 

looks very comfortable, nontachypneic, she is sitting up in the recliner, she is

watching TV.  She is complaining of being hungry.  She has not been able to take

much by mouth related to being BiPAP dependent.  She has been afebrile, 

hemodynamics are stable.  Her last chest x-ray from 12/10/2021 showed bilateral 

airspace disease with prominence of interstitium with no evident pneumothorax or

pleural effusion.  Today's labs have been reviewed showing white blood cell 

count of 10.6, hemoglobin 12.2, d-dimer and yesterday's labs was down to 3.98, 

electrolytes and renal profile were within normal limits.  Per pro-calcitonin 

level was negative at 0.19.  Patient remains on Baricitinib, Decadron 6 mg twice

daily, she is receiving IV fluids with D5 W at 100 ML per hour





On 12/13/2021 patient seen in follow-up on medical surgical floor, she was 

switched to Airvo at 60 L and 90%, and nonrebreather mask, she is maintaining 

her O2 saturations between 88 and 91%, she is breathing comfortably, tolerating 

it for a few hours now, she was able to eat something, she is taking in oral 

fluids, she is awake and alert, she is sitting up in the chair, she is being her

bills.  Lung sounds are positive for scattered crackles throughout the lung 

fields, no cough, appears very comfortable.  He is on [place] neb, she is on 

Decadron, and Lovenox.  She is on D5W at a rate of 100 ML per hour related to 

her nothing by mouth status however in view of her tolerating oral intake now, 

and not been BiPAP dependent anymore we'll stop the IV fluids.  Today's labs 

have been reviewed and her white blood cell count is 10.4, hemoglobin is 12.6, 

her electrolytes have been reviewed and are within normal limits, sodium is 141,

potassium is 3.9, chloride is 103, BUN is 15, creatinine is 0.72.  Her 

procalcitonin level was negative.





On 12/15/2021 patient seen in follow-up on medical surgical floor, she is 

sitting up in the recliner, she states her breathing is improving, she is on 

Airvo, and we were able to wean down the flow and FiO2, and the flow is 

currently at 55 L/m, and FiO2 is currently at 88%, her pulse ox is ranging 

between 86-93%, breathing comfortably, minimal crackles at bilateral bases, 

occasional cough, patient has been get not to the bedside commode, tolerates 

activity well, her spirits are high, her oral intake is fair.  She continues on 

Baricitinib, she continues on Decadron 6 blood gram twice daily, and Lovenox 40 

mg twice daily, today's labs have been reviewed showing white blood cell count 

is 7.19, hemoglobin of 11.9, platelet count is 383, electrolytes and renal 

profile are unremarkable.  Cdiff negative.





On 12/16/2021 patient seen in follow-up on medical surgical floor.  She is awake

and alert, in no acute distress, she is currently resting in bed, she is on her 

left side and patient has been compliant with salt repositioning in bed from 

side to side.  She is breathing comfortably, remains on Airvo at 55 L and FiO2 

of 87%, and she was also placed on Airvo because her pulse ox was less than 85%,

her current O2 saturations are around 88%, patient seems to breathing quite 

comfortably.  Today's chest x-ray shows persistent but slightly improving 

bilateral pneumonia. patient continues on Baricitinib, Decadron 6 mg twice 

daily, she is on Lovenox 40 mg twice daily, COVID-19 vitamins.  His labs have 

been reviewed and there was down to 1.63, CBC was within normal limits, 

electrolytes and renal profile were unremarkable.  Today's LDH is 1029, CRP is 

1.7.  Vital signs have been stable, patient has been afebrile.





On 12/17/2021 patient is seen in follow-up on medical surgical floor.  She is 

breathing comfortably, she is resting in bed, she's been repositioning self from

side to side.  She remains on Airvo at 55 L and FiO2 of 90%, and pulse ox is 89-

93%.  Blood pressure is been stable, patient has been afebrile.  Patient 

continues on Baricitinib, continues on Decadron, Diflucan, Lovenox twice daily. 

Lung sounds revealed mildly diminished breath sounds, and a few scattered c

rackles.  Chest x-ray showing persistent but slightly improving bilateral Osvaldo

pneumonia





On 12/19/2001 patient seen in follow-up on medical surgical floor.  She is 

resting comfortably in bed, she is currently on 55 L/m and FiO2 of 80%, 

breathing comfortably, she has not required nonrebreather mask at all for the 

past 24 hours, she continues on Decadron, Baricitinib, and prophylactic Lovenox,

she is breathing comfortably, lung sounds revealed minimal crackles bilaterally,

she is tolerating oral intake.  No nausea vomiting or diarrhea.  Fever or 

chills.  Today's chest x-ray shows no change in the appearance of diffuse 

interstitial opacities in both lungs.  Today's labs have been reviewed, white 

count is normal at 9.8, hemoglobin of 12.7, d-dimer is 2.1, electrolytes and 

renal profile are unremarkable, and her inflammatory markers are improving on 

today's labs.





On 12/20/2021 patient seen in follow-up on medical surgical floor, she remains 

on a currently on 55 L/m, and FiO2 of 75%, she has not required nonrebreather 

mask.  She is breathing quite comfortably, lung sounds revealed minimal 

crackles, no wheezing, patient continues on Decadron 6. Twice a day, Lovenox 40 

mg twice a day, she is on cough syrup, she is on Xopenex and Spiriva inhalers.  

She remains on Baricitinib.  She's had no acute events overnight, no specific 

complaints.  No new labs today.  Her last set of inflammatory markers from 

yesterday showed improving LDH which was down to 442, and CRP was down to 0.60, 

her d-dimer was 2.10.





On 12/21/2001 patient seen in follow-up on medical surgical floor.  She is breat

julia comfortably, she remains on Airvo, and the flow has been decreased down 

further to 50 L/m, and FiO2 is currently at 77%, she is maintaining O2 

saturations between 90-92%, breathing very comfortably, minimal cough, lung 

sounds are clear, no rhonchi or wheezing, she is awake and alert, oriented 3, 

no altered mentation, no fever or chills, she is tolerating oral intake, no 

nausea or vomiting, clinically she's been stable, slowly improving in terms of 

oxygenation as well.  Remains on Decadron 6 Coumadin twice daily, Lovenox 40 mg 

twice daily, she is on COVID-19 multivitamins, she remains on Baricitinib 4 mg 

daily.  She's had no acute events overnight.  Today's labs have been reviewed, 

white blood cell count is 10.7, hemoglobin 13.7, electrolytes and renal profile 

are unremarkable, her inflammatory markers were improving on labs from 2 days 

ago. 





On 12/22/2021 patient seen in follow-up on medical surgical floor.  She is 

breathing very comfortably, she is currently sitting up in the chair, on Airvo, 

and currently on 50 L/m, and FiO2 is at 75%, her pulse ox is 94%.  Only 

occasional cough, minimal crackles, no rhonchi or wheezing.  No fever or chills.

 She continues on Baricitinib, Decadron 6 mg twice daily, Lovenox 40 mg twice 

daily, multivitamins.  Hemodynamically she is stable, no fever or chills.





On 12/23/2021 patient seen in follow-up on medical surgical floor, she is sitt

ing up in the chair, breathing comfortably, she remains on Airvo and the flow is

currently at 15 L and FiO2 has been decreased to 69%, her pulse ox is 91-94%, 

she is breathing comfortably, minimal crackles at the left lung base, no fever 

or chills, only occasional cough, no significant phlegm production, no chest 

discomfort.  Clinically she is improving, she's had no acute events overnight, 

she is tolerating oral intake, she's been admitted to the bedside commode, she 

does get exertional dyspnea, but recovers.  D-dimer is improving and is down to 

1.11 on today's labs, her last LDH from yesterday was 734, was increased from 

most previous value but overall improved during this admission, CRP level was 

less than 0.5.  She has completed her course of Baricitinib, she continues on 

Decadron 6 mg twice a day, Lovenox 40 mg twice daily, she is on cough syrup, and

COVID-19 multivitamins.





On 12/24/2021 patient seen in follow-up on medical surgical floor, she remains 

on Airvo and her flow and FiO2 are being continuously weaned down and she is 

currently at 45 L/m and FiO2 is currently down to 55%.  She is maintaining O2 

saturations at 93-97%.  Breathing comfortably, lung sounds are essentially clear

to auscultation, with minimal fine rales at bilateral bases, no wheezing, no 

complaints of chest pain.  Afebrile.  Patient has been sitting up in the 

recliner most of the day, she gets up to use the commode, tolerates exertion 

fairly well, yesterday's chest x-ray showed COPD and chronic interstitial 

pulmonary fibrosis with basilar infiltrates that are stable in appearance.  

Today's labs have been reviewed showing improving d-dimer which is down to 0.60,

inflammatory markers have further decreased and LDH is down to 290, and CRP is 

less than 0.30.





On 12/25/2021 patient seen in follow-up on medical surgical floor.  Remains on 

Airvo and her flow is down to 45 L and FiO2 of 45% with the O2 saturations at 

92%.  Breathing very comfortably, lung sounds are essentially clear, no rhonchi 

or wheezing.  No cough, patient has been tolerating activity, has been getting 

up in a recliner, and bedside commode, tolerates it well.  Vital signs have been

stable, no fever or chills. She has completed Baricitinib, continues Decadron, 

and Lovenox





On 12/27/2021 patient is seen in follow-up on medical surgical floor.  He was 

switched over to regular high flow nasal cannula yesterday and she is currently 

on 10 L per high flow with a pulse ox of 91-95%, breathing comfortably, lung 

sounds are clear, no rhonchi or wheezing, no rales, no chest pain, minimal 

cough, patient has been get not to the commode, tolerating activity well.  She 

has completed her course of Baricitinib, she continues on Decadron 6 mg twice 

daily, and twice daily dosing of Lovenox in addition to multivitamins.  No new 

labs.  Follow-up chest x-ray showing progressive reticulonodular infiltrates.





On 12/28/2021 patient seen in follow-up on medical surgical floor.  Patient is 

awake and alert, in no acute distress, she is currently on high flow oxygen per 

high flow nasal cannula at 10 L, her pulse ox is 80-91%, breathing quite 

comfortably, afebrile, hemodynamically stable, his she's had no acute events 

overnight, minimal cough, lung sounds are positive for diffuse inspiratory 

crackles on today's exam, no wheezing.  Yesterday chest x-ray was showing 

progressive reticulonodular infiltrates.  Patient was noted to have more 

crackling on today's exam at the lung bases.  No new labs today.  Patient 

continues on dexamethasone 6 mg twice daily, she is on  Lovenox twice daily, she

is on COVID-19 multivitamins and inhaled bronchodilators.





Objective





- Vital Signs


Vital signs: 


                                   Vital Signs











Temp  98.3 F   12/28/21 08:00


 


Pulse  77   12/28/21 08:00


 


Resp  20   12/28/21 10:49


 


BP  124/71   12/28/21 08:00


 


Pulse Ox  91 L  12/28/21 09:56








                                 Intake & Output











 12/27/21 12/28/21 12/28/21





 18:59 06:59 18:59


 


Intake Total   250


 


Output Total   300


 


Balance   -50


 


Intake:   


 


  Oral   250


 


Output:   


 


  Urine   300


 


Other:   


 


  Voiding Method Toilet Bedside Commode Bedside Commode


 


  # Voids 4 1 1


 


  # Bowel Movements  1 1














- Exam


 GENERAL EXAM: Alert, very pleasant, 66-year-old white female, on 10 L per high 

flow nasal cannula with a pulse ox of 95% does not appear to be in any acute 

distress


HEAD: Normocephalic/atraumatic.


EYES: Normal reaction of pupils, equal size.  Conjunctiva pink, sclera white.


NOSE: Clear with pink turbinates.


THROAT: No erythema or exudates.


NECK: No masses, no JVD, no thyroid enlargement, no adenopathy.


CHEST: No chest wall deformity.  Symmetrical expansion. 


LUNGS: Equal air entry with with coarse bilateral crackles


CVS: Regular rate and rhythm, normal S1 and S2, no gallops, no murmurs, no rubs


ABDOMEN: Soft, nontender.  No hepatosplenomegaly, normal bowel sounds, no 

guarding or rigidity.


EXTREMITIES: No clubbing, no edema, no cyanosis, 2+ pulses and upper and lower 

extremities.


MUSCULOSKELETAL: Muscle strength and tone normal.


SPINE: No scoliosis or deformity


SKIN: No rashes


CENTRAL NERVOUS SYSTEM: Alert and oriented -3.  No focal deficits, tone is 

normal in all 4 extremities.


PSYCHIATRIC: Alert and oriented -3.  Appropriate affect.  Intact judgment and 

insight.











- Labs


CBC & Chem 7: 


                                 12/23/21 06:21





                                 12/23/21 06:21





Assessment and Plan


Plan: 


 Assessment:





#1.  Acute on chronic hypoxic respiratory failure secondary to acute COVID-19 

pneumonia, patient is not vaccinated related to multiple ALLERGIES, patient 

presented with a 4 week history of symptoms, she was outside the window for 

Remdesivir, she is being treated supportively with Decadron, prophylactic 

Lovenox, and empiric antibiotics.  Patient was placed on irritable at 60 L and 

90% FiO2 with nonrebreather mask today on 12/10/2021, we'll start the patient on

Baricitinib.  On 12/12/2021 patient is on BiPAP support remains BiPAP support 

dependent at pressures of 14 and 1800% FiO2 although clinically is comfortable. 

Today on 12/23/2021 patient is on Airvo at 50 L and FiO2 of 70%. Baricitinib 

completed on 12/23/2021





#2.  Mildly elevated pro calcitonin, patient is on empiric antibiotics in the 

form of Rocephin, pro calcitonin has improved, no definite sign of infection, 

Rocephin has been discontinued





#3.  Elevated inflammatory markers secondary to the viral pneumonia, improving





#4.  Morbid obesity with BMI of 39.1 kg/m





#5.  Obstructive sleep apnea on CPAP therapy





#6.  History of COPD with chronic hypoxic respiratory failure usually wears 2 L 

of oxygen on the outpatient basis, and CT angiogram of the chest also revealed 

evidence of interstitial pulmonary fibrosis





#7.  Hyperlipidemia





#8.  Anxiety/depression





#9.  History of coronary artery disease with previous stent placement





#10.  History of aortic thrombectomy/aortobifem bypass in 2007





#11.  Former smoker





Plan:





Continues on 10 L per high flow nasal cannula


Breathing is stable, improving


Minimal cough


Today's chest x-ray showing progressive reticulonodular infiltrates


Clinically has been stable no fever or chills, vital signs have been stable


We'll obtain follow-up inflammatory markers and d-dimer tomorrow


We'll continue to try to wean FiO2 to maintain O2 saturations at 88% and above





I performed a history & physical examination of the patient and discussed their 

management with my nurse practitioner, Jeannine Belcher.  I reviewed the nurse 

practitioner's note and agree with the documented findings and plan of care.  

Lung sounds are positive for diffuse crackles throughout the lung fields.  The 

findings and the impression was discussed with the patient.  I attest to the 

documentation by the nurse practitioner. 











Time with Patient: Less than 30

## 2021-12-28 NOTE — PN
PROGRESS NOTE



DATE OF SERVICE:

12/28/2021



REASON FOR FOLLOWUP:

1. COVID-19.

2. Oral thrush.



INTERVAL HISTORY:

The patient is afebrile.  The patient is breathing comfortably.  The patient 
denies

having any chest pain or worsening cough or sputum production.  No abdominal 
pain or

diarrhea.  Still requiring 10 L high-flow oxygen.



PHYSICAL EXAMINATION:

Blood pressure 120/60 with a pulse of 79, temperature 97.5.  She is 90% on 10 L 
nasal

cannula. General description is an elderly female up in the chair in no 
distress.

Respiratory system: Unlabored breathing, decreased intensity of breath sounds. 
No

wheeze.  Heart S1, S2.  Regular rate and rhythm.  Abdomen soft, no tenderness.



LABS:

No new labs have been obtained today.



DIAGNOSTIC IMPRESSION AND PLAN:

1. Patient with acute respiratory failure secondary to COVID-19 pneumonia in 
this

    patient who has completed her baricitinib therapy, currently on 
dexamethasone,

    Lovenox  along with respiratory support.

2. Oral thrush. To continue with nystatin swish and swallow.





MMODL / IJN: [id number] / Job#: [id number]

MTDD

## 2021-12-28 NOTE — P.PN
Subjective





Patient is a 66-year-old the female with past medical history of COPD and 3 L 

oxygen dependent at home has cold symptoms for about a month comes in with 

shortness of breath presently on telemetry dysfunction CT of the chest did not 

show any pulmonary embolism but did show pulmonary fibrosis as well as 

infiltrates.  Below.  Patient doesn't have any infiltrate consistent with 

pneumonia patient denied any dysuria.  Patient is presently on Rocephin.  

Patient has mild acute renal failure because of which I'm holding ACE inhibitor.

 Severely hypomagnesemic magnesium is being replaced.  Patient last smoked about

17 years ago.





12/08/2021





Patient is seen and evaluated in follow-up and currently maintained on 10 L high

flow and weaning as tolerated.  Patient was on 15 L high flow nasal cannula this

morning and tolerating and nursing staff continuing to wean.  Patient normally 

wears 3 L of oxygen continuously in the outpatient setting.  Patient denies any 

worsening shortness of breath just generalized fatigue and weakness and 

continued diarrhea.  Patient states she is going approximately 2-3 times per day

will add C. diff testing and if negative will add Imodium and Questran.  

Pulmonary is following and venous Doppler was ordered bilateral lower 

extremities which is negative for DVT as d-dimer is elevated today at 2.42.  

Inflammatory markers significantly improved his LDH is 338 and CRP is 7.0, 

repeat magnesium after replacement is 2.9. 





12/09/2021





Patient is seen and evaluated in follow-up this morning currently sitting up in 

the chair and was on 10 L high flow although oxygen saturations were below 90% 

and patient bumped back up to 15 L.  Patient is complaining of the oxygen being 

too high and making her feel more anxious and discussed with nursing staff about

continuing to wean as tolerated.  She continues with diarrhea although is 

improving and C. diff testing was negative and patient was started on Questran 

along with Imodium.  Patient continues to state she has no real appetite 

although encourage the patient to continue with small frequent meals and will 

add Magic cups as well.  Encourage the patient to increase oral intake as she is

weak and needs the energy.  She normally maintained on 3 L via nasal cannula and

will continue to titrate as tolerated.  Pulmonary is following patient is 

maintained on IV dexamethasone twice daily along with vitamin and zinc 

supplements, Lovenox twice daily for elevated d-dimer and will continue.  

Patient was on normal saline and considered discontinuing as patient sodium is 

now 147 and patient is being started on D5 in water and will repeat labs.





12/10/2021


Patient is seen in follow-up this morning currently sitting up in the chair and 

oxygen requirements have increased and patient is now maintained on Airvo along 

with 15 L nonrebreather and oxygen saturations have been in the low 80s to 90 

maximum.  Discussed CODE STATUS with the patient and patient is a full code but 

does not wish to be placed on a ventilator if respiratory status continues to 

decline.  Pulmonary following closely and prognosis remains quite guarded.  

Patient continues on Lovenox along with dexamethasone and vitamin and zinc 

supplements and is being started on Baricitinib.  Patient is extremely weak and 

becomes extremely dyspneic with minimal exertion.  Patient's oral intake 

continues to be extremely poor and again encouraged and stressed the importance 

of eating regardless of not feeling hungry and having no appetite.  May consider

enteral nutrition and dietary consult.  Patient states her diarrhea has 

significantly improved and will use Imodium as needed and discontinue Questran. 

Repeat inflammatory markers ordered and pending.  Chest x-ray today shows 

bilateral airspace disease again noted with prominence and interstitial, and 

findings consistent with pneumonia.





12/11/2021


Patient admitted with bilateral common pneumonia , Her respiratory status today 

is a slightly worse as she is dependent on BiPAP throughout the day.  Also she 

has decreased air entry on both sides


She is saturating 88% while she is on BiPAP.


D-dimer is the same, 4.0 and 3.9.


She continued on dexamethasone, vitamin C, vitamin D, zinc,  barcitininb and 

Lovenox 40 mg twice a day.  Also she is on Protonix





12/12/2021


Sitting in chair looks mildly tachypneic and tolerates BiPAP which she uses his 

yesterday all the time.  She has not been eating since yesterday and may switch 

her for short time to nonrebreather and air or so she can read some oral diet   

Per pulmonary team recommendation.  Other than that she is hemodynamically 

stable.  Labs are stable.Calcitonin 0.19.  C. diff is negative.


She remains on dexamethasone, vitamin C, Z and Lovenox, Protonix and  barcitinib







12/13/2021


This morning she was still on BiPAP, and she was refusing to start TPN as she 

could not come off the BiPAP.  However later on during the day she could be 

placed on nonrebreather 15 L and Aravo 60 L with 94% so she can tolerate oral 

feeding.


Other vitals are stable, afebrile.  Labs are stable as well.


She continued on dexamethasone, vitamin C, vitamin D, zinc,  barcitininb and 

Lovenox 40 mg twice a day





12/14/2021


Patient with bilateral: With pneumonia and hypoxia, she is improving gradually 

today, today she couldn't get off her BiPAP and placed on airvo 60 L with FiO2 

of 85%, with occasional nonrebreather 15 L, patient was happy that she was able 

to eat today and she tolerates diet well.  She has some diarrhea but no 

abdominal pain.  C. diff is negative.


Other than that she is hemodynamically stable


Patient continued today on  dexamethasone, vitamin C, Z and Lovenox, Protonix 

and  barcitinib 


Follow-up chest x-ray and CBC/BMP in the morning





12/15/2021


Patient today is pleasant and smiling as her pulmonary illness is improving 

progressively but slowly, but she still currently on high flow nasal cannula 

with 55 L/m and FiO2 of 88% saturation in low 90s.  Rest of vitals are stable.  

Labs including CBC, BMP are unremarkable.


C. diff test came back negative and her diarrhea stopped today.  She is able to 

eat about 50-25% of her meals and she is satisfied with that for now.


She still on steroids with dexamethasone, vitamin C, vitamin D, zinc, Lovenox 40

mg twice a day, Protonix and barcitininb 





12/16/2021


Patient today her breathing slightly worsened and her oxygen requirement today 

was 55 L with 90%, she still can go a little difficulty due to her dyspnea and 

she confirmed me she is able to eat with no issues.


She sitting in chair today.  Hemodynamically stable.


Labs reviewed and shows stability, her d-dimer actually improved 3.9 down to 

1.6.  LDH went up to 1029 and CRP 3.4.


Chest x-ray shows slight improvement in aeration in both lung fields despite 

bilateral pneumonia.


She remains on dexamethasone, vitamin C, D and zinc and  Protonix and 

barcitininb .





12/17/2021


Patient breathing pattern is slowly to improve, today she still on high flow 

nasal cannula 55 L/m and FiO2 of 90%.


Labs including CBC and BMP are unremarkable and stable.  On the top of the 

patient is afebrile


However patient is complaining of from oral thrush and asked for fluconazole.  

Patient refused nystatin.  Also she has persistent loose bowel movement, C. diff

checked twice and there were negative.  We'll start her on short course of 

Questran.  Also patient states she is eating well and tolerates diet with no 

difficulty.


Continue with the other same medication of dexamethasone, multiple vitamin 

cocktail and Lovenox, Protonix and barcitininb 





12/18/2021


Her respiratory status with minimal change and fluctuating, currently she is on 

airvo at 55 L/m and 85%.  She is able to eat and drink.


She states that her most thrush is better and his diarrhea is improving after 

adding nystatin and Questran yesterday.


Afebrile and vitals are stable.


She remains on the same treatment of dexamethasone, vitamin C, D and zinc and  

Protonix and barcitininb .





12/19/2021 


patient breathing is improving as per patient stated that she is breathing 

easier.  She remains on same dose of oxygen as yesterday she still on 15 L/m at 

88% FiO2.


Also reports improvement in her bowel movements, she said that she almost had 

more formed bowel movement and her oral thrush is also improving with 

fluconazole.


And we will continue with the same treatment for her colon pneumonia with 

dexamethasone, vitamins, Barcitininb, Lovenox and Pepcid





12/20/2021


Patient is with bilateral common pneumonia on high dose of records oxygen since 

admission.  Today is awake and alert looks more comfortable and each day she 

states she is breathing easier.  Also she is happy with the progress of her 

bowel movement, she had about 4 months which is almost formed over the last 2 

days.  No more nausea vomiting but she feels some sore throat.  She is eating 

well.  However despite her reported clinical improvement by the patient she 

still on same dose of high need of oxygen and currently 55% and 70-90 L/m.


She remains on the same treatment of dexamethasone, vitamin C, DM zinc, 

,baricitinib.  Also she is on fluconazole, nystatin, Robitussin, Lovenox , and 

patient is not on Questran





12/21/2021


Patient clinically slowly to improve, he states she feels better but however her

oxygen requirements very close to yesterday at 50 L/m and 75% high flow nasal 

cannula.


Her GI symptoms or improvement and she tolerates diet well.


She is fluconazole currently while continue the same treatment of dexamethasone,

vitamin cocktail for Covid and baricitinib. .  Nystatin and Robitussin and 

Lovenox





12/22/2021


Patient respiratory status not improving or worsening much compared to 

yesterday.  She still breathing easily although she requires high flow nasal 

cannula at 50 L/m and FiO2 of 75%.  No GI symptoms, no significant sore throat 

which is responding to nystatin.  Patient tolerates diet well.


We'll keep the same medication and keep monitoring the patient for now.


Her creatinine trending up over the last 2-3 days, today is 0.08 went up to 1.2.

 We held her Diovan which is a home medication.  And we'll check creatinine 

tomorrow and bladder scan.


Repeat labs in the morning.  Follow-up chest x-ray





12/23/2021


 patient still improved slowly and gradually and her oxygen requirement today is

slightly down to 50 L/m and FiO2 of 68%


Other than that she is a pleasant subjective she feels improved and she breathes

easily, no diarrhea or vomiting.  Tolerates diet well.  Occasional sore throat.


Labs looks stable with WBCs 11.8.  D-dimer is down to 1.1.  LDH slightly up 734 

and CRP up to 6.2.


Continue with same treatment dexamethasone, vitamin cocktail for Covid and 

baricitinib.  Also Lovenox and Protonix





12/24/2021


Patient continued to report improvement in her breathing pattern however she 

still on high flow nasal cannula 50 liter per minute with FiO2 of 45%


Inflammatory markers improvement, d-dimer down to 0.6, LDH down from 734 and 2-

90 and CRP is normal less than 0.3.


Patient finished her dose of baricitinib breath continue with the rest of 

treatment including dexamethasone, vitamin C, D and zinc.  Lovenox Protonix





12/25/2021


Patient breathing easily, eats well.  Her oxygen requirement down to 45 L and 

45-50 FiO2.


We will continue with the same and treatment of steroids, vitamins, Lovenox and 

Protonix.  She finished her dose of baricitnib





10/26/2021 


patient continued to improve slowly and gradually and steadily.  Today her 

oxygen requirements significantly lowered to telemetry later per minute via high

flow nasal cannula.  Patient herself feels improvement each day little by 

little.


Hemodynamically stable.  No labs.


Continue with steroids and vitamins.  No GI symptoms but she wants to keep 

nystatin records status with sterile she will get fungal infection on the thrush








12/27/2021


Patient admitted with bilateral Covid pneumonia, she slowly improved but 

progressively as well.


Today just x-ray showing bilateral reticulonodular infiltrate more progressive 

by radiologist.  However patient clinically is improving and actually today she 

is down to 10 L/m via high flow nasal cannula, patient was happy because today 

for the first time she is off  her airvo .


She remains on dexamethasone, vitamin C, D and zinc.  Nystatin, Lovenox 40 mg 

twice a day and Protonix





Objective





- Vital Signs


Vital signs: 


                                   Vital Signs











Temp  98.4 F   12/27/21 08:00


 


Pulse  63   12/27/21 08:00


 


Resp  22   12/27/21 08:00


 


BP  115/63   12/27/21 08:00


 


Pulse Ox  95   12/27/21 08:00








                                 Intake & Output











 12/26/21 12/27/21 12/27/21





 18:59 06:59 18:59


 


Intake Total 1080 260 


 


Balance 1080 260 


 


Intake:   


 


  Oral 1080 260 


 


Other:   


 


  Voiding Method Bedside Commode Toilet Toilet


 


  # Voids 4 2 


 


  # Bowel Movements 1 1 














- Exam





-GENERAL: The patient is alert and oriented , on BiPAP, not in any acute 

distress. Well developed, well nourished. 


HEENT: Pupils are round and equally reacting to light. EOMI. No scleral icterus.

No conjunctival pallor. Normocephalic, atraumatic. No pharyngeal erythema. No 

thyromegaly. 


CARDIOVASCULAR: S1 and S2 present. No murmurs, rubs, or gallops. 


-PULMONARY: Chest is clear to auscultation, no wheezing.  Bilateral crepitation 

and decreased air entry on both sides


ABDOMEN: Soft, nontender, nondistended, normoactive bowel sounds. No palpable 

organomegaly. 


MUSCULOSKELETAL: No joint swelling or deformity. 


EXTREMITIES: No cyanosis, clubbing, or pedal edema. 


NEUROLOGICAL: Gross neurological examination did not reveal any focal deficits. 


SKIN: No rashes. no petechiae.





- Labs


CBC & Chem 7: 


                                 12/23/21 06:21





                                 12/23/21 06:21





Assessment and Plan


Assessment: 





-acute hypoxic respiratory failure: secondary to COVID-19 pneumonia 


-chronic hypercapnic respiratory failure secondary to COPD uses 3 L of oxygen at

home


-Acute kidney injury


-Diarrhea most likely secondary to Covid, resolved


-Oral thrush secondary to steroids


-Hypernatremia, improving, sodium is 140 a.m. we'll continue D5 in water and 

repeat a.m. labs 


-Severe hypomagnesemia, improved


-Elevated d-dimer secondary to Covid and patient is on Lovenox which will be 

continued, Lovenox is currently twice daily


-Acute renal failure, prerenal, improved with IV fluids


-Hypertension


-Mild anion gap and metabolic acidosis probability of lactic acidosis, most 

likely secondary to increased amounts of diarrhea and dehydration, lactic acid 

is 1.1


-gastroesophageal reflux disease


-Hyperlipidemia


-Hypertension


-History of pulmonary fibrosis


-Sleep apnea uses CPAP machine at home


-Peripheral vascular disease


-Coronary artery disease


-DVT prophylaxis: On Lovenox which will be continued











Plan: 





This is a pleasant 66 years old female who presents with bilateral covid 

pneumonia


Continue with dexamethasone


Continue with vitamin C, vitamin D and zinc


Pulmonary consult 


Also he is on barcitinib


Continue with stenting


Hold Diovan and monitor blood pressure.  Check a bladder scan


Labs and medication were reviewed..  Continue same treatment.  Continue with 

symptomatic treatment.  Resume home medication.  Monitor lytes and vitals.  DVT 

and GI prophylaxis.  Further recommendations as per clinical course of the 

patient


DVT prophylaxis: Subcutaneous Lovenox


GI Prophylaxis: Ppi


Prognosis is guarded

## 2021-12-29 LAB
ALBUMIN SERPL-MCNC: 3.2 G/DL (ref 3.5–5)
ALBUMIN/GLOB SERPL: 1.3 {RATIO}
ALP SERPL-CCNC: 121 U/L (ref 38–126)
ALT SERPL-CCNC: 31 U/L (ref 4–34)
ANION GAP SERPL CALC-SCNC: 9 MMOL/L
AST SERPL-CCNC: 19 U/L (ref 14–36)
BASOPHILS # BLD AUTO: 0 K/UL (ref 0–0.2)
BASOPHILS NFR BLD AUTO: 0 %
BUN SERPL-SCNC: 17 MG/DL (ref 7–17)
CALCIUM SPEC-MCNC: 9.1 MG/DL (ref 8.4–10.2)
CHLORIDE SERPL-SCNC: 107 MMOL/L (ref 98–107)
CO2 SERPL-SCNC: 25 MMOL/L (ref 22–30)
EOSINOPHIL # BLD AUTO: 0.3 K/UL (ref 0–0.7)
EOSINOPHIL NFR BLD AUTO: 3 %
ERYTHROCYTE [DISTWIDTH] IN BLOOD BY AUTOMATED COUNT: 4.52 M/UL (ref 3.8–5.4)
ERYTHROCYTE [DISTWIDTH] IN BLOOD: 15.9 % (ref 11.5–15.5)
GLOBULIN SER CALC-MCNC: 2.4 G/DL
GLUCOSE SERPL-MCNC: 128 MG/DL (ref 74–99)
HCT VFR BLD AUTO: 39.3 % (ref 34–46)
HGB BLD-MCNC: 12.6 GM/DL (ref 11.4–16)
LDH SPEC-CCNC: 779 U/L (ref 313–618)
LYMPHOCYTES # SPEC AUTO: 1.1 K/UL (ref 1–4.8)
LYMPHOCYTES NFR SPEC AUTO: 12 %
MAGNESIUM SPEC-SCNC: 1.5 MG/DL (ref 1.6–2.3)
MCH RBC QN AUTO: 27.9 PG (ref 25–35)
MCHC RBC AUTO-ENTMCNC: 32 G/DL (ref 31–37)
MCV RBC AUTO: 87 FL (ref 80–100)
MONOCYTES # BLD AUTO: 0.4 K/UL (ref 0–1)
MONOCYTES NFR BLD AUTO: 4 %
NEUTROPHILS # BLD AUTO: 7.2 K/UL (ref 1.3–7.7)
NEUTROPHILS NFR BLD AUTO: 80 %
PLATELET # BLD AUTO: 203 K/UL (ref 150–450)
POTASSIUM SERPL-SCNC: 3.7 MMOL/L (ref 3.5–5.1)
PROT SERPL-MCNC: 5.6 G/DL (ref 6.3–8.2)
SODIUM SERPL-SCNC: 141 MMOL/L (ref 137–145)
WBC # BLD AUTO: 9.1 K/UL (ref 3.8–10.6)

## 2021-12-29 RX ADMIN — ASPIRIN SCH: 325 TABLET ORAL at 18:27

## 2021-12-29 RX ADMIN — NYSTATIN SCH UNIT: 100000 SUSPENSION ORAL at 21:18

## 2021-12-29 RX ADMIN — ENOXAPARIN SODIUM SCH MG: 40 INJECTION SUBCUTANEOUS at 09:06

## 2021-12-29 RX ADMIN — NYSTATIN SCH UNIT: 100000 SUSPENSION ORAL at 17:23

## 2021-12-29 RX ADMIN — NYSTATIN SCH UNIT: 100000 SUSPENSION ORAL at 09:06

## 2021-12-29 RX ADMIN — NYSTATIN SCH UNIT: 100000 SUSPENSION ORAL at 13:35

## 2021-12-29 RX ADMIN — ENOXAPARIN SODIUM SCH MG: 40 INJECTION SUBCUTANEOUS at 21:17

## 2021-12-29 RX ADMIN — ASPIRIN SCH: 325 TABLET ORAL at 18:29

## 2021-12-29 RX ADMIN — DEXTROSE SCH MG: 50 INJECTION, SOLUTION INTRAVENOUS at 09:07

## 2021-12-29 RX ADMIN — TIOTROPIUM BROMIDE INHALATION SPRAY SCH PUFF: 3.12 SPRAY, METERED RESPIRATORY (INHALATION) at 10:00

## 2021-12-29 RX ADMIN — ASPIRIN SCH: 325 TABLET ORAL at 21:27

## 2021-12-29 RX ADMIN — Medication SCH MCG: at 09:06

## 2021-12-29 RX ADMIN — DEXTROSE SCH MG: 50 INJECTION, SOLUTION INTRAVENOUS at 21:17

## 2021-12-29 RX ADMIN — OXYCODONE HYDROCHLORIDE AND ACETAMINOPHEN SCH MG: 500 TABLET ORAL at 21:17

## 2021-12-29 RX ADMIN — LORATADINE SCH MG: 10 TABLET ORAL at 09:06

## 2021-12-29 RX ADMIN — ATORVASTATIN CALCIUM SCH MG: 80 TABLET, FILM COATED ORAL at 09:06

## 2021-12-29 RX ADMIN — SERTRALINE HYDROCHLORIDE SCH MG: 100 TABLET ORAL at 09:06

## 2021-12-29 RX ADMIN — OXYCODONE HYDROCHLORIDE AND ACETAMINOPHEN SCH MG: 500 TABLET ORAL at 09:06

## 2021-12-29 RX ADMIN — METOPROLOL SUCCINATE SCH MG: 25 TABLET, EXTENDED RELEASE ORAL at 09:06

## 2021-12-29 RX ADMIN — Medication SCH MG: at 09:06

## 2021-12-29 RX ADMIN — CLOPIDOGREL BISULFATE SCH MG: 75 TABLET ORAL at 09:06

## 2021-12-29 RX ADMIN — PANTOPRAZOLE SODIUM SCH MG: 40 TABLET, DELAYED RELEASE ORAL at 09:06

## 2021-12-29 NOTE — P.PN
Subjective


Progress Note Date: 12/29/21


Principal diagnosis: 





COVID-19 pneumonia





This is a pleasant 66-year-old female patient who follows at the [facility] for her

primary care needs.  She has history of hypertension, hyperlipidemia, 

gastroesophageal reflux disease, anxiety/depression, aortic thrombectomy in 

2007, former smoker, COPD, obstructive sleep apnea utilizing CPAP.  He is here 

yesterday to the emergency room with a four-week history of shortness of breath,

cough, congestion, body aches and fevers.  She was tested negative for CoVID 2.

 Her  is positive for CoVID and she is now positive for CoVID.  She is 

not vaccinated.  She does have home oxygen use at 2-3 L.  She is currently 

requiring 10 L high flow nasal cannula to maintain O2 saturations at 90%.  Chest

x-ray reveals bilateral patchy infiltrates. White count 5.9.  Hemoglobin 12.5.  

Platelets 398.  Lymphocytes 0.5.  D-dimer 1.89.  Sodium 140.  Potassium 4.9.  

Creatinine 1.2.  AST 39.  ALT 26.  Alk phos 156.  LDH 1283.  C-reactive protein 

21.7.  Pro-calcitonin 0.32.  She's been initiated on Decadron, Lovenox, vitamin 

supplements.  0.9% normal saline at 75 ML's per hour.





On 12/08/2021 patient is in follow-up on medical surgical floor, she is resting 

comfortably in bed, she states she is feeling better, although she is requiring 

more oxygen compared to when she first presented to the emergency department, 

note that patient does wear home oxygen at 2 L for history of COPD.  She is 

currently on 10 L of oxygen, her pulse ox is 91-92%, breathing comfortably, lung

sounds reveal coarse crackles at bilateral bases, but no wheezing, no rhonchi.  

CT angiogram of the chest showed no evidence of pulmonary embolism, it did show 

pulmonary emphysema and pulmonary interstitial fibrosis with increased 

interstitial infiltrates compared to her old exam.  There was no evidence of a 

suspicious pulmonary mass.  Patient was outside the window for Remdesivir, she 

continues on Decadron 6 mg twice daily, her pro-calcitonin level on admission wa

s 0.32, and patient was covered with Rocephin for possibility of underlying 

bacterial infection.  She is on prophylactic Lovenox 40 mg daily.  CTA [place] 

Ye of the chest showed no evidence of pulmonary embolism. 0





On 12/09/2001 patient seen in follow-up on medical surgical floor, she is 

currently on 10 L of oxygen the pulse ox of 86%, FiO2 has since been increased 

to 15 L, she has a mild cough, but overall seems to be in no acute distress, she

is sitting up in the chair, denies any chest discomfort, looks quite 

comfortable.  Lung sounds reveal coarse crackles bilateral bases, she does get s

hort of breath with exertion.  She states if she sits still she is breathing 

comfortably, and her O2 saturations are at or above 90%.  She's had no fever or 

chills overnight, she remains on Decadron 40 mg twice daily.  She is on empiric 

antibiotics in the form of Rocephin and IV fluids at 75 ML per hour.  Today's 

labs have been reviewed, white blood cell count is 8.6, hemoglobin is 12, 

platelet count is 506, d-dimer is 4.01, sodium is 147, potassium is 4.8, 

chloride is 112, BUN of 20 creatinine 0.7, renal profile has significantly 

improved since admission.  LDH has significantly improved since admission and is

down to 348 on today's labs, CRP is still pending, her last pro-calcitonin is 

negative at 0.13.  Blood culture has shown no growth thus far.





On 12/10/2021 patient seen in follow-up on medical surgical floor, she had been 

on 15 L high flow and 100% nonrebreather mask up until this morning, this 

morning patient's FiO2 requirements had increased, patient was getting up in the

chair, she is significantly desaturated, she was placed on irritable at 60 L and

FiO2 of 90% in addition to 100% nonrebreather, and patient is taking quite a 

while to recover her O2 saturations although her work of breathing is not 

increased.  She is still awake and alert, she is oriented 3, she is able to 

make her needs known, her pulse ox is slowly recovering at rest, with deep 

breathing, pursed lip breathing, repositioning.  Overnight she has had no fever 

or chills, blood pressures have been stable.  Breathing fairly comfortably 

although she desaturates with any little exertion.  Currently she is on Decadron

6 Twice daily, she will be started on Baricitinib, patient is also on Plavix, 

and prophylactic Lovenox.  Today's chest x-ray and labs are still pending, she 

remains on D5W at a rate of 100 ML per hour for dehydration related to diarrhea 

and hypernatremia, repeat electrolytes and renal profile are still pending for 

today.  No abdominal pain, no nausea.  The diarrhea has significantly improved 

in the last 48 hours.  Patient is tolerating oral intake, no abdominal pain. 





The patient is seen today 12/11/2021 in follow-up on the regular medical floor. 

She is currently sitting up in a chair at the bedside.  She is now on BiPAP 12/6

and 100% FiO2 with O2 saturations in the mid to upper 80s.  She remains alert.  

No further diarrhea.  No abdominal pain.  The cultures revealed no growth.  D-

dimer 3.98.  She remains on Baricitinib, Decadron, vitamin supplements.  She is 

continued on bronchodilators.








The patient is seen today 12/14/2021 in follow-up on the regular medical floor. 

She is currently sitting up in a chair at the bedside.  She remains on AirVo 

high flow oxygen at 60 L and 85% FiO2.  O2 saturations 84-89%.  Occasionally 

she'll put on a nonrebreather mask.  She's afebrile.  Hemodynamically stable.  

Blood cultures revealed no growth.  C. difficile screen is negative.  She is 

continued on Decadron, Lovenox, vitamin supplements.  She remains on 

Baricitinib.  Continued on bronchodilators. 








The patient is seen today 12/18/2021 in follow-up on the regular medical floor. 

She is currently sitting up in a chair at bedside.  Awake and alert in no acute 

distress.  She is getting better.  Slow to progress.  She continues on the AirVo

high flow oxygen at 60 L and 80% FiO2.  She is having issues with thrush.  

Continued on Baricitinib, Decadron, Lovenox and vitamin supplements.  Continued 

on bronchodilators.  She is on Diflucan.











The patient is seen today 12/25/2021 in follow-up on the regular medical floor. 

She is awake and alert in no acute distress.  Up in a chair at the bedside.  

Still requiring AirVo high flow oxygen at 45 L and 45% with O2 saturation 91%.  

No worsening shortness of breath, cough or congestion.  She is afebrile.  

Hemodynamically stable.  Last d-dimer 0.60.  .  C-reactive protein less 

than 0.3.  She is continued on Decadron, Lovenox, vitamin supplements.











The patient is seen today 12/29/2021 in follow-up on the regular medical floor. 

She is currently resting flat in bed.  Awake and alert in no acute distress.  

She is on 10 L high flow nasal cannula with O2 saturations between 85 and 93%.  

She has been up in the chair frequently.  She is up ambulating in her room.  

White count 9.1.  Hemoglobin 12.6.  D-dimer 1.5.  Sodium 141 potassium 3.7.  

Creatinine 0.78.  .  C-reactive protein 4.3.  She is continued on 

Decadron, Lovenox, vitamin supplements.  Remains on bronchodilators.





Objective





- Vital Signs


Vital signs: 


                                   Vital Signs











Temp  98.4 F   12/29/21 10:00


 


Pulse  75   12/29/21 10:00


 


Resp  17   12/29/21 10:00


 


BP  123/79   12/29/21 10:00


 


Pulse Ox  92 L  12/29/21 10:42








                                 Intake & Output











 12/28/21 12/29/21 12/29/21





 18:59 06:59 18:59


 


Intake Total 450  236


 


Output Total 300  


 


Balance 150  236


 


Intake:   


 


  Oral 450  236


 


Output:   


 


  Urine 300  


 


Other:   


 


  Voiding Method Bedside Commode Bedside Commode Bedside Commode


 


  # Voids 2  


 


  # Bowel Movements 1  1














- Exam





GENERAL EXAM: Alert, very pleasant, 66-year-old female, on 10 L high flow nasal 

cannula, comfortable, no acute distress.


HEAD: Normocephalic/atraumatic.


EYES: Normal reaction of pupils, equal size.  Conjunctiva pink, sclera white.


NOSE: Clear with pink turbinates.


THROAT: No erythema or exudates.


NECK: No masses, no JVD, no thyroid enlargement, no adenopathy.


CHEST: No chest wall deformity.  Symmetrical expansion. 


LUNGS: Equal air entry with with coarse bilateral crackles


CVS: Regular rate and rhythm, normal S1 and S2, no gallops, no murmurs, no rubs


ABDOMEN: Soft, nontender.  No hepatosplenomegaly, normal bowel sounds, no 

guarding or rigidity.


EXTREMITIES: No clubbing, no edema, no cyanosis, 2+ pulses and upper and lower 

extremities.


MUSCULOSKELETAL: Muscle strength and tone normal.


SPINE: No scoliosis or deformity


SKIN: No rashes


CENTRAL NERVOUS SYSTEM: No focal deficits, tone is normal in all 4 extremities.


PSYCHIATRIC: Alert and oriented -3.  Appropriate affect.  Intact judgment and 

insight.





- Labs


CBC & Chem 7: 


                                 12/29/21 06:29





                                 12/29/21 06:29


Labs: 


                  Abnormal Lab Results - Last 24 Hours (Table)











  12/29/21 12/29/21 12/29/21 Range/Units





  06:29 06:29 06:29 


 


RDW  15.9 H    (11.5-15.5)  %


 


D-Dimer   1.50 H   (<0.60)  mg/L FEU


 


Glucose    128 H  (74-99)  mg/dL


 


Magnesium    1.5 L  (1.6-2.3)  mg/dL


 


Lactate Dehydrogenase    779 H  (313-618)  U/L


 


C-Reactive Protein    4.3 H  (<1.0)  mg/dL


 


Total Protein    5.6 L  (6.3-8.2)  g/dL


 


Albumin    3.2 L  (3.5-5.0)  g/dL














Assessment and Plan


Assessment: 





1 Acute on chronic hypoxic respiratory failure secondary to COVID-19 pneumonia. 

Not vaccinated.  Completed Baricitinib on 12/23/2021. Currently on 10 L high 

flow nasal cannula





2 Elevated inflammatory markers secondary to above





3 No evidence of acute infection, antibiotics discontinued and started on 

Baricitinib 





4 Obesity





5 Obstructive sleep apnea, on CPAP





6 Hypertension





7 Hyperlipidemia





8 Anxiety/depression





9 History of coronary disease with previous stent placement





10 History of aortic thrombectomy/aortobifem bypass in 2007





11 Multiple medication ALLERGIES





12 Former smoker.  





Plan:





Patient was seen and evaluated


On 10 L high flow nasal cannula


Completed Baricitinib


Continue Decadron, Lovenox, vitamin supplements


Continue bronchodilators


Titrate the FiO2 as tolerated


Increase her activity as tolerated


We'll continue to follow 





I, the cosigning physician, performed a history & physical examination of the 

patient. Lungs sounds with basilar coarse crackles, diminished.  Maintaining O2 

saturations in the 90s on 10 L high flow nasal cannula.  I discussed the 

assessment and plan of care with my nurse practitioner, Velia Short. I attest to 

the above note as dictated by her.

## 2021-12-29 NOTE — P.PN
Progress Note - Text


Progress Note Date: 12/29/21


REASON FOR FOLLOWUP:


1. COVID-19.


2. Oral thrush.





INTERVAL HISTORY:


The patient remains to be afebrile.  The patient is breathing comfortably.  The 

patient denies


having any chest pain patient with minimal dry cough No abdominal pain or


diarrhea.  Still requiring 10 L high-flow oxygen.





PHYSICAL EXAMINATION:


Blood pressure 130/60 with a pulse of 79, temperature 97.5.  She is 90% on 10 L 

nasal


cannula. General description is an elderly female up in the chair in no 

distress.


Respiratory system: Unlabored breathing, decreased intensity of breath sounds. 

No


wheeze.  Heart S1, S2.  Regular rate and rhythm.  Abdomen soft, no tenderness.





LABS: Reviewed





DIAGNOSTIC IMPRESSION AND PLAN:


1. Patient with acute respiratory failure secondary to COVID-19 pneumonia in 

this


    patient who has completed her baricitinib therapy, patient is currently on 

dexamethasone,


    Lovenox .  She'll be continued along with respiratory support.


2. Oral thrush. To continue with nystatin swish and swallow.

## 2021-12-29 NOTE — P.PN
Subjective


Progress Note Date: 12/29/21











Patient is a 66-year-old the female with past medical history of COPD and 3 L 

oxygen dependent at home has cold symptoms for about a month comes in with 

shortness of breath presently on telemetry dysfunction CT of the chest did not 

show any pulmonary embolism but did show pulmonary fibrosis as well as i

nfiltrates.  Below.  Patient doesn't have any infiltrate consistent with 

pneumonia patient denied any dysuria.  Patient is presently on Rocephin.  

Patient has mild acute renal failure because of which I'm holding ACE inhibitor.

 Severely hypomagnesemic magnesium is being replaced.  Patient last smoked about

17 years ago.





12/08/2021





Patient is seen and evaluated in follow-up and currently maintained on 10 L high

flow and weaning as tolerated.  Patient was on 15 L high flow nasal cannula this

morning and tolerating and nursing staff continuing to wean.  Patient normally 

wears 3 L of oxygen continuously in the outpatient setting.  Patient denies any 

worsening shortness of breath just generalized fatigue and weakness and 

continued diarrhea.  Patient states she is going approximately 2-3 times per day

will add C. diff testing and if negative will add Imodium and Questran.  

Pulmonary is following and venous Doppler was ordered bilateral lower 

extremities which is negative for DVT as d-dimer is elevated today at 2.42.  

Inflammatory markers significantly improved his LDH is 338 and CRP is 7.0, 

repeat magnesium after replacement is 2.9. 





12/09/2021





Patient is seen and evaluated in follow-up this morning currently sitting up in 

the chair and was on 10 L high flow although oxygen saturations were below 90% 

and patient bumped back up to 15 L.  Patient is complaining of the oxygen being 

too high and making her feel more anxious and discussed with nursing staff about

continuing to wean as tolerated.  She continues with diarrhea although is 

improving and C. diff testing was negative and patient was started on Questran 

along with Imodium.  Patient continues to state she has no real appetite 

although encourage the patient to continue with small frequent meals and will 

add Magic cups as well.  Encourage the patient to increase oral intake as she is

weak and needs the energy.  She normally maintained on 3 L via nasal cannula and

will continue to titrate as tolerated.  Pulmonary is following patient is 

maintained on IV dexamethasone twice daily along with vitamin and zinc supplem

ents, Lovenox twice daily for elevated d-dimer and will continue.  Patient was 

on normal saline and considered discontinuing as patient sodium is now 147 and 

patient is being started on D5 in water and will repeat labs.





12/10/2021


Patient is seen in follow-up this morning currently sitting up in the chair and 

oxygen requirements have increased and patient is now maintained on Airvo along 

with 15 L nonrebreather and oxygen saturations have been in the low 80s to 90 

maximum.  Discussed CODE STATUS with the patient and patient is a full code but 

does not wish to be placed on a ventilator if respiratory status continues to 

decline.  Pulmonary following closely and prognosis remains quite guarded.  

Patient continues on Lovenox along with dexamethasone and vitamin and zinc 

supplements and is being started on Baricitinib.  Patient is extremely weak and 

becomes extremely dyspneic with minimal exertion.  Patient's oral intake 

continues to be extremely poor and again encouraged and stressed the importance 

of eating regardless of not feeling hungry and having no appetite.  May consider

enteral nutrition and dietary consult.  Patient states her diarrhea has 

significantly improved and will use Imodium as needed and discontinue Questran. 

Repeat inflammatory markers ordered and pending.  Chest x-ray today shows 

bilateral airspace disease again noted with prominence and interstitial, and fin

dings consistent with pneumonia.





12/11/2021


Patient admitted with bilateral common pneumonia , Her respiratory status today 

is a slightly worse as she is dependent on BiPAP throughout the day.  Also she 

has decreased air entry on both sides


She is saturating 88% while she is on BiPAP.


D-dimer is the same, 4.0 and 3.9.


She continued on dexamethasone, vitamin C, vitamin D, zinc,  barcitininb and Lo

venox 40 mg twice a day.  Also she is on Protonix





12/12/2021


Sitting in chair looks mildly tachypneic and tolerates BiPAP which she uses his 

yesterday all the time.  She has not been eating since yesterday and may switch 

her for short time to nonrebreather and air or so she can read some oral diet   

Per pulmonary team recommendation.  Other than that she is hemodynamically 

stable.  Labs are stable.Calcitonin 0.19.  C. diff is negative.


She remains on dexamethasone, vitamin C, Z and Lovenox, Protonix and  barcitinib







12/13/2021


This morning she was still on BiPAP, and she was refusing to start TPN as she 

could not come off the BiPAP.  However later on during the day she could be 

placed on nonrebreather 15 L and Aravo 60 L with 94% so she can tolerate oral 

feeding.


Other vitals are stable, afebrile.  Labs are stable as well.


She continued on dexamethasone, vitamin C, vitamin D, zinc,  barcitininb and 

Lovenox 40 mg twice a day





12/14/2021


Patient with bilateral: With pneumonia and hypoxia, she is improving gradually 

today, today she couldn't get off her BiPAP and placed on airvo 60 L with FiO2 

of 85%, with occasional nonrebreather 15 L, patient was happy that she was able 

to eat today and she tolerates diet well.  She has some diarrhea but no 

abdominal pain.  C. diff is negative.


Other than that she is hemodynamically stable


Patient continued today on  dexamethasone, vitamin C, Z and Lovenox, Protonix 

and  barcitinib 


Follow-up chest x-ray and CBC/BMP in the morning





12/15/2021


Patient today is pleasant and smiling as her pulmonary illness is improving 

progressively but slowly, but she still currently on high flow nasal cannula 

with 55 L/m and FiO2 of 88% saturation in low 90s.  Rest of vitals are stable.  

Labs including CBC, BMP are unremarkable.


C. diff test came back negative and her diarrhea stopped today.  She is able to 

eat about 50-25% of her meals and she is satisfied with that for now.


She still on steroids with dexamethasone, vitamin C, vitamin D, zinc, Lovenox 40

mg twice a day, Protonix and barcitininb 





12/16/2021


Patient today her breathing slightly worsened and her oxygen requirement today 

was 55 L with 90%, she still can go a little difficulty due to her dyspnea and 

she confirmed me she is able to eat with no issues.


She sitting in chair today.  Hemodynamically stable.


Labs reviewed and shows stability, her d-dimer actually improved 3.9 down to 

1.6.  LDH went up to 1029 and CRP 3.4.


Chest x-ray shows slight improvement in aeration in both lung fields despite 

bilateral pneumonia.


She remains on dexamethasone, vitamin C, D and zinc and  Protonix and 

barcitininb .





12/17/2021


Patient breathing pattern is slowly to improve, today she still on high flow 

nasal cannula 55 L/m and FiO2 of 90%.


Labs including CBC and BMP are unremarkable and stable.  On the top of the 

patient is afebrile


However patient is complaining of from oral thrush and asked for fluconazole.  

Patient refused nystatin.  Also she has persistent loose bowel movement, C. diff

checked twice and there were negative.  We'll start her on short course of 

Questran.  Also patient states she is eating well and tolerates diet with no 

difficulty.


Continue with the other same medication of dexamethasone, multiple vitamin 

cocktail and Lovenox, Protonix and barcitininb 





12/18/2021


Her respiratory status with minimal change and fluctuating, currently she is on 

airvo at 55 L/m and 85%.  She is able to eat and drink.


She states that her most thrush is better and his diarrhea is improving after 

adding nystatin and Questran yesterday.


Afebrile and vitals are stable.


She remains on the same treatment of dexamethasone, vitamin C, D and zinc and  

Protonix and barcitininb .





12/19/2021 


patient breathing is improving as per patient stated that she is breathing 

easier.  She remains on same dose of oxygen as yesterday she still on 15 L/m at 

88% FiO2.


Also reports improvement in her bowel movements, she said that she almost had 

more formed bowel movement and her oral thrush is also improving with 

fluconazole.


And we will continue with the same treatment for her colon pneumonia with 

dexamethasone, vitamins, Barcitininb, Lovenox and Pepcid





12/20/2021


Patient is with bilateral common pneumonia on high dose of records oxygen since 

admission.  Today is awake and alert looks more comfortable and each day she 

states she is breathing easier.  Also she is happy with the progress of her 

bowel movement, she had about 4 months which is almost formed over the last 2 

days.  No more nausea vomiting but she feels some sore throat.  She is eating 

well.  However despite her reported clinical improvement by the patient she 

still on same dose of high need of oxygen and currently 55% and 70-90 L/m.


She remains on the same treatment of dexamethasone, vitamin C, DM zinc, 

,baricitinib.  Also she is on fluconazole, nystatin, Robitussin, Lovenox , and 

patient is not on Questran





12/21/2021


Patient clinically slowly to improve, he states she feels better but however her

oxygen requirements very close to yesterday at 50 L/m and 75% high flow nasal 

cannula.


Her GI symptoms or improvement and she tolerates diet well.


She is fluconazole currently while continue the same treatment of dexamethasone,

vitamin cocktail for Covid and baricitinib. .  Nystatin and Robitussin and 

Lovenox





12/22/2021


Patient respiratory status not improving or worsening much compared to 

yesterday.  She still breathing easily although she requires high flow nasal 

cannula at 50 L/m and FiO2 of 75%.  No GI symptoms, no significant sore throat 

which is responding to nystatin.  Patient tolerates diet well.


We'll keep the same medication and keep monitoring the patient for now.


Her creatinine trending up over the last 2-3 days, today is 0.08 went up to 1.2.

 We held her Diovan which is a home medication.  And we'll check creatinine 

tomorrow and bladder scan.


Repeat labs in the morning.  Follow-up chest x-ray





12/23/2021


 patient still improved slowly and gradually and her oxygen requirement today is

slightly down to 50 L/m and FiO2 of 68%


Other than that she is a pleasant subjective she feels improved and she breathes

easily, no diarrhea or vomiting.  Tolerates diet well.  Occasional sore throat.


Labs looks stable with WBCs 11.8.  D-dimer is down to 1.1.  LDH slightly up 734 

and CRP up to 6.2.


Continue with same treatment dexamethasone, vitamin cocktail for Covid and 

baricitinib.  Also Lovenox and Protonix





12/24/2021


Patient continued to report improvement in her breathing pattern however she 

still on high flow nasal cannula 50 liter per minute with FiO2 of 45%


Inflammatory markers improvement, d-dimer down to 0.6, LDH down from 734 and 2-

90 and CRP is normal less than 0.3.


Patient finished her dose of baricitinib breath continue with the rest of 

treatment including dexamethasone, vitamin C, D and zinc.  Lovenox Protonix





12/25/2021


Patient breathing easily, eats well.  Her oxygen requirement down to 45 L and 

45-50 FiO2.


We will continue with the same and treatment of steroids, vitamins, Lovenox and 

Protonix.  She finished her dose of baricitnib





10/26/2021 


patient continued to improve slowly and gradually and steadily.  Today her 

oxygen requirements significantly lowered to telemetry later per minute via high

flow nasal cannula.  Patient herself feels improvement each day little by 

little.


Hemodynamically stable.  No labs.


Continue with steroids and vitamins.  No GI symptoms but she wants to keep 

nystatin records status with sterile she will get fungal infection on the thrush








12/27/2021


Patient admitted with bilateral Covid pneumonia, she slowly improved but pr

ogressively as well.


Today just x-ray showing bilateral reticulonodular infiltrate more progressive 

by radiologist.  However patient clinically is improving and actually today she 

is down to 10 L/m via high flow nasal cannula, patient was happy because today 

for the first time she is off  her airvo .


She remains on dexamethasone, vitamin C, D and zinc.  Nystatin, Lovenox 40 mg 

twice a day and Protonix





12/28/2021





Patient is seen this morning in follow up and continues on HF at 10 liters with 

pulmonary and infectious disease following. Patient continues on IV 

dexamethasone and lovenox along with vitamins and zinc and will continue. 

Recommend to continue to wean FI02 as tolerated. Patient continues to be weak 

and will have PT evaluate the patient. Patient has refused rehab and would like 

to return home with family. Will repeat am labs and continue to monitor closely.

Patient denies any worsening shortness of breath, chest pains, or palpitations. 

Patient is afebrile. No reports of nausea or vomiting. 





12/29/2021





Patient is seen and evaluated and follow-up continues on 10 L high flow with 

oxygen saturation of 88-90% with pulmonary following closely.  Patient also 

continues on IV dexamethasone along with Lovenox and vitamin and zinc 

supplements and will continue.  Patient is stating that she is feeling slightly 

improved and is currently maintained off of Airvo and on HF NC. Recommend to 

continue weaning as tolerated. Patient states that she has been eating well and 

continues to increase activity as tolerated. Patient continues to be extremely 

dyspneic with exertion and conversation. Prognosis remains guarded. Patient to 

continue with vitamin and zinc supplements and IV dexamethasone and lovenox BID.

Magnesium low at 1.5 and will replace per protocol and repeat am labs and chest 

xray. Pulmonary following closely. Inflammatory markers elevated although 

trending down.





Labs:





WBC is 9.1, hemoglobin is 12.6, platelets are 203 home d-dimer is 1.50, sodium 

is 141, potassium 3.7, BUN is 17, creatinine 0.78, magnesium is 1.5, LDH is 779,

CRP is 4.3





Active Medications





Acetaminophen (Acetaminophen Tab 325 Mg Tab)  650 mg PO Q4HR PRN


   PRN Reason: Fever>101


   Last Admin: 12/27/21 09:21 Dose:  650 mg


   Documented by: 


Ascorbic Acid (Ascorbic Acid 500 Mg Tab)  500 mg PO BID [facility]


   Last Admin: 12/29/21 09:06 Dose:  500 mg


   Documented by: 


Atorvastatin Calcium (Atorvastatin 80 Mg Tab)  80 mg PO DAILY [facility]


   Last Admin: 12/29/21 09:06 Dose:  80 mg


   Documented by: 


Bupropion HCl (Bupropion 100 Mg Tab)  100 mg PO DAILY [facility]


   Last Admin: 12/29/21 09:06 Dose:  100 mg


   Documented by: 


Cholecalciferol (Cholecalciferol 25 Mcg (1000 Iu) Tablet)  25 mcg PO DAILY [facility]


   Last Admin: 12/29/21 09:06 Dose:  25 mcg


   Documented by: 


Clopidogrel Bisulfate (Clopidogrel 75 Mg Tab)  75 mg PO DAILY [facility]


   Last Admin: 12/29/21 09:06 Dose:  75 mg


   Documented by: 


Dexamethasone Sodium Phosphate (Dexamethasone Sod Phosphate 10 Mg/Ml 1 Ml Vial) 

6 mg IVP BID [facility]


   Last Admin: 12/29/21 09:07 Dose:  6 mg


   Documented by: 


Enoxaparin Sodium (Enoxaparin 40 Mg/0.4 Ml Syringe)  40 mg SQ BID [facility]


   Last Admin: 12/29/21 09:06 Dose:  40 mg


   Documented by: 


Guaifenesin/Dextromethorphan (Guaifenesin-Dm 100-10mg/5ml 10 Ml Cup)  10 ml PO 

Q6HR PRN


   PRN Reason: Cough


   Last Admin: 12/17/21 10:18 Dose:  10 ml


   Documented by: 


Loperamide HCl (Loperamide 2 Mg Cap)  2 mg PO QID PRN


   PRN Reason: Diarrhea


   Last Admin: 12/20/21 22:19 Dose:  2 mg


   Documented by: 


Loratadine (Loratadine 10 Mg Tab)  10 mg PO DAILY [facility]


   Last Admin: 12/29/21 09:06 Dose:  10 mg


   Documented by: 


Metoprolol Succinate (Metoprolol Succinate (Er) 25 Mg Tab.Er.24h)  25 mg PO 

DAILY [facility]


   Last Admin: 12/29/21 09:06 Dose:  25 mg


   Documented by: 


Non-Formulary Medication (Levalbuterol Hfa Inhaler)  2 puff INHALATION RT-QID 

[facility]


   Last Admin: 12/28/21 21:03 Dose:  Not Given


   Documented by: 


Non-Formulary Medication (Levalbuterol Nebulized)  1.25 mg INHALATION RT-Q6H PRN


   PRN Reason: Shortness Of Breath


Nystatin (Nystatin 100,000 Unit/Ml Susp 500,000 Unit/5 Ml Cup)  500,000 unit PO 

QID [facility]


   Last Admin: 12/29/21 13:35 Dose:  500,000 unit


   Documented by: 


Pantoprazole Sodium (Pantoprazole 40 Mg Tablet)  40 mg PO DAILY [facility]


   Last Admin: 12/29/21 09:06 Dose:  40 mg


   Documented by: 


Sertraline HCl (Sertraline 100 Mg Tab)  100 mg PO DAILY [facility]


   Last Admin: 12/29/21 09:06 Dose:  100 mg


   Documented by: 


Tiotropium Bromide (Tiotropium 2.5 Mcg Inhaler)  2 puff INHALATION RT-DAILY [facility]


   Last Admin: 12/29/21 10:00 Dose:  2 puff


   Documented by: 


Zinc Sulfate (Zinc Sulfate 220 Mg Cap)  220 mg PO DAILY [facility]


   Last Admin: 12/29/21 09:06 Dose:  220 mg


   Documented by: 








Physical Exam:








GENERAL: The patient is alert and oriented , on 10L high flow. Well developed, 

well nourished. Temp is 98.4F, pulse is 75, resp are 17, blood pressure is 

123/79, 02 saturation is 89% on 10L HF


HEENT: Pupils are round and equally reacting to light. EOMI. No scleral icterus.

No conjunctival pallor. Normocephalic, atraumatic. No pharyngeal erythema. No 

thyromegaly. 


CARDIOVASCULAR: S1 and S2 muffled


PULMONARY: diminished breath sounds bilaterally with  coarse bilateral rhonchi 

noted


ABDOMEN: Soft, nontender, nondistended, normoactive bowel sounds. No palpable 

organomegaly. 


MUSCULOSKELETAL: No joint swelling or deformity. 


EXTREMITIES: No cyanosis, clubbing, or pedal edema. 


NEUROLOGICAL: Gross neurological examination did not reveal any focal deficits. 


SKIN: No rashes. no petechiae.





Assessment: 





-acute hypoxic respiratory failure: secondary to COVID-19 pneumonia 


-chronic hypercapnic respiratory failure secondary to COPD uses 3 L of oxygen at

home


-Acute kidney injury


-Diarrhea most likely secondary to Covid, resolved


-Oral thrush secondary to steroids


-Hypernatremia, improving


-Severe hypomagnesemia, improved


-Elevated d-dimer secondary to Covid and patient is on Lovenox which will be 

continued, Lovenox is currently twice daily


-Acute renal failure, prerenal


-Hypertension


-Mild anion gap and metabolic acidosis probability of lactic acidosis, most 

likely secondary to increased amounts of diarrhea and dehydration, improved


-gastroesophageal reflux disease


-Hyperlipidemia


-Hypertension


-History of pulmonary fibrosis


-Sleep apnea uses CPAP machine at home


-Peripheral vascular disease


-Coronary artery disease


-DVT prophylaxis: On Lovenox which will be continued


-GI prophylaxis


-full code without mechanical ventilation











Plan: 





This is a pleasant 66 years old female who presents with bilateral covid 

pneumonia and is being followed closely by pulmonary and ID. Patient continues 

on 10L HF and weaning as tolerated. Recommend to continue with dexamethasone BID

along with lovenox bid and vitamin and zinc supplements. Will repeat am labs and

chest xray and continue to monitor closely. Due to multiple complex medical 

issues, prognosis is guarded. Recommend to continue to wean FI02 as tolerated. 

Encourage incentive spirometer and oral intake with increased activity as 

tolerated. 








Objective





- Vital Signs


Vital signs: 


                                   Vital Signs











Temp  98.0 F   12/29/21 01:44


 


Pulse  82   12/29/21 01:44


 


Resp  22   12/28/21 19:38


 


BP  164/80   12/29/21 01:44


 


Pulse Ox  85 L  12/29/21 01:44








                                 Intake & Output











 12/28/21 12/29/21 12/29/21





 18:59 06:59 18:59


 


Intake Total 450  


 


Output Total 300  


 


Balance 150  


 


Intake:   


 


  Oral 450  


 


Output:   


 


  Urine 300  


 


Other:   


 


  Voiding Method Bedside Commode Bedside Commode 


 


  # Voids 2  


 


  # Bowel Movements 1  














- Labs


CBC & Chem 7: 


                                 12/29/21 06:29





                                 12/29/21 06:29


Labs: 


                  Abnormal Lab Results - Last 24 Hours (Table)











  12/29/21 12/29/21 12/29/21 Range/Units





  06:29 06:29 06:29 


 


RDW  15.9 H    (11.5-15.5)  %


 


D-Dimer   1.50 H   (<0.60)  mg/L FEU


 


Glucose    128 H  (74-99)  mg/dL


 


Magnesium    1.5 L  (1.6-2.3)  mg/dL


 


Lactate Dehydrogenase    779 H  (313-618)  U/L


 


C-Reactive Protein    4.3 H  (<1.0)  mg/dL


 


Total Protein    5.6 L  (6.3-8.2)  g/dL


 


Albumin    3.2 L  (3.5-5.0)  g/dL

## 2021-12-30 LAB
ANION GAP SERPL CALC-SCNC: 10 MMOL/L
BASOPHILS # BLD AUTO: 0 K/UL (ref 0–0.2)
BASOPHILS NFR BLD AUTO: 0 %
BUN SERPL-SCNC: 18 MG/DL (ref 7–17)
CALCIUM SPEC-MCNC: 9.2 MG/DL (ref 8.4–10.2)
CHLORIDE SERPL-SCNC: 104 MMOL/L (ref 98–107)
CO2 SERPL-SCNC: 24 MMOL/L (ref 22–30)
EOSINOPHIL # BLD AUTO: 0.2 K/UL (ref 0–0.7)
EOSINOPHIL NFR BLD AUTO: 1 %
ERYTHROCYTE [DISTWIDTH] IN BLOOD BY AUTOMATED COUNT: 4.41 M/UL (ref 3.8–5.4)
ERYTHROCYTE [DISTWIDTH] IN BLOOD: 15.6 % (ref 11.5–15.5)
GLUCOSE BLD-MCNC: 224 MG/DL (ref 75–99)
GLUCOSE BLD-MCNC: 259 MG/DL (ref 75–99)
GLUCOSE SERPL-MCNC: 205 MG/DL (ref 74–99)
HCT VFR BLD AUTO: 38.1 % (ref 34–46)
HGB BLD-MCNC: 12.5 GM/DL (ref 11.4–16)
LDH SPEC-CCNC: 1015 U/L (ref 313–618)
LYMPHOCYTES # SPEC AUTO: 0.8 K/UL (ref 1–4.8)
LYMPHOCYTES NFR SPEC AUTO: 6 %
MCH RBC QN AUTO: 28.3 PG (ref 25–35)
MCHC RBC AUTO-ENTMCNC: 32.7 G/DL (ref 31–37)
MCV RBC AUTO: 86.5 FL (ref 80–100)
MONOCYTES # BLD AUTO: 0.5 K/UL (ref 0–1)
MONOCYTES NFR BLD AUTO: 4 %
NEUTROPHILS # BLD AUTO: 12.1 K/UL (ref 1.3–7.7)
NEUTROPHILS NFR BLD AUTO: 89 %
PLATELET # BLD AUTO: 203 K/UL (ref 150–450)
POTASSIUM SERPL-SCNC: 4 MMOL/L (ref 3.5–5.1)
SODIUM SERPL-SCNC: 138 MMOL/L (ref 137–145)
WBC # BLD AUTO: 13.7 K/UL (ref 3.8–10.6)

## 2021-12-30 RX ADMIN — OXYCODONE HYDROCHLORIDE AND ACETAMINOPHEN SCH MG: 500 TABLET ORAL at 08:40

## 2021-12-30 RX ADMIN — Medication SCH MCG: at 08:40

## 2021-12-30 RX ADMIN — METHYLPREDNISOLONE SODIUM SUCCINATE SCH MG: 125 INJECTION, POWDER, FOR SOLUTION INTRAMUSCULAR; INTRAVENOUS at 23:27

## 2021-12-30 RX ADMIN — NYSTATIN SCH UNIT: 100000 SUSPENSION ORAL at 08:40

## 2021-12-30 RX ADMIN — PIPERACILLIN AND TAZOBACTAM SCH MLS/HR: 3; .375 INJECTION, POWDER, FOR SOLUTION INTRAVENOUS at 17:49

## 2021-12-30 RX ADMIN — MAGNESIUM SULFATE IN DEXTROSE SCH MLS/HR: 10 INJECTION, SOLUTION INTRAVENOUS at 03:56

## 2021-12-30 RX ADMIN — SERTRALINE HYDROCHLORIDE SCH MG: 100 TABLET ORAL at 08:40

## 2021-12-30 RX ADMIN — CLOPIDOGREL BISULFATE SCH MG: 75 TABLET ORAL at 08:40

## 2021-12-30 RX ADMIN — INSULIN ASPART SCH UNIT: 100 INJECTION, SOLUTION INTRAVENOUS; SUBCUTANEOUS at 17:49

## 2021-12-30 RX ADMIN — NYSTATIN SCH UNIT: 100000 SUSPENSION ORAL at 13:08

## 2021-12-30 RX ADMIN — ENOXAPARIN SODIUM SCH MG: 40 INJECTION SUBCUTANEOUS at 08:42

## 2021-12-30 RX ADMIN — METHYLPREDNISOLONE SODIUM SUCCINATE SCH MG: 125 INJECTION, POWDER, FOR SOLUTION INTRAMUSCULAR; INTRAVENOUS at 13:08

## 2021-12-30 RX ADMIN — ENOXAPARIN SODIUM SCH MG: 40 INJECTION SUBCUTANEOUS at 20:56

## 2021-12-30 RX ADMIN — OXYCODONE HYDROCHLORIDE AND ACETAMINOPHEN SCH MG: 500 TABLET ORAL at 20:56

## 2021-12-30 RX ADMIN — NYSTATIN SCH UNIT: 100000 SUSPENSION ORAL at 17:49

## 2021-12-30 RX ADMIN — TIOTROPIUM BROMIDE INHALATION SPRAY SCH PUFF: 3.12 SPRAY, METERED RESPIRATORY (INHALATION) at 08:10

## 2021-12-30 RX ADMIN — METHYLPREDNISOLONE SODIUM SUCCINATE SCH MG: 125 INJECTION, POWDER, FOR SOLUTION INTRAMUSCULAR; INTRAVENOUS at 17:49

## 2021-12-30 RX ADMIN — MAGNESIUM SULFATE IN DEXTROSE SCH: 10 INJECTION, SOLUTION INTRAVENOUS at 08:41

## 2021-12-30 RX ADMIN — LORATADINE SCH MG: 10 TABLET ORAL at 08:40

## 2021-12-30 RX ADMIN — NYSTATIN SCH UNIT: 100000 SUSPENSION ORAL at 20:56

## 2021-12-30 RX ADMIN — DEXTROSE SCH MG: 50 INJECTION, SOLUTION INTRAVENOUS at 08:39

## 2021-12-30 RX ADMIN — METOPROLOL SUCCINATE SCH MG: 25 TABLET, EXTENDED RELEASE ORAL at 08:40

## 2021-12-30 RX ADMIN — Medication SCH MG: at 08:40

## 2021-12-30 RX ADMIN — MAGNESIUM SULFATE IN DEXTROSE SCH MLS/HR: 10 INJECTION, SOLUTION INTRAVENOUS at 00:03

## 2021-12-30 RX ADMIN — PANTOPRAZOLE SODIUM SCH MG: 40 TABLET, DELAYED RELEASE ORAL at 08:40

## 2021-12-30 RX ADMIN — ATORVASTATIN CALCIUM SCH MG: 80 TABLET, FILM COATED ORAL at 08:40

## 2021-12-30 RX ADMIN — INSULIN ASPART SCH UNIT: 100 INJECTION, SOLUTION INTRAVENOUS; SUBCUTANEOUS at 21:07

## 2021-12-30 NOTE — P.PN
Subjective


Progress Note Date: 12/30/21


Principal diagnosis: 





COVID-19 pneumonia





This is a pleasant 66-year-old female patient who follows at the [facility] for her

primary care needs.  She has history of hypertension, hyperlipidemia, 

gastroesophageal reflux disease, anxiety/depression, aortic thrombectomy in 

2007, former smoker, COPD, obstructive sleep apnea utilizing CPAP.  He is here 

yesterday to the emergency room with a four-week history of shortness of breath,

cough, congestion, body aches and fevers.  She was tested negative for CoVID 2.

 Her  is positive for CoVID and she is now positive for CoVID.  She is 

not vaccinated.  She does have home oxygen use at 2-3 L.  She is currently 

requiring 10 L high flow nasal cannula to maintain O2 saturations at 90%.  Chest

x-ray reveals bilateral patchy infiltrates. White count 5.9.  Hemoglobin 12.5.  

Platelets 398.  Lymphocytes 0.5.  D-dimer 1.89.  Sodium 140.  Potassium 4.9.  

Creatinine 1.2.  AST 39.  ALT 26.  Alk phos 156.  LDH 1283.  C-reactive protein 

21.7.  Pro-calcitonin 0.32.  She's been initiated on Decadron, Lovenox, vitamin 

supplements.  0.9% normal saline at 75 ML's per hour.





On 12/08/2021 patient is in follow-up on medical surgical floor, she is resting 

comfortably in bed, she states she is feeling better, although she is requiring 

more oxygen compared to when she first presented to the emergency department, 

note that patient does wear home oxygen at 2 L for history of COPD.  She is 

currently on 10 L of oxygen, her pulse ox is 91-92%, breathing comfortably, lung

sounds reveal coarse crackles at bilateral bases, but no wheezing, no rhonchi.  

CT angiogram of the chest showed no evidence of pulmonary embolism, it did show 

pulmonary emphysema and pulmonary interstitial fibrosis with increased 

interstitial infiltrates compared to her old exam.  There was no evidence of a 

suspicious pulmonary mass.  Patient was outside the window for Remdesivir, she 

continues on Decadron 6 mg twice daily, her pro-calcitonin level on admission wa

s 0.32, and patient was covered with Rocephin for possibility of underlying 

bacterial infection.  She is on prophylactic Lovenox 40 mg daily.  CTA [place] 

Ye of the chest showed no evidence of pulmonary embolism. 0





On 12/09/2001 patient seen in follow-up on medical surgical floor, she is 

currently on 10 L of oxygen the pulse ox of 86%, FiO2 has since been increased 

to 15 L, she has a mild cough, but overall seems to be in no acute distress, she

is sitting up in the chair, denies any chest discomfort, looks quite 

comfortable.  Lung sounds reveal coarse crackles bilateral bases, she does get s

hort of breath with exertion.  She states if she sits still she is breathing 

comfortably, and her O2 saturations are at or above 90%.  She's had no fever or 

chills overnight, she remains on Decadron 40 mg twice daily.  She is on empiric 

antibiotics in the form of Rocephin and IV fluids at 75 ML per hour.  Today's 

labs have been reviewed, white blood cell count is 8.6, hemoglobin is 12, 

platelet count is 506, d-dimer is 4.01, sodium is 147, potassium is 4.8, 

chloride is 112, BUN of 20 creatinine 0.7, renal profile has significantly 

improved since admission.  LDH has significantly improved since admission and is

down to 348 on today's labs, CRP is still pending, her last pro-calcitonin is 

negative at 0.13.  Blood culture has shown no growth thus far.





On 12/10/2021 patient seen in follow-up on medical surgical floor, she had been 

on 15 L high flow and 100% nonrebreather mask up until this morning, this 

morning patient's FiO2 requirements had increased, patient was getting up in the

chair, she is significantly desaturated, she was placed on irritable at 60 L and

FiO2 of 90% in addition to 100% nonrebreather, and patient is taking quite a 

while to recover her O2 saturations although her work of breathing is not 

increased.  She is still awake and alert, she is oriented 3, she is able to 

make her needs known, her pulse ox is slowly recovering at rest, with deep 

breathing, pursed lip breathing, repositioning.  Overnight she has had no fever 

or chills, blood pressures have been stable.  Breathing fairly comfortably 

although she desaturates with any little exertion.  Currently she is on Decadron

6 Twice daily, she will be started on Baricitinib, patient is also on Plavix, 

and prophylactic Lovenox.  Today's chest x-ray and labs are still pending, she 

remains on D5W at a rate of 100 ML per hour for dehydration related to diarrhea 

and hypernatremia, repeat electrolytes and renal profile are still pending for 

today.  No abdominal pain, no nausea.  The diarrhea has significantly improved 

in the last 48 hours.  Patient is tolerating oral intake, no abdominal pain. 





The patient is seen today 12/11/2021 in follow-up on the regular medical floor. 

She is currently sitting up in a chair at the bedside.  She is now on BiPAP 12/6

and 100% FiO2 with O2 saturations in the mid to upper 80s.  She remains alert.  

No further diarrhea.  No abdominal pain.  The cultures revealed no growth.  D-

dimer 3.98.  She remains on Baricitinib, Decadron, vitamin supplements.  She is 

continued on bronchodilators.








The patient is seen today 12/14/2021 in follow-up on the regular medical floor. 

She is currently sitting up in a chair at the bedside.  She remains on AirVo 

high flow oxygen at 60 L and 85% FiO2.  O2 saturations 84-89%.  Occasionally 

she'll put on a nonrebreather mask.  She's afebrile.  Hemodynamically stable.  

Blood cultures revealed no growth.  C. difficile screen is negative.  She is 

continued on Decadron, Lovenox, vitamin supplements.  She remains on 

Baricitinib.  Continued on bronchodilators. 








The patient is seen today 12/18/2021 in follow-up on the regular medical floor. 

She is currently sitting up in a chair at bedside.  Awake and alert in no acute 

distress.  She is getting better.  Slow to progress.  She continues on the AirVo

high flow oxygen at 60 L and 80% FiO2.  She is having issues with thrush.  

Continued on Baricitinib, Decadron, Lovenox and vitamin supplements.  Continued 

on bronchodilators.  She is on Diflucan.











The patient is seen today 12/25/2021 in follow-up on the regular medical floor. 

She is awake and alert in no acute distress.  Up in a chair at the bedside.  

Still requiring AirVo high flow oxygen at 45 L and 45% with O2 saturation 91%.  

No worsening shortness of breath, cough or congestion.  She is afebrile.  

Hemodynamically stable.  Last d-dimer 0.60.  .  C-reactive protein less 

than 0.3.  She is continued on Decadron, Lovenox, vitamin supplements.











The patient is seen today 12/29/2021 in follow-up on the regular medical floor. 

She is currently resting flat in bed.  Awake and alert in no acute distress.  

She is on 10 L high flow nasal cannula with O2 saturations between 85 and 93%.  

She has been up in the chair frequently.  She is up ambulating in her room.  

White count 9.1.  Hemoglobin 12.6.  D-dimer 1.5.  Sodium 141 potassium 3.7.  

Creatinine 0.78.  .  C-reactive protein 4.3.  She is continued on 

Decadron, Lovenox, vitamin supplements.  Remains on bronchodilators.





Patient is seen today 12/30/2021 in follow-up on the regular medical floor.  She

is currently resting comfortably in bed.  Awake and alert in no acute distress. 

Continued O2 saturations in the 80s on AirVo high flow oxygen at 60 L some 70% 

FiO2 along with 100% nonrebreather mask.  She's been afebrile.  Hemodynamically 

stable.  She did have a rapid response team called on her early this morning 

possibly 1 AM for sudden increase in oxygen requirements.  She is placed on high

flow nasal cannula at that time.  She was adamant about being a DO NOT 

INTUBATE/DO NOT RESUSCITATE CODE STATUS.  She refused the BiPAP.  She did benefi

t from Ativan and relaxed and is doing better this morning.  Another CT 

angiogram was performed and pulmonary emboli was ruled out.  There was evidence 

of COPD with moderate emphysema and scattered bullous changes.  Diffuse 

interstitial infiltrates have worsened compared to 12/06/2021.  White count 

13.7.  Hemoglobin 12.5.  Lymphocytes 0.8.  Sodium 138.  Potassium 4.0.  

Creatinine 0.70.  LDH 1015.  C-reactive protein 6.6.  Glucose 205.  She is 

continued on Lovenox, IV Solu-Medrol, vitamin supplements.  Maintained on 

Xopenex and Spiriva.





Objective





- Vital Signs


Vital signs: 


                                   Vital Signs











Temp  98.2 F   12/30/21 14:00


 


Pulse  80   12/30/21 14:00


 


Resp  16   12/30/21 14:00


 


BP  118/71   12/30/21 14:00


 


Pulse Ox  84 L  12/30/21 14:00








                                 Intake & Output











 12/29/21 12/30/21 12/30/21





 18:59 06:59 18:59


 


Intake Total 536  236


 


Balance 536  236


 


Intake:   


 


  Oral 536  236


 


Other:   


 


  Voiding Method Bedside Commode Bedside Commode Bedside Commode


 


  # Voids 2 1 


 


  # Bowel Movements 1  














- Exam





GENERAL EXAM: Alert, very pleasant, 66-year-old female, on AirVo high flow nasal

cannula at 60 L and 70% FiO2 is a nonrebreather mask, currently comfortable, no 

acute distress.


HEAD: Normocephalic/atraumatic.


EYES: Normal reaction of pupils, equal size.  Conjunctiva pink, sclera white.


NOSE: Clear with pink turbinates.


THROAT: No erythema or exudates.


NECK: No masses, no JVD, no thyroid enlargement, no adenopathy.


CHEST: No chest wall deformity.  Symmetrical expansion. 


LUNGS: Equal air entry with with coarse bilateral crackles


CVS: Regular rate and rhythm, normal S1 and S2, no gallops, no murmurs, no rubs


ABDOMEN: Soft, nontender.  No hepatosplenomegaly, normal bowel sounds, no 

guarding or rigidity.


EXTREMITIES: No clubbing, no edema, no cyanosis, 2+ pulses and upper and lower 

extremities.


MUSCULOSKELETAL: Muscle strength and tone normal.


SPINE: No scoliosis or deformity


SKIN: No rashes


CENTRAL NERVOUS SYSTEM: No focal deficits, tone is normal in all 4 extremities.


PSYCHIATRIC: Alert and oriented -3.  Appropriate affect.  Intact judgment and 

insight.





- Labs


CBC & Chem 7: 


                                 12/30/21 05:58





                                 12/30/21 05:58


Labs: 


                  Abnormal Lab Results - Last 24 Hours (Table)











  12/30/21 12/30/21 12/30/21 Range/Units





  05:58 05:58 05:58 


 


WBC  13.7 H    (3.8-10.6)  k/uL


 


RDW  15.6 H    (11.5-15.5)  %


 


Neutrophils #  12.1 H    (1.3-7.7)  k/uL


 


Lymphocytes #  0.8 L    (1.0-4.8)  k/uL


 


BUN   18 H   (7-17)  mg/dL


 


Glucose   205 H   (74-99)  mg/dL


 


POC Glucose (mg/dL)     (75-99)  mg/dL


 


Lactate Dehydrogenase    1015 H  (313-618)  U/L


 


C-Reactive Protein    6.6 H  (<1.0)  mg/dL














  12/30/21 Range/Units





  16:35 


 


WBC   (3.8-10.6)  k/uL


 


RDW   (11.5-15.5)  %


 


Neutrophils #   (1.3-7.7)  k/uL


 


Lymphocytes #   (1.0-4.8)  k/uL


 


BUN   (7-17)  mg/dL


 


Glucose   (74-99)  mg/dL


 


POC Glucose (mg/dL)  259 H  (75-99)  mg/dL


 


Lactate Dehydrogenase   (313-618)  U/L


 


C-Reactive Protein   (<1.0)  mg/dL














Assessment and Plan


Assessment: 





1 Acute on chronic hypoxic respiratory failure secondary to COVID-19 pneumonia. 

Not vaccinated.  Completed Baricitinib on 12/23/2021. Currently on AirVo 60 L 

and 70% FiO2 plus a nonrebreather mask.  Follow-up CT angiogram ruled out 

pulmonary embolism today.  She does have worsening bilateral infiltrates.  

Significant COPD/emphysema and bullous changes.





2 Elevated inflammatory markers secondary to above





3 No evidence of acute infection, antibiotics discontinued and started on 

Baricitinib 





4 Obesity





5 Obstructive sleep apnea, on CPAP





6 Hypertension





7 Hyperlipidemia





8 Anxiety/depression





9 History of coronary disease with previous stent placement





10 History of aortic thrombectomy/aortobifem bypass in 2007





11 Multiple medication ALLERGIES





12 Former smoker.  





Plan:





Patient was seen and evaluated


Computed tomography scan reveals worsening bilateral infiltrates


Currently on AirVo high flow oxygen at 60 L and 70% FiO2 +100% nonrebreather 

mask


Completed Baricitinib


Continue Decadron, Lovenox, vitamin supplements


Continue bronchodilators


Titrate the FiO2 as tolerated


Prognosis guarded


She is a DO NOT RESUSCITATE/DO NOT INTUBATE CODE STATUS


We'll continue to follow 





I, the cosigning physician, performed a history & physical examination of the 

patient. Lungs sounds with basilar coarse crackles, diminished.  Maintaining O2 

saturations in the 90s on AirVo high flow oxygen at 60 L and 70% FiO2 plus a 

nonrebreather mask.  I discussed the assessment and plan of care with my nurse 

practitioner, Velia Short. I attest to the above note as dictated by her.

## 2021-12-30 NOTE — XR
EXAMINATION TYPE: XR chest 1V portable

 

DATE OF EXAM: 12/30/2021

 

COMPARISON: 12/27/2021

 

HISTORY: Short of breath

 

TECHNIQUE:

 

FINDINGS: There is extensive coarse interstitial infiltrate throughout the lungs. There is neurostimu
lator over the mid thoracic spine. Heart size is normal. Pulmonary vascularity is difficult to evalua
te because of the extensive lung disease. Bony thorax is intact.

 

IMPRESSION: Extensive pulmonary interstitial infiltrates. No significant change compared to recent ex
am. Interstitial pneumonia is significantly increased compared to 11/30/2021 exam.

## 2021-12-30 NOTE — CT
EXAMINATION TYPE: CT chest angio for PE

 

DATE OF EXAM: 12/30/2021

 

COMPARISON: 12/6/2021

 

HISTORY: 66-year-old female SOB

 

TECHNIQUE: Contiguous axial scanning of the chest performed with IV Contrast, patient injected with 8
8 mL of Isovue 370. Coronal/sagittal MIP reconstructions performed.

 

CT DLP: 436.3 mGycm

Automated exposure control for dose reduction was used.

 

FINDINGS: 

Heart upper limits of normal in size without pericardial effusion. No reflux of contrast into the hep
atic veins. Atherosclerotic calcifications at the origin of the RCA and mild within the LAD.

 

There is satisfactory opacification of the pulmonary arterial system the limitations due to breathing
 motion. No large central or definite lobar pulmonary embolus is seen. Most of the segmental and more
 distal arterial branches are very limited and nondiagnostic due to the degree of breathing.

 

Prominent right paratracheal lymph node measuring 9 mm.

 

Right hilar lymph node measures 1.5 cm and is probably reactive but repeat should be reassessed at fo
llow-up.

 

Moderate emphysema with bullous changes in the upper lungs. Continued diffuse interstitial opacities.
 Changes have worsened as compared to 12/6/2021. No pleural effusion.

 

Visualized upper abdomen likely with a fatty infiltration of the liver.

 

Bones: Pectus excavatum. Spinal stimulator array along the lower third thoracic spinal canal. Dish mi
d to lower thoracic spine.

 

 

 

IMPRESSION: 

 

1. SATISFACTORY CONTRAST BOLUS BUT WITH BREATHING MOTION DEGRADING THE EVALUATION. NO LARGE CENTRAL O
R DEFINITE LOBAR BRANCH PULMONARY BLISTER. MOST OF THE SEGMENTAL AND MORE DISTAL ARTERIAL BRANCHES AR
E NONDIAGNOSTIC AND EMBOLI IN THESE LOCATIONS CANNOT BE ADEQUATELY EXCLUDED ON THE BASIS OF THIS EXAM
.

2. COPD WITH MODERATE EMPHYSEMA AND SCATTERED BULLOUS CHANGES. DIFFUSE INTERSTITIAL INFILTRATES HAVE 
WORSENED COMPARED TO 12/6/2021. CORRELATE FOR WORSENING: PNEUMONIA.

3. A 6 - 12 MONTH FOLLOW-UP CT CHEST IS RECOMMENDED TO REASSESS A 1.5 CM RIGHT HILAR LYMPH NODE, PROB
ABLY REACTIVE.

## 2021-12-30 NOTE — PN
PROGRESS NOTE



DATE OF SERVICE:

12/30/2021



This 66-year-old woman who was admitted with acute bilateral interstitial 
pneumonia and

COVID pneumonia as well as acute hypoxic respiratory failure is being closely 
monitored

at this time.  The patient had a chest CTA which showed extensive bilateral 
pneumonia

indicative of COVID pneumonia and no evidence of any large central or definite

pulmonary embolism, reported as nondiagnostic. COPD was also noted. The patient

is currently on 10 L.  There is no history of any fever, rigors or chills. No 
history

of headache, loss of consciousness, seizures. Past medical history reviewed.



REVIEW OF SYSTEMS:

CARDIOVASCULAR SYSTEM: No angina.

RESPIRATION: As mentioned earlier.

GI: As mentioned earlier.

: No dysuria.

NERVOUS SYSTEM: No numbness, weakness.

PULMONARY: As mentioned earlier.



CURRENT MEDICATIONS:

Tylenol, vitamin C, Lipitor, Wellbutrin, vitamin D3, Plavix, Lovenox. Doses and 
other

medications reviewed.



PHYSICAL EXAMINATION:

Patient alert and oriented x3.  Pulse 80, blood pressure is 116/70, respirations
16,

temperature 98.2, pulse ox 84% on 15 L and non-rebreather.

HEENT: Conjunctivae normal.

NECK: No jugular venous distention.

CARDIOVASCULAR: S1, S2 muffled.

RESPIRATION: Breath sounds diminished at the bases.  A few scattered rhonchi and

crackles.

ABDOMEN: Soft.

NERVOUS SYSTEM: No focal deficit.



LAB STUDIES:

WBC 13.7.  D-dimer is 1.56.  Other labs are noted.



ASSESSMENT:

1. Acute COVID-19 infection with acute bilateral interstitial pneumonia with 
acute

    hypoxic respiratory failure.

2. Chronic hypercapnic respiratory failure secondary to chronic obstructive 
pulmonary

    disease.

3. Chronic obstructive pulmonary disease, acute exacerbation.

4. Acute kidney injury.

5. Elevated D-dimer without any evidence of pulmonary embolism.

6. Diarrhea, most likely secondary to COVID-19.

7. Oral thrush.

8. Hyponatremia.

9. Severe hypomagnesemia.

10.Elevated D-dimer secondary to COVID-19 with no evidence of pulmonary 
embolism.

11.Acute renal failure.

12.Hypertension.

13.Mild anion-gap metabolic acidosis.

14.Gastroesophageal reflux disease.

15.Hypertension.

16.Hyperlipidemia.

17.History of pulmonary fibrosis.

18.History of sleep apnea.

19.History of peripheral vascular disease.

20.History of coronary artery disease.

21.Deep vein thrombosis prophylaxis.



RECOMMENDATIONS AND DISCUSSION:

I recommend to continue current medications, continue with the monitoring, 
symptomatic

treatment.  Continue with Lovenox. Solu-Medrol per Dr. Fishman.  Will monitor 
the

blood sugars closely. Repeat labs.  Continue the rest of the medications. 
Prognosis

guarded because of multiple complex medical issues. Further recommendations to 
follow.

Continue with the bronchodilators.





MMJOAO / MICKEYN: [id number] / Job#: [id number]

MTDD

## 2021-12-30 NOTE — PN
PROGRESS NOTE



DATE OF SERVICE:

12/30/2021



REASON FOR FOLLOWUP:

1. Covid 19 pneumonia.

2. Thrush.



INTERVAL HISTORY:

The patient did have significant worsening of respiratory status last night.  The

patient ended up requiring BiPAP.  The patient is currently lethargic, sleepy, not able

to  provide any history.  Hemodynamically stable, not requiring pressor support.  No

vomiting or diarrhea or any other changes reported by the nursing staff.



PHYSICAL EXAMINATION:

Blood pressure 118/71 with a pulse of 80, temperature 98.2.  She is 84% on _____

nonrebreather. General description is an elderly female lying in bed, mild distress

with some tachypnea, but no accessory muscles of respiration use.

Heart S1, S2.  Regular rate and rhythm.  Abdomen:  Soft. No tenderness.



LABS:

Hemoglobin is 12.8, white count 15.7, creatinine 0.70.  The patient did have a CT

angiogram of the chest did not show any large PE, COPD with moderate emphysema and

_____ changes, possibly worsening pneumonia.



DIAGNOSTIC IMPRESSION AND PLAN:

Patient with acute respiratory failure in this patient with admission diagnosis of

Covid 19 pneumonia, has received _____therapy on Lovenox and Solu-Medrol with no other

worsening Covid respiratory status.  The patient does have multiple antibiotic

allergies.  We will obtain cultures and empirically on Zosyn and _____ response.

Overall prognosis remains to be guarded.





MMODL / IJN: [id number] / Job#: [id number]

## 2021-12-30 NOTE — P.EN
A team note





sudden increase in oxygen requirement , patient has been here for about a month 

, for COVID pneumonia 





oxygen sat dropped to mid 60s , she is switched back to  high flow oxygen and 

non rebreather . she seems to be anxious and possible component of hypoxemia , 

however she is adamant about not using bipap. patient is a DNI. 





emotional support offered, patient given ativan . 





we updated the  with patient wishes. patient would like to be left alone 

with no heroic measures, refusing intubation, bipap, and CPR if needed. she 

understands the consequences of prolonged hypoxemia 





with above measures, her oxygen sat started improving slowly to mid 80s. 





patient will be offered morphine. to help her relax, listed allergy is non 

significant and will be monitored. 





patient  requested that pulmonary team be notified and asked to come 

evaluated patient. RN will notify pulmonary about family wishes. 





CXR reviewed looks like increase pulmonary interstitial infilterates , on exam 

increase rales at lung bases . patient offered one time dose of lasix 20 mg 





continue with supportive care and monitor vital signs 





continue care at the current medical floor

## 2021-12-31 VITALS — TEMPERATURE: 98 F | DIASTOLIC BLOOD PRESSURE: 70 MMHG | RESPIRATION RATE: 18 BRPM | SYSTOLIC BLOOD PRESSURE: 144 MMHG

## 2021-12-31 VITALS — HEART RATE: 80 BPM

## 2021-12-31 LAB
ANION GAP SERPL CALC-SCNC: 15.8 MMOL/L (ref 10–18)
BASOPHILS # BLD MANUAL: 0 X 10*3/UL (ref 0–0.1)
BUN SERPL-SCNC: 27.2 MG/DL (ref 9–27)
BUN/CREAT SERPL: 30.22 RATIO (ref 12–20)
CALCIUM SPEC-MCNC: 9.2 MG/DL (ref 8.7–10.3)
CHLORIDE SERPL-SCNC: 103 MMOL/L (ref 96–109)
CO2 SERPL-SCNC: 21.2 MMOL/L (ref 20–27.5)
EOSINOPHIL # BLD MANUAL: 0 X 10*3/UL (ref 0.04–0.35)
ERYTHROCYTE [DISTWIDTH] IN BLOOD BY AUTOMATED COUNT: 4.46 X 10*6/UL (ref 4.1–5.2)
ERYTHROCYTE [DISTWIDTH] IN BLOOD: 16.7 % (ref 11.5–14.5)
GLUCOSE BLD-MCNC: 239 MG/DL (ref 75–99)
GLUCOSE BLD-MCNC: 423 MG/DL (ref 75–99)
GLUCOSE SERPL-MCNC: 238 MG/DL (ref 70–110)
HCT VFR BLD AUTO: 37.5 % (ref 37.2–46.3)
HGB BLD-MCNC: 11.8 G/DL (ref 12–15)
LYMPHOCYTES # BLD MANUAL: 0.57 X 10*3/UL (ref 0.9–5)
MCH RBC QN AUTO: 26.5 PG (ref 27–32)
MCHC RBC AUTO-ENTMCNC: 31.5 G/DL (ref 32–37)
MCV RBC AUTO: 84.1 FL (ref 80–97)
MONOCYTES # BLD MANUAL: 0.57 X 10*3/UL (ref 0.2–1)
NEUTROPHILS NFR BLD MANUAL: 91 %
NEUTS SEG # BLD MANUAL: 12.98 X 10*3/UL (ref 2–8.9)
PLATELET # BLD AUTO: 198 X 10*3/UL (ref 140–440)
POTASSIUM SERPL-SCNC: 3.8 MMOL/L (ref 3.5–5.5)
SODIUM SERPL-SCNC: 140 MMOL/L (ref 135–145)
WBC # BLD AUTO: 14.26 X 10*3/UL (ref 4.5–10)

## 2021-12-31 RX ADMIN — INSULIN ASPART SCH UNIT: 100 INJECTION, SOLUTION INTRAVENOUS; SUBCUTANEOUS at 07:30

## 2021-12-31 RX ADMIN — INSULIN ASPART SCH: 100 INJECTION, SOLUTION INTRAVENOUS; SUBCUTANEOUS at 08:56

## 2021-12-31 RX ADMIN — Medication SCH MG: at 08:39

## 2021-12-31 RX ADMIN — METHYLPREDNISOLONE SODIUM SUCCINATE SCH MG: 125 INJECTION, POWDER, FOR SOLUTION INTRAMUSCULAR; INTRAVENOUS at 05:55

## 2021-12-31 RX ADMIN — ATORVASTATIN CALCIUM SCH MG: 80 TABLET, FILM COATED ORAL at 08:39

## 2021-12-31 RX ADMIN — PIPERACILLIN AND TAZOBACTAM SCH MLS/HR: 3; .375 INJECTION, POWDER, FOR SOLUTION INTRAVENOUS at 08:38

## 2021-12-31 RX ADMIN — INSULIN ASPART SCH UNIT: 100 INJECTION, SOLUTION INTRAVENOUS; SUBCUTANEOUS at 11:50

## 2021-12-31 RX ADMIN — ENOXAPARIN SODIUM SCH MG: 40 INJECTION SUBCUTANEOUS at 08:38

## 2021-12-31 RX ADMIN — METHYLPREDNISOLONE SODIUM SUCCINATE SCH: 125 INJECTION, POWDER, FOR SOLUTION INTRAMUSCULAR; INTRAVENOUS at 08:39

## 2021-12-31 RX ADMIN — OXYCODONE HYDROCHLORIDE AND ACETAMINOPHEN SCH MG: 500 TABLET ORAL at 08:39

## 2021-12-31 RX ADMIN — METOPROLOL SUCCINATE SCH MG: 25 TABLET, EXTENDED RELEASE ORAL at 08:39

## 2021-12-31 RX ADMIN — SERTRALINE HYDROCHLORIDE SCH MG: 100 TABLET ORAL at 08:39

## 2021-12-31 RX ADMIN — CLOPIDOGREL BISULFATE SCH MG: 75 TABLET ORAL at 08:39

## 2021-12-31 RX ADMIN — NYSTATIN SCH: 100000 SUSPENSION ORAL at 12:55

## 2021-12-31 RX ADMIN — INSULIN ASPART SCH UNIT: 100 INJECTION, SOLUTION INTRAVENOUS; SUBCUTANEOUS at 11:51

## 2021-12-31 RX ADMIN — INSULIN ASPART SCH UNIT: 100 INJECTION, SOLUTION INTRAVENOUS; SUBCUTANEOUS at 08:38

## 2021-12-31 RX ADMIN — ENOXAPARIN SODIUM SCH MG: 40 INJECTION SUBCUTANEOUS at 11:54

## 2021-12-31 RX ADMIN — INSULIN ASPART SCH UNIT: 100 INJECTION, SOLUTION INTRAVENOUS; SUBCUTANEOUS at 11:56

## 2021-12-31 RX ADMIN — PIPERACILLIN AND TAZOBACTAM SCH MLS/HR: 3; .375 INJECTION, POWDER, FOR SOLUTION INTRAVENOUS at 01:54

## 2021-12-31 RX ADMIN — NYSTATIN SCH UNIT: 100000 SUSPENSION ORAL at 08:41

## 2021-12-31 RX ADMIN — PANTOPRAZOLE SODIUM SCH MG: 40 TABLET, DELAYED RELEASE ORAL at 08:39

## 2021-12-31 RX ADMIN — TIOTROPIUM BROMIDE INHALATION SPRAY SCH PUFF: 3.12 SPRAY, METERED RESPIRATORY (INHALATION) at 09:32

## 2021-12-31 RX ADMIN — Medication SCH MCG: at 08:39

## 2021-12-31 RX ADMIN — LORATADINE SCH MG: 10 TABLET ORAL at 08:39

## 2021-12-31 NOTE — P.DS
Providers


Date of admission: 


21 18:50





Expected date of discharge: 21


Attending physician: 


Emma Dixon





Consults: 





                                        





21 18:51


Consult Physician Routine 


   Consulting Provider: Genaro Penny


   Consult Reason/Comments: COVID 19 pneumonia, acute on chronic hypoxic 

respiratory failure


   Do you want consulting provider notified?: Yes





21 18:52


Consult Physician Routine 


   Consulting Provider: Jenni Dueñas


   Consult Reason/Comments: Covid 19 Pneumonia, acute on chronic hypoxic 

respiratory failure


   Do you want consulting provider notified?: Yes











Primary care physician: 


[facility]





Hospital Course: 





Preliminary cause of death





Acute covid 19 infection with acute bilateral interstitial pneumonia with acute 

hypoxic respiratory failure





Final Diagnosis





-Acute covid 19 infection with acute bilateral interstitial pneumonia with acute

hypoxic respiratory failure


-chronic hypercapnic respiratory failure secondary to COPD uses 3 L of oxygen at

home


-COPD, acute exacerbation


-Acute kidney injury


-Diarrhea most likely secondary to Covid, resolved


-Oral thrush secondary to steroids


-Hypernatremia, improving


-Severe hypomagnesemia, improved


-Elevated d-dimer secondary to Covid without evidence of PE


-Acute renal failure, prerenal


-Hypertension


-Mild anion gap and metabolic acidosis


-gastroesophageal reflux disease


-Hyperlipidemia


-Hypertension


-History of pulmonary fibrosis


-Sleep apnea uses CPAP machine at home


-Peripheral vascular disease


-Coronary artery disease


-DVT prophylaxis: On Lovenox which will be continued


-GI prophylaxis


-no code











Discharge disposition


Patient has  . According to nursing documentation, time of death was 

1623. Please refer to previous documentation for further HPI. 





Hospital course





This is a 66 year old female who was admitted with Covid 19 infection with 

bilateral interstitial pneumonia and was being closely monitored. Patient has 

had prolonged hospitalization requiring high flow oxygen support with Airvo and 

Non-rebreather and maxed out and maintained 02 saturations of low 70's-80's and 

continued to be in respiratory distress. Family was at the bedside today and had

decided on comfort measures as patient continued to refuse bipap and intubation.

Patient was placed on Morphine and ativan and ultimately  at 1623. 








Patient Condition at Discharge: Poor





Plan - Discharge Summary


Discharge Rx Participant: No


New Discharge Prescriptions: 


No Action


   Sertraline [Zoloft] 100 mg PO DAILY


   Pantoprazole Sodium [Protonix] 40 mg PO DAILY


   Cetirizine HCl [Zyrtec] 10 mg PO DAILY


   Nitroglycerin Sl Tabs [Nitrostat] 0.4 mg SUBLINGUAL Q5M PRN #25 tab


     PRN Reason: Chest Pain


   Clopidogrel [Plavix] 75 mg PO DAILY #30 tab


   Rosuvastatin Calcium [Crestor] 40 mg PO DAILY


   Metoprolol Succinate [Toprol XL] 25 mg PO DAILY


   Levalbuterol Nebulized [Xopenex Nebulized] 1.25 mg INHALATION RT-Q6H PRN


     PRN Reason: Shortness Of Breath


   Tiotropium 2.5 Mcg/Puff [Spiriva Respimat 2.5 Mcg] 2 puff INHALATION RT-DAILY


   buPROPion [Wellbutrin] 100 mg PO DAILY


   Valsartan 160 mg PO BID


   Levalbuterol Hfa Inhaler [Xopenex Hfa Inhaler] 2 puff INHALATION RT-Q6H PRN


     PRN Reason: Shortness Of Breath


Discharge Medication List





Cetirizine HCl [Zyrtec] 10 mg PO DAILY 19 [History]


Pantoprazole Sodium [Protonix] 40 mg PO DAILY 19 [History]


Sertraline [Zoloft] 100 mg PO DAILY 19 [History]


Clopidogrel [Plavix] 75 mg PO DAILY #30 tab 19 [Rx]


Nitroglycerin Sl Tabs [Nitrostat] 0.4 mg SUBLINGUAL Q5M PRN #25 tab 19 

[Rx]


Metoprolol Succinate [Toprol XL] 25 mg PO DAILY 21 [History]


Rosuvastatin Calcium [Crestor] 40 mg PO DAILY 21 [History]


Valsartan 160 mg PO BID 21 [History]


buPROPion [Wellbutrin] 100 mg PO DAILY 21 [History]


Levalbuterol Hfa Inhaler [Xopenex Hfa Inhaler] 2 puff INHALATION RT-Q6H PRN 

21 [History]


Levalbuterol Nebulized [Xopenex Nebulized] 1.25 mg INHALATION RT-Q6H PRN 

21 [History]


Tiotropium 2.5 Mcg/Puff [Spiriva Respimat 2.5 Mcg] 2 puff INHALATION RT-DAILY 

21 [History]








Follow up Appointment(s)/Referral(s): 


[facility] [Primary Care Provider] - 1-2 days


Discharge Disposition: 





- Preliminary Cause of Death


Preliminary Cause of Death:  covid 19 infection with acute bilateral 

interstitial pneumonia

## 2021-12-31 NOTE — P.PN
Subjective


Progress Note Date: 12/31/21











Patient is a 66-year-old the female with past medical history of COPD and 3 L 

oxygen dependent at home has cold symptoms for about a month comes in with 

shortness of breath presently on telemetry dysfunction CT of the chest did not 

show any pulmonary embolism but did show pulmonary fibrosis as well as i

nfiltrates.  Below.  Patient doesn't have any infiltrate consistent with 

pneumonia patient denied any dysuria.  Patient is presently on Rocephin.  

Patient has mild acute renal failure because of which I'm holding ACE inhibitor.

 Severely hypomagnesemic magnesium is being replaced.  Patient last smoked about

17 years ago.





12/08/2021





Patient is seen and evaluated in follow-up and currently maintained on 10 L high

flow and weaning as tolerated.  Patient was on 15 L high flow nasal cannula this

morning and tolerating and nursing staff continuing to wean.  Patient normally 

wears 3 L of oxygen continuously in the outpatient setting.  Patient denies any 

worsening shortness of breath just generalized fatigue and weakness and 

continued diarrhea.  Patient states she is going approximately 2-3 times per day

will add C. diff testing and if negative will add Imodium and Questran.  

Pulmonary is following and venous Doppler was ordered bilateral lower 

extremities which is negative for DVT as d-dimer is elevated today at 2.42.  

Inflammatory markers significantly improved his LDH is 338 and CRP is 7.0, 

repeat magnesium after replacement is 2.9. 





12/09/2021





Patient is seen and evaluated in follow-up this morning currently sitting up in 

the chair and was on 10 L high flow although oxygen saturations were below 90% 

and patient bumped back up to 15 L.  Patient is complaining of the oxygen being 

too high and making her feel more anxious and discussed with nursing staff about

continuing to wean as tolerated.  She continues with diarrhea although is 

improving and C. diff testing was negative and patient was started on Questran 

along with Imodium.  Patient continues to state she has no real appetite 

although encourage the patient to continue with small frequent meals and will 

add Magic cups as well.  Encourage the patient to increase oral intake as she is

weak and needs the energy.  She normally maintained on 3 L via nasal cannula and

will continue to titrate as tolerated.  Pulmonary is following patient is 

maintained on IV dexamethasone twice daily along with vitamin and zinc supplem

ents, Lovenox twice daily for elevated d-dimer and will continue.  Patient was 

on normal saline and considered discontinuing as patient sodium is now 147 and 

patient is being started on D5 in water and will repeat labs.





12/10/2021


Patient is seen in follow-up this morning currently sitting up in the chair and 

oxygen requirements have increased and patient is now maintained on Airvo along 

with 15 L nonrebreather and oxygen saturations have been in the low 80s to 90 

maximum.  Discussed CODE STATUS with the patient and patient is a full code but 

does not wish to be placed on a ventilator if respiratory status continues to 

decline.  Pulmonary following closely and prognosis remains quite guarded.  

Patient continues on Lovenox along with dexamethasone and vitamin and zinc 

supplements and is being started on Baricitinib.  Patient is extremely weak and 

becomes extremely dyspneic with minimal exertion.  Patient's oral intake 

continues to be extremely poor and again encouraged and stressed the importance 

of eating regardless of not feeling hungry and having no appetite.  May consider

enteral nutrition and dietary consult.  Patient states her diarrhea has 

significantly improved and will use Imodium as needed and discontinue Questran. 

Repeat inflammatory markers ordered and pending.  Chest x-ray today shows 

bilateral airspace disease again noted with prominence and interstitial, and fin

dings consistent with pneumonia.





12/11/2021


Patient admitted with bilateral common pneumonia , Her respiratory status today 

is a slightly worse as she is dependent on BiPAP throughout the day.  Also she 

has decreased air entry on both sides


She is saturating 88% while she is on BiPAP.


D-dimer is the same, 4.0 and 3.9.


She continued on dexamethasone, vitamin C, vitamin D, zinc,  barcitininb and Lo

venox 40 mg twice a day.  Also she is on Protonix





12/12/2021


Sitting in chair looks mildly tachypneic and tolerates BiPAP which she uses his 

yesterday all the time.  She has not been eating since yesterday and may switch 

her for short time to nonrebreather and air or so she can read some oral diet   

Per pulmonary team recommendation.  Other than that she is hemodynamically 

stable.  Labs are stable.Calcitonin 0.19.  C. diff is negative.


She remains on dexamethasone, vitamin C, Z and Lovenox, Protonix and  barcitinib







12/13/2021


This morning she was still on BiPAP, and she was refusing to start TPN as she 

could not come off the BiPAP.  However later on during the day she could be 

placed on nonrebreather 15 L and Aravo 60 L with 94% so she can tolerate oral 

feeding.


Other vitals are stable, afebrile.  Labs are stable as well.


She continued on dexamethasone, vitamin C, vitamin D, zinc,  barcitininb and 

Lovenox 40 mg twice a day





12/14/2021


Patient with bilateral: With pneumonia and hypoxia, she is improving gradually 

today, today she couldn't get off her BiPAP and placed on airvo 60 L with FiO2 

of 85%, with occasional nonrebreather 15 L, patient was happy that she was able 

to eat today and she tolerates diet well.  She has some diarrhea but no 

abdominal pain.  C. diff is negative.


Other than that she is hemodynamically stable


Patient continued today on  dexamethasone, vitamin C, Z and Lovenox, Protonix 

and  barcitinib 


Follow-up chest x-ray and CBC/BMP in the morning





12/15/2021


Patient today is pleasant and smiling as her pulmonary illness is improving 

progressively but slowly, but she still currently on high flow nasal cannula 

with 55 L/m and FiO2 of 88% saturation in low 90s.  Rest of vitals are stable.  

Labs including CBC, BMP are unremarkable.


C. diff test came back negative and her diarrhea stopped today.  She is able to 

eat about 50-25% of her meals and she is satisfied with that for now.


She still on steroids with dexamethasone, vitamin C, vitamin D, zinc, Lovenox 40

mg twice a day, Protonix and barcitininb 





12/16/2021


Patient today her breathing slightly worsened and her oxygen requirement today 

was 55 L with 90%, she still can go a little difficulty due to her dyspnea and 

she confirmed me she is able to eat with no issues.


She sitting in chair today.  Hemodynamically stable.


Labs reviewed and shows stability, her d-dimer actually improved 3.9 down to 

1.6.  LDH went up to 1029 and CRP 3.4.


Chest x-ray shows slight improvement in aeration in both lung fields despite 

bilateral pneumonia.


She remains on dexamethasone, vitamin C, D and zinc and  Protonix and 

barcitininb .





12/17/2021


Patient breathing pattern is slowly to improve, today she still on high flow 

nasal cannula 55 L/m and FiO2 of 90%.


Labs including CBC and BMP are unremarkable and stable.  On the top of the 

patient is afebrile


However patient is complaining of from oral thrush and asked for fluconazole.  

Patient refused nystatin.  Also she has persistent loose bowel movement, C. diff

checked twice and there were negative.  We'll start her on short course of 

Questran.  Also patient states she is eating well and tolerates diet with no 

difficulty.


Continue with the other same medication of dexamethasone, multiple vitamin 

cocktail and Lovenox, Protonix and barcitininb 





12/18/2021


Her respiratory status with minimal change and fluctuating, currently she is on 

airvo at 55 L/m and 85%.  She is able to eat and drink.


She states that her most thrush is better and his diarrhea is improving after 

adding nystatin and Questran yesterday.


Afebrile and vitals are stable.


She remains on the same treatment of dexamethasone, vitamin C, D and zinc and  

Protonix and barcitininb .





12/19/2021 


patient breathing is improving as per patient stated that she is breathing 

easier.  She remains on same dose of oxygen as yesterday she still on 15 L/m at 

88% FiO2.


Also reports improvement in her bowel movements, she said that she almost had 

more formed bowel movement and her oral thrush is also improving with 

fluconazole.


And we will continue with the same treatment for her colon pneumonia with 

dexamethasone, vitamins, Barcitininb, Lovenox and Pepcid





12/20/2021


Patient is with bilateral common pneumonia on high dose of records oxygen since 

admission.  Today is awake and alert looks more comfortable and each day she 

states she is breathing easier.  Also she is happy with the progress of her 

bowel movement, she had about 4 months which is almost formed over the last 2 

days.  No more nausea vomiting but she feels some sore throat.  She is eating 

well.  However despite her reported clinical improvement by the patient she 

still on same dose of high need of oxygen and currently 55% and 70-90 L/m.


She remains on the same treatment of dexamethasone, vitamin C, DM zinc, 

,baricitinib.  Also she is on fluconazole, nystatin, Robitussin, Lovenox , and 

patient is not on Questran





12/21/2021


Patient clinically slowly to improve, he states she feels better but however her

oxygen requirements very close to yesterday at 50 L/m and 75% high flow nasal 

cannula.


Her GI symptoms or improvement and she tolerates diet well.


She is fluconazole currently while continue the same treatment of dexamethasone,

vitamin cocktail for Covid and baricitinib. .  Nystatin and Robitussin and 

Lovenox





12/22/2021


Patient respiratory status not improving or worsening much compared to 

yesterday.  She still breathing easily although she requires high flow nasal 

cannula at 50 L/m and FiO2 of 75%.  No GI symptoms, no significant sore throat 

which is responding to nystatin.  Patient tolerates diet well.


We'll keep the same medication and keep monitoring the patient for now.


Her creatinine trending up over the last 2-3 days, today is 0.08 went up to 1.2.

 We held her Diovan which is a home medication.  And we'll check creatinine 

tomorrow and bladder scan.


Repeat labs in the morning.  Follow-up chest x-ray





12/23/2021


 patient still improved slowly and gradually and her oxygen requirement today is

slightly down to 50 L/m and FiO2 of 68%


Other than that she is a pleasant subjective she feels improved and she breathes

easily, no diarrhea or vomiting.  Tolerates diet well.  Occasional sore throat.


Labs looks stable with WBCs 11.8.  D-dimer is down to 1.1.  LDH slightly up 734 

and CRP up to 6.2.


Continue with same treatment dexamethasone, vitamin cocktail for Covid and 

baricitinib.  Also Lovenox and Protonix





12/24/2021


Patient continued to report improvement in her breathing pattern however she 

still on high flow nasal cannula 50 liter per minute with FiO2 of 45%


Inflammatory markers improvement, d-dimer down to 0.6, LDH down from 734 and 2-

90 and CRP is normal less than 0.3.


Patient finished her dose of baricitinib breath continue with the rest of 

treatment including dexamethasone, vitamin C, D and zinc.  Lovenox Protonix





12/25/2021


Patient breathing easily, eats well.  Her oxygen requirement down to 45 L and 

45-50 FiO2.


We will continue with the same and treatment of steroids, vitamins, Lovenox and 

Protonix.  She finished her dose of baricitnib





10/26/2021 


patient continued to improve slowly and gradually and steadily.  Today her 

oxygen requirements significantly lowered to telemetry later per minute via high

flow nasal cannula.  Patient herself feels improvement each day little by 

little.


Hemodynamically stable.  No labs.


Continue with steroids and vitamins.  No GI symptoms but she wants to keep 

nystatin records status with sterile she will get fungal infection on the thrush








12/27/2021


Patient admitted with bilateral Covid pneumonia, she slowly improved but pr

ogressively as well.


Today just x-ray showing bilateral reticulonodular infiltrate more progressive 

by radiologist.  However patient clinically is improving and actually today she 

is down to 10 L/m via high flow nasal cannula, patient was happy because today 

for the first time she is off  her airvo .


She remains on dexamethasone, vitamin C, D and zinc.  Nystatin, Lovenox 40 mg 

twice a day and Protonix





12/28/2021





Patient is seen this morning in follow up and continues on HF at 10 liters with 

pulmonary and infectious disease following. Patient continues on IV 

dexamethasone and lovenox along with vitamins and zinc and will continue. 

Recommend to continue to wean FI02 as tolerated. Patient continues to be weak 

and will have PT evaluate the patient. Patient has refused rehab and would like 

to return home with family. Will repeat am labs and continue to monitor closely.

Patient denies any worsening shortness of breath, chest pains, or palpitations. 

Patient is afebrile. No reports of nausea or vomiting. 





12/29/2021





Patient is seen and evaluated and follow-up continues on 10 L high flow with 

oxygen saturation of 88-90% with pulmonary following closely.  Patient also 

continues on IV dexamethasone along with Lovenox and vitamin and zinc 

supplements and will continue.  Patient is stating that she is feeling slightly 

improved and is currently maintained off of Airvo and on HF NC. Recommend to 

continue weaning as tolerated. Patient states that she has been eating well and 

continues to increase activity as tolerated. Patient continues to be extremely 

dyspneic with exertion and conversation. Prognosis remains guarded. Patient to 

continue with vitamin and zinc supplements and IV dexamethasone and lovenox BID.

Magnesium low at 1.5 and will replace per protocol and repeat am labs and chest 

xray. Pulmonary following closely. Inflammatory markers elevated although 

trending down.





Labs:





WBC is 9.1, hemoglobin is 12.6, platelets are 203 home d-dimer is 1.50, sodium 

is 141, potassium 3.7, BUN is 17, creatinine 0.78, magnesium is 1.5, LDH is 779,

CRP is 4.3











12/31/2021





Patient is seen in follow up this morning and having severe dyspnea and 

respiratory distress while maintained on max Airvo and 15L Non-rebreather. 

 is at the bedside. Patient is extremely anxious and restless and 

requesting to be left alone. Oxygen saturations are low 70's. Discussed bipap 

and patient refused. Pulmonary following closely and had a detailed discussion 

with patient and  and they would like to proceed with comfort measures. 

Patient to be started with morphine and ativan. Prognosis is extremely guarded. 


Physical Exam:








GENERAL: The patient is alert and oriented x3. anxious and restless, on 15L non-

rebreather and Airvo maxed, in respiratory distress. Well developed, well 

nourished.  02 saturation is 70% 


HEENT: Pupils are round and equally reacting to light. EOMI. No scleral icterus.

No conjunctival pallor. Normocephalic, atraumatic. No pharyngeal erythema. No 

thyromegaly. 


CARDIOVASCULAR: S1 and S2 muffled


PULMONARY: diminished breath sounds bilaterally with  coarse bilateral rhonchi 

noted


ABDOMEN: Soft, nontender, nondistended, normoactive bowel sounds. No palpable 

organomegaly. 


MUSCULOSKELETAL: No joint swelling or deformity. 


EXTREMITIES: No cyanosis, clubbing, or pedal edema. 


NEUROLOGICAL: Gross neurological examination did not reveal any focal deficits. 


SKIN: No rashes. no petechiae.





Assessment: 





-Acute covid 19 infection with acute bilateral interstitial pneumonia with acute

hypoxic respiratory failure


-chronic hypercapnic respiratory failure secondary to COPD uses 3 L of oxygen at

home


-COPD, acute exacerbation


-Acute kidney injury


-Diarrhea most likely secondary to Covid, resolved


-Oral thrush secondary to steroids


-Hypernatremia, improving


-Severe hypomagnesemia, improved


-Elevated d-dimer secondary to Covid without evidence of PE


-Acute renal failure, prerenal


-Hypertension


-Mild anion gap and metabolic acidosis


-gastroesophageal reflux disease


-Hyperlipidemia


-Hypertension


-History of pulmonary fibrosis


-Sleep apnea uses CPAP machine at home


-Peripheral vascular disease


-Coronary artery disease


-DVT prophylaxis: On Lovenox which will be continued


-GI prophylaxis


-full code without mechanical ventilation











Plan: 





Recommend to initiate comfort measures per the request of the patient and 

spouse. Pulmonary following and at bedside and detailed discussion was had of 

overall poor and guarded prognosis. Patient refusing bipap and does not wish to 

be intubated. Oxygen saturations in the 70-80's and extremely restless and 

dyspneic. Will continue to monitor closely and again overall prognosis is 

extremely poor and guarded. 








Objective





- Vital Signs


Vital signs: 


                                   Vital Signs











Temp  98.0 F   12/31/21 10:08


 


Pulse  80   12/31/21 10:47


 


Resp  18   12/31/21 10:47


 


BP  144/70   12/31/21 10:08


 


Pulse Ox  80 L  12/31/21 12:46








                                 Intake & Output











 12/30/21 12/31/21 12/31/21





 18:59 06:59 18:59


 


Intake Total 236 100 


 


Output Total 300  


 


Balance -64 100 


 


Intake:   


 


  Oral 236 100 


 


Output:   


 


  Urine 300  


 


Other:   


 


  Voiding Method Bedside Commode Bedside Commode Bedside Commode














- Labs


CBC & Chem 7: 


                                 12/31/21 07:37





                                 12/31/21 07:37


Labs: 


                  Abnormal Lab Results - Last 24 Hours (Table)











  12/30/21 12/30/21 12/30/21 Range/Units





  16:35 18:20 18:20 


 


WBC     (4.50-10.00)  X 10*3/uL


 


Hgb     (12.0-15.0)  g/dL


 


MCH     (27.0-32.0)  pg


 


MCHC     (32.0-37.0)  g/dL


 


RDW     (11.5-14.5)  %


 


Absolute Nucleated RBC     (0.00-0.00)  X 10*3/uL


 


Myelocytes %     (0-0)  %


 


Neutrophils # (Manual)     (2.00-8.90)  X 10*3/uL


 


Lymphocytes # (Manual)     (0.90-5.00)  X 10*3/uL


 


Eosinophils # (Manual)     (0.04-0.35)  X 10*3/uL


 


NRBC/100 WBC Diff     (0.0-0.0)  /100 WBCS


 


BUN     (9.0-27.0)  mg/dL


 


BUN/Creatinine Ratio     (12.00-20.00)  Ratio


 


Glucose     ()  mg/dL


 


POC Glucose (mg/dL)  259 H    (75-99)  mg/dL


 


C-Reactive Protein   5.7 H   (<1.0)  mg/dL


 


Procalcitonin    0.11 H  (0.02-0.09)  ng/mL














  12/30/21 12/31/21 12/31/21 Range/Units





  20:47 07:23 07:37 


 


WBC    14.26 H  (4.50-10.00)  X 10*3/uL


 


Hgb    11.8 L  (12.0-15.0)  g/dL


 


MCH    26.5 L  (27.0-32.0)  pg


 


MCHC    31.5 L  (32.0-37.0)  g/dL


 


RDW    16.7 H  (11.5-14.5)  %


 


Absolute Nucleated RBC    0.02 H  (0.00-0.00)  X 10*3/uL


 


Myelocytes %    1 H  (0-0)  %


 


Neutrophils # (Manual)    12.98 H  (2.00-8.90)  X 10*3/uL


 


Lymphocytes # (Manual)    0.57 L  (0.90-5.00)  X 10*3/uL


 


Eosinophils # (Manual)    0 L  (0.04-0.35)  X 10*3/uL


 


NRBC/100 WBC Diff    0.1 H  (0.0-0.0)  /100 WBCS


 


BUN     (9.0-27.0)  mg/dL


 


BUN/Creatinine Ratio     (12.00-20.00)  Ratio


 


Glucose     ()  mg/dL


 


POC Glucose (mg/dL)  224 H  239 H   (75-99)  mg/dL


 


C-Reactive Protein     (<1.0)  mg/dL


 


Procalcitonin     (0.02-0.09)  ng/mL














  12/31/21 12/31/21 Range/Units





  07:37 11:24 


 


WBC    (4.50-10.00)  X 10*3/uL


 


Hgb    (12.0-15.0)  g/dL


 


MCH    (27.0-32.0)  pg


 


MCHC    (32.0-37.0)  g/dL


 


RDW    (11.5-14.5)  %


 


Absolute Nucleated RBC    (0.00-0.00)  X 10*3/uL


 


Myelocytes %    (0-0)  %


 


Neutrophils # (Manual)    (2.00-8.90)  X 10*3/uL


 


Lymphocytes # (Manual)    (0.90-5.00)  X 10*3/uL


 


Eosinophils # (Manual)    (0.04-0.35)  X 10*3/uL


 


NRBC/100 WBC Diff    (0.0-0.0)  /100 WBCS


 


BUN  27.2 H   (9.0-27.0)  mg/dL


 


BUN/Creatinine Ratio  30.22 H   (12.00-20.00)  Ratio


 


Glucose  238 H   ()  mg/dL


 


POC Glucose (mg/dL)   423 H  (75-99)  mg/dL


 


C-Reactive Protein    (<1.0)  mg/dL


 


Procalcitonin    (0.02-0.09)  ng/mL

## 2021-12-31 NOTE — P.PN
Subjective


Progress Note Date: 12/31/21








This is a pleasant 66-year-old female patient who follows at the [facility] for her

primary care needs.  She has history of hypertension, hyperlipidemia, 

gastroesophageal reflux disease, anxiety/depression, aortic thrombectomy in 

2007, former smoker, COPD, obstructive sleep apnea utilizing CPAP.  He is here 

yesterday to the emergency room with a four-week history of shortness of breath,

cough, congestion, body aches and fevers.  She was tested negative for CoVID 2.

 Her  is positive for CoVID and she is now positive for CoVID.  She is 

not vaccinated.  She does have home oxygen use at 2-3 L.  She is currently 

requiring 10 L high flow nasal cannula to maintain O2 saturations at 90%.  Chest

x-ray reveals bilateral patchy infiltrates. White count 5.9.  Hemoglobin 12.5.  

Platelets 398.  Lymphocytes 0.5.  D-dimer 1.89.  Sodium 140.  Potassium 4.9.  

Creatinine 1.2.  AST 39.  ALT 26.  Alk phos 156.  LDH 1283.  C-reactive protein 

21.7.  Pro-calcitonin 0.32.  She's been initiated on Decadron, Lovenox, vitamin 

supplements.  0.9% normal saline at 75 ML's per hour.





On 12/08/2021 patient is in follow-up on medical surgical floor, she is resting 

comfortably in bed, she states she is feeling better, although she is requiring 

more oxygen compared to when she first presented to the emergency department, 

note that patient does wear home oxygen at 2 L for history of COPD.  She is 

currently on 10 L of oxygen, her pulse ox is 91-92%, breathing comfortably, lung

sounds reveal coarse crackles at bilateral bases, but no wheezing, no rhonchi.  

CT angiogram of the chest showed no evidence of pulmonary embolism, it did show 

pulmonary emphysema and pulmonary interstitial fibrosis with increased inte

rstitial infiltrates compared to her old exam.  There was no evidence of a 

suspicious pulmonary mass.  Patient was outside the window for Remdesivir, she 

continues on Decadron 6 mg twice daily, her pro-calcitonin level on admission 

was 0.32, and patient was covered with Rocephin for possibility of underlying 

bacterial infection.  She is on prophylactic Lovenox 40 mg daily.  CTA [place] 

Ye of the chest showed no evidence of pulmonary embolism. 0





On 12/09/2001 patient seen in follow-up on medical surgical floor, she is 

currently on 10 L of oxygen the pulse ox of 86%, FiO2 has since been increased 

to 15 L, she has a mild cough, but overall seems to be in no acute distress, she

is sitting up in the chair, denies any chest discomfort, looks quite 

comfortable.  Lung sounds reveal coarse crackles bilateral bases, she does get 

short of breath with exertion.  She states if she sits still she is breathing 

comfortably, and her O2 saturations are at or above 90%.  She's had no fever or 

chills overnight, she remains on Decadron 40 mg twice daily.  She is on empiric 

antibiotics in the form of Rocephin and IV fluids at 75 ML per hour.  Today's 

labs have been reviewed, white blood cell count is 8.6, hemoglobin is 12, 

platelet count is 506, d-dimer is 4.01, sodium is 147, potassium is 4.8, chlo

ride is 112, BUN of 20 creatinine 0.7, renal profile has significantly improved 

since admission.  LDH has significantly improved since admission and is down to 

348 on today's labs, CRP is still pending, her last pro-calcitonin is negative 

at 0.13.  Blood culture has shown no growth thus far.





On 12/10/2021 patient seen in follow-up on medical surgical floor, she had been 

on 15 L high flow and 100% nonrebreather mask up until this morning, this 

morning patient's FiO2 requirements had increased, patient was getting up in the

chair, she is significantly desaturated, she was placed on irritable at 60 L and

FiO2 of 90% in addition to 100% nonrebreather, and patient is taking quite a 

while to recover her O2 saturations although her work of breathing is not 

increased.  She is still awake and alert, she is oriented 3, she is able to 

make her needs known, her pulse ox is slowly recovering at rest, with deep 

breathing, pursed lip breathing, repositioning.  Overnight she has had no fever 

or chills, blood pressures have been stable.  Breathing fairly comfortably 

although she desaturates with any little exertion.  Currently she is on Decadron

6 Twice daily, she will be started on Baricitinib, patient is also on Plavix, 

and prophylactic Lovenox.  Today's chest x-ray and labs are still pending, she 

remains on D5W at a rate of 100 ML per hour for dehydration related to diarrhea 

and hypernatremia, repeat electrolytes and renal profile are still pending for 

today.  No abdominal pain, no nausea.  The diarrhea has significantly improved 

in the last 48 hours.  Patient is tolerating oral intake, no abdominal pain. 





The patient is seen today 12/11/2021 in follow-up on the regular medical floor. 

She is currently sitting up in a chair at the bedside.  She is now on BiPAP 12/6

and 100% FiO2 with O2 saturations in the mid to upper 80s.  She remains alert.  

No further diarrhea.  No abdominal pain.  The cultures revealed no growth.  D-

dimer 3.98.  She remains on Baricitinib, Decadron, vitamin supplements.  She is 

continued on bronchodilators.








The patient is seen today 12/14/2021 in follow-up on the regular medical floor. 

She is currently sitting up in a chair at the bedside.  She remains on AirVo 

high flow oxygen at 60 L and 85% FiO2.  O2 saturations 84-89%.  Occasionally 

she'll put on a nonrebreather mask.  She's afebrile.  Hemodynamically stable.  

Blood cultures revealed no growth.  C. difficile screen is negative.  She is 

continued on Decadron, Lovenox, vitamin supplements.  She remains on 

Baricitinib.  Continued on bronchodilators. 








The patient is seen today 12/18/2021 in follow-up on the regular medical floor. 

She is currently sitting up in a chair at bedside.  Awake and alert in no acute 

distress.  She is getting better.  Slow to progress.  She continues on the AirVo

high flow oxygen at 60 L and 80% FiO2.  She is having issues with thrush.  

Continued on Baricitinib, Decadron, Lovenox and vitamin supplements.  Continued 

on bronchodilators.  She is on Diflucan.











The patient is seen today 12/25/2021 in follow-up on the regular medical floor. 

She is awake and alert in no acute distress.  Up in a chair at the bedside.  

Still requiring AirVo high flow oxygen at 45 L and 45% with O2 saturation 91%.  

No worsening shortness of breath, cough or congestion.  She is afebrile.  

Hemodynamically stable.  Last d-dimer 0.60.  .  C-reactive protein less 

than 0.3.  She is continued on Decadron, Lovenox, vitamin supplements.











The patient is seen today 12/29/2021 in follow-up on the regular medical floor. 

She is currently resting flat in bed.  Awake and alert in no acute distress.  

She is on 10 L high flow nasal cannula with O2 saturations between 85 and 93%.  

She has been up in the chair frequently.  She is up ambulating in her room.  

White count 9.1.  Hemoglobin 12.6.  D-dimer 1.5.  Sodium 141 potassium 3.7.  

Creatinine 0.78.  .  C-reactive protein 4.3.  She is continued on 

Decadron, Lovenox, vitamin supplements.  Remains on bronchodilators.





Patient is seen today 12/30/2021 in follow-up on the regular medical floor.  She

is currently resting comfortably in bed.  Awake and alert in no acute distress. 

Continued O2 saturations in the 80s on AirVo high flow oxygen at 60 L some 70% 

FiO2 along with 100% nonrebreather mask.  She's been afebrile.  Hemodynamically 

stable.  She did have a rapid response team called on her early this morning 

possibly 1 AM for sudden increase in oxygen requirements.  She is placed on high

flow nasal cannula at that time.  She was adamant about being a DO NOT 

INTUBATE/DO NOT RESUSCITATE CODE STATUS.  She refused the BiPAP.  She did 

benefit from Ativan and relaxed and is doing better this morning.  Another CT 

angiogram was performed and pulmonary emboli was ruled out.  There was evidence 

of COPD with moderate emphysema and scattered bullous changes.  Diffuse 

interstitial infiltrates have worsened compared to 12/06/2021.  White count 

13.7.  Hemoglobin 12.5.  Lymphocytes 0.8.  Sodium 138.  Potassium 4.0.  

Creatinine 0.70.  LDH 1015.  C-reactive protein 6.6.  Glucose 205.  She is 

continued on Lovenox, IV Solu-Medrol, vitamin supplements.  Maintained on 

Xopenex and Spiriva.








12/31/2021, seeing the patient for a follow-up.  Fact the patient's condition 

progressively decompensating since earlier this morning.  The patient was found 

to be progressively more hypoxic.  She was on Airvo 60 L with an FiO2 of 70% and

she was on 100% nonrebreather facemask.  She was quite agitated.  She was unable

to keep the 100% nonrebreather facemask on her face and the patient was 

desaturating and the patient's pulse ox was dropping down in the low 80s and low

70s.  She also had pulse ox is at low as 60%.  I was informed of this changes.  

I already had a computed tomography scan of the chest on the patient yesterday 

that showed no evidence of any pulmonary embolism.  There was progression of her

code regulated pneumonia and respiratory decompensation was much expected.  We 

tried to put on a BiPAP.  The patient declined.  She was unable to tolerate the 

BiPAP.   was at the bedside.  Atelectatic discussion with him.  We noted 

that she already did not want any form of intubation mechanical ventilation and 

she.  A DNR/DNI CODE STATUS.  Based on that, I recommendations to proceed with 

comfort care measures.  The patient will be started on morphine drip and she'll 

be given morphine for comfort reasons.  She'll be kept on 100% nonrebreather 

facemask in addition to her Airvo at this point in time.  The morning Showed a 

white cell count of 14.2 with a hemoglobin of 11.8 and the sodium is at 140, 

normal electrolytes, normal renal function, her LDH level for a few days back 

was 1015 and the procal Level of 0.11.  





Objective





- Vital Signs


Vital signs: 


                                   Vital Signs











Temp  98.0 F   12/31/21 10:08


 


Pulse  80   12/31/21 10:47


 


Resp  18   12/31/21 10:47


 


BP  144/70   12/31/21 10:08


 


Pulse Ox  75 L  12/31/21 10:08








                                 Intake & Output











 12/30/21 12/31/21 12/31/21





 18:59 06:59 18:59


 


Intake Total 236 100 


 


Output Total 300  


 


Balance -64 100 


 


Intake:   


 


  Oral 236 100 


 


Output:   


 


  Urine 300  


 


Other:   


 


  Voiding Method Bedside Commode Bedside Commode Bedside Commode














- Exam








GENERAL EXAM: Alert, very pleasant, 66-year-old female, on AirVo high flow nasal

cannula at 60 L and 100 % FiO2 is a nonrebreather mask, currently the patient is

unresponsive.  The patient is quite hypoxic.  Her pulse ox was dropping in the 

low 50s and the patient is quite restless and is having significant shortness of

breath and her breathing is very much labored and she is agitated trying to 

reach out on her mask and pull it out.  She is in obvious respiratory failure at

this point in time using incentive muscle breathing.


HEAD: Normocephalic/atraumatic.


EYES: Normal reaction of pupils, equal size.  Conjunctiva pink, sclera white.


NOSE: Clear with pink turbinates.


THROAT: No erythema or exudates.


NECK: No masses, no JVD, no thyroid enlargement, no adenopathy.


CHEST: No chest wall deformity.  Symmetrical expansion. 


LUNGS: Equal air entry with with coarse bilateral crackles, the patient is using

excessive muscle breathing and she is in significant respiratory distress


CVS: Regular rate and rhythm, normal S1 and S2, no gallops, no murmurs, no rubs


ABDOMEN: Soft, nontender.  No hepatosplenomegaly, normal bowel sounds, no 

guarding or rigidity.


EXTREMITIES: No clubbing, no edema, no cyanosis, 2+ pulses and upper and lower 

extremities.


MUSCULOSKELETAL: Muscle strength and tone normal.


SPINE: No scoliosis or deformity


SKIN: No rashes


CENTRAL NERVOUS SYSTEM: No focal deficits, tone is normal in all 4 extremities.,

 Unresponsive and the patient is not following any commands


 





- Labs


CBC & Chem 7: 


                                 12/31/21 07:37





                                 12/31/21 07:37


Labs: 


                  Abnormal Lab Results - Last 24 Hours (Table)











  12/30/21 12/30/21 12/30/21 Range/Units





  16:35 18:20 18:20 


 


WBC     (4.50-10.00)  X 10*3/uL


 


Hgb     (12.0-15.0)  g/dL


 


MCH     (27.0-32.0)  pg


 


MCHC     (32.0-37.0)  g/dL


 


RDW     (11.5-14.5)  %


 


Absolute Nucleated RBC     (0.00-0.00)  X 10*3/uL


 


Myelocytes %     (0-0)  %


 


Neutrophils # (Manual)     (2.00-8.90)  X 10*3/uL


 


Lymphocytes # (Manual)     (0.90-5.00)  X 10*3/uL


 


Eosinophils # (Manual)     (0.04-0.35)  X 10*3/uL


 


NRBC/100 WBC Diff     (0.0-0.0)  /100 WBCS


 


BUN     (9.0-27.0)  mg/dL


 


BUN/Creatinine Ratio     (12.00-20.00)  Ratio


 


Glucose     ()  mg/dL


 


POC Glucose (mg/dL)  259 H    (75-99)  mg/dL


 


C-Reactive Protein   5.7 H   (<1.0)  mg/dL


 


Procalcitonin    0.11 H  (0.02-0.09)  ng/mL














  12/30/21 12/31/21 12/31/21 Range/Units





  20:47 07:23 07:37 


 


WBC    14.26 H  (4.50-10.00)  X 10*3/uL


 


Hgb    11.8 L  (12.0-15.0)  g/dL


 


MCH    26.5 L  (27.0-32.0)  pg


 


MCHC    31.5 L  (32.0-37.0)  g/dL


 


RDW    16.7 H  (11.5-14.5)  %


 


Absolute Nucleated RBC    0.02 H  (0.00-0.00)  X 10*3/uL


 


Myelocytes %    1 H  (0-0)  %


 


Neutrophils # (Manual)    12.98 H  (2.00-8.90)  X 10*3/uL


 


Lymphocytes # (Manual)    0.57 L  (0.90-5.00)  X 10*3/uL


 


Eosinophils # (Manual)    0 L  (0.04-0.35)  X 10*3/uL


 


NRBC/100 WBC Diff    0.1 H  (0.0-0.0)  /100 WBCS


 


BUN     (9.0-27.0)  mg/dL


 


BUN/Creatinine Ratio     (12.00-20.00)  Ratio


 


Glucose     ()  mg/dL


 


POC Glucose (mg/dL)  224 H  239 H   (75-99)  mg/dL


 


C-Reactive Protein     (<1.0)  mg/dL


 


Procalcitonin     (0.02-0.09)  ng/mL














  12/31/21 12/31/21 Range/Units





  07:37 11:24 


 


WBC    (4.50-10.00)  X 10*3/uL


 


Hgb    (12.0-15.0)  g/dL


 


MCH    (27.0-32.0)  pg


 


MCHC    (32.0-37.0)  g/dL


 


RDW    (11.5-14.5)  %


 


Absolute Nucleated RBC    (0.00-0.00)  X 10*3/uL


 


Myelocytes %    (0-0)  %


 


Neutrophils # (Manual)    (2.00-8.90)  X 10*3/uL


 


Lymphocytes # (Manual)    (0.90-5.00)  X 10*3/uL


 


Eosinophils # (Manual)    (0.04-0.35)  X 10*3/uL


 


NRBC/100 WBC Diff    (0.0-0.0)  /100 WBCS


 


BUN  27.2 H   (9.0-27.0)  mg/dL


 


BUN/Creatinine Ratio  30.22 H   (12.00-20.00)  Ratio


 


Glucose  238 H   ()  mg/dL


 


POC Glucose (mg/dL)   423 H  (75-99)  mg/dL


 


C-Reactive Protein    (<1.0)  mg/dL


 


Procalcitonin    (0.02-0.09)  ng/mL














Assessment and Plan


Plan: 








1 Acute on chronic hypoxic respiratory failure secondary to COVID-19 pneumonia. 

Not vaccinated.  Completed Baricitinib on 12/23/2021. Currently on AirVo 60 L 

and 70% FiO2 plus a 100 % nonrebreather mask.  Follow-up CT angiogram ruled out 

pulmonary embolism today.  She does have worsening bilateral infiltrates.  Sig

nificant COPD/emphysema and bullous changes.  Over the past 24 hours, the 

patient is a versus decompensated further and the patient is currently in 

significant amount of respiratory distress, agitated, hypoxic, tachypneic and 

she needs comfort care measures.  





2   Elevated inflammatory markers secondary to above





3 No evidence of acute infection, antibiotics discontinued and started on 

Baricitinib 





4 Obesity





5 Obstructive sleep apnea, on CPAP





6 Hypertension





7 Hyperlipidemia





8 Anxiety/depression





9 History of coronary disease with previous stent placement





10 History of aortic thrombectomy/aortobifem bypass in 2007





11 Multiple medication ALLERGIES





12 Former smoker.  





Plan:





Patient carries a DNR/DNI CODE STATUS.  I had a discussion with the  at 

bedside.  My recommendations was to this patient comfort measures.  I offered to

morphine 4 mg IV push immediately and following that the patient be started on 

morphine drip at 5 mg an hour and comfort care measures will be introduced on 

this patient and she will hopefully passed away in peace.  She is quite restless

at this point in time and she needs an immediate morphine drip.  Keep the 100% 

on a beta facemasks for now addition to the Airvo.  Further recommendations are 

to follow.  DNR/DNI CODE STATUS.  End of life care  is to follow

## 2022-12-14 NOTE — P.CONS
History of Present Illness





- Reason for Consult


Consult date: 21





- History of Present Illness


History of present illness : Patient is 66-year-old  female with a past

medical history significant for anxiety depression hypertension hyperlipidemia 

COPD presented to hospital for evaluation of increasing shortness of breath that

has been getting worse for the last 4 weeks patient also have a cough which is 

congested body aches and fever patient previously tested negative for Covid x2 

however that has been tested positive for Covid with the symptom the patient did

present to the hospital on arrival to the ER patient was afebrile and no fever 

has been recorded subsequently patient was noted to be hypoxic with O2 sats of 

86% and is currently on high flow nasal cannula oxygen patient did have a normal

white count with lymphopenia D-dimer was mildly elevated BUN/creatinine was 

mildly elevated there was also mild elevated procalcitonin 0.32 blood culture 

obtained which are currently pending patient did have a chest x-ray infiltration

of 1 appears increased compared to old exam patient did have a CT angiogram of 

the chest negative for PE shows pulmonary emphysema and infiltrate patient was 

admitted to hospital infectious disease was consulted for further management














Review of system:


 CONSTITUTIONAL:  Positive for weakness denies fever.


EYES:  No complaint.


ENT:  No complaint.


RESPIRATORY: As per history of present illness


 CARDIOVASCULAR:  No complaint.


GENITOURINARY:  No complaint.


GASTROINTESTINAL: Some nausea decreased oral intake no vomiting.


MUSCULOSKELETAL: No complaint.


INTEGUMENTARY: No complaint.


PSYCHOLOGIC: No complaint.


ENDOCRINE: No complaint.


NEUROLOGIC:  No complaint.








Past medical history : Reviewed, documented below


Past surgical history : Reviewed, documented below


Social history: Reviewed, documented below


Medications: Reviewed, as documented below








EXAMINATION:


Vital sigans=  Reviewed and documented below


GENERAL DESCRIPTION: Elderly female lying in bed, no distress. No tachypnea or 

accessory muscle of respiration use.


HEENT: Shows Pallor , no scleral icterus. Oral mucous membrane is dry.


NECK: Trachea central, no thyromegaly.


LUNGS: Unlabored breathing.  Coarse breath sounds bilaterally. No wheeze or 

crackle.


HEART: S1, S2, regular rate and rhythm.


ABDOMEN: Soft, no tenderness , guarding or rigidity


EXTREMITIES: No edema of feet.


SKIN: No rash, no masses palpable.


NEUROLOGICAL: The patient is awake, alert, oriented x3, mood and affect normal.





LABS AND RADIOLOGY: Reviewed results see below





Assessment : Patient presented to hospital with increasing shortness of breath 

and this patient symptom has going on for almost 4 weeks with evidence of 

multifocal pneumonia secondary to COVID-19 infection in this patient currently 

out of the therapeutic window for remdesivir the patient had mild elevated 

glucose which could be more likely related to COVID-19 infection clinical 

suspicion low for secondary bacterial pneumonia 











Plan: 1-patient be treated with Lovenox dexamethasone zinc ascorbic acid


2-droplet isolation and respiratory support


3-no need for systemic antibiotic therapy


We will follow on clinical condition and cultures to further adjust medication 

if needed


Thank you for this consultation we will follow the patient along with you








Past Medical History


Past Medical History: Coronary Artery Disease (CAD), Cancer, GERD/Reflux, 

Hyperlipidemia, Hypertension, Osteoarthritis (OA), Sleep Apnea/CPAP/BIPAP, 

Vascular Disorder


Additional Past Medical History / Comment(s): 2007 Aortic 

thrombectomy/aortofemoral bypass/pt in coma/trach x 6 weeks, R hip to R toes 

nerve damage after 6 weeks comatose, past chronic pain to R hip/leg but has pain

stimulator which has greatly helped, PAD, hx gastric ulcers, Nielsen's 

esophagus, small hiatal hernia, hemorrhoids, 5 cancerous colon polyps removed, 

pulmonary htn, tracheomalacia, bronchitis, home oxygen 2L/NC ATC, MIRELA with 

Cpap/O2 use, arthritis in feet, legs and hips, occasional numbness r leg.


History of Any Multi-Drug Resistant Organisms: None Reported


Past Surgical History: Appendectomy, Cholecystectomy, Heart Catheterization, 

Heart Catheterization With Stent, Hysterectomy, Orthopedic Surgery, 

Tonsillectomy


Additional Past Surgical History / Comment(s): abdominal aortograms/runoffs, 

aortobifemoral bypass, L fempop atherectomy with PTBA, fem/fem bypass, stent R 

leg, bilateral heel bone spurs/bilateral feet fusions to lower arches, bone 

spurs removed from inside bilateral feet,  R knee arthroscopic surgery,  n

eurostimulator implant,  neruostimulator paddle moved, 2019 stimulator 

replaced and relocation stimulator battery,  L common femoral endartectomy, 

EGDs, colonoscopies/cancerous polypectomies


Past Anesthesia/Blood Transfusion Reactions: No Reported Reaction


Date of Last Stent Placement:: 2019


Past Psychological History: Anxiety, Depression


Additional Psychological History / Comment(s): Pt resides with her spouse of 

47yrs.  She uses no assistive device but does own a cane and walker.  Home 

oxygen at 2L/NC and nebulizer. Occasionally drives.


Smoking Status: Former smoker


Past Alcohol Use History: None Reported


Additional Past Alcohol Use History / Comment(s): Pt started smoking in  and

quit in  SMOKED 1PPD


Past Drug Use History: None Reported





- Past Family History


  ** Mother


Family Medical History: No Reported History


Additional Family Medical History / Comment(s): Mother was an alcoholic and 

at the age of 36 yrs.





  ** Father


Family Medical History: Cancer


Additional Family Medical History / Comment(s): Father had jaw/throat cancer.  

He was a smoker.





Medications and Allergies


                                Home Medications











 Medication  Instructions  Recorded  Confirmed  Type


 


Cetirizine HCl [Zyrtec] 10 mg PO DAILY 19 History


 


Pantoprazole Sodium [Protonix] 40 mg PO DAILY 19 History


 


Sertraline [Zoloft] 100 mg PO DAILY 19 History


 


Clopidogrel [Plavix] 75 mg PO DAILY #30 tab 19 Rx


 


Nitroglycerin Sl Tabs [Nitrostat] 0.4 mg SUBLINGUAL Q5M PRN #25 tab 19 Rx


 


Metoprolol Succinate [Toprol XL] 25 mg PO DAILY 21 History


 


Rosuvastatin Calcium [Crestor] 40 mg PO DAILY 21 History


 


Valsartan 160 mg PO BID 21 History


 


buPROPion [Wellbutrin] 100 mg PO DAILY 21 History


 


Levalbuterol Hfa Inhaler [Xopenex 2 puff INHALATION RT-Q6H PRN 21

 History





Hfa Inhaler]    


 


Levalbuterol Nebulized [Xopenex 1.25 mg INHALATION RT-Q6H PRN 21 

History





Nebulized]    


 


Tiotropium 2.5 Mcg/Puff [Spiriva 2 puff INHALATION RT-DAILY 21 

History





Respimat 2.5 Mcg]    








                                    Allergies











Allergy/AdvReac Type Severity Reaction Status Date / Time


 


albuterol Allergy  Rash/Hives Verified 21 18:32


 


cortisone Allergy  Swelling/Pain Verified 21 18:32





   at inj site  


 


doxycycline Allergy  Swelling Verified 21 18:32


 


erythromycin base Allergy  Rash/Hives Verified 21 18:32


 


hydralazine Allergy  Rash/Hives Verified 21 18:32


 


Iodinated Contrast Media Allergy  Anaphylaxis Verified 21 18:32


 


iodine Allergy  Anaphylaxis Verified 21 18:32


 


lidocaine Allergy  Swelling Verified 21 18:32


 


lisinopril Allergy  Swelling Verified 21 18:32


 


modafinil Allergy  Unknown Verified 21 18:32


 


nystatin Allergy  Rash/Hives Verified 21 18:32


 


procaine [From Novocain] Allergy  Swelling Verified 21 18:32


 


vancomycin Allergy  Rash/Hives Verified 21 18:32


 


amlodipine AdvReac  Nausea & Verified 21 18:32





   Vomiting &  





   Diarrhea  


 


aspirin AdvReac  stomach Verified 21 18:32





   and  





   esophageal  





   ulcers  


 


atorvastatin [From Lipitor] AdvReac  MUSCLE PAIN Verified 21 18:32


 


cimetidine [From Tagamet] AdvReac  dizziness Verified 21 18:32


 


isosorbide AdvReac  Nausea & Verified 21 18:32





   Vomiting &  





   Diarrhea  


 


labetalol AdvReac  Nausea & Verified 21 18:32





   Vomiting &  





   Diarrhea  


 


metoprolol AdvReac  Diarrhea Verified 21 18:32


 


morphine AdvReac  "generic Verified 21 18:32





   morphine"  


 


oxcarbazepine AdvReac  hair loss Verified 21 18:32





[From Trileptal]     


 


paroxetine [From Paxil] AdvReac  tremors Verified 21 18:32


 


simvastatin [From Zocor] AdvReac  MUSCLE PAIN Verified 21 18:32














Physical Exam


Vitals: 


                                   Vital Signs











  Temp Pulse Pulse Resp BP BP Pulse Ox


 


 21 21:11  97.6 F   63  17   104/67  92 L


 


 21 19:47     17   


 


 21 18:49  97.5 F L   69  16   117/71  90 L


 


 21 17:08  97.8 F   69  22   140/70  91 L


 


 21 15:47  98.4 F  71   26 H  116/58   90 L


 


 21 13:31        88 L


 


 21 12:51  98.4 F  69   16  130/61   89 L


 


 21 09:00  97.9 F  68   15  119/61   89 L


 


 21 07:34        96


 


 21 01:25   59 L   18  112/83   97








                                Intake and Output











 21





 06:59 14:59 22:59


 


Intake Total   675


 


Balance   675


 


Intake:   


 


  Intake, IV Titration   475





  Amount   


 


    Magnesium Sulfate-D5w Pmx   400





    1 gm In Dextrose/Water 1   





    100ml.bag @ 100 mls/hr   





    IVPB Q1H TERESO Rx#:   





    [id number]   


 


    Sodium Chloride 0.9% 1,   75





    000 ml @ 75 mls/hr IV .   





    [id number] TERESO Rx#:[id number]   


 


  Oral   200


 


Other:   


 


  Voiding Method   Bedside Commode


 


  Weight  90.718 kg 














Results


CBC & Chem 7: 


                                 21 05:54





                                 21 05:54


Labs: 


                  Abnormal Lab Results - Last 24 Hours (Table)











  21 Range/Units





  17:39 17:39 05:54 


 


MCH    26.5 L  (27.0-32.0)  pg


 


MCHC    31.7 L  (32.0-37.0)  g/dL


 


RDW    15.7 H  (11.5-14.5)  %


 


Metamyelocytes %    1 H  (0-0)  %


 


Myelocytes %    1 H  (0-0)  %


 


Lymphocytes # (Manual)    0.59 L  (0.90-5.00)  X 10*3/uL


 


Monocytes # (Manual)    0.18 L  (0.20-1.00)  X 10*3/uL


 


Eosinophils # (Manual)    0 L  (0.04-0.35)  X 10*3/uL


 


Carbon Dioxide     (20.0-27.5)  mmol/L


 


BUN     (9.0-27.0)  mg/dL


 


Est GFR (CKD-EPI)AfAm     (60.0-200.0)   


 


Est GFR (CKD-EPI)NonAf     (60.0-200.0)   


 


BUN/Creatinine Ratio     (12.00-20.00)  Ratio


 


Glucose     ()  mg/dL


 


Magnesium     (1.5-2.4)  mg/dL


 


Ferritin   995.0 H   (10.0-291.0)  ng/mL


 


Procalcitonin  0.32 H    (0.02-0.09)  ng/mL














  21 Range/Units





  05:54 


 


MCH   (27.0-32.0)  pg


 


MCHC   (32.0-37.0)  g/dL


 


RDW   (11.5-14.5)  %


 


Metamyelocytes %   (0-0)  %


 


Myelocytes %   (0-0)  %


 


Lymphocytes # (Manual)   (0.90-5.00)  X 10*3/uL


 


Monocytes # (Manual)   (0.20-1.00)  X 10*3/uL


 


Eosinophils # (Manual)   (0.04-0.35)  X 10*3/uL


 


Carbon Dioxide  18.3 L  (20.0-27.5)  mmol/L


 


BUN  39.1 H  (9.0-27.0)  mg/dL


 


Est GFR (CKD-EPI)AfAm  54.2 L  (60.0-200.0)   


 


Est GFR (CKD-EPI)NonAf  46.7 L  (60.0-200.0)   


 


BUN/Creatinine Ratio  32.58 H  (12.00-20.00)  Ratio


 


Glucose  193 H  ()  mg/dL


 


Magnesium  3.4 H  (1.5-2.4)  mg/dL


 


Ferritin   (10.0-291.0)  ng/mL


 


Procalcitonin   (0.02-0.09)  ng/mL








                      Microbiology - Last 24 Hours (Table)











 21 17:39 Blood Culture - Preliminary





 Blood    No Growth after 24 hours


 


 21 17:39 Blood Culture - Preliminary





 Blood    No Growth after 24 hours
Colonoscopy/Event Note